# Patient Record
Sex: FEMALE | Race: WHITE | Employment: OTHER | URBAN - NONMETROPOLITAN AREA
[De-identification: names, ages, dates, MRNs, and addresses within clinical notes are randomized per-mention and may not be internally consistent; named-entity substitution may affect disease eponyms.]

---

## 2017-03-15 ENCOUNTER — OFFICE VISIT (OUTPATIENT)
Dept: ONCOLOGY | Age: 62
End: 2017-03-15

## 2017-03-15 VITALS
SYSTOLIC BLOOD PRESSURE: 149 MMHG | HEIGHT: 58 IN | DIASTOLIC BLOOD PRESSURE: 85 MMHG | HEART RATE: 83 BPM | WEIGHT: 116.8 LBS | BODY MASS INDEX: 24.52 KG/M2 | RESPIRATION RATE: 16 BRPM | TEMPERATURE: 97.9 F | OXYGEN SATURATION: 97 %

## 2017-03-15 DIAGNOSIS — M85.80 OSTEOPENIA: ICD-10-CM

## 2017-03-15 DIAGNOSIS — Z98.890 S/P LUMPECTOMY, LEFT BREAST: ICD-10-CM

## 2017-03-15 DIAGNOSIS — Z79.811 PROPHYLACTIC USE OF ANASTROZOLE (ARIMIDEX): ICD-10-CM

## 2017-03-15 DIAGNOSIS — C50.212 MALIGNANT NEOPLASM OF UPPER-INNER QUADRANT OF LEFT FEMALE BREAST (HCC): Primary | ICD-10-CM

## 2017-03-15 PROCEDURE — 99214 OFFICE O/P EST MOD 30 MIN: CPT | Performed by: INTERNAL MEDICINE

## 2017-03-15 RX ORDER — IBUPROFEN 200 MG
200 TABLET ORAL EVERY 6 HOURS PRN
Status: ON HOLD | COMMUNITY
End: 2018-06-13 | Stop reason: HOSPADM

## 2017-03-15 ASSESSMENT — ENCOUNTER SYMPTOMS
EYE DISCHARGE: 0
CHEST TIGHTNESS: 0
BLOOD IN STOOL: 0
CONSTIPATION: 0
FACIAL SWELLING: 0
COUGH: 0
ABDOMINAL DISTENTION: 0
VOMITING: 0
SORE THROAT: 0
TROUBLE SWALLOWING: 0
COLOR CHANGE: 0
DIARRHEA: 0
BACK PAIN: 0
ABDOMINAL PAIN: 0
WHEEZING: 0
SHORTNESS OF BREATH: 0
NAUSEA: 0
RECTAL PAIN: 0

## 2017-05-10 ENCOUNTER — OFFICE VISIT (OUTPATIENT)
Dept: FAMILY MEDICINE CLINIC | Age: 62
End: 2017-05-10

## 2017-05-10 VITALS
HEART RATE: 65 BPM | BODY MASS INDEX: 24.14 KG/M2 | TEMPERATURE: 98.6 F | RESPIRATION RATE: 14 BRPM | SYSTOLIC BLOOD PRESSURE: 126 MMHG | HEIGHT: 58 IN | DIASTOLIC BLOOD PRESSURE: 74 MMHG | WEIGHT: 115 LBS

## 2017-05-10 DIAGNOSIS — J40 BRONCHITIS: ICD-10-CM

## 2017-05-10 DIAGNOSIS — I10 ESSENTIAL HYPERTENSION: ICD-10-CM

## 2017-05-10 DIAGNOSIS — G56.03 BILATERAL CARPAL TUNNEL SYNDROME: Primary | ICD-10-CM

## 2017-05-10 DIAGNOSIS — F17.200 NICOTINE DEPENDENCE, UNCOMPLICATED, UNSPECIFIED NICOTINE PRODUCT TYPE: ICD-10-CM

## 2017-05-10 PROCEDURE — 99214 OFFICE O/P EST MOD 30 MIN: CPT | Performed by: FAMILY MEDICINE

## 2017-05-10 RX ORDER — ALBUTEROL SULFATE 90 UG/1
2 AEROSOL, METERED RESPIRATORY (INHALATION) EVERY 6 HOURS PRN
Qty: 1 INHALER | Refills: 3 | Status: SHIPPED | OUTPATIENT
Start: 2017-05-10 | End: 2018-05-31 | Stop reason: SDUPTHER

## 2017-07-19 ENCOUNTER — HOSPITAL ENCOUNTER (OUTPATIENT)
Dept: INFUSION THERAPY | Age: 62
Discharge: HOME | End: 2017-07-19
Payer: COMMERCIAL

## 2017-07-19 ENCOUNTER — OFFICE VISIT (OUTPATIENT)
Dept: ONCOLOGY | Age: 62
End: 2017-07-19
Payer: COMMERCIAL

## 2017-07-19 VITALS
RESPIRATION RATE: 18 BRPM | HEART RATE: 80 BPM | DIASTOLIC BLOOD PRESSURE: 73 MMHG | HEIGHT: 57 IN | BODY MASS INDEX: 24.47 KG/M2 | OXYGEN SATURATION: 96 % | SYSTOLIC BLOOD PRESSURE: 109 MMHG | TEMPERATURE: 97.6 F | WEIGHT: 113.4 LBS

## 2017-07-19 DIAGNOSIS — Z79.811 PROPHYLACTIC USE OF ANASTROZOLE (ARIMIDEX): ICD-10-CM

## 2017-07-19 DIAGNOSIS — M85.80 OSTEOPENIA: ICD-10-CM

## 2017-07-19 DIAGNOSIS — C50.212 MALIGNANT NEOPLASM OF UPPER-INNER QUADRANT OF LEFT FEMALE BREAST (HCC): Primary | ICD-10-CM

## 2017-07-19 DIAGNOSIS — Z98.890 S/P LUMPECTOMY, LEFT BREAST: ICD-10-CM

## 2017-07-19 PROCEDURE — 99214 OFFICE O/P EST MOD 30 MIN: CPT | Performed by: INTERNAL MEDICINE

## 2017-07-19 PROCEDURE — 99211 OFF/OP EST MAY X REQ PHY/QHP: CPT

## 2017-07-19 RX ORDER — ANASTROZOLE 1 MG/1
1 TABLET ORAL DAILY
Qty: 90 TABLET | Refills: 3 | Status: SHIPPED | OUTPATIENT
Start: 2017-07-19 | End: 2018-04-16 | Stop reason: SINTOL

## 2017-07-19 ASSESSMENT — ENCOUNTER SYMPTOMS
TROUBLE SWALLOWING: 0
FACIAL SWELLING: 0
DIARRHEA: 0
ABDOMINAL PAIN: 0
COUGH: 0
RECTAL PAIN: 0
CHEST TIGHTNESS: 0
SHORTNESS OF BREATH: 0
EYE DISCHARGE: 0
BACK PAIN: 0
ABDOMINAL DISTENTION: 0
NAUSEA: 0
CONSTIPATION: 0
WHEEZING: 0
SORE THROAT: 0
VOMITING: 0
COLOR CHANGE: 0
BLOOD IN STOOL: 0

## 2017-09-05 ENCOUNTER — OFFICE VISIT (OUTPATIENT)
Dept: FAMILY MEDICINE CLINIC | Age: 62
End: 2017-09-05
Payer: COMMERCIAL

## 2017-09-05 ENCOUNTER — HOSPITAL ENCOUNTER (OUTPATIENT)
Age: 62
Discharge: HOME | End: 2017-09-05
Payer: COMMERCIAL

## 2017-09-05 VITALS
DIASTOLIC BLOOD PRESSURE: 74 MMHG | HEART RATE: 58 BPM | TEMPERATURE: 98.3 F | BODY MASS INDEX: 24.72 KG/M2 | HEIGHT: 57 IN | SYSTOLIC BLOOD PRESSURE: 125 MMHG | RESPIRATION RATE: 12 BRPM | WEIGHT: 114.6 LBS

## 2017-09-05 DIAGNOSIS — R29.898 RIGHT LEG WEAKNESS: Primary | ICD-10-CM

## 2017-09-05 DIAGNOSIS — J01.90 ACUTE RHINOSINUSITIS: ICD-10-CM

## 2017-09-05 DIAGNOSIS — R29.898 RIGHT HAND WEAKNESS: ICD-10-CM

## 2017-09-05 DIAGNOSIS — R29.898 RIGHT LEG WEAKNESS: ICD-10-CM

## 2017-09-05 LAB
ALBUMIN SERPL-MCNC: 4.4 G/DL (ref 3.5–5.1)
ALP BLD-CCNC: 86 U/L (ref 38–126)
ALT SERPL-CCNC: 12 U/L (ref 11–66)
ANION GAP SERPL CALCULATED.3IONS-SCNC: 14 MEQ/L (ref 8–16)
AST SERPL-CCNC: 14 U/L (ref 5–40)
BASOPHILS # BLD: 1.2 %
BASOPHILS ABSOLUTE: 0.1 THOU/MM3 (ref 0–0.1)
BILIRUB SERPL-MCNC: 0.5 MG/DL (ref 0.3–1.2)
BUN BLDV-MCNC: 15 MG/DL (ref 7–22)
CALCIUM SERPL-MCNC: 10.8 MG/DL (ref 8.5–10.5)
CHLORIDE BLD-SCNC: 102 MEQ/L (ref 98–111)
CO2: 26 MEQ/L (ref 23–33)
CREAT SERPL-MCNC: 0.5 MG/DL (ref 0.4–1.2)
EOSINOPHIL # BLD: 1.2 %
EOSINOPHILS ABSOLUTE: 0.1 THOU/MM3 (ref 0–0.4)
GFR SERPL CREATININE-BSD FRML MDRD: > 90 ML/MIN/1.73M2
GLUCOSE BLD-MCNC: 89 MG/DL (ref 70–108)
HCT VFR BLD CALC: 44.4 % (ref 37–47)
HEMOGLOBIN: 15 GM/DL (ref 12–16)
LYMPHOCYTES # BLD: 18.5 %
LYMPHOCYTES ABSOLUTE: 2.1 THOU/MM3 (ref 1–4.8)
MCH RBC QN AUTO: 30.6 PG (ref 27–31)
MCHC RBC AUTO-ENTMCNC: 33.8 GM/DL (ref 33–37)
MCV RBC AUTO: 90.6 FL (ref 81–99)
MONOCYTES # BLD: 7 %
MONOCYTES ABSOLUTE: 0.8 THOU/MM3 (ref 0.4–1.3)
NUCLEATED RED BLOOD CELLS: 0 /100 WBC
PDW BLD-RTO: 13.2 % (ref 11.5–14.5)
PLATELET # BLD: 284 THOU/MM3 (ref 130–400)
PMV BLD AUTO: 8.8 MCM (ref 7.4–10.4)
POTASSIUM SERPL-SCNC: 4.2 MEQ/L (ref 3.5–5.2)
RBC # BLD: 4.9 MILL/MM3 (ref 4.2–5.4)
RBC # BLD: NORMAL 10*6/UL
SEG NEUTROPHILS: 72.1 %
SEGMENTED NEUTROPHILS ABSOLUTE COUNT: 8.3 THOU/MM3 (ref 1.8–7.7)
SODIUM BLD-SCNC: 142 MEQ/L (ref 135–145)
TOTAL PROTEIN: 7.4 G/DL (ref 6.1–8)
TSH SERPL DL<=0.05 MIU/L-ACNC: 1.51 UIU/ML (ref 0.4–4.2)
VITAMIN B-12: 377 PG/ML (ref 211–911)
WBC # BLD: 11.5 THOU/MM3 (ref 4.8–10.8)

## 2017-09-05 PROCEDURE — 36415 COLL VENOUS BLD VENIPUNCTURE: CPT

## 2017-09-05 PROCEDURE — 99214 OFFICE O/P EST MOD 30 MIN: CPT | Performed by: FAMILY MEDICINE

## 2017-09-05 PROCEDURE — 82607 VITAMIN B-12: CPT

## 2017-09-05 PROCEDURE — 80050 GENERAL HEALTH PANEL: CPT

## 2017-09-05 RX ORDER — DOXYCYCLINE HYCLATE 100 MG
100 TABLET ORAL 2 TIMES DAILY
Qty: 20 TABLET | Refills: 0 | Status: SHIPPED | OUTPATIENT
Start: 2017-09-05 | End: 2017-12-27 | Stop reason: SDUPTHER

## 2017-09-06 ENCOUNTER — HOSPITAL ENCOUNTER (OUTPATIENT)
Age: 62
Discharge: HOME | End: 2017-09-06
Payer: COMMERCIAL

## 2017-09-06 ENCOUNTER — TELEPHONE (OUTPATIENT)
Dept: FAMILY MEDICINE CLINIC | Age: 62
End: 2017-09-06

## 2017-09-06 DIAGNOSIS — E83.52 HYPERCALCEMIA: Primary | ICD-10-CM

## 2017-09-06 DIAGNOSIS — E83.52 HYPERCALCEMIA: ICD-10-CM

## 2017-09-06 LAB — PTH INTACT: 17.3 PG/ML (ref 15–65)

## 2017-09-06 PROCEDURE — 83970 ASSAY OF PARATHORMONE: CPT

## 2017-09-06 PROCEDURE — 36415 COLL VENOUS BLD VENIPUNCTURE: CPT

## 2017-09-26 ENCOUNTER — TELEPHONE (OUTPATIENT)
Dept: FAMILY MEDICINE CLINIC | Age: 62
End: 2017-09-26

## 2017-09-26 ENCOUNTER — PROCEDURE VISIT (OUTPATIENT)
Dept: NEUROLOGY | Age: 62
End: 2017-09-26
Payer: COMMERCIAL

## 2017-09-26 DIAGNOSIS — M54.16 RIGHT LUMBAR RADICULOPATHY: Primary | ICD-10-CM

## 2017-09-26 DIAGNOSIS — R29.898 RIGHT LEG WEAKNESS: ICD-10-CM

## 2017-09-26 PROCEDURE — 95908 NRV CNDJ TST 3-4 STUDIES: CPT | Performed by: PSYCHIATRY & NEUROLOGY

## 2017-09-26 PROCEDURE — 95886 MUSC TEST DONE W/N TEST COMP: CPT | Performed by: PSYCHIATRY & NEUROLOGY

## 2017-09-28 ENCOUNTER — HOSPITAL ENCOUNTER (OUTPATIENT)
Age: 62
Discharge: HOME | End: 2017-09-28
Payer: COMMERCIAL

## 2017-09-28 ENCOUNTER — TELEPHONE (OUTPATIENT)
Dept: FAMILY MEDICINE CLINIC | Age: 62
End: 2017-09-28

## 2017-09-28 DIAGNOSIS — E83.52 HYPERCALCEMIA: ICD-10-CM

## 2017-09-28 LAB — CALCIUM SERPL-MCNC: 9.1 MG/DL (ref 8.5–10.5)

## 2017-09-28 PROCEDURE — 82310 ASSAY OF CALCIUM: CPT

## 2017-09-28 PROCEDURE — 36415 COLL VENOUS BLD VENIPUNCTURE: CPT

## 2017-10-05 ENCOUNTER — OFFICE VISIT (OUTPATIENT)
Dept: FAMILY MEDICINE CLINIC | Age: 62
End: 2017-10-05
Payer: COMMERCIAL

## 2017-10-05 VITALS
HEIGHT: 57 IN | RESPIRATION RATE: 10 BRPM | DIASTOLIC BLOOD PRESSURE: 68 MMHG | TEMPERATURE: 98.2 F | HEART RATE: 80 BPM | BODY MASS INDEX: 25.03 KG/M2 | SYSTOLIC BLOOD PRESSURE: 124 MMHG | WEIGHT: 116 LBS

## 2017-10-05 DIAGNOSIS — M54.16 LUMBAR RADICULOPATHY, CHRONIC: ICD-10-CM

## 2017-10-05 DIAGNOSIS — M72.0 DUPUYTREN'S CONTRACTURE OF RIGHT HAND: Primary | ICD-10-CM

## 2017-10-05 PROCEDURE — 99213 OFFICE O/P EST LOW 20 MIN: CPT | Performed by: FAMILY MEDICINE

## 2017-10-05 NOTE — MR AVS SNAPSHOT
24.68 kg/m2 Current Every Day Smoker          Instructions    You may receive a survey about your visit with us today. The feedback from our patients helps us identify what is working well and where the service to all patients can be enhanced. Thank you! Medications and Orders      Your Current Medications Are              Calcium Carb-Cholecalciferol (CALCIUM 600+D) 600-800 MG-UNIT TABS Take 1 tablet by mouth 2 times daily    anastrozole (ARIMIDEX) 1 MG tablet Take 1 tablet by mouth daily    albuterol sulfate HFA (VENTOLIN HFA) 108 (90 BASE) MCG/ACT inhaler Inhale 2 puffs into the lungs every 6 hours as needed for Wheezing    ibuprofen (ADVIL;MOTRIN) 200 MG tablet Take 200 mg by mouth every 6 hours as needed for Pain    amLODIPine (NORVASC) 10 MG tablet Take 1 tablet by mouth daily      Allergies              Naproxen Anaphylaxis    Prednisone Swelling, Other (See Comments)    Angioedema    Ultram [Tramadol] Itching, Rash      We Ordered/Performed the following           External Referral To Orthopedic Surgery     Comments: The patient can be scheduled with any member of the group, including the provider with the first available appointments. Falguni Lowery MD     Scheduling Instructions:    [address], [address] MD  [address] [place] [address],[address] [facility]  [phone]    Comments: The patient can be scheduled with any member of the group, including the provider with the first available appointments.      ?Duputrne's contracture of the right hand         Additional Information        Basic Information     Date Of Birth Sex Race Ethnicity Preferred Language    1955 Female White Non-/Non  English      Problem List as of 10/5/2017  Date Reviewed: 10/5/2017                Lumbar radiculopathy, chronic    Essential hypertension    Bilateral carpal tunnel syndrome    S/P lumpectomy and sln bx, left breast Malignant neoplasm of upper-inner quadrant of left female breast ([facility] [address]    Nicotine dependence    S/P hemorrhoidectomy    Uterine prolapse    Blood in stool    Change in bowel habits    Abdominal pain, left lower quadrant    Hemorrhoids      Immunizations as of 10/5/2017     Name Date    Influenza Vaccine, unspecified formulation 10/13/2016      Preventive Care        Date Due    Hepatitis C screening is recommended for all adults regardless of risk factors born between [facility] at least once (lifetime) who have never been tested. 1955    HIV screening is recommended for all people regardless of risk factors  aged 15-65 years at least once (lifetime) who have never been HIV tested. 2/28/1968    Tetanus Combination Vaccine (1 - Tdap) 2/28/1972    Pneumococcal Vaccine - Pneumovax for adults aged 19-64 years with: chronic heart disease, chronic lung disease, diabetes mellitus, alcoholism, chronic liver disease, or cigarette smoking. (1 of 1 - PPSV23) 2/28/1972    Cholesterol Screening 2/28/1993    Zoster Vaccine 2/28/2013    Yearly Flu Vaccine (1) 9/1/2017    Mammograms are recommended every 2 years for low/average risk patients aged 48 - 69, and every year for high risk patients per updated national guidelines. However these guidelines can be individualized by your provider. 4/13/2018    Colonoscopy 10/27/2024            [employer] Signup           Our records indicate that you have an active [employer] account. You can view your After Visit Summary by going to [email or web address] and logging in with your [employer] username and password. If you don't have a [employer] username and password but a parent or guardian has access to your record, the parent or guardian should login with their own [employer] username and password and access your record to view the After Visit Summary.      Additional Information  If you have questions, please contact the physician practice where you receive care. Remember, [employer] is NOT to be used for urgent needs. For medical emergencies, dial 911. For questions regarding your [email or web address] account call [phone]. If you have a clinical question, please call your doctor's office.

## 2017-10-11 ENCOUNTER — TELEPHONE (OUTPATIENT)
Dept: FAMILY MEDICINE CLINIC | Age: 62
End: 2017-10-11

## 2017-10-11 ENCOUNTER — HOSPITAL ENCOUNTER (OUTPATIENT)
Dept: GENERAL RADIOLOGY | Age: 62
Discharge: HOME | End: 2017-10-11
Payer: COMMERCIAL

## 2017-10-11 ENCOUNTER — HOSPITAL ENCOUNTER (OUTPATIENT)
Age: 62
Discharge: HOME | End: 2017-10-11
Payer: COMMERCIAL

## 2017-10-11 DIAGNOSIS — M72.0 DUPUYTREN'S CONTRACTURE OF RIGHT HAND: ICD-10-CM

## 2017-10-11 PROCEDURE — 73130 X-RAY EXAM OF HAND: CPT

## 2017-10-11 NOTE — TELEPHONE ENCOUNTER
----- Message from Rocio Orellana DO sent at 10/11/2017  3:34 PM EDT -----  Kristina Ready with some arthritis changes, otherwise normal, recommend following up with ortho as discussed.

## 2017-10-17 ENCOUNTER — OFFICE VISIT (OUTPATIENT)
Dept: FAMILY MEDICINE CLINIC | Age: 62
End: 2017-10-17
Payer: COMMERCIAL

## 2017-10-17 VITALS
WEIGHT: 116 LBS | HEIGHT: 57 IN | HEART RATE: 60 BPM | DIASTOLIC BLOOD PRESSURE: 66 MMHG | SYSTOLIC BLOOD PRESSURE: 108 MMHG | BODY MASS INDEX: 25.03 KG/M2

## 2017-10-17 DIAGNOSIS — R29.898 HAND DYSFUNCTION: Primary | ICD-10-CM

## 2017-10-17 DIAGNOSIS — M79.641 PAIN OF RIGHT HAND: ICD-10-CM

## 2017-10-17 PROCEDURE — 99213 OFFICE O/P EST LOW 20 MIN: CPT | Performed by: FAMILY MEDICINE

## 2017-10-17 NOTE — PROGRESS NOTES
[facility] PROFESSIONAL SERVS  [facility]  [address] [address]  Dept: [phone]  Dept Fax: [phone]: [phone]  PROGRESS NOTE    Referring provider:  [facility], [address]    Visit Date: 10/17/2017    Sarah Beasley is a 57 y.o. female who presents today for:  Chief Complaint   Patient presents with    Hand Pain     right hand   per Dr Meryle Poet Duputrne's contracture of the right hand       Subjective:  HPI     Right hand: Had decreased ROM in 2nd and 4th MCPs but she is able to fully extend it. No injury. Reports nodules in her palm. Denies triggering of fingers. Seen by PCP. Has had carpal tunnel surgery on right hand. Having problems gripping objects and writing with a pen. Problems with using utensils. Denies neck pain.      is present    Review of Systems  Past Medical History:   Diagnosis Date    Chronic kidney disease     Diverticulosis     Essential hypertension 5/10/2017    GERD (gastroesophageal reflux disease)     IBS (irritable bowel syndrome)     Malignant neoplasm of upper-inner quadrant of left female breast ([address] 9/13/2016    Nausea & vomiting     PONV (postoperative nausea and vomiting)       Past Surgical History:   Procedure Laterality Date    BACK SURGERY  unsure    CARPAL TUNNEL RELEASE Right 06/2017    [facility]    COLONOSCOPY  unsure    Dr. Jewell Murphy COLONOSCOPY  10/10/14    Dr. Tha Saldana  80    [address]  12/16/2014    HYSTERECTOMY, VAGINAL  12/16/2014    KIDNEY STONE SURGERY      TUBAL LIGATION  80    WISDOM TOOTH EXTRACTION  1979    WRIST GANGLION EXCISION Left      Family History   Problem Relation Age of Onset    Cancer Mother      renal cell carcinoma    Kidney Cancer Mother 46    Cirrhosis Father     Diabetes Sister     Kidney Cancer Sister 54    Cancer Sister     Diabetes Brother     Breast Cancer Paternal Aunt 54    Ovarian Cancer Neg Hx      Social History   Substance Use Topics    Smoking status: Current Every Day Smoker     Packs/day: 1.50     Years: 20.00     Types: Cigarettes    Smokeless tobacco: Never Used    Alcohol use No      Current Outpatient Prescriptions   Medication Sig Dispense Refill    anastrozole (ARIMIDEX) 1 MG tablet Take 1 tablet by mouth daily 90 tablet 3    albuterol sulfate HFA (VENTOLIN HFA) 108 (90 BASE) MCG/ACT inhaler Inhale 2 puffs into the lungs every 6 hours as needed for Wheezing 1 Inhaler 3    ibuprofen (ADVIL;MOTRIN) 200 MG tablet Take 200 mg by mouth every 6 hours as needed for Pain      amLODIPine (NORVASC) 10 MG tablet Take 1 tablet by mouth daily 90 tablet 3     No current facility-administered medications for this visit. Allergies   Allergen Reactions    Naproxen Anaphylaxis    Prednisone Swelling and Other (See Comments)     Angioedema    Ultram [Tramadol] Itching and Rash     Health Maintenance   Topic Date Due    Hepatitis C screen  1955    HIV screen  02/28/1968    DTaP/Tdap/Td vaccine (1 - Tdap) 02/28/1972    Pneumococcal med risk (1 of 1 - PPSV23) 02/28/1972    Lipid screen  02/28/1993    Zostavax vaccine  02/28/2013    Breast cancer screen  04/13/2018    Colon cancer screen colonoscopy  10/27/2024    Flu vaccine  Completed       Objective:     /66 (Site: Left Arm, Position: Sitting, Cuff Size: Medium Adult)   Pulse 60   Ht 4' 9\" (1.448 m)   Wt 116 lb (52.6 kg)   BMI 25.10 kg/m²   Physical Exam   Constitutional: She is oriented to person, place, and time. She appears well-developed and well-nourished. No distress. Neurological: She is alert and oriented to person, place, and time. Psychiatric: She has a normal mood and affect. Her behavior is normal.   Vitals reviewed. Right hand:  No swelling. Several nodules in palm of hand. Full ROM of all fingers in flexion and extension. Good  strength.   Some extensor tendon slipping of fingers 2-5 when flexing fingers. Distal sensation and pulses are intact in Right hand. Impression/Plan:  1. Hand dysfunction  -right hand pain with decreased strength and decreased ROM. Possible Dupuytren's? Unlikely trigger finger. No loss of extension.  -referral to [facility] for further eval.  She declines OT eval.  - [facility] [place] [facility] Marrero MD    2. Pain of right hand  - [facility] [place] [facility] Marrero MD    They voiced understanding. All questions answered. They agreed with treatment plan. Return if symptoms worsen or fail to improve.        Electronically signed by Balbina Diaz MD on 10/17/2017 at 2:21 PM

## 2017-10-26 ENCOUNTER — HOSPITAL ENCOUNTER (OUTPATIENT)
Dept: RADIATION ONCOLOGY | Age: 62
Discharge: HOME | End: 2017-10-26
Payer: COMMERCIAL

## 2017-10-26 DIAGNOSIS — Z17.0 MALIGNANT NEOPLASM OF UPPER-INNER QUADRANT OF LEFT BREAST IN FEMALE, ESTROGEN RECEPTOR POSITIVE (HCC): Primary | ICD-10-CM

## 2017-10-26 DIAGNOSIS — C50.212 MALIGNANT NEOPLASM OF UPPER-INNER QUADRANT OF LEFT BREAST IN FEMALE, ESTROGEN RECEPTOR POSITIVE (HCC): Primary | ICD-10-CM

## 2017-10-26 PROCEDURE — 99211 OFF/OP EST MAY X REQ PHY/QHP: CPT

## 2017-10-26 NOTE — PROGRESS NOTES
[address] [place] [address]  [address], [address]  Phone: [phone]   Toll Free: [phone]   Fax: [phone]    RADIATION ONCOLOGY FOLLOW UP REPORT    PATIENT NAME:  Maria Del Carmen Tanner     : 1955  MEDICAL RECORD NO: [id number]    LOCATION: [facility] NO: [id number]      PROVIDER: Jossie Hill CNP        DATE OF SERVICE: 10/26/2017    FOLLOW UP PHYSICIANS: Dr. Raad Land, Dr. Mode Pandey, Dr. Mesha Gibson, Dr. Malena Ellsworth, Dr. Buck Vora    DIAGNOSIS: C 50.212 malignant neoplasm of upper inner quadrant of the left breast, stage IA, T1b N0 M0, ER positive, SC positive, HER-2/eleazar negative, with extensive intraductal component    DATE OF DIAGNOSIS: 2016    END OF TREATMENT DATE: 2016    ECOG PERFORMANCE STATUS: 0    PAIN: Occasional twinges to the left breast    CHAPERONE: Spouse      HPI: Laura Thomson underwent a screening mammogram in 2016 which returned concerning for an irregular density with indistinct margins in the upper left breast.  Ultrasound confirmed the presence of the suspicious density biopsy was recommended. On 2016 a biopsy was performed confirming the presence of an invasive ductal carcinoma. After discussing her options And elected to undergo breast conservation surgery. She underwent lumpectomy and sentinel lymph node biopsy 2016. Final pathology showing moderately differentiated invasive ductal carcinoma with high-grade DCIS. Surgical margins were free of invasion and sentinel lymph node was negative for malignancy. She was referred to radiation oncology to discuss the role of radiation therapy in the management of her breast cancer. She elected to undergo radiation therapy. She tolerated treatment well without any unexpected side effects.     INTERVAL HISTORY: Returns to [facility] 10/26/2017 First posttreatment checkup as part of her survivorship care after undergoing breast conservation surgery and Without any adenopathy. LYMPH: There is no supraclavicular or axillary adenopathy. LUNGS: Clear no adventitious sounds. HEART: Regular rate, rhythm without murmur. BREASTS: Right breast is soft, nontender. There are no suspicious masses lumps or architectural distortions. Nipple is everted without discharge. Left breast is soft, nontender to palpation. Surgical incision sites are well-healed without signs of infection. There are no suspicious masses or lumps with palpation. There is post surgical architectural distortion. Nipple is everted without discharge. There is mild soft tissue swelling. There is no erythema. Breast is warm to touch. ABDOMEN: Soft, flat nontender active bowel sounds ×4  EXTREMITIES: Without clubbing or cyanosis. She has full ROM to bilateral upper extremities. She does have decreased hand grasp to the left hand. Radial pulses are 2+. NEUROLOGIC EXAMINATION: cranial nerves grossly intact. ASSESSMENT AND PLAN: Ena Elizabeth continues to recuperate from her left breast radiation therapy. At this time she has no physical or mammographic evidence of recurrent disease. She continues to have intermittent left breast discomfort and fatigue. During her last visit attempted schedule her with lymphedema but she wanted to wait until she had carpal tunnel surgery completed. Unfortunately the surgery was not successful and she is scheduled to see Dr. Kristyn Tinajero at [facility] at the [facility] for further evaluation and management. Discussed physical therapy with her but she would like to wait until her evaluations at [facility] are completed first.     Plan:   1. Currently there is no physical or mammographic evidence of recurrent disease. 2. Will schedule bilateral mammogram to be competed now. 3. Fatigue/left breast tenderness/warmth: discussed physical therapy but would like to wait until evaluations are completed at [facility] the [facility]. 4. Arimidex: tolerating well  5.  DEXA scan: completed 12/2016 osteopenia was on calcium however ca level was elevated and calcium is on hold and being monitored. 6. Continue follow up with other physicians as scheduled. 7. Follow up here in 6 months, she is aware she can call prior to this appointment for any questions or concerns.        Electronically signed by Nabeel Smalls CNP on 10/26/17 at 2:37 PM

## 2017-10-30 ENCOUNTER — HOSPITAL ENCOUNTER (OUTPATIENT)
Dept: WOMENS IMAGING | Age: 62
Discharge: HOME | End: 2017-10-30
Payer: COMMERCIAL

## 2017-10-30 DIAGNOSIS — C50.212 MALIGNANT NEOPLASM OF UPPER-INNER QUADRANT OF LEFT BREAST IN FEMALE, ESTROGEN RECEPTOR POSITIVE (HCC): ICD-10-CM

## 2017-10-30 DIAGNOSIS — Z17.0 MALIGNANT NEOPLASM OF UPPER-INNER QUADRANT OF LEFT BREAST IN FEMALE, ESTROGEN RECEPTOR POSITIVE (HCC): ICD-10-CM

## 2017-10-30 PROCEDURE — G0204 DX MAMMO INCL CAD BI: HCPCS

## 2017-12-05 ENCOUNTER — TELEPHONE (OUTPATIENT)
Dept: FAMILY MEDICINE CLINIC | Age: 62
End: 2017-12-05

## 2017-12-05 NOTE — TELEPHONE ENCOUNTER
Pt informed and verbalized understanding. she going to wait a couple of days to see what happens and make an appointment if she doesn't get better.

## 2017-12-26 DIAGNOSIS — I10 ESSENTIAL HYPERTENSION: ICD-10-CM

## 2017-12-26 RX ORDER — AMLODIPINE BESYLATE 10 MG/1
10 TABLET ORAL DAILY
Qty: 90 TABLET | Refills: 3 | Status: ON HOLD | OUTPATIENT
Start: 2017-12-26 | End: 2018-10-29 | Stop reason: HOSPADM

## 2017-12-27 ENCOUNTER — OFFICE VISIT (OUTPATIENT)
Dept: FAMILY MEDICINE CLINIC | Age: 62
End: 2017-12-27
Payer: COMMERCIAL

## 2017-12-27 VITALS
BODY MASS INDEX: 24.47 KG/M2 | WEIGHT: 113.4 LBS | SYSTOLIC BLOOD PRESSURE: 118 MMHG | HEIGHT: 57 IN | OXYGEN SATURATION: 97 % | DIASTOLIC BLOOD PRESSURE: 82 MMHG | TEMPERATURE: 98.5 F | HEART RATE: 88 BPM | RESPIRATION RATE: 12 BRPM

## 2017-12-27 DIAGNOSIS — J01.90 ACUTE RHINOSINUSITIS: ICD-10-CM

## 2017-12-27 PROCEDURE — 99213 OFFICE O/P EST LOW 20 MIN: CPT | Performed by: FAMILY MEDICINE

## 2017-12-27 RX ORDER — BENZONATATE 200 MG/1
200 CAPSULE ORAL 3 TIMES DAILY PRN
Qty: 30 CAPSULE | Refills: 0 | Status: SHIPPED | OUTPATIENT
Start: 2017-12-27 | End: 2018-01-06

## 2017-12-27 RX ORDER — DOXYCYCLINE HYCLATE 100 MG
100 TABLET ORAL 2 TIMES DAILY
Qty: 20 TABLET | Refills: 0 | Status: SHIPPED | OUTPATIENT
Start: 2017-12-27 | End: 2018-01-06

## 2017-12-27 NOTE — PROGRESS NOTES
(1.448 m)   Wt 113 lb 6.4 oz (51.4 kg)   SpO2 97%   BMI 24.53 kg/m²   General appearance: alert, well appearing, and in no distress. ENT exam reveals - neck without nodes, pharynx erythematous without exudate and nasal mucosa congested. CVS exam: normal rate, regular rhythm, normal S1, S2, no murmurs, rubs, clicks or gallops. Chest:clear to auscultation, no wheezes, rales or rhonchi, symmetric air entry. Abdominal exam: soft, nontender, nondistended, no masses or organomegaly. Extremities:  No clubbing, cyanosis or edema  Skin exam - normal coloration and turgor, no rashes, no suspicious skin lesions noted. Psych -  Affect appropriate. Thought process is normal without evidence of depression or psychosis. Good insight and appropriae interaction. Cognition and memory appear to be intact. ASSESSMENT & PLAN  Laura Thomson was seen today for drainage, cough and headache. Diagnoses and all orders for this visit:    Acute rhinosinusitis  -     doxycycline hyclate (VIBRA-TABS) 100 MG tablet; Take 1 tablet by mouth 2 times daily for 10 days  -     benzonatate (TESSALON) 200 MG capsule; Take 1 capsule by mouth 3 times daily as needed for Cough        Return if symptoms worsen or fail to improve.     -Patient advised to call immediately or go to ER if any worsening of symptoms  -Patient counseled on conservative care including fluids, rest and OTC meds    Laura Thomson received counseling on the following healthy behaviors: medication adherence  Reviewed prior labs and health maintenance. Continue current medications, diet and exercise. Discussed use, benefit, and side effects of prescribed medications. Barriers to medication compliance addressed. Patient given educational materials - see patient instructions. All patient questions answered. Patient voiced understanding. I have reviewed this patient's history, habits, and medication list and have updated the chart where appropriate.

## 2018-02-27 DIAGNOSIS — R29.898 HAND DYSFUNCTION: Primary | ICD-10-CM

## 2018-03-14 ENCOUNTER — OFFICE VISIT (OUTPATIENT)
Dept: ONCOLOGY | Age: 63
End: 2018-03-14
Payer: MEDICARE

## 2018-03-14 ENCOUNTER — HOSPITAL ENCOUNTER (OUTPATIENT)
Dept: INFUSION THERAPY | Age: 63
Discharge: HOME | End: 2018-03-14
Payer: MEDICARE

## 2018-03-14 VITALS
SYSTOLIC BLOOD PRESSURE: 114 MMHG | WEIGHT: 112.8 LBS | HEIGHT: 57 IN | OXYGEN SATURATION: 95 % | DIASTOLIC BLOOD PRESSURE: 74 MMHG | RESPIRATION RATE: 16 BRPM | BODY MASS INDEX: 24.34 KG/M2 | HEART RATE: 82 BPM | TEMPERATURE: 98.1 F

## 2018-03-14 DIAGNOSIS — F17.219 NICOTINE DEPENDENCE, CIGARETTES, WITH UNSPECIFIED NICOTINE-INDUCED DISORDERS: Primary | ICD-10-CM

## 2018-03-14 DIAGNOSIS — Z98.890 S/P LUMPECTOMY, LEFT BREAST: ICD-10-CM

## 2018-03-14 DIAGNOSIS — Z79.811 PROPHYLACTIC USE OF ANASTROZOLE (ARIMIDEX): ICD-10-CM

## 2018-03-14 DIAGNOSIS — Z17.0 MALIGNANT NEOPLASM OF UPPER-INNER QUADRANT OF LEFT BREAST IN FEMALE, ESTROGEN RECEPTOR POSITIVE (HCC): ICD-10-CM

## 2018-03-14 DIAGNOSIS — C50.212 MALIGNANT NEOPLASM OF UPPER-INNER QUADRANT OF LEFT BREAST IN FEMALE, ESTROGEN RECEPTOR POSITIVE (HCC): ICD-10-CM

## 2018-03-14 PROCEDURE — G8400 PT W/DXA NO RESULTS DOC: HCPCS | Performed by: INTERNAL MEDICINE

## 2018-03-14 PROCEDURE — 99211 OFF/OP EST MAY X REQ PHY/QHP: CPT

## 2018-03-14 PROCEDURE — 3014F SCREEN MAMMO DOC REV: CPT | Performed by: INTERNAL MEDICINE

## 2018-03-14 PROCEDURE — G0296 VISIT TO DETERM LDCT ELIG: HCPCS | Performed by: INTERNAL MEDICINE

## 2018-03-14 PROCEDURE — 99214 OFFICE O/P EST MOD 30 MIN: CPT | Performed by: INTERNAL MEDICINE

## 2018-03-14 PROCEDURE — 4040F PNEUMOC VAC/ADMIN/RCVD: CPT | Performed by: INTERNAL MEDICINE

## 2018-03-14 PROCEDURE — 1090F PRES/ABSN URINE INCON ASSESS: CPT | Performed by: INTERNAL MEDICINE

## 2018-03-14 PROCEDURE — 3017F COLORECTAL CA SCREEN DOC REV: CPT | Performed by: INTERNAL MEDICINE

## 2018-03-14 PROCEDURE — G8482 FLU IMMUNIZE ORDER/ADMIN: HCPCS | Performed by: INTERNAL MEDICINE

## 2018-03-14 PROCEDURE — 1123F ACP DISCUSS/DSCN MKR DOCD: CPT | Performed by: INTERNAL MEDICINE

## 2018-03-14 PROCEDURE — 4004F PT TOBACCO SCREEN RCVD TLK: CPT | Performed by: INTERNAL MEDICINE

## 2018-03-14 PROCEDURE — G8427 DOCREV CUR MEDS BY ELIG CLIN: HCPCS | Performed by: INTERNAL MEDICINE

## 2018-03-14 PROCEDURE — G8420 CALC BMI NORM PARAMETERS: HCPCS | Performed by: INTERNAL MEDICINE

## 2018-03-14 ASSESSMENT — ENCOUNTER SYMPTOMS
BLOOD IN STOOL: 0
TROUBLE SWALLOWING: 0
BACK PAIN: 0
DIARRHEA: 0
NAUSEA: 0
SORE THROAT: 0
ABDOMINAL DISTENTION: 0
FACIAL SWELLING: 0
SHORTNESS OF BREATH: 0
VOMITING: 0
EYE DISCHARGE: 0
CONSTIPATION: 0
COLOR CHANGE: 0
COUGH: 0
ABDOMINAL PAIN: 0
WHEEZING: 0
CHEST TIGHTNESS: 0
RECTAL PAIN: 0

## 2018-03-14 NOTE — PROGRESS NOTES
[facility] PROFESSIONAL SERVS  [facility]  [address]  [address] [address],[address]  [address] [address]  Dept: [phone]  Dept Fax: [phone]: [phone]     Visit Date: 3/14/2018     Sejal Queen is a 70 y.o. female who presents today for:   Chief Complaint   Patient presents with   Margarito Colon        HPI   Mrs. Craol Davis is a 57-year-old female with stage I B left breast cancer September 2016. The patient had digital screening bilateral mammogram on August 31, 2016 that showed irregular density in the left breast that was indeterminate but confirmed on the compression and lateral medial views. On September 9, 2016 she underwent left breast upper inner quadrant core biopsy that showed invasive ductal carcinoma grade 2. IHC was positive for estrogen receptor expression in 95% of cells, and progesterone receptor expression in 80% of cells. Subsequently the patient met with the surgeon Dr. Mustapha Schulte and after discussing her treatment options, she decided to proceed with left breast lumpectomy and sentinel lymph node mapping and biopsy. Her surgery was performed on September 29, 2016. Final pathology report showed:  Specimen Laterality  Left  Tumor Site: Invasive Carcinoma  Upper inner quadrant  Tumor Size: Size of Largest Invasive Carcinoma  Greatest dimension of largest focus of invasion  > 1 mm:  0.9 cm  Histologic Type  Invasive ductal carcinoma, no special type.   Histologic Grade: Johnny Histologic Score  Glandular (Acinar)/Tubular Differentiation  Score 3:  < 10% of tumor area forming glandular/tubular structures  Overall Grade  Grade 2: scores of 6 or 7  Tumor Focality (required only if more than 1 focus of invasive  carcinoma is present)  Single focus of invasive carcinoma  Ductal Carcinoma In Situ (DCIS)  DCIS is present  Negative for extensive intraductal component (EIC)  Size (Extent) of DCIS  Estimated size (extent) of DCIS (greatest dimension using history on March 14, 2018: The patient reports severe fatigue. She believes that Arimidex and is responsible for her tiredness. Her hot flashes are stable. She denies having any arthralgia. She has Dupuytren contracture in her right hand for which she underwent recent surgery.     Past Medical History:   Diagnosis Date    Chronic kidney disease     Diverticulosis     Essential hypertension 5/10/2017    GERD (gastroesophageal reflux disease)     IBS (irritable bowel syndrome)     Malignant neoplasm of upper-inner quadrant of left female breast ([address] 9/13/2016    Nausea & vomiting     PONV (postoperative nausea and vomiting)       Past Surgical History:   Procedure Laterality Date    BACK SURGERY  unsure    CARPAL TUNNEL RELEASE Right 06/2017    [facility]    COLONOSCOPY  unsure    Dr. Huffman Batch COLONOSCOPY  10/10/14    Dr. Lopez Countess  80    [address]  12/16/2014    HYSTERECTOMY, VAGINAL  12/16/2014    KIDNEY STONE SURGERY      TUBAL LIGATION  80    WISDOM TOOTH EXTRACTION  1979    WRIST GANGLION EXCISION Left       Family History   Problem Relation Age of Onset    Cancer Mother      renal cell carcinoma    Kidney Cancer Mother 46    Cirrhosis Father     Diabetes Sister     Kidney Cancer Sister 54    Cancer Sister     Diabetes Brother     Breast Cancer Paternal Aunt 54    Ovarian Cancer Neg Hx       Social History   Substance Use Topics    Smoking status: Current Every Day Smoker     Packs/day: 1.50     Years: 20.00     Types: Cigarettes    Smokeless tobacco: Never Used    Alcohol use No      Current Outpatient Prescriptions   Medication Sig Dispense Refill    amLODIPine (NORVASC) 10 MG tablet Take 1 tablet by mouth daily 90 tablet 3    anastrozole (ARIMIDEX) 1 MG tablet Take 1 tablet by mouth daily 90 tablet 3    albuterol sulfate HFA (VENTOLIN HFA) 108 (90 BASE) MCG/ACT inhaler Inhale 2 puffs into the lungs every 6 hours as needed for Negative for dizziness, tremors, seizures, speech difficulty, weakness, light-headedness, numbness and headaches. Hematological: Negative for adenopathy. Does not bruise/bleed easily. Psychiatric/Behavioral: Negative for confusion and sleep disturbance. The patient is not nervous/anxious. Objective:   Physical Exam   Constitutional: She is oriented to person, place, and time. She appears well-developed and well-nourished. No distress. HENT:   Head: Normocephalic. Mouth/Throat: Oropharynx is clear and moist. No oropharyngeal exudate. Eyes: EOM are normal. Pupils are equal, round, and reactive to light. Right eye exhibits no discharge. Left eye exhibits no discharge. No scleral icterus. Neck: Normal range of motion. Neck supple. No JVD present. No tracheal deviation present. No thyromegaly present. Cardiovascular: Normal rate and normal heart sounds. Exam reveals no gallop and no friction rub. No murmur heard. Pulmonary/Chest: Effort normal and breath sounds normal. No stridor. No respiratory distress. She has no wheezes. She has no rales. She exhibits no tenderness. Abdominal: Soft. Bowel sounds are normal. She exhibits no distension and no mass. There is no tenderness. There is no rebound. Musculoskeletal: Normal range of motion. She exhibits no edema. Lymphadenopathy:     She has no cervical adenopathy. Neurological: She is alert and oriented to person, place, and time. She has normal reflexes. No cranial nerve deficit. She exhibits normal muscle tone. Skin: Skin is warm. No rash noted. No erythema. Psychiatric: She has a normal mood and affect. Her behavior is normal. Judgment and thought content normal.   Vitals reviewed.      /74 (Site: Right Arm, Position: Sitting, Cuff Size: Medium Adult)   Pulse 82   Temp 98.1 °F (36.7 °C) (Oral)   Resp 16   Ht 4' 9.01\" (1.448 m)   Wt 112 lb 12.8 oz (51.2 kg)   SpO2 95%   BMI 24.40 kg/m²      DEXA study in December 2016 showed:  IMPRESSION: OSTEOPENIA   Patient is at medium risk for fracture. Mammogram On October 30, 2017 showed: Benign findings, BIRADS category 2  Assessment:   1. Stage I B, ER positive, IN positive, HER-2/eleazar negative left breast cancer. The patient is status post a left lumpectomy with sentinel lymph node biopsy. Her breast cancer had multiple good prognostic features: 9 mm in size, strong estrogen receptor expression, strong progesterone receptor expression, HER-2/eleazar negativity. The only concerning feature of her breast cancer was high (40%) Ki-67 proliferation index. Therefore her breast cancer was assessed by Breast Oncotype Dx assay. It showed recurrent score of 20, translating into 13% risk of distant disease recurrence during the next 10 years. Decision was made  not to proceed with adjuvant chemotherapy. She completed adjuvant radiation treatment to the reminder of her left breast on December 4, 2016.    2. Adjuvant hormonal therapy. The patient started Adjuvant hormonal therapy with Arimidex in December 2016. She reports worsening fatigue. She blames Arimidex for her tiredness. Therefore she was instructed to stop taking Arimidex for 1 month. We will reevaluate her after this break. No evidence of distant disease recurrence on today's physical examination. Her annual breast mammogram in October 2017 was benign   3. Osteopenia. The patient was instructed to take calcium with vitamin D. She also received recommendation to exercise on a regular basis. Recommendation is to walk 3-4 times per week. 4.  The patient is a heavy smoker.   She agreed to have lung screening with low dose CT of the chest.    She will return to clinic in 1 month, all her questions were answered  Bri Molina MD    Low Dose CT (LDCT) Lung Screening criteria met   Age 50-69   Pack year smoking >30   Still smoking or less than 15 year since quit   No sign or symptoms of lung cancer   > 11 months since last LDCT

## 2018-03-14 NOTE — PATIENT INSTRUCTIONS
1.  Low dose computed tomography for lung cancer screening. 2.  RTC in one month. What is lung cancer screening? Lung cancer screening is a way in which doctors check the lungs for early signs of cancer in people who have no symptoms of lung cancer. A low-dose CT scan uses much less radiation than a normal CT scan and shows a more detailed image of the lungs than a standard X-ray. The goal of lung cancer screening is to find cancer early, before it has a chance to grow, spread, or cause problems. One large study found that smokers who were screened with low-dose CT scans were less likely to die of lung cancer than those who were screened with standard X-ray. Below is a summary of the things you need to know regarding screening for lung cancer with low-dose computed tomography (LDCT). This is a screening program that involves routine annual screening with LDCT studies of the lung. The LDCTs are done using low-dose radiation that is not thought to increase your cancer risk. If you have other serious medical conditions (other cancers, congestive heart failure) that limit your life expectancy to less than 10 years, you should not undergo lung cancer screening with LDCT. The chance is 20%-60% that the LDCT result will show abnormalities. This would require additional testing which could include repeat imaging or even invasive procedures. Most (about 95%) of \"abnormal\" LDCT results are false in the sense that no lung cancer is ultimately found. Additionally, some (about 10%) of the cancers found would not affect your life expectancy, even if undetected and untreated. If you are still smoking, the single most important thing that you can do to reduce your risk of dying of lung cancer is to quit. For this screening to be covered by Medicare and most other insurers, strict criteria must be met.   If you do not meet these criteria, but still wish to undergo LDCT testing, you will be required to sign a waiver indicating your willingness to pay for the scan. 1.

## 2018-04-05 ENCOUNTER — OFFICE VISIT (OUTPATIENT)
Dept: FAMILY MEDICINE CLINIC | Age: 63
End: 2018-04-05
Payer: MEDICARE

## 2018-04-05 ENCOUNTER — TELEPHONE (OUTPATIENT)
Dept: FAMILY MEDICINE CLINIC | Age: 63
End: 2018-04-05

## 2018-04-05 ENCOUNTER — HOSPITAL ENCOUNTER (OUTPATIENT)
Age: 63
Discharge: HOME | End: 2018-04-05
Payer: MEDICARE

## 2018-04-05 VITALS
HEIGHT: 58 IN | TEMPERATURE: 98.6 F | HEART RATE: 78 BPM | DIASTOLIC BLOOD PRESSURE: 76 MMHG | SYSTOLIC BLOOD PRESSURE: 128 MMHG | BODY MASS INDEX: 23.93 KG/M2 | WEIGHT: 114 LBS | RESPIRATION RATE: 14 BRPM

## 2018-04-05 DIAGNOSIS — R25.2 LEG CRAMPING: ICD-10-CM

## 2018-04-05 DIAGNOSIS — R60.9 DEPENDENT EDEMA: ICD-10-CM

## 2018-04-05 DIAGNOSIS — I10 ESSENTIAL HYPERTENSION: ICD-10-CM

## 2018-04-05 DIAGNOSIS — R60.9 DEPENDENT EDEMA: Primary | ICD-10-CM

## 2018-04-05 LAB
ANION GAP SERPL CALCULATED.3IONS-SCNC: 14 MEQ/L (ref 8–16)
BUN BLDV-MCNC: 18 MG/DL (ref 7–22)
CALCIUM SERPL-MCNC: 10.1 MG/DL (ref 8.5–10.5)
CHLORIDE BLD-SCNC: 106 MEQ/L (ref 98–111)
CO2: 26 MEQ/L (ref 23–33)
CREAT SERPL-MCNC: 0.4 MG/DL (ref 0.4–1.2)
GFR SERPL CREATININE-BSD FRML MDRD: > 90 ML/MIN/1.73M2
GLUCOSE BLD-MCNC: 109 MG/DL (ref 70–108)
POTASSIUM SERPL-SCNC: 3.6 MEQ/L (ref 3.5–5.2)
SODIUM BLD-SCNC: 146 MEQ/L (ref 135–145)
TOTAL CK: 50 U/L (ref 30–135)

## 2018-04-05 PROCEDURE — G8420 CALC BMI NORM PARAMETERS: HCPCS | Performed by: FAMILY MEDICINE

## 2018-04-05 PROCEDURE — G8400 PT W/DXA NO RESULTS DOC: HCPCS | Performed by: FAMILY MEDICINE

## 2018-04-05 PROCEDURE — 99213 OFFICE O/P EST LOW 20 MIN: CPT | Performed by: FAMILY MEDICINE

## 2018-04-05 PROCEDURE — 82550 ASSAY OF CK (CPK): CPT

## 2018-04-05 PROCEDURE — 3014F SCREEN MAMMO DOC REV: CPT | Performed by: FAMILY MEDICINE

## 2018-04-05 PROCEDURE — 36415 COLL VENOUS BLD VENIPUNCTURE: CPT

## 2018-04-05 PROCEDURE — 4004F PT TOBACCO SCREEN RCVD TLK: CPT | Performed by: FAMILY MEDICINE

## 2018-04-05 PROCEDURE — 80048 BASIC METABOLIC PNL TOTAL CA: CPT

## 2018-04-05 PROCEDURE — 4040F PNEUMOC VAC/ADMIN/RCVD: CPT | Performed by: FAMILY MEDICINE

## 2018-04-05 PROCEDURE — 3017F COLORECTAL CA SCREEN DOC REV: CPT | Performed by: FAMILY MEDICINE

## 2018-04-05 PROCEDURE — G8427 DOCREV CUR MEDS BY ELIG CLIN: HCPCS | Performed by: FAMILY MEDICINE

## 2018-04-05 PROCEDURE — 1090F PRES/ABSN URINE INCON ASSESS: CPT | Performed by: FAMILY MEDICINE

## 2018-04-05 PROCEDURE — 1123F ACP DISCUSS/DSCN MKR DOCD: CPT | Performed by: FAMILY MEDICINE

## 2018-04-05 ASSESSMENT — PATIENT HEALTH QUESTIONNAIRE - PHQ9
SUM OF ALL RESPONSES TO PHQ QUESTIONS 1-9: 0
1. LITTLE INTEREST OR PLEASURE IN DOING THINGS: 0
2. FEELING DOWN, DEPRESSED OR HOPELESS: 0
SUM OF ALL RESPONSES TO PHQ9 QUESTIONS 1 & 2: 0

## 2018-04-09 ENCOUNTER — HOSPITAL ENCOUNTER (OUTPATIENT)
Dept: CT IMAGING | Age: 63
Discharge: HOME | End: 2018-04-09
Payer: MEDICARE

## 2018-04-09 DIAGNOSIS — F17.219 NICOTINE DEPENDENCE, CIGARETTES, WITH UNSPECIFIED NICOTINE-INDUCED DISORDERS: ICD-10-CM

## 2018-04-09 PROCEDURE — G0297 LDCT FOR LUNG CA SCREEN: HCPCS

## 2018-04-15 ASSESSMENT — ENCOUNTER SYMPTOMS
TROUBLE SWALLOWING: 0
CONSTIPATION: 0
SORE THROAT: 0
DIARRHEA: 0
COLOR CHANGE: 0
NAUSEA: 0
VOMITING: 0
ABDOMINAL PAIN: 0
SHORTNESS OF BREATH: 0
FACIAL SWELLING: 0
COUGH: 0
WHEEZING: 0
CHEST TIGHTNESS: 0
BLOOD IN STOOL: 0
BACK PAIN: 0
EYE DISCHARGE: 0
ABDOMINAL DISTENTION: 0
RECTAL PAIN: 0

## 2018-04-16 ENCOUNTER — HOSPITAL ENCOUNTER (OUTPATIENT)
Dept: INFUSION THERAPY | Age: 63
Discharge: HOME | End: 2018-04-16
Payer: MEDICARE

## 2018-04-16 ENCOUNTER — OFFICE VISIT (OUTPATIENT)
Dept: ONCOLOGY | Age: 63
End: 2018-04-16
Payer: MEDICARE

## 2018-04-16 VITALS
WEIGHT: 114.4 LBS | DIASTOLIC BLOOD PRESSURE: 80 MMHG | SYSTOLIC BLOOD PRESSURE: 120 MMHG | OXYGEN SATURATION: 97 % | HEART RATE: 82 BPM | HEIGHT: 58 IN | TEMPERATURE: 98.3 F | RESPIRATION RATE: 16 BRPM | BODY MASS INDEX: 24.01 KG/M2

## 2018-04-16 DIAGNOSIS — Z17.0 MALIGNANT NEOPLASM OF UPPER-INNER QUADRANT OF LEFT BREAST IN FEMALE, ESTROGEN RECEPTOR POSITIVE (HCC): Primary | ICD-10-CM

## 2018-04-16 DIAGNOSIS — Z79.811 PROPHYLACTIC USE OF LETROZOLE (FEMARA): ICD-10-CM

## 2018-04-16 DIAGNOSIS — F17.219 NICOTINE DEPENDENCE, CIGARETTES, WITH UNSPECIFIED NICOTINE-INDUCED DISORDERS: ICD-10-CM

## 2018-04-16 DIAGNOSIS — Z98.890 S/P LUMPECTOMY, LEFT BREAST: ICD-10-CM

## 2018-04-16 DIAGNOSIS — C50.212 MALIGNANT NEOPLASM OF UPPER-INNER QUADRANT OF LEFT BREAST IN FEMALE, ESTROGEN RECEPTOR POSITIVE (HCC): Primary | ICD-10-CM

## 2018-04-16 PROCEDURE — 1090F PRES/ABSN URINE INCON ASSESS: CPT | Performed by: INTERNAL MEDICINE

## 2018-04-16 PROCEDURE — 3014F SCREEN MAMMO DOC REV: CPT | Performed by: INTERNAL MEDICINE

## 2018-04-16 PROCEDURE — 99211 OFF/OP EST MAY X REQ PHY/QHP: CPT

## 2018-04-16 PROCEDURE — G8427 DOCREV CUR MEDS BY ELIG CLIN: HCPCS | Performed by: INTERNAL MEDICINE

## 2018-04-16 PROCEDURE — 99214 OFFICE O/P EST MOD 30 MIN: CPT | Performed by: INTERNAL MEDICINE

## 2018-04-16 PROCEDURE — G8400 PT W/DXA NO RESULTS DOC: HCPCS | Performed by: INTERNAL MEDICINE

## 2018-04-16 PROCEDURE — G8420 CALC BMI NORM PARAMETERS: HCPCS | Performed by: INTERNAL MEDICINE

## 2018-04-16 PROCEDURE — 4040F PNEUMOC VAC/ADMIN/RCVD: CPT | Performed by: INTERNAL MEDICINE

## 2018-04-16 PROCEDURE — 4004F PT TOBACCO SCREEN RCVD TLK: CPT | Performed by: INTERNAL MEDICINE

## 2018-04-16 PROCEDURE — 1123F ACP DISCUSS/DSCN MKR DOCD: CPT | Performed by: INTERNAL MEDICINE

## 2018-04-16 PROCEDURE — 3017F COLORECTAL CA SCREEN DOC REV: CPT | Performed by: INTERNAL MEDICINE

## 2018-04-16 RX ORDER — LETROZOLE 2.5 MG/1
2.5 TABLET, FILM COATED ORAL DAILY
Qty: 30 TABLET | Refills: 3 | Status: SHIPPED | OUTPATIENT
Start: 2018-04-16 | End: 2018-07-16 | Stop reason: SDUPTHER

## 2018-05-03 ENCOUNTER — HOSPITAL ENCOUNTER (OUTPATIENT)
Dept: MRI IMAGING | Age: 63
Discharge: HOME | End: 2018-05-03
Payer: MEDICARE

## 2018-05-03 DIAGNOSIS — M48.07 SPINAL STENOSIS OF LUMBOSACRAL REGION: ICD-10-CM

## 2018-05-03 DIAGNOSIS — M50.322 OTHER CERVICAL DISC DEGENERATION AT C5-C6 LEVEL: ICD-10-CM

## 2018-05-03 PROCEDURE — 72141 MRI NECK SPINE W/O DYE: CPT

## 2018-05-03 PROCEDURE — 72148 MRI LUMBAR SPINE W/O DYE: CPT

## 2018-05-18 ENCOUNTER — HOSPITAL ENCOUNTER (OUTPATIENT)
Dept: PREADMISSION TESTING | Age: 63
Discharge: HOME | End: 2018-05-22
Payer: MEDICARE

## 2018-05-18 ENCOUNTER — HOSPITAL ENCOUNTER (OUTPATIENT)
Dept: GENERAL RADIOLOGY | Age: 63
Discharge: HOME | End: 2018-05-18
Payer: MEDICARE

## 2018-05-18 ENCOUNTER — HOSPITAL ENCOUNTER (OUTPATIENT)
Age: 63
Discharge: HOME | End: 2018-05-18
Payer: MEDICARE

## 2018-05-18 VITALS
RESPIRATION RATE: 16 BRPM | WEIGHT: 116.84 LBS | TEMPERATURE: 97.6 F | SYSTOLIC BLOOD PRESSURE: 138 MMHG | OXYGEN SATURATION: 97 % | HEART RATE: 72 BPM | HEIGHT: 58 IN | DIASTOLIC BLOOD PRESSURE: 81 MMHG | BODY MASS INDEX: 24.53 KG/M2

## 2018-05-18 DIAGNOSIS — Z01.818 PRE-OP TESTING: ICD-10-CM

## 2018-05-18 LAB
ANION GAP SERPL CALCULATED.3IONS-SCNC: 11 MEQ/L (ref 8–16)
APTT: 33.4 SECONDS (ref 22–38)
BUN BLDV-MCNC: 18 MG/DL (ref 7–22)
CALCIUM SERPL-MCNC: 9.3 MG/DL (ref 8.5–10.5)
CHLORIDE BLD-SCNC: 107 MEQ/L (ref 98–111)
CO2: 25 MEQ/L (ref 23–33)
CREAT SERPL-MCNC: 0.4 MG/DL (ref 0.4–1.2)
EKG ATRIAL RATE: 69 BPM
EKG P AXIS: 72 DEGREES
EKG P-R INTERVAL: 138 MS
EKG Q-T INTERVAL: 418 MS
EKG QRS DURATION: 100 MS
EKG QTC CALCULATION (BAZETT): 447 MS
EKG R AXIS: 101 DEGREES
EKG T AXIS: 67 DEGREES
EKG VENTRICULAR RATE: 69 BPM
GFR SERPL CREATININE-BSD FRML MDRD: > 90 ML/MIN/1.73M2
GLUCOSE BLD-MCNC: 93 MG/DL (ref 70–108)
HCT VFR BLD CALC: 41.1 % (ref 37–47)
HEMOGLOBIN: 14.1 GM/DL (ref 12–16)
INR BLD: 0.92 (ref 0.85–1.13)
MCH RBC QN AUTO: 30.9 PG (ref 27–31)
MCHC RBC AUTO-ENTMCNC: 34.2 GM/DL (ref 33–37)
MCV RBC AUTO: 90.2 FL (ref 81–99)
MRSA NASAL SCREEN RT-PCR: NEGATIVE
PDW BLD-RTO: 12.7 % (ref 11.5–14.5)
PLATELET # BLD: 274 THOU/MM3 (ref 130–400)
PMV BLD AUTO: 8.3 FL (ref 7.4–10.4)
POTASSIUM SERPL-SCNC: 3.7 MEQ/L (ref 3.5–5.2)
RBC # BLD: 4.56 MILL/MM3 (ref 4.2–5.4)
SODIUM BLD-SCNC: 143 MEQ/L (ref 135–145)
STAPH AUREUS SCREEN RT-PCR: NEGATIVE
WBC # BLD: 6.3 THOU/MM3 (ref 4.8–10.8)

## 2018-05-18 PROCEDURE — 71046 X-RAY EXAM CHEST 2 VIEWS: CPT

## 2018-05-18 PROCEDURE — 87641 MR-STAPH DNA AMP PROBE: CPT

## 2018-05-18 PROCEDURE — 93005 ELECTROCARDIOGRAM TRACING: CPT | Performed by: ORTHOPAEDIC SURGERY

## 2018-05-18 PROCEDURE — 80048 BASIC METABOLIC PNL TOTAL CA: CPT

## 2018-05-18 PROCEDURE — 87081 CULTURE SCREEN ONLY: CPT

## 2018-05-18 PROCEDURE — 85730 THROMBOPLASTIN TIME PARTIAL: CPT

## 2018-05-18 PROCEDURE — 93010 ELECTROCARDIOGRAM REPORT: CPT | Performed by: INTERNAL MEDICINE

## 2018-05-18 PROCEDURE — 85027 COMPLETE CBC AUTOMATED: CPT

## 2018-05-18 PROCEDURE — 36415 COLL VENOUS BLD VENIPUNCTURE: CPT

## 2018-05-18 PROCEDURE — 85610 PROTHROMBIN TIME: CPT

## 2018-05-18 ASSESSMENT — PAIN DESCRIPTION - PROGRESSION
CLINICAL_PROGRESSION: GRADUALLY WORSENING
CLINICAL_PROGRESSION_2: GRADUALLY WORSENING

## 2018-05-18 ASSESSMENT — PAIN SCALES - GENERAL: PAINLEVEL_OUTOF10: 2

## 2018-05-18 ASSESSMENT — PAIN DESCRIPTION - DESCRIPTORS
DESCRIPTORS_2: CONSTANT;SHARP;SORE
DESCRIPTORS: ACHING;SORE

## 2018-05-18 ASSESSMENT — PAIN DESCRIPTION - ORIENTATION
ORIENTATION_2: LOWER
ORIENTATION: RIGHT

## 2018-05-18 ASSESSMENT — PAIN DESCRIPTION - ONSET
ONSET: PROGRESSIVE
ONSET_2: ON-GOING

## 2018-05-18 ASSESSMENT — PAIN DESCRIPTION - LOCATION
LOCATION: HAND;NECK
LOCATION_2: BACK

## 2018-05-18 ASSESSMENT — PAIN DESCRIPTION - INTENSITY: RATING_2: 5

## 2018-05-18 ASSESSMENT — PAIN DESCRIPTION - PAIN TYPE: TYPE: CHRONIC PAIN

## 2018-05-18 ASSESSMENT — PAIN DESCRIPTION - FREQUENCY: FREQUENCY: CONTINUOUS

## 2018-05-18 ASSESSMENT — PAIN DESCRIPTION - DURATION: DURATION_2: CONTINUOUS

## 2018-05-20 LAB — MRSA SCREEN: NORMAL

## 2018-05-23 RX ORDER — BUPIVACAINE HYDROCHLORIDE 2.5 MG/ML
5 INJECTION, SOLUTION EPIDURAL; INFILTRATION; INTRACAUDAL ONCE
Status: CANCELLED | OUTPATIENT
Start: 2018-05-23 | End: 2018-05-23

## 2018-05-23 RX ORDER — METHYLPREDNISOLONE ACETATE 80 MG/ML
80 INJECTION, SUSPENSION INTRA-ARTICULAR; INTRALESIONAL; INTRAMUSCULAR; SOFT TISSUE ONCE
Status: CANCELLED | OUTPATIENT
Start: 2018-05-23 | End: 2018-05-23

## 2018-05-24 ENCOUNTER — HOSPITAL ENCOUNTER (OUTPATIENT)
Dept: INTERVENTIONAL RADIOLOGY/VASCULAR | Age: 63
Discharge: HOME | End: 2018-05-24
Payer: MEDICARE

## 2018-05-24 VITALS
RESPIRATION RATE: 18 BRPM | TEMPERATURE: 98.3 F | SYSTOLIC BLOOD PRESSURE: 139 MMHG | HEART RATE: 84 BPM | DIASTOLIC BLOOD PRESSURE: 82 MMHG | OXYGEN SATURATION: 97 %

## 2018-05-24 PROCEDURE — 2500000003 HC RX 250 WO HCPCS

## 2018-05-24 PROCEDURE — 6360000002 HC RX W HCPCS

## 2018-05-24 PROCEDURE — 62323 NJX INTERLAMINAR LMBR/SAC: CPT | Performed by: RADIOLOGY

## 2018-05-24 PROCEDURE — 6360000004 HC RX CONTRAST MEDICATION: Performed by: RADIOLOGY

## 2018-05-24 RX ORDER — METHYLPREDNISOLONE ACETATE 80 MG/ML
80 INJECTION, SUSPENSION INTRA-ARTICULAR; INTRALESIONAL; INTRAMUSCULAR; SOFT TISSUE ONCE
Status: COMPLETED | OUTPATIENT
Start: 2018-05-24 | End: 2018-05-24

## 2018-05-24 RX ORDER — BUPIVACAINE HYDROCHLORIDE 2.5 MG/ML
5 INJECTION, SOLUTION EPIDURAL; INFILTRATION; INTRACAUDAL ONCE
Status: COMPLETED | OUTPATIENT
Start: 2018-05-24 | End: 2018-05-24

## 2018-05-24 RX ADMIN — IOHEXOL 1 ML: 180 INJECTION INTRAVENOUS at 11:20

## 2018-05-24 RX ADMIN — BUPIVACAINE HYDROCHLORIDE 2 ML: 2.5 INJECTION, SOLUTION EPIDURAL; INFILTRATION; INTRACAUDAL at 11:20

## 2018-05-24 RX ADMIN — METHYLPREDNISOLONE ACETATE 80 MG: 80 INJECTION, SUSPENSION INTRA-ARTICULAR; INTRALESIONAL; INTRAMUSCULAR; SOFT TISSUE at 11:20

## 2018-05-24 ASSESSMENT — PAIN DESCRIPTION - LOCATION: LOCATION: NECK

## 2018-05-24 ASSESSMENT — PAIN SCALES - GENERAL
PAINLEVEL_OUTOF10: 0
PAINLEVEL_OUTOF10: 8

## 2018-05-24 ASSESSMENT — PAIN DESCRIPTION - PAIN TYPE: TYPE: CHRONIC PAIN

## 2018-05-31 ENCOUNTER — OFFICE VISIT (OUTPATIENT)
Dept: FAMILY MEDICINE CLINIC | Age: 63
End: 2018-05-31
Payer: MEDICARE

## 2018-05-31 VITALS
BODY MASS INDEX: 23.72 KG/M2 | TEMPERATURE: 98.6 F | DIASTOLIC BLOOD PRESSURE: 60 MMHG | SYSTOLIC BLOOD PRESSURE: 118 MMHG | WEIGHT: 113 LBS | HEIGHT: 58 IN | RESPIRATION RATE: 12 BRPM | HEART RATE: 84 BPM

## 2018-05-31 DIAGNOSIS — Z01.818 PRE-OP EVALUATION: Primary | ICD-10-CM

## 2018-05-31 DIAGNOSIS — J44.9 CHRONIC OBSTRUCTIVE PULMONARY DISEASE, UNSPECIFIED COPD TYPE (HCC): ICD-10-CM

## 2018-05-31 DIAGNOSIS — M48.02 SPINAL STENOSIS OF CERVICAL REGION: ICD-10-CM

## 2018-05-31 DIAGNOSIS — I10 ESSENTIAL HYPERTENSION: ICD-10-CM

## 2018-05-31 DIAGNOSIS — F17.200 NICOTINE DEPENDENCE, UNCOMPLICATED, UNSPECIFIED NICOTINE PRODUCT TYPE: ICD-10-CM

## 2018-05-31 PROCEDURE — 99213 OFFICE O/P EST LOW 20 MIN: CPT | Performed by: FAMILY MEDICINE

## 2018-05-31 PROCEDURE — 3023F SPIROM DOC REV: CPT | Performed by: FAMILY MEDICINE

## 2018-05-31 PROCEDURE — G8420 CALC BMI NORM PARAMETERS: HCPCS | Performed by: FAMILY MEDICINE

## 2018-05-31 PROCEDURE — G8926 SPIRO NO PERF OR DOC: HCPCS | Performed by: FAMILY MEDICINE

## 2018-05-31 PROCEDURE — G8427 DOCREV CUR MEDS BY ELIG CLIN: HCPCS | Performed by: FAMILY MEDICINE

## 2018-05-31 PROCEDURE — 3017F COLORECTAL CA SCREEN DOC REV: CPT | Performed by: FAMILY MEDICINE

## 2018-05-31 PROCEDURE — 1090F PRES/ABSN URINE INCON ASSESS: CPT | Performed by: FAMILY MEDICINE

## 2018-05-31 RX ORDER — ALBUTEROL SULFATE 90 UG/1
2 AEROSOL, METERED RESPIRATORY (INHALATION) EVERY 6 HOURS PRN
Qty: 1 INHALER | Refills: 3 | Status: SHIPPED | OUTPATIENT
Start: 2018-05-31 | End: 2018-10-08 | Stop reason: SDUPTHER

## 2018-06-12 ENCOUNTER — ANESTHESIA (OUTPATIENT)
Dept: OPERATING ROOM | Age: 63
End: 2018-06-12
Payer: MEDICARE

## 2018-06-12 ENCOUNTER — HOSPITAL ENCOUNTER (OUTPATIENT)
Age: 63
Discharge: HOME | End: 2018-06-13
Attending: ORTHOPAEDIC SURGERY | Admitting: ORTHOPAEDIC SURGERY
Payer: MEDICARE

## 2018-06-12 ENCOUNTER — ANESTHESIA EVENT (OUTPATIENT)
Dept: OPERATING ROOM | Age: 63
End: 2018-06-12
Payer: MEDICARE

## 2018-06-12 ENCOUNTER — APPOINTMENT (OUTPATIENT)
Dept: GENERAL RADIOLOGY | Age: 63
End: 2018-06-12
Attending: ORTHOPAEDIC SURGERY
Payer: MEDICARE

## 2018-06-12 VITALS
RESPIRATION RATE: 1 BRPM | OXYGEN SATURATION: 100 % | TEMPERATURE: 96.3 F | SYSTOLIC BLOOD PRESSURE: 105 MMHG | DIASTOLIC BLOOD PRESSURE: 57 MMHG

## 2018-06-12 PROBLEM — M48.02 CERVICAL SPINAL STENOSIS: Status: ACTIVE | Noted: 2018-06-12

## 2018-06-12 LAB
ABO: NORMAL
ANTIBODY SCREEN: NORMAL
RH FACTOR: NORMAL

## 2018-06-12 PROCEDURE — 6370000000 HC RX 637 (ALT 250 FOR IP): Performed by: ORTHOPAEDIC SURGERY

## 2018-06-12 PROCEDURE — C1713 ANCHOR/SCREW BN/BN,TIS/BN: HCPCS | Performed by: ORTHOPAEDIC SURGERY

## 2018-06-12 PROCEDURE — 6370000000 HC RX 637 (ALT 250 FOR IP): Performed by: NURSE ANESTHETIST, CERTIFIED REGISTERED

## 2018-06-12 PROCEDURE — 72020 X-RAY EXAM OF SPINE 1 VIEW: CPT

## 2018-06-12 PROCEDURE — 3700000001 HC ADD 15 MINUTES (ANESTHESIA): Performed by: ORTHOPAEDIC SURGERY

## 2018-06-12 PROCEDURE — 2500000003 HC RX 250 WO HCPCS: Performed by: ORTHOPAEDIC SURGERY

## 2018-06-12 PROCEDURE — 36415 COLL VENOUS BLD VENIPUNCTURE: CPT

## 2018-06-12 PROCEDURE — 6360000002 HC RX W HCPCS: Performed by: PHYSICIAN ASSISTANT

## 2018-06-12 PROCEDURE — 96361 HYDRATE IV INFUSION ADD-ON: CPT

## 2018-06-12 PROCEDURE — 2720000010 HC SURG SUPPLY STERILE: Performed by: ORTHOPAEDIC SURGERY

## 2018-06-12 PROCEDURE — 6370000000 HC RX 637 (ALT 250 FOR IP): Performed by: PHYSICIAN ASSISTANT

## 2018-06-12 PROCEDURE — 96375 TX/PRO/DX INJ NEW DRUG ADDON: CPT

## 2018-06-12 PROCEDURE — 3700000000 HC ANESTHESIA ATTENDED CARE: Performed by: ORTHOPAEDIC SURGERY

## 2018-06-12 PROCEDURE — 2500000003 HC RX 250 WO HCPCS: Performed by: NURSE ANESTHETIST, CERTIFIED REGISTERED

## 2018-06-12 PROCEDURE — 86900 BLOOD TYPING SEROLOGIC ABO: CPT

## 2018-06-12 PROCEDURE — 6360000002 HC RX W HCPCS

## 2018-06-12 PROCEDURE — 96374 THER/PROPH/DIAG INJ IV PUSH: CPT

## 2018-06-12 PROCEDURE — 3600000015 HC SURGERY LEVEL 5 ADDTL 15MIN: Performed by: ORTHOPAEDIC SURGERY

## 2018-06-12 PROCEDURE — 6360000002 HC RX W HCPCS: Performed by: ANESTHESIOLOGY

## 2018-06-12 PROCEDURE — 95940 IONM IN OPERATNG ROOM 15 MIN: CPT | Performed by: ORTHOPAEDIC SURGERY

## 2018-06-12 PROCEDURE — 96360 HYDRATION IV INFUSION INIT: CPT

## 2018-06-12 PROCEDURE — 6370000000 HC RX 637 (ALT 250 FOR IP)

## 2018-06-12 PROCEDURE — 7100000001 HC PACU RECOVERY - ADDTL 15 MIN: Performed by: ORTHOPAEDIC SURGERY

## 2018-06-12 PROCEDURE — 2580000003 HC RX 258: Performed by: ORTHOPAEDIC SURGERY

## 2018-06-12 PROCEDURE — 7100000000 HC PACU RECOVERY - FIRST 15 MIN: Performed by: ORTHOPAEDIC SURGERY

## 2018-06-12 PROCEDURE — 96365 THER/PROPH/DIAG IV INF INIT: CPT

## 2018-06-12 PROCEDURE — 6360000002 HC RX W HCPCS: Performed by: NURSE ANESTHETIST, CERTIFIED REGISTERED

## 2018-06-12 PROCEDURE — 3600000005 HC SURGERY LEVEL 5 BASE: Performed by: ORTHOPAEDIC SURGERY

## 2018-06-12 PROCEDURE — C9359 IMPLNT,BON VOID FILLER-PUTTY: HCPCS | Performed by: ORTHOPAEDIC SURGERY

## 2018-06-12 PROCEDURE — 2580000003 HC RX 258: Performed by: PHYSICIAN ASSISTANT

## 2018-06-12 PROCEDURE — 86850 RBC ANTIBODY SCREEN: CPT

## 2018-06-12 PROCEDURE — 86901 BLOOD TYPING SEROLOGIC RH(D): CPT

## 2018-06-12 DEVICE — SCREW 3120415 4.0 X 15 SELF DRILL FIX
Type: IMPLANTABLE DEVICE | Site: NECK | Status: FUNCTIONAL
Brand: ATLANTIS® ANTERIOR CERVICAL PLATE SYSTEM

## 2018-06-12 DEVICE — GRAFT BNE SUB W11XH8XL14MM CORT INTBDY FUS FRZ DRY BLK SPCR: Type: IMPLANTABLE DEVICE | Site: NECK | Status: FUNCTIONAL

## 2018-06-12 DEVICE — DBM 005001 PROGENIX DBM 1CC SRVC FEE
Type: IMPLANTABLE DEVICE | Site: NECK | Status: FUNCTIONAL
Brand: PROGENIX® PUTTY AND PROGENIX® PLUS

## 2018-06-12 RX ORDER — MORPHINE SULFATE 2 MG/ML
2 INJECTION, SOLUTION INTRAMUSCULAR; INTRAVENOUS
Status: DISCONTINUED | OUTPATIENT
Start: 2018-06-12 | End: 2018-06-13 | Stop reason: HOSPADM

## 2018-06-12 RX ORDER — DOCUSATE SODIUM 100 MG/1
100 CAPSULE, LIQUID FILLED ORAL 2 TIMES DAILY
Status: DISCONTINUED | OUTPATIENT
Start: 2018-06-12 | End: 2018-06-13 | Stop reason: HOSPADM

## 2018-06-12 RX ORDER — LETROZOLE 2.5 MG/1
2.5 TABLET, FILM COATED ORAL DAILY
Status: DISCONTINUED | OUTPATIENT
Start: 2018-06-13 | End: 2018-06-13 | Stop reason: HOSPADM

## 2018-06-12 RX ORDER — ROCURONIUM BROMIDE 10 MG/ML
INJECTION, SOLUTION INTRAVENOUS PRN
Status: DISCONTINUED | OUTPATIENT
Start: 2018-06-12 | End: 2018-06-12 | Stop reason: SDUPTHER

## 2018-06-12 RX ORDER — ALBUTEROL SULFATE 90 UG/1
AEROSOL, METERED RESPIRATORY (INHALATION) PRN
Status: DISCONTINUED | OUTPATIENT
Start: 2018-06-12 | End: 2018-06-12 | Stop reason: SDUPTHER

## 2018-06-12 RX ORDER — CYCLOBENZAPRINE HCL 10 MG
10 TABLET ORAL 3 TIMES DAILY PRN
Status: DISCONTINUED | OUTPATIENT
Start: 2018-06-12 | End: 2018-06-13 | Stop reason: HOSPADM

## 2018-06-12 RX ORDER — ACETAMINOPHEN 650 MG/1
650 SUPPOSITORY RECTAL EVERY 4 HOURS PRN
Status: DISCONTINUED | OUTPATIENT
Start: 2018-06-12 | End: 2018-06-13 | Stop reason: HOSPADM

## 2018-06-12 RX ORDER — SCOLOPAMINE TRANSDERMAL SYSTEM 1 MG/1
1 PATCH, EXTENDED RELEASE TRANSDERMAL ONCE
Status: DISCONTINUED | OUTPATIENT
Start: 2018-06-12 | End: 2018-06-13 | Stop reason: HOSPADM

## 2018-06-12 RX ORDER — ACETAMINOPHEN 325 MG/1
650 TABLET ORAL EVERY 4 HOURS PRN
Status: DISCONTINUED | OUTPATIENT
Start: 2018-06-12 | End: 2018-06-13 | Stop reason: HOSPADM

## 2018-06-12 RX ORDER — OXYCODONE HYDROCHLORIDE AND ACETAMINOPHEN 5; 325 MG/1; MG/1
1 TABLET ORAL EVERY 4 HOURS PRN
Status: DISCONTINUED | OUTPATIENT
Start: 2018-06-12 | End: 2018-06-13 | Stop reason: HOSPADM

## 2018-06-12 RX ORDER — ONDANSETRON 2 MG/ML
INJECTION INTRAMUSCULAR; INTRAVENOUS
Status: COMPLETED
Start: 2018-06-12 | End: 2018-06-12

## 2018-06-12 RX ORDER — SODIUM CHLORIDE 9 MG/ML
INJECTION, SOLUTION INTRAVENOUS CONTINUOUS
Status: DISCONTINUED | OUTPATIENT
Start: 2018-06-12 | End: 2018-06-12

## 2018-06-12 RX ORDER — OXYCODONE HYDROCHLORIDE AND ACETAMINOPHEN 5; 325 MG/1; MG/1
2 TABLET ORAL EVERY 4 HOURS PRN
Status: DISCONTINUED | OUTPATIENT
Start: 2018-06-12 | End: 2018-06-13 | Stop reason: HOSPADM

## 2018-06-12 RX ORDER — SUCCINYLCHOLINE CHLORIDE 20 MG/ML
INJECTION INTRAMUSCULAR; INTRAVENOUS PRN
Status: DISCONTINUED | OUTPATIENT
Start: 2018-06-12 | End: 2018-06-12 | Stop reason: SDUPTHER

## 2018-06-12 RX ORDER — MORPHINE SULFATE 2 MG/ML
INJECTION, SOLUTION INTRAMUSCULAR; INTRAVENOUS
Status: COMPLETED
Start: 2018-06-12 | End: 2018-06-12

## 2018-06-12 RX ORDER — SODIUM CHLORIDE 0.9 % (FLUSH) 0.9 %
10 SYRINGE (ML) INJECTION PRN
Status: DISCONTINUED | OUTPATIENT
Start: 2018-06-12 | End: 2018-06-12 | Stop reason: HOSPADM

## 2018-06-12 RX ORDER — PROPOFOL 10 MG/ML
INJECTION, EMULSION INTRAVENOUS PRN
Status: DISCONTINUED | OUTPATIENT
Start: 2018-06-12 | End: 2018-06-12 | Stop reason: SDUPTHER

## 2018-06-12 RX ORDER — FAMOTIDINE 20 MG/1
20 TABLET, FILM COATED ORAL 2 TIMES DAILY
Status: DISCONTINUED | OUTPATIENT
Start: 2018-06-12 | End: 2018-06-13 | Stop reason: HOSPADM

## 2018-06-12 RX ORDER — SODIUM CHLORIDE 9 MG/ML
INJECTION, SOLUTION INTRAVENOUS CONTINUOUS
Status: DISCONTINUED | OUTPATIENT
Start: 2018-06-12 | End: 2018-06-13 | Stop reason: HOSPADM

## 2018-06-12 RX ORDER — FENTANYL CITRATE 50 UG/ML
INJECTION, SOLUTION INTRAMUSCULAR; INTRAVENOUS PRN
Status: DISCONTINUED | OUTPATIENT
Start: 2018-06-12 | End: 2018-06-12 | Stop reason: SDUPTHER

## 2018-06-12 RX ORDER — DIPHENHYDRAMINE HYDROCHLORIDE 50 MG/ML
INJECTION INTRAMUSCULAR; INTRAVENOUS PRN
Status: DISCONTINUED | OUTPATIENT
Start: 2018-06-12 | End: 2018-06-12 | Stop reason: SDUPTHER

## 2018-06-12 RX ORDER — SODIUM CHLORIDE 0.9 % (FLUSH) 0.9 %
10 SYRINGE (ML) INJECTION EVERY 12 HOURS SCHEDULED
Status: DISCONTINUED | OUTPATIENT
Start: 2018-06-12 | End: 2018-06-13 | Stop reason: HOSPADM

## 2018-06-12 RX ORDER — SODIUM CHLORIDE 0.9 % (FLUSH) 0.9 %
10 SYRINGE (ML) INJECTION EVERY 12 HOURS SCHEDULED
Status: DISCONTINUED | OUTPATIENT
Start: 2018-06-12 | End: 2018-06-12 | Stop reason: HOSPADM

## 2018-06-12 RX ORDER — BISACODYL 10 MG
10 SUPPOSITORY, RECTAL RECTAL DAILY PRN
Status: DISCONTINUED | OUTPATIENT
Start: 2018-06-12 | End: 2018-06-13 | Stop reason: HOSPADM

## 2018-06-12 RX ORDER — FENTANYL CITRATE 50 UG/ML
50 INJECTION, SOLUTION INTRAMUSCULAR; INTRAVENOUS EVERY 5 MIN PRN
Status: DISCONTINUED | OUTPATIENT
Start: 2018-06-12 | End: 2018-06-12 | Stop reason: HOSPADM

## 2018-06-12 RX ORDER — LABETALOL HYDROCHLORIDE 5 MG/ML
10 INJECTION, SOLUTION INTRAVENOUS EVERY 10 MIN PRN
Status: DISCONTINUED | OUTPATIENT
Start: 2018-06-12 | End: 2018-06-12 | Stop reason: HOSPADM

## 2018-06-12 RX ORDER — PROMETHAZINE HYDROCHLORIDE 25 MG/ML
12.5 INJECTION, SOLUTION INTRAMUSCULAR; INTRAVENOUS
Status: DISCONTINUED | OUTPATIENT
Start: 2018-06-12 | End: 2018-06-12 | Stop reason: HOSPADM

## 2018-06-12 RX ORDER — ALBUTEROL SULFATE 90 UG/1
2 AEROSOL, METERED RESPIRATORY (INHALATION) EVERY 6 HOURS PRN
Status: DISCONTINUED | OUTPATIENT
Start: 2018-06-12 | End: 2018-06-13 | Stop reason: HOSPADM

## 2018-06-12 RX ORDER — ONDANSETRON 2 MG/ML
INJECTION INTRAMUSCULAR; INTRAVENOUS PRN
Status: DISCONTINUED | OUTPATIENT
Start: 2018-06-12 | End: 2018-06-12 | Stop reason: SDUPTHER

## 2018-06-12 RX ORDER — SCOLOPAMINE TRANSDERMAL SYSTEM 1 MG/1
PATCH, EXTENDED RELEASE TRANSDERMAL
Status: DISCONTINUED
Start: 2018-06-12 | End: 2018-06-13 | Stop reason: HOSPADM

## 2018-06-12 RX ORDER — LIDOCAINE HYDROCHLORIDE 10 MG/ML
INJECTION, SOLUTION INFILTRATION; PERINEURAL PRN
Status: DISCONTINUED | OUTPATIENT
Start: 2018-06-12 | End: 2018-06-12 | Stop reason: SDUPTHER

## 2018-06-12 RX ORDER — MORPHINE SULFATE 4 MG/ML
4 INJECTION, SOLUTION INTRAMUSCULAR; INTRAVENOUS
Status: DISCONTINUED | OUTPATIENT
Start: 2018-06-12 | End: 2018-06-13 | Stop reason: HOSPADM

## 2018-06-12 RX ORDER — ONDANSETRON 2 MG/ML
4 INJECTION INTRAMUSCULAR; INTRAVENOUS EVERY 6 HOURS PRN
Status: DISCONTINUED | OUTPATIENT
Start: 2018-06-12 | End: 2018-06-13 | Stop reason: HOSPADM

## 2018-06-12 RX ORDER — MIDAZOLAM HYDROCHLORIDE 1 MG/ML
INJECTION INTRAMUSCULAR; INTRAVENOUS PRN
Status: DISCONTINUED | OUTPATIENT
Start: 2018-06-12 | End: 2018-06-12 | Stop reason: SDUPTHER

## 2018-06-12 RX ORDER — AMLODIPINE BESYLATE 10 MG/1
10 TABLET ORAL DAILY
Status: DISCONTINUED | OUTPATIENT
Start: 2018-06-12 | End: 2018-06-13 | Stop reason: HOSPADM

## 2018-06-12 RX ORDER — GLYCOPYRROLATE 1 MG/5 ML
SYRINGE (ML) INTRAVENOUS PRN
Status: DISCONTINUED | OUTPATIENT
Start: 2018-06-12 | End: 2018-06-12 | Stop reason: SDUPTHER

## 2018-06-12 RX ORDER — SODIUM CHLORIDE 0.9 % (FLUSH) 0.9 %
10 SYRINGE (ML) INJECTION PRN
Status: DISCONTINUED | OUTPATIENT
Start: 2018-06-12 | End: 2018-06-13 | Stop reason: HOSPADM

## 2018-06-12 RX ADMIN — DOCUSATE SODIUM 100 MG: 100 CAPSULE, LIQUID FILLED ORAL at 20:34

## 2018-06-12 RX ADMIN — HYDROMORPHONE HYDROCHLORIDE 0.25 MG: 1 INJECTION, SOLUTION INTRAMUSCULAR; INTRAVENOUS; SUBCUTANEOUS at 10:15

## 2018-06-12 RX ADMIN — SODIUM CHLORIDE: 9 INJECTION, SOLUTION INTRAVENOUS at 19:22

## 2018-06-12 RX ADMIN — FENTANYL CITRATE 50 MCG: 50 INJECTION INTRAMUSCULAR; INTRAVENOUS at 09:47

## 2018-06-12 RX ADMIN — FENTANYL CITRATE 50 MCG: 50 INJECTION INTRAMUSCULAR; INTRAVENOUS at 08:50

## 2018-06-12 RX ADMIN — PHENYLEPHRINE HYDROCHLORIDE 100 MCG: 10 INJECTION INTRAVENOUS at 08:43

## 2018-06-12 RX ADMIN — Medication 60 MG: at 08:24

## 2018-06-12 RX ADMIN — CEFAZOLIN SODIUM 2 G: 10 INJECTION, POWDER, FOR SOLUTION INTRAVENOUS at 16:10

## 2018-06-12 RX ADMIN — LIDOCAINE HYDROCHLORIDE 40 MG: 10 INJECTION, SOLUTION INFILTRATION; PERINEURAL at 08:24

## 2018-06-12 RX ADMIN — HYDROMORPHONE HYDROCHLORIDE 0.25 MG: 1 INJECTION, SOLUTION INTRAMUSCULAR; INTRAVENOUS; SUBCUTANEOUS at 10:00

## 2018-06-12 RX ADMIN — FENTANYL CITRATE 50 MCG: 50 INJECTION, SOLUTION INTRAMUSCULAR; INTRAVENOUS at 11:00

## 2018-06-12 RX ADMIN — ROCURONIUM BROMIDE 45 MG: 10 INJECTION, SOLUTION INTRAVENOUS at 08:34

## 2018-06-12 RX ADMIN — PROPOFOL 100 MG: 10 INJECTION, EMULSION INTRAVENOUS at 08:24

## 2018-06-12 RX ADMIN — Medication 2 G: at 08:30

## 2018-06-12 RX ADMIN — ONDANSETRON HYDROCHLORIDE 4 MG: 4 INJECTION, SOLUTION INTRAMUSCULAR; INTRAVENOUS at 08:30

## 2018-06-12 RX ADMIN — OXYCODONE HYDROCHLORIDE AND ACETAMINOPHEN 2 TABLET: 5; 325 TABLET ORAL at 20:34

## 2018-06-12 RX ADMIN — OXYCODONE HYDROCHLORIDE AND ACETAMINOPHEN 2 TABLET: 5; 325 TABLET ORAL at 14:03

## 2018-06-12 RX ADMIN — PROPOFOL 40 MG: 10 INJECTION, EMULSION INTRAVENOUS at 08:50

## 2018-06-12 RX ADMIN — DOCUSATE SODIUM 100 MG: 100 CAPSULE, LIQUID FILLED ORAL at 15:07

## 2018-06-12 RX ADMIN — FENTANYL CITRATE 50 MCG: 50 INJECTION INTRAMUSCULAR; INTRAVENOUS at 08:53

## 2018-06-12 RX ADMIN — PHENYLEPHRINE HYDROCHLORIDE 50 MCG: 10 INJECTION INTRAVENOUS at 08:48

## 2018-06-12 RX ADMIN — FENTANYL CITRATE 50 MCG: 50 INJECTION INTRAMUSCULAR; INTRAVENOUS at 08:27

## 2018-06-12 RX ADMIN — Medication 0.2 MG: at 09:16

## 2018-06-12 RX ADMIN — MORPHINE SULFATE 2 MG: 2 INJECTION, SOLUTION INTRAMUSCULAR; INTRAVENOUS at 12:11

## 2018-06-12 RX ADMIN — MIDAZOLAM HYDROCHLORIDE 2 MG: 1 INJECTION, SOLUTION INTRAMUSCULAR; INTRAVENOUS at 08:23

## 2018-06-12 RX ADMIN — PHENYLEPHRINE HYDROCHLORIDE 50 MCG: 10 INJECTION INTRAVENOUS at 09:16

## 2018-06-12 RX ADMIN — CYCLOBENZAPRINE 10 MG: 10 TABLET, FILM COATED ORAL at 09:55

## 2018-06-12 RX ADMIN — SODIUM CHLORIDE: 9 INJECTION, SOLUTION INTRAVENOUS at 11:20

## 2018-06-12 RX ADMIN — HYDROMORPHONE HYDROCHLORIDE 0.25 MG: 1 INJECTION, SOLUTION INTRAMUSCULAR; INTRAVENOUS; SUBCUTANEOUS at 10:10

## 2018-06-12 RX ADMIN — PHENYLEPHRINE HYDROCHLORIDE 50 MCG: 10 INJECTION INTRAVENOUS at 09:05

## 2018-06-12 RX ADMIN — SODIUM CHLORIDE: 9 INJECTION, SOLUTION INTRAVENOUS at 07:36

## 2018-06-12 RX ADMIN — DIPHENHYDRAMINE HYDROCHLORIDE 12.5 MG: 50 INJECTION, SOLUTION INTRAMUSCULAR; INTRAVENOUS at 08:30

## 2018-06-12 RX ADMIN — ONDANSETRON 4 MG: 2 INJECTION INTRAMUSCULAR; INTRAVENOUS at 12:10

## 2018-06-12 RX ADMIN — PHENYLEPHRINE HYDROCHLORIDE 100 MCG: 10 INJECTION INTRAVENOUS at 09:20

## 2018-06-12 RX ADMIN — ROCURONIUM BROMIDE 5 MG: 10 INJECTION, SOLUTION INTRAVENOUS at 08:24

## 2018-06-12 RX ADMIN — FENTANYL CITRATE 50 MCG: 50 INJECTION, SOLUTION INTRAMUSCULAR; INTRAVENOUS at 10:35

## 2018-06-12 RX ADMIN — ONDANSETRON 4 MG: 2 INJECTION INTRAMUSCULAR; INTRAVENOUS at 20:05

## 2018-06-12 RX ADMIN — HYDROMORPHONE HYDROCHLORIDE 0.25 MG: 1 INJECTION, SOLUTION INTRAMUSCULAR; INTRAVENOUS; SUBCUTANEOUS at 10:05

## 2018-06-12 RX ADMIN — SUGAMMADEX 200 MG: 100 INJECTION, SOLUTION INTRAVENOUS at 09:11

## 2018-06-12 RX ADMIN — FAMOTIDINE 20 MG: 20 TABLET ORAL at 20:34

## 2018-06-12 RX ADMIN — ALBUTEROL SULFATE 4 PUFF: 90 AEROSOL, METERED RESPIRATORY (INHALATION) at 08:31

## 2018-06-12 RX ADMIN — PHENYLEPHRINE HYDROCHLORIDE 50 MCG: 10 INJECTION INTRAVENOUS at 09:03

## 2018-06-12 ASSESSMENT — PULMONARY FUNCTION TESTS
PIF_VALUE: 19
PIF_VALUE: 19
PIF_VALUE: 27
PIF_VALUE: 19
PIF_VALUE: 25
PIF_VALUE: 19
PIF_VALUE: 20
PIF_VALUE: 19
PIF_VALUE: 22
PIF_VALUE: 18
PIF_VALUE: 20
PIF_VALUE: 20
PIF_VALUE: 23
PIF_VALUE: 20
PIF_VALUE: 23
PIF_VALUE: 20
PIF_VALUE: 21
PIF_VALUE: 19
PIF_VALUE: 22
PIF_VALUE: 20
PIF_VALUE: 18
PIF_VALUE: 19
PIF_VALUE: 13
PIF_VALUE: 22
PIF_VALUE: 22
PIF_VALUE: 19
PIF_VALUE: 0
PIF_VALUE: 13
PIF_VALUE: 23
PIF_VALUE: 18
PIF_VALUE: 20
PIF_VALUE: 20
PIF_VALUE: 23
PIF_VALUE: 20
PIF_VALUE: 19
PIF_VALUE: 18
PIF_VALUE: 26
PIF_VALUE: 33
PIF_VALUE: 33
PIF_VALUE: 27
PIF_VALUE: 22
PIF_VALUE: 22
PIF_VALUE: 19
PIF_VALUE: 23
PIF_VALUE: 19
PIF_VALUE: 18
PIF_VALUE: 31
PIF_VALUE: 29
PIF_VALUE: 22
PIF_VALUE: 23
PIF_VALUE: 20
PIF_VALUE: 18
PIF_VALUE: 19
PIF_VALUE: 17
PIF_VALUE: 19
PIF_VALUE: 22
PIF_VALUE: 19
PIF_VALUE: 23
PIF_VALUE: 22
PIF_VALUE: 26
PIF_VALUE: 16
PIF_VALUE: 1
PIF_VALUE: 22
PIF_VALUE: 20
PIF_VALUE: 22
PIF_VALUE: 19
PIF_VALUE: 20
PIF_VALUE: 22
PIF_VALUE: 19
PIF_VALUE: 21

## 2018-06-12 ASSESSMENT — PAIN DESCRIPTION - DESCRIPTORS
DESCRIPTORS: ACHING
DESCRIPTORS: ACHING

## 2018-06-12 ASSESSMENT — PAIN SCALES - GENERAL
PAINLEVEL_OUTOF10: 7
PAINLEVEL_OUTOF10: 6
PAINLEVEL_OUTOF10: 10
PAINLEVEL_OUTOF10: 10
PAINLEVEL_OUTOF10: 5
PAINLEVEL_OUTOF10: 9
PAINLEVEL_OUTOF10: 6
PAINLEVEL_OUTOF10: 0
PAINLEVEL_OUTOF10: 6
PAINLEVEL_OUTOF10: 6
PAINLEVEL_OUTOF10: 9
PAINLEVEL_OUTOF10: 6
PAINLEVEL_OUTOF10: 10
PAINLEVEL_OUTOF10: 7
PAINLEVEL_OUTOF10: 4
PAINLEVEL_OUTOF10: 9
PAINLEVEL_OUTOF10: 2
PAINLEVEL_OUTOF10: 9

## 2018-06-12 ASSESSMENT — LIFESTYLE VARIABLES: SMOKING_STATUS: 1

## 2018-06-12 ASSESSMENT — PAIN DESCRIPTION - LOCATION
LOCATION: NECK

## 2018-06-12 ASSESSMENT — PAIN DESCRIPTION - FREQUENCY
FREQUENCY: CONTINUOUS
FREQUENCY: CONTINUOUS

## 2018-06-12 ASSESSMENT — PAIN DESCRIPTION - PAIN TYPE
TYPE: SURGICAL PAIN

## 2018-06-12 ASSESSMENT — PAIN DESCRIPTION - PROGRESSION
CLINICAL_PROGRESSION: NOT CHANGED

## 2018-06-12 ASSESSMENT — PAIN - FUNCTIONAL ASSESSMENT: PAIN_FUNCTIONAL_ASSESSMENT: 0-10

## 2018-06-12 ASSESSMENT — PAIN DESCRIPTION - ONSET
ONSET: ON-GOING
ONSET: ON-GOING

## 2018-06-12 ASSESSMENT — PAIN DESCRIPTION - ORIENTATION
ORIENTATION: LEFT;POSTERIOR
ORIENTATION: LEFT;ANTERIOR;POSTERIOR
ORIENTATION: LEFT;POSTERIOR
ORIENTATION: LEFT;ANTERIOR;POSTERIOR

## 2018-06-13 ENCOUNTER — TELEPHONE (OUTPATIENT)
Dept: FAMILY MEDICINE CLINIC | Age: 63
End: 2018-06-13

## 2018-06-13 VITALS
DIASTOLIC BLOOD PRESSURE: 66 MMHG | HEART RATE: 76 BPM | TEMPERATURE: 99.2 F | WEIGHT: 114 LBS | HEIGHT: 57 IN | OXYGEN SATURATION: 92 % | SYSTOLIC BLOOD PRESSURE: 125 MMHG | RESPIRATION RATE: 16 BRPM | BODY MASS INDEX: 24.59 KG/M2

## 2018-06-13 PROCEDURE — 6370000000 HC RX 637 (ALT 250 FOR IP): Performed by: PHYSICIAN ASSISTANT

## 2018-06-13 PROCEDURE — 2580000003 HC RX 258: Performed by: PHYSICIAN ASSISTANT

## 2018-06-13 PROCEDURE — 96376 TX/PRO/DX INJ SAME DRUG ADON: CPT

## 2018-06-13 PROCEDURE — 96361 HYDRATE IV INFUSION ADD-ON: CPT

## 2018-06-13 PROCEDURE — 96368 THER/DIAG CONCURRENT INF: CPT

## 2018-06-13 PROCEDURE — 6360000002 HC RX W HCPCS: Performed by: PHYSICIAN ASSISTANT

## 2018-06-13 RX ADMIN — SODIUM CHLORIDE: 9 INJECTION, SOLUTION INTRAVENOUS at 04:36

## 2018-06-13 RX ADMIN — AMLODIPINE BESYLATE 10 MG: 10 TABLET ORAL at 10:16

## 2018-06-13 RX ADMIN — ONDANSETRON 4 MG: 2 INJECTION INTRAMUSCULAR; INTRAVENOUS at 02:16

## 2018-06-13 RX ADMIN — OXYCODONE HYDROCHLORIDE AND ACETAMINOPHEN 2 TABLET: 5; 325 TABLET ORAL at 04:55

## 2018-06-13 RX ADMIN — LETROZOLE 2.5 MG: 2.5 TABLET, FILM COATED ORAL at 10:16

## 2018-06-13 RX ADMIN — FAMOTIDINE 20 MG: 20 TABLET ORAL at 10:16

## 2018-06-13 RX ADMIN — DOCUSATE SODIUM 100 MG: 100 CAPSULE, LIQUID FILLED ORAL at 10:16

## 2018-06-13 RX ADMIN — CEFAZOLIN SODIUM 2 G: 10 INJECTION, POWDER, FOR SOLUTION INTRAVENOUS at 00:02

## 2018-06-13 ASSESSMENT — PAIN SCALES - GENERAL
PAINLEVEL_OUTOF10: 8
PAINLEVEL_OUTOF10: 8
PAINLEVEL_OUTOF10: 4

## 2018-06-13 ASSESSMENT — PAIN DESCRIPTION - PROGRESSION
CLINICAL_PROGRESSION: NOT CHANGED

## 2018-06-13 ASSESSMENT — PAIN DESCRIPTION - LOCATION
LOCATION: NECK
LOCATION: NECK

## 2018-06-13 ASSESSMENT — PAIN DESCRIPTION - DESCRIPTORS: DESCRIPTORS: ACHING

## 2018-06-13 ASSESSMENT — PAIN DESCRIPTION - PAIN TYPE
TYPE: SURGICAL PAIN
TYPE: SURGICAL PAIN

## 2018-06-13 ASSESSMENT — PAIN DESCRIPTION - ORIENTATION: ORIENTATION: POSTERIOR;ANTERIOR

## 2018-06-13 ASSESSMENT — PAIN DESCRIPTION - ONSET: ONSET: ON-GOING

## 2018-06-13 ASSESSMENT — PAIN DESCRIPTION - FREQUENCY: FREQUENCY: CONTINUOUS

## 2018-06-18 ENCOUNTER — OFFICE VISIT (OUTPATIENT)
Dept: FAMILY MEDICINE CLINIC | Age: 63
End: 2018-06-18
Payer: MEDICARE

## 2018-06-18 VITALS
HEART RATE: 91 BPM | TEMPERATURE: 98.8 F | SYSTOLIC BLOOD PRESSURE: 132 MMHG | DIASTOLIC BLOOD PRESSURE: 84 MMHG | HEIGHT: 58 IN | RESPIRATION RATE: 16 BRPM | BODY MASS INDEX: 24.14 KG/M2 | WEIGHT: 115 LBS

## 2018-06-18 DIAGNOSIS — M48.02 CERVICAL SPINAL STENOSIS: Primary | ICD-10-CM

## 2018-06-18 PROCEDURE — 1111F DSCHRG MED/CURRENT MED MERGE: CPT | Performed by: FAMILY MEDICINE

## 2018-06-18 PROCEDURE — 99214 OFFICE O/P EST MOD 30 MIN: CPT | Performed by: FAMILY MEDICINE

## 2018-06-18 RX ORDER — HYDROCODONE BITARTRATE AND ACETAMINOPHEN 5; 325 MG/1; MG/1
1 TABLET ORAL EVERY 6 HOURS PRN
COMMUNITY
Start: 2018-06-11 | End: 2018-10-24 | Stop reason: ALTCHOICE

## 2018-06-18 RX ORDER — CYCLOBENZAPRINE HCL 10 MG
10 TABLET ORAL 3 TIMES DAILY PRN
COMMUNITY
End: 2018-10-24

## 2018-07-16 ENCOUNTER — HOSPITAL ENCOUNTER (OUTPATIENT)
Dept: INFUSION THERAPY | Age: 63
Discharge: HOME | End: 2018-07-16
Payer: MEDICARE

## 2018-07-16 ENCOUNTER — OFFICE VISIT (OUTPATIENT)
Dept: ONCOLOGY | Age: 63
End: 2018-07-16
Payer: MEDICARE

## 2018-07-16 VITALS
WEIGHT: 113.4 LBS | BODY MASS INDEX: 23.8 KG/M2 | SYSTOLIC BLOOD PRESSURE: 130 MMHG | TEMPERATURE: 98.6 F | RESPIRATION RATE: 16 BRPM | OXYGEN SATURATION: 96 % | HEIGHT: 58 IN | DIASTOLIC BLOOD PRESSURE: 85 MMHG | HEART RATE: 93 BPM

## 2018-07-16 DIAGNOSIS — C50.212 MALIGNANT NEOPLASM OF UPPER-INNER QUADRANT OF LEFT BREAST IN FEMALE, ESTROGEN RECEPTOR POSITIVE (HCC): Primary | ICD-10-CM

## 2018-07-16 DIAGNOSIS — Z17.0 MALIGNANT NEOPLASM OF UPPER-INNER QUADRANT OF LEFT BREAST IN FEMALE, ESTROGEN RECEPTOR POSITIVE (HCC): Primary | ICD-10-CM

## 2018-07-16 DIAGNOSIS — Z98.890 S/P LUMPECTOMY, LEFT BREAST: ICD-10-CM

## 2018-07-16 DIAGNOSIS — Z79.811 PROPHYLACTIC USE OF LETROZOLE (FEMARA): ICD-10-CM

## 2018-07-16 PROCEDURE — 3017F COLORECTAL CA SCREEN DOC REV: CPT | Performed by: INTERNAL MEDICINE

## 2018-07-16 PROCEDURE — G8420 CALC BMI NORM PARAMETERS: HCPCS | Performed by: INTERNAL MEDICINE

## 2018-07-16 PROCEDURE — 1090F PRES/ABSN URINE INCON ASSESS: CPT | Performed by: INTERNAL MEDICINE

## 2018-07-16 PROCEDURE — G8400 PT W/DXA NO RESULTS DOC: HCPCS | Performed by: INTERNAL MEDICINE

## 2018-07-16 PROCEDURE — 99211 OFF/OP EST MAY X REQ PHY/QHP: CPT

## 2018-07-16 PROCEDURE — 99214 OFFICE O/P EST MOD 30 MIN: CPT | Performed by: INTERNAL MEDICINE

## 2018-07-16 PROCEDURE — G8427 DOCREV CUR MEDS BY ELIG CLIN: HCPCS | Performed by: INTERNAL MEDICINE

## 2018-07-16 PROCEDURE — 1101F PT FALLS ASSESS-DOCD LE1/YR: CPT | Performed by: INTERNAL MEDICINE

## 2018-07-16 PROCEDURE — 1123F ACP DISCUSS/DSCN MKR DOCD: CPT | Performed by: INTERNAL MEDICINE

## 2018-07-16 PROCEDURE — 4004F PT TOBACCO SCREEN RCVD TLK: CPT | Performed by: INTERNAL MEDICINE

## 2018-07-16 PROCEDURE — 4040F PNEUMOC VAC/ADMIN/RCVD: CPT | Performed by: INTERNAL MEDICINE

## 2018-07-16 RX ORDER — LETROZOLE 2.5 MG/1
2.5 TABLET, FILM COATED ORAL DAILY
Qty: 90 TABLET | Refills: 3 | Status: SHIPPED | OUTPATIENT
Start: 2018-07-16 | End: 2019-06-20 | Stop reason: SDUPTHER

## 2018-07-16 ASSESSMENT — ENCOUNTER SYMPTOMS
CONSTIPATION: 0
NAUSEA: 0
CHEST TIGHTNESS: 0
RECTAL PAIN: 0
EYE DISCHARGE: 0
TROUBLE SWALLOWING: 0
ABDOMINAL DISTENTION: 0
COLOR CHANGE: 0
COUGH: 0
ABDOMINAL PAIN: 0
DIARRHEA: 0
SHORTNESS OF BREATH: 0
WHEEZING: 0
FACIAL SWELLING: 0
VOMITING: 0
BACK PAIN: 0
BLOOD IN STOOL: 0
SORE THROAT: 0

## 2018-07-16 NOTE — PROGRESS NOTES
using gross and  microscopic evaluation): at least 0.8 cm  Number of blocks with DCIS:  2  Number of blocks examined:  9  Nuclear Grade  Grade III (high)  Necrosis  Present, central (expansive \"comedo\" necrosis)  Margins  Invasive Carcinoma  Margins uninvolved by invasive carcinoma   Distance from closest margin:  0.9 cm   Specify margin:  skin    Lymph Nodes (required only if lymph nodes are present in the specimen)  Total number of lymph nodes examined (sentinel and nonsentinel):  1  Number of sentinel lymph nodes examined:  1    Lymph Node Involvement (required only if one or more lymph nodes have  tumor cells identified)   Number of lymph nodes with macrometastases ( > 2 mm):  0   Number of lymph nodes with micrometastases ( > 0.2 mm to 2 mm and/or  > 200 cells):  0   Number of lymph nodes with isolated tumor cells ( < or equal to 0.2 mm  and  < or equal to 200 cells):  0    Pathologic Staging (based on information available to the pathologist)  (pTNM)    Primary Tumor (Invasive Carcinoma) (pT)  pT1b: Tumor  > 5 mm but  < 10 mm in greatest dimension  pN0: No regional lymph node metastasis identified histologically    BREAST BIOMARKERS* ([id number], 9/13/16)  Estrogen Receptor:  Positive 95% of cells  Progesterone Receptor: Positive 80% of cells  Ki-67  Percentage of positive nuclei:  40%  HER-2 Dual RENEE   Nonamplified - HER2/CEP17 ratio  < 2.0 AND average HER2           copy number  < 4.0 signals/cell  Her breast cancer specimen was sent for Oncotype DX assay. It came back with a recurrent score of 20, translating into 13% risk of distant disease recurrence in the next 10 years. Therefore the patient did not receive recommendation to proceed with chemotherapy. She completed radiation treatment to her left breast on December 4, 2016. She received 5 CGy of radiation treatment. Overall she tolerated radiation treatment well. In December 2016 she started adjuvant hormonal therapy with Arimidex.     Interim shortness of breath and wheezing. Cardiovascular: Negative for chest pain, palpitations and leg swelling. Gastrointestinal: Negative for abdominal distention, abdominal pain, blood in stool, constipation, diarrhea, nausea, rectal pain and vomiting. Endocrine: Negative for cold intolerance, polydipsia and polyuria. Genitourinary: Negative for decreased urine volume, difficulty urinating, dysuria, flank pain, hematuria and urgency. Musculoskeletal: Negative for arthralgias, back pain, gait problem, joint swelling, myalgias and neck stiffness. Skin: Negative for color change, rash and wound. Neurological: Negative for dizziness, tremors, seizures, speech difficulty, weakness, light-headedness, numbness and headaches. Hematological: Negative for adenopathy. Does not bruise/bleed easily. Psychiatric/Behavioral: Negative for confusion and sleep disturbance. The patient is not nervous/anxious. Objective:   Physical Exam   Constitutional: She is oriented to person, place, and time. She appears well-developed and well-nourished. No distress. HENT:   Head: Normocephalic. Mouth/Throat: Oropharynx is clear and moist. No oropharyngeal exudate. Eyes: EOM are normal. Pupils are equal, round, and reactive to light. Right eye exhibits no discharge. Left eye exhibits no discharge. No scleral icterus. Neck: Normal range of motion. Neck supple. No JVD present. No tracheal deviation present. No thyromegaly present. Cardiovascular: Normal rate and normal heart sounds. Exam reveals no gallop and no friction rub. No murmur heard. Pulmonary/Chest: Effort normal and breath sounds normal. No stridor. No respiratory distress. She has no wheezes. She has no rales. She exhibits no tenderness. Abdominal: Soft. Bowel sounds are normal. She exhibits no distension and no mass. There is no tenderness. There is no rebound. Musculoskeletal: Normal range of motion. She exhibits no edema.    Lymphadenopathy: Therefore she was instructed to stop taking Arimidex for 1 month and afterwards she was placed on Femara. Today she reports that her fatigue is resolved. She reports minimal arthralgia, minimal hot flashes. No evidence of distant disease recurrence on today's physical examination. Her annual breast mammogram in October 2017 was benign   3. Osteopenia. The patient was instructed to take calcium with vitamin D. She also received recommendation to exercise on a regular basis. Recommendation is to walk 3-4 times per week. 4.  The patient is a heavy smoker. The lung screening low dose CT of the chest on a April 9, 2018 showed no suspicious pulmonary nodules. Lung rads category 2.       Dominga Ruiz MD    L

## 2018-07-30 ENCOUNTER — HOSPITAL ENCOUNTER (OUTPATIENT)
Dept: PHYSICAL THERAPY | Age: 63
Setting detail: THERAPIES SERIES
Discharge: HOME | End: 2018-07-30
Payer: MEDICARE

## 2018-07-30 PROCEDURE — 97161 PT EVAL LOW COMPLEX 20 MIN: CPT

## 2018-07-30 PROCEDURE — G8991 OTHER PT/OT GOAL STATUS: HCPCS

## 2018-07-30 PROCEDURE — 97110 THERAPEUTIC EXERCISES: CPT

## 2018-07-30 PROCEDURE — G8990 OTHER PT/OT CURRENT STATUS: HCPCS

## 2018-07-30 ASSESSMENT — PAIN DESCRIPTION - PAIN TYPE: TYPE: CHRONIC PAIN

## 2018-07-30 NOTE — PROGRESS NOTES
FADIR mod restriction with thigh soreness, hamstring min restriction with calf tightness  ROM LLE: SKTC, FRANCINE WFL, FADIR mod restriction with thigh soreness, hamstring min restriction with calf tightness  ROM RUE: AROM WFL  ROM LUE: AROM WFL    Strength RLE: WFL  Comment: SLR 3+/5, sidelying hip ABD 4/5, ER 5/5, hamstring 5/5, quad 4/5, ankle 4/5    Strength LLE: WFL  Comment: SLR 4-/5, sidelying hip ABD 4-/5, ER 5/5, hamstring 5/5, quad 4/5, ankle 4/5    Strength LUE: WFL  Comment: shoulder flexion, ABD 4/5, horizontal ABD and latts 3+/5, elbow flexion/ext 4+/5,  4+/5    Strength RUE: WFL  Comment: shoulder flexion, ABD 4/5, horizontal ABD and latts 3+/5, elbow flexion/ext 4+/5,  4+/5            Overall Sensation Status: WFL           Rolling to Left: Independent  Rolling to Right: Independent  Supine to Sit: Independent  Sit to Supine: Independent                Transfers  Sit to Stand: Modified independent  Stand to sit: Modified independent            Ambulation 1  Surface: carpet  Device: No Device  Assistance: Independent  Quality of Gait: decreased pace; no heel strike on right; decreased step length on right; antalgia pattern; no arm swing on right, minimal on left  Distance: 60ft              Exercises  Exercise 1: Log roll education with bed mobility. Postural education in chair with use of towel roll for lumbar support, pull shoulders back, and neck neutral, feet on stool for proper positioning. Exercise 2: Seated with towel roll: cervical AROM x10 each; shoulder shrugs, retro rolls and scap retraction x10 each -tactile cues for technique  Exercise 3: SKTC, DKTC 3x10 sec hold each with towel          Activity Tolerance:  Activity Tolerance: Patient Tolerated treatment well    Assessment:   Body structures, Functions, Activity limitations: Decreased functional mobility , Decreased ADL status, Decreased ROM, Decreased strength, Decreased high-level IADLs, Decreased coordination  Assessment: 65

## 2018-08-01 ENCOUNTER — HOSPITAL ENCOUNTER (OUTPATIENT)
Dept: PHYSICAL THERAPY | Age: 63
Setting detail: THERAPIES SERIES
Discharge: HOME | End: 2018-08-01
Payer: MEDICARE

## 2018-08-01 PROCEDURE — 97110 THERAPEUTIC EXERCISES: CPT

## 2018-08-01 NOTE — PROGRESS NOTES
[place]     Time In: 4609  Time Out: 3815  Minutes: 40  Timed Code Treatment Minutes: 40 Minutes     Date: 2018  Patient Name: Diane Garcia,  Gender:  female        CSN: [id number]   : 1955  (70 y.o.)       Referring Practitioner: José Miguel Lindsey MD      Diagnosis: B61.239 - Other cervical disc degeneration at C5-C6 level; M48.07 - Spinal stenosis, lumbosacral region   Additional Pertinent Hx: s/p C5-6 ACDF. Breast CA- left lumpectomy 2016 with mild lymphedema. General:  PT Visit Information  Onset Date: 18  Total # of Visits to Date: 2  Plan of Care/Certification Expiration Date: 10/22/18  Progress Note Counter: 2/10 for PN. Subjective:  Comments: Follow up with Dr. Everett Foster 18. Subjective: Patient reports no pain right now,  did do home ex --states neck and upper back little sore from ex     Pain:  Patient Currently in Pain: Denies         Objective    Exercises  Exercise 1: Log roll education with bed mobility. Postural education in chair with use of towel roll for lumbar support, pull shoulders back, and neck neutral, feet on stool for proper positioning. Exercise 2: Seated with towel roll: cervical AROM x10 each; shoulder shrugs, retro rolls and scap retraction x10 each -tactile cues for technique  Exercise 3: SKTC, DKTC 3x10 sec hold each,  LTR x3,  pelvic tilts x10 x 3 sec hold  Exercise 5: seated hamstring stretch, using stool, B 3 x 15 sec,  standing calf stretch R 3 x15 sec hold  Exercise 6: seated with towel roll lumbar, abdominal brace--UE flex , abd, rows, biceps x10--c/os fatigue but no pain  Exercise 9: nustep  seat 4, arms 8 work 1    5 min--c/os fatigue neck , upper back         Activity Tolerance:  Activity Tolerance: Patient Tolerated treatment well    Assessment:  Assessment: no c/os pain after rx,  posture fair in seated.   progressed with strengthing UEs, core       Patient Education:  Patient Education: handout supine stretches, hamstrings, calf                      Plan:  Times per week: 2x  Plan weeks: 12 weeks  Specific instructions for Next Treatment: no modalites d/t breast CA left breast; manual, cervical stretches, SLR hamstring stretch, cerv loyda, DLS  Current Treatment Recommendations: Strengthening, ROM, Gait Training, Manual Therapy - Soft Tissue Mobilization, Modalities, Aquatics, Home Exercise Program, Patient/Caregiver Education & Training, Functional Mobility Training, ADL/Self-care Training, IADL Training  Plan Comment: continue per POC    Goals:  Patient goals : Use right arm better and decrease pain    Short term goals  Time Frame for Short term goals: 6 weeks  Short term goal 1: Pt will report <5/10 neck pain and back pain at worst to tolerate bending down to do household chores . Short term goal 2: Pt will improve RUE strength to 4+/5 to demo appropriate use when reaching, grasping, and lifting. Short term goal 3: Pt will demo good postural awareness and core engagement with <2 verbal cues during therapy session to carry over to mobility and IADLs. Short term goal 4: Pt will improve AROM cervical rotation right from 36 to 46 degrees and sidebending left from 12 to 19 degrees for ease scanning envirnoment and less guarding. Long term goals  Time Frame for Long term goals : 12 weeks   Long term goal 1: Pt will improve postural strength (horizontal ABD and latts) to 4/5 for spinal stabilization when sitting and standing. Long term goal 2: Pt will demo good body mechanics with household chores with <2 cues for safe completion of tasks. Long term goal 3: Pt will ambulate with improved speed, fair heel strike on right and good arm swing B to normalize gait speed for community distances. Long term goal 4: Pt will be independent and compliant with HEP to achieve above goals.           Lucy Mireles PTA

## 2018-08-08 ENCOUNTER — HOSPITAL ENCOUNTER (OUTPATIENT)
Dept: PHYSICAL THERAPY | Age: 63
Setting detail: THERAPIES SERIES
Discharge: HOME | End: 2018-08-08
Payer: MEDICARE

## 2018-08-08 PROCEDURE — 97110 THERAPEUTIC EXERCISES: CPT

## 2018-08-08 NOTE — PROGRESS NOTES
[place]     Time In: 1081  Time Out: 1500  Minutes: 45  Timed Code Treatment Minutes: 45 Minutes     Date: 2018  Patient Name: Matti Faith,  Gender:  female        CSN: [id number]   : 1955  (70 y.o.)       Referring Practitioner: Arlette Bush MD      Diagnosis: G23.975 - Other cervical disc degeneration at C5-C6 level; M48.07 - Spinal stenosis, lumbosacral region   Additional Pertinent Hx: s/p C5-6 ACDF. Breast CA- left lumpectomy 2016 with mild lymphedema. General:  PT Visit Information  Onset Date: 18  PT Insurance Information: [employer] [place], $40 co-pay per visit, no visit limit, aquatics and modalities covered  Total # of Visits to Date: 3  Plan of Care/Certification Expiration Date: 10/22/18  Progress Note Counter: 3/10 for PN. Subjective:  Comments: Follow up with Dr. Laquita Nichols 18. Subjective: Patient states back was sore after last visit but didnt last long. Doing home ex with min difficulty     Pain:  Patient Currently in Pain: Denies         Objective    Exercises  Exercise 2: supine with bracing--marches, leg raises, heelslides x10  Exercise 3: SKTC, DKTC 3x10 sec hold each,  LTR x3,  pelvic tilts x10 x 3 sec hold  Exercise 4: seated toe taps, marches, kicks x10  Exercise 5: seated hamstring stretch, using stool, B 3 x 15 sec,  standing calf stretch R 3 x15 sec hold  Exercise 6: seated with towel roll lumbar, abdominal brace--UE flex , abd, rows, biceps x10--c/os fatigue but no pain  Exercise 7: seated with towel roll: cervical AROM x10, shoulder shrugs, rolls, scap retractions e08--[id number] tactile cues  Exercise 9: nustep  seat 4, arms 8 work 1    5 min--no c/os today         Activity Tolerance:  Activity Tolerance: Patient Tolerated treatment well    Assessment:  Assessment: posture fair, needing freq cues. Vidalia Skinner   good recall supine

## 2018-08-10 ENCOUNTER — HOSPITAL ENCOUNTER (OUTPATIENT)
Dept: PHYSICAL THERAPY | Age: 63
Setting detail: THERAPIES SERIES
Discharge: HOME | End: 2018-08-10
Payer: MEDICARE

## 2018-08-10 PROCEDURE — 97110 THERAPEUTIC EXERCISES: CPT

## 2018-08-10 ASSESSMENT — PAIN SCALES - GENERAL: PAINLEVEL_OUTOF10: 3

## 2018-08-10 ASSESSMENT — PAIN DESCRIPTION - LOCATION: LOCATION: NECK

## 2018-08-15 ENCOUNTER — APPOINTMENT (OUTPATIENT)
Dept: PHYSICAL THERAPY | Age: 63
End: 2018-08-15
Payer: MEDICARE

## 2018-08-17 ENCOUNTER — APPOINTMENT (OUTPATIENT)
Dept: PHYSICAL THERAPY | Age: 63
End: 2018-08-17
Payer: MEDICARE

## 2018-08-22 ENCOUNTER — APPOINTMENT (OUTPATIENT)
Dept: PHYSICAL THERAPY | Age: 63
End: 2018-08-22
Payer: MEDICARE

## 2018-08-24 ENCOUNTER — APPOINTMENT (OUTPATIENT)
Dept: PHYSICAL THERAPY | Age: 63
End: 2018-08-24
Payer: MEDICARE

## 2018-08-27 ENCOUNTER — APPOINTMENT (OUTPATIENT)
Dept: PHYSICAL THERAPY | Age: 63
End: 2018-08-27
Payer: MEDICARE

## 2018-08-29 ENCOUNTER — APPOINTMENT (OUTPATIENT)
Dept: PHYSICAL THERAPY | Age: 63
End: 2018-08-29
Payer: MEDICARE

## 2018-08-30 NOTE — DISCHARGE SUMMARY
[facility]    Patient Name: Skye Mendieta        CSN: [id number]   YOB: 1955  Gender: female  Referring Practitioner: Lisbet Dillard MD  Diagnosis: I43.004 - Other cervical disc degeneration at C5-C6 level; M48.07 - Spinal stenosis, lumbosacral region    Patient is discharged from Physical Therapy services at this time. See last note for details related to results of therapy and goal achievement. Reason for discharge:  Pt request. Physician ordered epidural. Pt discharged from PT 8/30/18 at 1019.       Kinza Ma DPT, #[id number]

## 2018-08-31 ENCOUNTER — HOSPITAL ENCOUNTER (OUTPATIENT)
Dept: PHYSICAL THERAPY | Age: 63
Setting detail: THERAPIES SERIES
Discharge: HOME | End: 2018-08-31
Payer: MEDICARE

## 2018-09-20 ENCOUNTER — HOSPITAL ENCOUNTER (OUTPATIENT)
Dept: INTERVENTIONAL RADIOLOGY/VASCULAR | Age: 63
Discharge: HOME | End: 2018-09-20
Payer: MEDICARE

## 2018-09-20 VITALS
WEIGHT: 115 LBS | SYSTOLIC BLOOD PRESSURE: 131 MMHG | OXYGEN SATURATION: 99 % | HEART RATE: 78 BPM | TEMPERATURE: 98.1 F | BODY MASS INDEX: 24.24 KG/M2 | DIASTOLIC BLOOD PRESSURE: 62 MMHG | RESPIRATION RATE: 16 BRPM

## 2018-09-20 PROCEDURE — 2709999900 HC NON-CHARGEABLE SUPPLY

## 2018-09-20 PROCEDURE — 6360000002 HC RX W HCPCS

## 2018-09-20 PROCEDURE — 6360000004 HC RX CONTRAST MEDICATION: Performed by: RADIOLOGY

## 2018-09-20 PROCEDURE — 2500000003 HC RX 250 WO HCPCS

## 2018-09-20 PROCEDURE — 62323 NJX INTERLAMINAR LMBR/SAC: CPT

## 2018-09-20 RX ORDER — BUPIVACAINE HYDROCHLORIDE 2.5 MG/ML
5 INJECTION, SOLUTION EPIDURAL; INFILTRATION; INTRACAUDAL ONCE
Status: COMPLETED | OUTPATIENT
Start: 2018-09-20 | End: 2018-09-20

## 2018-09-20 RX ORDER — METHYLPREDNISOLONE ACETATE 80 MG/ML
80 INJECTION, SUSPENSION INTRA-ARTICULAR; INTRALESIONAL; INTRAMUSCULAR; SOFT TISSUE ONCE
Status: COMPLETED | OUTPATIENT
Start: 2018-09-20 | End: 2018-09-20

## 2018-09-20 RX ADMIN — METHYLPREDNISOLONE ACETATE 80 MG: 80 INJECTION, SUSPENSION INTRA-ARTICULAR; INTRALESIONAL; INTRAMUSCULAR; SOFT TISSUE at 14:28

## 2018-09-20 RX ADMIN — IOHEXOL 1 ML: 180 INJECTION INTRAVENOUS at 14:28

## 2018-09-20 RX ADMIN — BUPIVACAINE HYDROCHLORIDE 2 ML: 2.5 INJECTION, SOLUTION EPIDURAL; INFILTRATION; INTRACAUDAL at 14:28

## 2018-09-20 ASSESSMENT — PAIN SCALES - GENERAL
PAINLEVEL_OUTOF10: 0
PAINLEVEL_OUTOF10: 0

## 2018-09-20 ASSESSMENT — PAIN - FUNCTIONAL ASSESSMENT: PAIN_FUNCTIONAL_ASSESSMENT: 0-10

## 2018-09-20 ASSESSMENT — PAIN DESCRIPTION - PAIN TYPE: TYPE: CHRONIC PAIN

## 2018-09-20 ASSESSMENT — PAIN DESCRIPTION - DESCRIPTORS: DESCRIPTORS: ACHING

## 2018-09-20 ASSESSMENT — PAIN DESCRIPTION - ORIENTATION: ORIENTATION: RIGHT;LEFT;LOWER

## 2018-09-20 ASSESSMENT — PAIN DESCRIPTION - LOCATION: LOCATION: BACK

## 2018-09-20 NOTE — PROGRESS NOTES
63 Pt in specials radiology for lumbar epidural. Explained procedure to pt and pt verbalizes understanding. Consent signed. 1617 Pt positioned prone on table. 1 Dr [employer] to start procedure. Made aware of allergies. 1428 Lumbar epidural complete. Pt tolerated well. 1431 Pt positioned on cart for comfort. 1432 Transferred to [facility] per cart. Stable condition.

## 2018-09-20 NOTE — PROGRESS NOTES
1344 PT ARRIVES TO [facility] FOR LUMBAR EPIDURAL. ALL QUESTIONS AND CONCERNS ADDRESSED. DENIES USE OF BLOOD THINNERS  1345 PATIENT RIGHTS AND RESPONSIBILITIES SHEET OFFERED TO PT TO READ. [address] PEDAL PUSH AND PULL WEAK ON RIGHT SIDE WITH FOOT DROP, PER PT STATEMENT   1410 TO SPECIALS VIA CART  1435 RETURNS TO [facility] POST PROCEDURE. MID LOWER BACK BANDAID CLEAN AND DRY. DENIES PAIN, DENIES NUMBNESS AND TINGLING. HAND GRASP PEDAL PUSH AND PULL EQUAL AND STRONG.    1440 TOLERATING LIQUIDS, REFRESHMENTS TO   1445 _M___ Safety:       (Environmental)   [place] to environment   Ensure ID band is correct and in place/ allergy band as needed   Assess for fall risk   Initiate fall precautions as applicable (fall band, side rails, etc.)   Call light within reach   Bed in low position/ wheels locked    _M___ Pain:        Assess pain level and characteristics   Administer analgesics as ordered   Assess effectiveness of pain management and report to MD as needed    M____ Knowledge Deficit:   Assess baseline knowledge   Provide teaching at level of understanding   Provide teaching via preferred learning method   Evaluate teaching effectiveness    _M__ Hemodynamic/Respiratory Status:       (Pre and Post Procedure Monitoring)   Assess/Monitor vital signs and LOC   Assess Baseline SpO2 prior to any sedation   Obtain weight/height   Assess vital signs/ LOC until patient meets discharge criteria   Monitor procedure site and notify MD of any issues             1455 WRITTEN DISCHARGE INSTRUCTIONS GIVEN TO PT-VERBALIZES UNDERSTANDING    1510 PT DISCHARGED AMBULATORY IN SATISFACTORY CONDITION

## 2018-09-20 NOTE — PROGRESS NOTES
Department of Radiology  Post Procedure Progress Note    Pre-Procedure Diagnosis:  Lumbar radiculopathy     Procedure Performed:  Epidural block/steroid injection      Anesthesia: local     Findings: successful    Immediate Complications:  None    Estimated Blood Loss: minimal    SEE DICTATED PROCEDURE NOTE FOR COMPLETE DETAILS.       Nathalie Aguilar MD   9/20/2018 2:30 PM

## 2018-09-20 NOTE — PROGRESS NOTES
Formulation and discussion of sedation / procedure plans, risks, benefits, side effects and alternatives with patient and/or responsible adult completed.     Electronically signed by Cas Womack MD on 9/20/2018 at 2:29 PM

## 2018-10-08 ENCOUNTER — TELEPHONE (OUTPATIENT)
Dept: FAMILY MEDICINE CLINIC | Age: 63
End: 2018-10-08

## 2018-10-08 ENCOUNTER — OFFICE VISIT (OUTPATIENT)
Dept: FAMILY MEDICINE CLINIC | Age: 63
End: 2018-10-08
Payer: MEDICARE

## 2018-10-08 VITALS
WEIGHT: 114 LBS | DIASTOLIC BLOOD PRESSURE: 62 MMHG | BODY MASS INDEX: 23.93 KG/M2 | SYSTOLIC BLOOD PRESSURE: 118 MMHG | HEART RATE: 80 BPM | TEMPERATURE: 98.5 F | RESPIRATION RATE: 16 BRPM | HEIGHT: 58 IN

## 2018-10-08 DIAGNOSIS — I10 ESSENTIAL HYPERTENSION: ICD-10-CM

## 2018-10-08 DIAGNOSIS — Z23 NEED FOR VACCINATION FOR PNEUMOCOCCUS: ICD-10-CM

## 2018-10-08 DIAGNOSIS — J44.9 CHRONIC OBSTRUCTIVE PULMONARY DISEASE, UNSPECIFIED COPD TYPE (HCC): ICD-10-CM

## 2018-10-08 DIAGNOSIS — Z23 NEED FOR IMMUNIZATION AGAINST INFLUENZA: ICD-10-CM

## 2018-10-08 DIAGNOSIS — J01.90 ACUTE RHINOSINUSITIS: Primary | ICD-10-CM

## 2018-10-08 PROCEDURE — 90686 IIV4 VACC NO PRSV 0.5 ML IM: CPT | Performed by: FAMILY MEDICINE

## 2018-10-08 PROCEDURE — G8400 PT W/DXA NO RESULTS DOC: HCPCS | Performed by: FAMILY MEDICINE

## 2018-10-08 PROCEDURE — 3017F COLORECTAL CA SCREEN DOC REV: CPT | Performed by: FAMILY MEDICINE

## 2018-10-08 PROCEDURE — G8926 SPIRO NO PERF OR DOC: HCPCS | Performed by: FAMILY MEDICINE

## 2018-10-08 PROCEDURE — 3023F SPIROM DOC REV: CPT | Performed by: FAMILY MEDICINE

## 2018-10-08 PROCEDURE — G0008 ADMIN INFLUENZA VIRUS VAC: HCPCS | Performed by: FAMILY MEDICINE

## 2018-10-08 PROCEDURE — 1123F ACP DISCUSS/DSCN MKR DOCD: CPT | Performed by: FAMILY MEDICINE

## 2018-10-08 PROCEDURE — 90670 PCV13 VACCINE IM: CPT | Performed by: FAMILY MEDICINE

## 2018-10-08 PROCEDURE — G0009 ADMIN PNEUMOCOCCAL VACCINE: HCPCS | Performed by: FAMILY MEDICINE

## 2018-10-08 PROCEDURE — 1101F PT FALLS ASSESS-DOCD LE1/YR: CPT | Performed by: FAMILY MEDICINE

## 2018-10-08 PROCEDURE — G8482 FLU IMMUNIZE ORDER/ADMIN: HCPCS | Performed by: FAMILY MEDICINE

## 2018-10-08 PROCEDURE — G8420 CALC BMI NORM PARAMETERS: HCPCS | Performed by: FAMILY MEDICINE

## 2018-10-08 PROCEDURE — 4004F PT TOBACCO SCREEN RCVD TLK: CPT | Performed by: FAMILY MEDICINE

## 2018-10-08 PROCEDURE — G8427 DOCREV CUR MEDS BY ELIG CLIN: HCPCS | Performed by: FAMILY MEDICINE

## 2018-10-08 PROCEDURE — 99214 OFFICE O/P EST MOD 30 MIN: CPT | Performed by: FAMILY MEDICINE

## 2018-10-08 PROCEDURE — 4040F PNEUMOC VAC/ADMIN/RCVD: CPT | Performed by: FAMILY MEDICINE

## 2018-10-08 PROCEDURE — 1090F PRES/ABSN URINE INCON ASSESS: CPT | Performed by: FAMILY MEDICINE

## 2018-10-08 RX ORDER — ALBUTEROL SULFATE 90 UG/1
2 AEROSOL, METERED RESPIRATORY (INHALATION) EVERY 6 HOURS PRN
Qty: 1 INHALER | Refills: 3 | Status: SHIPPED | OUTPATIENT
Start: 2018-10-08 | End: 2019-07-31 | Stop reason: SDUPTHER

## 2018-10-08 RX ORDER — DOXYCYCLINE HYCLATE 100 MG
100 TABLET ORAL 2 TIMES DAILY
Qty: 20 TABLET | Refills: 0 | Status: SHIPPED | OUTPATIENT
Start: 2018-10-08 | End: 2018-10-18

## 2018-10-08 RX ORDER — FLUTICASONE PROPIONATE 50 MCG
2 SPRAY, SUSPENSION (ML) NASAL DAILY
Qty: 1 BOTTLE | Refills: 0 | Status: SHIPPED | OUTPATIENT
Start: 2018-10-08 | End: 2019-01-02 | Stop reason: SDUPTHER

## 2018-10-08 NOTE — PROGRESS NOTES
Immunization(s) given during visit:    Immunizations     Name Date Dose Route    Influenza, Caspian Labrum, 3 yrs and older, IM, PF (Fluzone 3 yrs and older or Afluria 5 yrs and older) 10/8/2018 0.5 mL Intramuscular    Site: Deltoid- Left    Lot: [id number]    NDC: 42346-064-83    Pneumococcal 13-valent Conjugate ([id number]) 10/8/2018 0.5 mL Intramuscular    Site: Deltoid- Right    Lot: [id number]    NDC: 9543-9182-77          Most recent Vaccine Information Sheet dated 11/5/15, 8/7/15 given to pt

## 2018-10-17 ENCOUNTER — HOSPITAL ENCOUNTER (OUTPATIENT)
Dept: PULMONOLOGY | Age: 63
Discharge: HOME | End: 2018-10-17
Payer: MEDICARE

## 2018-10-17 DIAGNOSIS — J44.9 CHRONIC OBSTRUCTIVE PULMONARY DISEASE, UNSPECIFIED COPD TYPE (HCC): ICD-10-CM

## 2018-10-17 PROCEDURE — 94729 DIFFUSING CAPACITY: CPT

## 2018-10-17 PROCEDURE — 94726 PLETHYSMOGRAPHY LUNG VOLUMES: CPT

## 2018-10-17 PROCEDURE — 94060 EVALUATION OF WHEEZING: CPT

## 2018-10-17 PROCEDURE — 2709999900 HC NON-CHARGEABLE SUPPLY

## 2018-10-22 ENCOUNTER — TELEPHONE (OUTPATIENT)
Dept: FAMILY MEDICINE CLINIC | Age: 63
End: 2018-10-22

## 2018-10-24 ENCOUNTER — OFFICE VISIT (OUTPATIENT)
Dept: FAMILY MEDICINE CLINIC | Age: 63
End: 2018-10-24
Payer: MEDICARE

## 2018-10-24 VITALS
SYSTOLIC BLOOD PRESSURE: 130 MMHG | BODY MASS INDEX: 24.35 KG/M2 | TEMPERATURE: 98.2 F | HEIGHT: 58 IN | DIASTOLIC BLOOD PRESSURE: 82 MMHG | WEIGHT: 116 LBS | HEART RATE: 86 BPM | RESPIRATION RATE: 16 BRPM

## 2018-10-24 DIAGNOSIS — J43.9 PULMONARY EMPHYSEMA, UNSPECIFIED EMPHYSEMA TYPE (HCC): Primary | ICD-10-CM

## 2018-10-24 DIAGNOSIS — F17.218 CIGARETTE NICOTINE DEPENDENCE WITH OTHER NICOTINE-INDUCED DISORDER: ICD-10-CM

## 2018-10-24 PROCEDURE — 99213 OFFICE O/P EST LOW 20 MIN: CPT | Performed by: FAMILY MEDICINE

## 2018-10-24 PROCEDURE — G8400 PT W/DXA NO RESULTS DOC: HCPCS | Performed by: FAMILY MEDICINE

## 2018-10-24 PROCEDURE — 1101F PT FALLS ASSESS-DOCD LE1/YR: CPT | Performed by: FAMILY MEDICINE

## 2018-10-24 PROCEDURE — G8482 FLU IMMUNIZE ORDER/ADMIN: HCPCS | Performed by: FAMILY MEDICINE

## 2018-10-24 PROCEDURE — 1090F PRES/ABSN URINE INCON ASSESS: CPT | Performed by: FAMILY MEDICINE

## 2018-10-24 PROCEDURE — 4004F PT TOBACCO SCREEN RCVD TLK: CPT | Performed by: FAMILY MEDICINE

## 2018-10-24 PROCEDURE — G8427 DOCREV CUR MEDS BY ELIG CLIN: HCPCS | Performed by: FAMILY MEDICINE

## 2018-10-24 PROCEDURE — 3023F SPIROM DOC REV: CPT | Performed by: FAMILY MEDICINE

## 2018-10-24 PROCEDURE — 3017F COLORECTAL CA SCREEN DOC REV: CPT | Performed by: FAMILY MEDICINE

## 2018-10-24 PROCEDURE — 4040F PNEUMOC VAC/ADMIN/RCVD: CPT | Performed by: FAMILY MEDICINE

## 2018-10-24 PROCEDURE — G8926 SPIRO NO PERF OR DOC: HCPCS | Performed by: FAMILY MEDICINE

## 2018-10-24 PROCEDURE — 1123F ACP DISCUSS/DSCN MKR DOCD: CPT | Performed by: FAMILY MEDICINE

## 2018-10-24 PROCEDURE — G8420 CALC BMI NORM PARAMETERS: HCPCS | Performed by: FAMILY MEDICINE

## 2018-10-24 NOTE — PROGRESS NOTES
bilaterally- no wheezes, rales or rhonchi, normal air movement, no respiratory distress or retractions  Cardiovascular: normal rate, regular rhythm, normal S1 and S2, no murmurs, rubs, clicks, or gallops, distal pulses intact  Extremities: no cyanosis, clubbing or edema of the lower extremities  Psych:  Normal affect without evidence of depression oranxiety, insight and judgement are appropriate, memory appears intact  Skin: warm and dry, no rash or erythema      ASSESSMENT & PLAN  Cecy Owen was seen today for follow up after procedure. Diagnoses and all orders for this visit:    Pulmonary emphysema, unspecified emphysema type ([facility] [address]  -     tiotropium (SPIRIVA RESPIMAT) 2.5 MCG/ACT AERS inhaler; Inhale 2 puffs into the lungs daily    Cigarette nicotine dependence with other nicotine-induced disorder  -     tiotropium (SPIRIVA RESPIMAT) 2.5 MCG/ACT AERS inhaler; Inhale 2 puffs into the lungs daily    -COPD pathogenesis discussed extensively today  -Strongly encouraged on smoking cessation, she does not wish to try medications at this time  -Given samples of Spiriva today, will see if this is helping in 6 weeks, continue PRN albuterol  -Call if any issues    Return in about 6 weeks (around 12/5/2018), or if symptoms worsen or fail to improve. Controlled Substances Monitoring:                     Cecy Owen received counseling on the following healthy behaviors: medication adherence  Reviewed prior labs and health maintenance. Continue current medications, diet and exercise. Discussed use, benefit, and side effects of prescribed medications. Barriers to medication compliance addressed. Patient given educational materials - see patient instructions. All patient questions answered. Patient voiced understanding.

## 2018-10-27 ENCOUNTER — APPOINTMENT (OUTPATIENT)
Dept: GENERAL RADIOLOGY | Age: 63
DRG: 247 | End: 2018-10-27
Payer: MEDICARE

## 2018-10-27 ENCOUNTER — HOSPITAL ENCOUNTER (INPATIENT)
Age: 63
LOS: 2 days | Discharge: HOME | DRG: 247 | End: 2018-10-29
Attending: FAMILY MEDICINE | Admitting: INTERNAL MEDICINE
Payer: MEDICARE

## 2018-10-27 DIAGNOSIS — I21.11 STEMI INVOLVING RIGHT CORONARY ARTERY (HCC): Primary | ICD-10-CM

## 2018-10-27 PROBLEM — I21.3 STEMI (ST ELEVATION MYOCARDIAL INFARCTION) (HCC): Status: ACTIVE | Noted: 2018-10-27

## 2018-10-27 LAB
ACTIVATED CLOTTING TIME: 169 SECONDS (ref 1–150)
ALBUMIN SERPL-MCNC: 4.4 G/DL (ref 3.5–5.1)
ALP BLD-CCNC: 96 U/L (ref 38–126)
ALT SERPL-CCNC: 14 U/L (ref 11–66)
ANION GAP SERPL CALCULATED.3IONS-SCNC: 18 MEQ/L (ref 8–16)
APTT: 29.7 SECONDS (ref 22–38)
AST SERPL-CCNC: 14 U/L (ref 5–40)
BASOPHILS # BLD: 0.8 %
BASOPHILS ABSOLUTE: 0.1 THOU/MM3 (ref 0–0.1)
BILIRUB SERPL-MCNC: 0.3 MG/DL (ref 0.3–1.2)
BILIRUBIN DIRECT: < 0.2 MG/DL (ref 0–0.3)
BUN BLDV-MCNC: 14 MG/DL (ref 7–22)
CALCIUM SERPL-MCNC: 9.5 MG/DL (ref 8.5–10.5)
CHLORIDE BLD-SCNC: 103 MEQ/L (ref 98–111)
CO2: 20 MEQ/L (ref 23–33)
CREAT SERPL-MCNC: 0.6 MG/DL (ref 0.4–1.2)
EOSINOPHIL # BLD: 0.8 %
EOSINOPHILS ABSOLUTE: 0.1 THOU/MM3 (ref 0–0.4)
ERYTHROCYTE [DISTWIDTH] IN BLOOD BY AUTOMATED COUNT: 12.4 % (ref 11.5–14.5)
ERYTHROCYTE [DISTWIDTH] IN BLOOD BY AUTOMATED COUNT: 41 FL (ref 35–45)
GFR SERPL CREATININE-BSD FRML MDRD: > 90 ML/MIN/1.73M2
GLUCOSE BLD-MCNC: 152 MG/DL (ref 70–108)
HCT VFR BLD CALC: 43 % (ref 37–47)
HEMOGLOBIN: 14.6 GM/DL (ref 12–16)
IMMATURE GRANS (ABS): 0.02 THOU/MM3 (ref 0–0.07)
IMMATURE GRANULOCYTES: 0.2 %
LIPASE: 40.4 U/L (ref 5.6–51.3)
LYMPHOCYTES # BLD: 42.2 %
LYMPHOCYTES ABSOLUTE: 3.7 THOU/MM3 (ref 1–4.8)
MCH RBC QN AUTO: 30.6 PG (ref 26–33)
MCHC RBC AUTO-ENTMCNC: 34 GM/DL (ref 32.2–35.5)
MCV RBC AUTO: 90.1 FL (ref 81–99)
MONOCYTES # BLD: 10.2 %
MONOCYTES ABSOLUTE: 0.9 THOU/MM3 (ref 0.4–1.3)
MRSA SCREEN RT-PCR: NEGATIVE
NUCLEATED RED BLOOD CELLS: 0 /100 WBC
OSMOLALITY CALCULATION: 284.7 MOSMOL/KG (ref 275–300)
PLATELET # BLD: 333 THOU/MM3 (ref 130–400)
PMV BLD AUTO: 9.5 FL (ref 9.4–12.4)
POTASSIUM SERPL-SCNC: 3.5 MEQ/L (ref 3.5–5.2)
RBC # BLD: 4.77 MILL/MM3 (ref 4.2–5.4)
SEG NEUTROPHILS: 45.8 %
SEGMENTED NEUTROPHILS ABSOLUTE COUNT: 4 THOU/MM3 (ref 1.8–7.7)
SODIUM BLD-SCNC: 141 MEQ/L (ref 135–145)
TOTAL PROTEIN: 7.3 G/DL (ref 6.1–8)
TROPONIN T: < 0.01 NG/ML
WBC # BLD: 8.8 THOU/MM3 (ref 4.8–10.8)

## 2018-10-27 PROCEDURE — 93458 L HRT ARTERY/VENTRICLE ANGIO: CPT | Performed by: INTERNAL MEDICINE

## 2018-10-27 PROCEDURE — 93005 ELECTROCARDIOGRAM TRACING: CPT | Performed by: EMERGENCY MEDICINE

## 2018-10-27 PROCEDURE — 2700000000 HC OXYGEN THERAPY PER DAY

## 2018-10-27 PROCEDURE — C1894 INTRO/SHEATH, NON-LASER: HCPCS

## 2018-10-27 PROCEDURE — C1769 GUIDE WIRE: HCPCS

## 2018-10-27 PROCEDURE — 99221 1ST HOSP IP/OBS SF/LOW 40: CPT | Performed by: INTERNAL MEDICINE

## 2018-10-27 PROCEDURE — 92941 PRQ TRLML REVSC TOT OCCL AMI: CPT | Performed by: INTERNAL MEDICINE

## 2018-10-27 PROCEDURE — B2151ZZ FLUOROSCOPY OF LEFT HEART USING LOW OSMOLAR CONTRAST: ICD-10-PCS | Performed by: INTERNAL MEDICINE

## 2018-10-27 PROCEDURE — 87641 MR-STAPH DNA AMP PROBE: CPT

## 2018-10-27 PROCEDURE — 2500000003 HC RX 250 WO HCPCS

## 2018-10-27 PROCEDURE — 6360000004 HC RX CONTRAST MEDICATION: Performed by: FAMILY MEDICINE

## 2018-10-27 PROCEDURE — 6370000000 HC RX 637 (ALT 250 FOR IP): Performed by: EMERGENCY MEDICINE

## 2018-10-27 PROCEDURE — 80053 COMPREHEN METABOLIC PANEL: CPT

## 2018-10-27 PROCEDURE — 2709999900 HC NON-CHARGEABLE SUPPLY

## 2018-10-27 PROCEDURE — 6370000000 HC RX 637 (ALT 250 FOR IP): Performed by: NUCLEAR MEDICINE

## 2018-10-27 PROCEDURE — 2000000000 HC ICU R&B

## 2018-10-27 PROCEDURE — 6360000002 HC RX W HCPCS

## 2018-10-27 PROCEDURE — 84484 ASSAY OF TROPONIN QUANT: CPT

## 2018-10-27 PROCEDURE — C1760 CLOSURE DEV, VASC: HCPCS

## 2018-10-27 PROCEDURE — 4A023N7 MEASUREMENT OF CARDIAC SAMPLING AND PRESSURE, LEFT HEART, PERCUTANEOUS APPROACH: ICD-10-PCS | Performed by: INTERNAL MEDICINE

## 2018-10-27 PROCEDURE — 85347 COAGULATION TIME ACTIVATED: CPT

## 2018-10-27 PROCEDURE — 2580000003 HC RX 258: Performed by: FAMILY MEDICINE

## 2018-10-27 PROCEDURE — 6370000000 HC RX 637 (ALT 250 FOR IP)

## 2018-10-27 PROCEDURE — 82248 BILIRUBIN DIRECT: CPT

## 2018-10-27 PROCEDURE — 99285 EMERGENCY DEPT VISIT HI MDM: CPT

## 2018-10-27 PROCEDURE — 71045 X-RAY EXAM CHEST 1 VIEW: CPT

## 2018-10-27 PROCEDURE — 96375 TX/PRO/DX INJ NEW DRUG ADDON: CPT

## 2018-10-27 PROCEDURE — 96374 THER/PROPH/DIAG INJ IV PUSH: CPT

## 2018-10-27 PROCEDURE — 6360000002 HC RX W HCPCS: Performed by: EMERGENCY MEDICINE

## 2018-10-27 PROCEDURE — 85025 COMPLETE CBC W/AUTO DIFF WBC: CPT

## 2018-10-27 PROCEDURE — 85730 THROMBOPLASTIN TIME PARTIAL: CPT

## 2018-10-27 PROCEDURE — 6370000000 HC RX 637 (ALT 250 FOR IP): Performed by: INTERNAL MEDICINE

## 2018-10-27 PROCEDURE — 027034Z DILATION OF CORONARY ARTERY, ONE ARTERY WITH DRUG-ELUTING INTRALUMINAL DEVICE, PERCUTANEOUS APPROACH: ICD-10-PCS | Performed by: INTERNAL MEDICINE

## 2018-10-27 PROCEDURE — C1874 STENT, COATED/COV W/DEL SYS: HCPCS

## 2018-10-27 PROCEDURE — 87081 CULTURE SCREEN ONLY: CPT

## 2018-10-27 PROCEDURE — 83690 ASSAY OF LIPASE: CPT

## 2018-10-27 PROCEDURE — 93005 ELECTROCARDIOGRAM TRACING: CPT | Performed by: INTERNAL MEDICINE

## 2018-10-27 PROCEDURE — B2111ZZ FLUOROSCOPY OF MULTIPLE CORONARY ARTERIES USING LOW OSMOLAR CONTRAST: ICD-10-PCS | Performed by: INTERNAL MEDICINE

## 2018-10-27 PROCEDURE — C1887 CATHETER, GUIDING: HCPCS

## 2018-10-27 PROCEDURE — 2580000003 HC RX 258: Performed by: EMERGENCY MEDICINE

## 2018-10-27 PROCEDURE — 99291 CRITICAL CARE FIRST HOUR: CPT | Performed by: INTERNAL MEDICINE

## 2018-10-27 PROCEDURE — 2580000003 HC RX 258: Performed by: INTERNAL MEDICINE

## 2018-10-27 PROCEDURE — C1725 CATH, TRANSLUMIN NON-LASER: HCPCS

## 2018-10-27 PROCEDURE — 36415 COLL VENOUS BLD VENIPUNCTURE: CPT

## 2018-10-27 RX ORDER — ASPIRIN 81 MG/1
324 TABLET, CHEWABLE ORAL ONCE
Status: COMPLETED | OUTPATIENT
Start: 2018-10-27 | End: 2018-10-27

## 2018-10-27 RX ORDER — ONDANSETRON 2 MG/ML
4 INJECTION INTRAMUSCULAR; INTRAVENOUS EVERY 6 HOURS PRN
Status: DISCONTINUED | OUTPATIENT
Start: 2018-10-27 | End: 2018-10-29 | Stop reason: HOSPADM

## 2018-10-27 RX ORDER — 0.9 % SODIUM CHLORIDE 0.9 %
1000 INTRAVENOUS SOLUTION INTRAVENOUS ONCE
Status: COMPLETED | OUTPATIENT
Start: 2018-10-27 | End: 2018-10-27

## 2018-10-27 RX ORDER — HEPARIN SODIUM 1000 [USP'U]/ML
60 INJECTION, SOLUTION INTRAVENOUS; SUBCUTANEOUS ONCE
Status: COMPLETED | OUTPATIENT
Start: 2018-10-27 | End: 2018-10-27

## 2018-10-27 RX ORDER — IPRATROPIUM BROMIDE AND ALBUTEROL SULFATE 2.5; .5 MG/3ML; MG/3ML
1 SOLUTION RESPIRATORY (INHALATION) EVERY 4 HOURS PRN
Status: DISCONTINUED | OUTPATIENT
Start: 2018-10-27 | End: 2018-10-29 | Stop reason: HOSPADM

## 2018-10-27 RX ORDER — ATORVASTATIN CALCIUM 80 MG/1
80 TABLET, FILM COATED ORAL NIGHTLY
Status: DISCONTINUED | OUTPATIENT
Start: 2018-10-27 | End: 2018-10-29 | Stop reason: HOSPADM

## 2018-10-27 RX ORDER — SODIUM CHLORIDE 0.9 % (FLUSH) 0.9 %
10 SYRINGE (ML) INJECTION PRN
Status: DISCONTINUED | OUTPATIENT
Start: 2018-10-27 | End: 2018-10-29 | Stop reason: HOSPADM

## 2018-10-27 RX ORDER — SODIUM CHLORIDE 0.9 % (FLUSH) 0.9 %
10 SYRINGE (ML) INJECTION EVERY 12 HOURS SCHEDULED
Status: DISCONTINUED | OUTPATIENT
Start: 2018-10-27 | End: 2018-10-29 | Stop reason: HOSPADM

## 2018-10-27 RX ORDER — LISINOPRIL 2.5 MG/1
2.5 TABLET ORAL DAILY
Status: DISCONTINUED | OUTPATIENT
Start: 2018-10-27 | End: 2018-10-29

## 2018-10-27 RX ORDER — ONDANSETRON 2 MG/ML
4 INJECTION INTRAMUSCULAR; INTRAVENOUS ONCE
Status: COMPLETED | OUTPATIENT
Start: 2018-10-27 | End: 2018-10-27

## 2018-10-27 RX ORDER — ASPIRIN 81 MG/1
81 TABLET ORAL DAILY
Status: DISCONTINUED | OUTPATIENT
Start: 2018-10-28 | End: 2018-10-29 | Stop reason: HOSPADM

## 2018-10-27 RX ORDER — ATROPINE SULFATE 0.4 MG/ML
0.5 AMPUL (ML) INJECTION
Status: ACTIVE | OUTPATIENT
Start: 2018-10-27 | End: 2018-10-27

## 2018-10-27 RX ORDER — FLUTICASONE PROPIONATE 50 MCG
1 SPRAY, SUSPENSION (ML) NASAL DAILY
Status: DISCONTINUED | OUTPATIENT
Start: 2018-10-27 | End: 2018-10-29 | Stop reason: HOSPADM

## 2018-10-27 RX ORDER — ACETAMINOPHEN 325 MG/1
650 TABLET ORAL EVERY 4 HOURS PRN
Status: DISCONTINUED | OUTPATIENT
Start: 2018-10-27 | End: 2018-10-29 | Stop reason: HOSPADM

## 2018-10-27 RX ORDER — SODIUM CHLORIDE 9 MG/ML
INJECTION, SOLUTION INTRAVENOUS CONTINUOUS
Status: DISCONTINUED | OUTPATIENT
Start: 2018-10-27 | End: 2018-10-27 | Stop reason: SDUPTHER

## 2018-10-27 RX ORDER — HEPARIN SODIUM 1000 [USP'U]/ML
60 INJECTION, SOLUTION INTRAVENOUS; SUBCUTANEOUS PRN
Status: DISCONTINUED | OUTPATIENT
Start: 2018-10-27 | End: 2018-10-29 | Stop reason: HOSPADM

## 2018-10-27 RX ORDER — SODIUM CHLORIDE 9 MG/ML
75 INJECTION, SOLUTION INTRAVENOUS CONTINUOUS
Status: ACTIVE | OUTPATIENT
Start: 2018-10-27 | End: 2018-10-28

## 2018-10-27 RX ORDER — HEPARIN SODIUM 1000 [USP'U]/ML
30 INJECTION, SOLUTION INTRAVENOUS; SUBCUTANEOUS PRN
Status: DISCONTINUED | OUTPATIENT
Start: 2018-10-27 | End: 2018-10-29 | Stop reason: HOSPADM

## 2018-10-27 RX ORDER — CARVEDILOL 3.12 MG/1
3.12 TABLET ORAL 2 TIMES DAILY WITH MEALS
Status: DISCONTINUED | OUTPATIENT
Start: 2018-10-27 | End: 2018-10-29

## 2018-10-27 RX ORDER — HEPARIN SODIUM 10000 [USP'U]/100ML
12 INJECTION, SOLUTION INTRAVENOUS CONTINUOUS
Status: DISCONTINUED | OUTPATIENT
Start: 2018-10-27 | End: 2018-10-29 | Stop reason: HOSPADM

## 2018-10-27 RX ORDER — OXYCODONE HYDROCHLORIDE AND ACETAMINOPHEN 5; 325 MG/1; MG/1
1 TABLET ORAL EVERY 6 HOURS PRN
Status: DISCONTINUED | OUTPATIENT
Start: 2018-10-27 | End: 2018-10-29 | Stop reason: HOSPADM

## 2018-10-27 RX ORDER — MORPHINE SULFATE 2 MG/ML
2 INJECTION, SOLUTION INTRAMUSCULAR; INTRAVENOUS ONCE
Status: COMPLETED | OUTPATIENT
Start: 2018-10-27 | End: 2018-10-27

## 2018-10-27 RX ADMIN — ASPIRIN 81 MG 324 MG: 81 TABLET ORAL at 15:53

## 2018-10-27 RX ADMIN — TICAGRELOR 90 MG: 90 TABLET ORAL at 21:39

## 2018-10-27 RX ADMIN — ONDANSETRON 4 MG: 2 INJECTION INTRAMUSCULAR; INTRAVENOUS at 15:53

## 2018-10-27 RX ADMIN — SODIUM CHLORIDE: 9 INJECTION, SOLUTION INTRAVENOUS at 15:56

## 2018-10-27 RX ADMIN — CARVEDILOL 3.12 MG: 3.12 TABLET, FILM COATED ORAL at 20:10

## 2018-10-27 RX ADMIN — HEPARIN SODIUM 12 UNITS/KG/HR: 10000 INJECTION, SOLUTION INTRAVENOUS at 15:55

## 2018-10-27 RX ADMIN — OXYCODONE HYDROCHLORIDE AND ACETAMINOPHEN 1 TABLET: 5; 325 TABLET ORAL at 21:38

## 2018-10-27 RX ADMIN — ATORVASTATIN CALCIUM 80 MG: 80 TABLET, FILM COATED ORAL at 20:44

## 2018-10-27 RX ADMIN — ACETAMINOPHEN 650 MG: 325 TABLET ORAL at 20:44

## 2018-10-27 RX ADMIN — FLUTICASONE PROPIONATE 1 SPRAY: 50 SPRAY, METERED NASAL at 20:10

## 2018-10-27 RX ADMIN — SODIUM CHLORIDE 1000 ML: 9 INJECTION, SOLUTION INTRAVENOUS at 16:08

## 2018-10-27 RX ADMIN — LISINOPRIL 2.5 MG: 2.5 TABLET ORAL at 20:10

## 2018-10-27 RX ADMIN — MORPHINE SULFATE 2 MG: 2 INJECTION, SOLUTION INTRAMUSCULAR; INTRAVENOUS at 15:58

## 2018-10-27 RX ADMIN — SODIUM CHLORIDE: 9 INJECTION, SOLUTION INTRAVENOUS at 16:08

## 2018-10-27 RX ADMIN — HEPARIN SODIUM 3190 UNITS: 1000 INJECTION, SOLUTION INTRAVENOUS; SUBCUTANEOUS at 15:54

## 2018-10-27 RX ADMIN — SODIUM CHLORIDE 75 ML/HR: 9 INJECTION, SOLUTION INTRAVENOUS at 18:29

## 2018-10-27 RX ADMIN — IOPAMIDOL 110 ML: 755 INJECTION, SOLUTION INTRAVENOUS at 17:06

## 2018-10-27 ASSESSMENT — ENCOUNTER SYMPTOMS
COUGH: 0
BACK PAIN: 0
SORE THROAT: 0
EYE PAIN: 0
NAUSEA: 0
DIARRHEA: 1
SHORTNESS OF BREATH: 0
RHINORRHEA: 0
ABDOMINAL PAIN: 0
EYE DISCHARGE: 0
WHEEZING: 0
VOMITING: 1

## 2018-10-27 ASSESSMENT — PAIN SCALES - GENERAL
PAINLEVEL_OUTOF10: 0
PAINLEVEL_OUTOF10: 5
PAINLEVEL_OUTOF10: 7
PAINLEVEL_OUTOF10: 6

## 2018-10-27 NOTE — CONSULTS
Patient - Mirna Al   MRN -  [id number]   Acct # - [de-identified]   - 1955      Date of Admission -  10/27/2018  3:32 PM  Primary Care Physician - Izzy Zuniga DO  Date of evaluation -  10/27/2018  Room - [facility]   HD - 0  Problem List      Patient Active Problem List   Diagnosis    Hemorrhoids    Blood in stool    Change in bowel habits    Uterine prolapse    S/P hemorrhoidectomy    Nicotine dependence    Malignant neoplasm of upper-inner quadrant of left female breast ([address]    S/P lumpectomy and sln bx, left breast    Essential hypertension    Bilateral carpal tunnel syndrome    Lumbar radiculopathy, chronic    Cervical spinal stenosis     Assessment    · STEMI  · COPD  · Diarrhea  · Hypertension  · IBS  · History of breast cancer  · GERD    Plan    Cath per cardiology. Anti plt, b blockers, statin. ACE I,Echo, heparin per cardio  Duoneb today, then switch to albuterol PRN and Spiriva. Incentive spirometry. Stool for Cdiff. Influenza swab  Flonase  No COPD exacerbation. DVT prophylaxis    Chief Complaint/Reason for Admission   Chest pain    HPI   Mirna Al is a 70 y.o. female admitted for   She complained of substernal chest pain,mod to severe , central , comes and goes and the last 2 days, getting worse. associated with nausea and vomiting. She is smoker to around 30 pack-year  EKG in the ER showed ST elevation in 2-3 and AVF. STEMI alert was called and she went to the cath lab. The patient also had diarrhea and she was incontinent. Also the patient complains of runny nose and stuffiness. She has history of COPD  and had recent PFT on albuterol and spiriva. CXR did not show significant infiltrates.     PMHx         Diagnosis Date    Bronchitis     Cerebral artery occlusion with cerebral infarction [facility])     TIA    Chronic kidney disease     Diverticulosis     Essential hypertension 5/10/2017    Foot drop, right foot     GERD (gastroesophageal reflux disease) hematochezia, hematemesis or pyrosis, positive for diarrhea, vomiting.  Denies any frequency, urgency, hesitancy or incontinence  Heme Denies bruising or bleeding easily  Endocrine Denies any history of diabetes or thyroid disease  Neuro Denies any focal motor or sensory deficits    Vitals     height is 4' 10\" (1.473 m) and weight is 117 lb (53.1 kg). Her temperature is 97.7 °F (36.5 °C). Her blood pressure is 107/64 and her pulse is 61. Her respiration is 17 and oxygen saturation is 100%. O2 Flow Rate (L/min): 2 L/min  I/O    No intake or output data in the 24 hours ending 10/27/18 1736  [unfilled]  @[email or web address]@  Exam    General Appearance  Awake, alert, oriented, in no acute distress  HEENT - Normal, Head is normocephalic, atraumatic. EOMI, PERRLA    Neck - Supple, symmetrical, trachea midline and Soft, trachea midline and straight  Lungs - no wheezes, rales or rhonchi, aeration good  Cardiovascular - Regular rhythm normal rate without murmur, gallop or rub. Abdomen - Soft, nontender, nondistended, no masses or organomegaly  Neurologic - CN II-XII are grossly intact.  There are no focal motor or sensory deficits  Skin - No bruising or bleeding  Extremities - No cyanosis, clubbing or edema    Lab Data  - Old records and notes have been reviewed in [facility] ALEJANDR[facility]   CBC     Lab Results   Component Value Date    WBC 8.8 10/27/2018    RBC 4.77 10/27/2018    HGB 14.6 10/27/2018    HCT 43.0 10/27/2018     10/27/2018    MCV 90.1 10/27/2018    MCH 30.6 10/27/2018    MCHC 34.0 10/27/2018    RDW 12.7 05/18/2018    NRBC 0 10/27/2018    SEGSPCT 45.8 10/27/2018    MONOPCT 10.2 10/27/2018    MONOSABS 0.9 10/27/2018    LYMPHSABS 3.7 10/27/2018    EOSABS 0.1 10/27/2018    BASOSABS 0.1 10/27/2018     BMP   Lab Results   Component Value Date     10/27/2018    K 3.5 10/27/2018     10/27/2018    CO2 20 10/27/2018    BUN 14 10/27/2018    CREATININE 0.6 10/27/2018    GLUCOSE 152 10/27/2018    CALCIUM 9.5 10/27/2018 LFTS  Lab Results   Component Value Date    ALKPHOS 96 10/27/2018    ALT 14 10/27/2018    AST 14 10/27/2018    PROT 7.3 10/27/2018    BILITOT 0.3 10/27/2018    BILIDIR <0.2 10/27/2018    LABALBU 4.4 10/27/2018     ABG   No results found for: PH, PCO2, PO2, HCO3, O2SAT  No results found for: Cammy Payment, SETPEEP  INR  Lab Results   Component Value Date    INR 0.92 05/18/2018     PTT   Lab Results   Component Value Date    APTT 29.7 10/27/2018       Radiology      XR CHEST PORTABLE [[id number]] Resulted: 10/27/18 1609      Order Status: Completed Updated: 10/27/18 1611     Narrative:       PROCEDURE: XR CHEST PORTABLE    CLINICAL INFORMATION: chest pain, STEMI,     COMPARISON: No prior study. TECHNIQUE: A single mobile view of the chest was obtained.       Impression:       1. Normal heart size. Kyphotic positioning of the patient. Vascular clips project over left side of chest and left axillary region. 2. No acute findings. No infiltrates or effusions are seen.        (See actual reports for details)    Electronically signed by     Freddy Najera MD on 10/27/2018 at 5:36 PM

## 2018-10-27 NOTE — H&P
[facility]  Cardiology Consult      Patient:  Saira Moya  YOB: 1955    MRN: [id number]   Acct: [de-identified]     Primary Care Physician: Michelle Babcock DO        REASON FOR adm: STEMI        CHIEF COMPLAINT:        HISTORY OF PRESENT ILLNESS:          All labs, EKG's, diagnostic testing and images as well as cardiac cath, stress testing   were reviewed during this encounter    PREVIOUS CARDIAC TESTING    Ekg:     Echo:    Str. Test:    Cath:      Past Medical History:    Past Medical History:   Diagnosis Date    Bronchitis     Cerebral artery occlusion with cerebral infarction [address]     TIA    Chronic kidney disease     Diverticulosis     Essential hypertension 5/10/2017    Foot drop, right foot     GERD (gastroesophageal reflux disease)     IBS (irritable bowel syndrome)     Malignant neoplasm of upper-inner quadrant of left female breast ([facility] 9/13/2016    Nausea & vomiting     PONV (postoperative nausea and vomiting)        Past Surgical History:    Past Surgical History:   Procedure Laterality Date    BACK SURGERY  unsure    BREAST SURGERY Left 2016    exc. lymph nodes, lumpectomy    CARPAL TUNNEL RELEASE Right 06/2017    [facility]    COLONOSCOPY  unsure    Dr. Keyon Mosley    COLONOSCOPY  10/10/14    Dr. Lindsay Jamila  12/16/2014    HYSTERECTOMY, VAGINAL  12/16/2014    KIDNEY STONE SURGERY      LAMINECTOMY  06/12/2018    [place] OFFICE/OUTPT VISIT,PROCEDURE ONLY N/A 6/12/2018    ACDF C5-6 performed by Lilly Giles MD at [address] [place] [address] Left        Medications Prior to Admission:    Not in a hospital admission. Allergies:    Naproxen; Prednisone; and Ultram [tramadol]    Social History:    reports that she has been smoking Cigarettes. She has a 30.00 pack-year smoking history.  She has never used smokeless tobacco.

## 2018-10-27 NOTE — ED PROVIDER NOTES
[facility]  eMERGENCY dEPARTMENT eNCOUnter   Physician Attestation    Pt Name: Ismael Smith  MRN: [id number]  [place] 1955  Date of evaluation: 10/27/18        Physician Note:    Evaluation:  I have personally performed a face-to-face evaluation on this patient. I have reviewed the Midlevel's findings and agree. History and exam by me shows the following information. Patient has stuttering chest pain for about 3 days    Today she will be worse about 7/10 in central location associated nausea vomiting. She's had been a chronic smoker and had question will small stroke in the past    Cholesterol status unknown    Personal history of left breast cancer treated with lumpectomy and radiation 1-2 years ago. Exam showed her to be alert cooperative    Sinus rhythm on monitor with normal blood pressure    EKG showed sinus rhythm at 72. QRS complexes showed normal axis, normal conduction. The ST and T-wave showed ST elevation 2,  3 and aVF consistent with acute inferior wall STEMI. She has reciprocal depression across the precordium from V1 to V5. STEMI alert was initiated    She's given aspirin by mouth, heparin bolus and drip, morphine for pain    IV hydration    Nitrates are held with the inferior MI    Cath Lab and  have been notified    Chest x-ray, single view showed lungs were clear with heart and mediastinum normal    After morphine she did drop her blood pressure 72/52, pulse 58    This did improve with IV fluids    Admitting for heart cath and  intervention        Due to the immediate potential for life-threatening deterioration due to acute inferior ST elevation MI, I spent 30 minutes providing critical care. This time is excluding time spent performing procedures. 1. STEMI involving right coronary artery (HCC)          DISPOSITION/PLAN  PATIENT REFERRED TO:  No follow-up provider specified.   DISCHARGE MEDICATIONS:  New Prescriptions    No

## 2018-10-28 LAB
APTT: 30 SECONDS (ref 22–38)
APTT: 32.9 SECONDS (ref 22–38)
EKG ATRIAL RATE: 60 BPM
EKG ATRIAL RATE: 72 BPM
EKG ATRIAL RATE: 75 BPM
EKG P AXIS: 60 DEGREES
EKG P AXIS: 63 DEGREES
EKG P AXIS: 76 DEGREES
EKG P-R INTERVAL: 142 MS
EKG P-R INTERVAL: 158 MS
EKG P-R INTERVAL: 162 MS
EKG Q-T INTERVAL: 392 MS
EKG Q-T INTERVAL: 456 MS
EKG Q-T INTERVAL: 468 MS
EKG QRS DURATION: 74 MS
EKG QRS DURATION: 90 MS
EKG QRS DURATION: 94 MS
EKG QTC CALCULATION (BAZETT): 429 MS
EKG QTC CALCULATION (BAZETT): 468 MS
EKG QTC CALCULATION (BAZETT): 509 MS
EKG R AXIS: 102 DEGREES
EKG R AXIS: 68 DEGREES
EKG R AXIS: 74 DEGREES
EKG T AXIS: 100 DEGREES
EKG T AXIS: 43 DEGREES
EKG T AXIS: 74 DEGREES
EKG VENTRICULAR RATE: 60 BPM
EKG VENTRICULAR RATE: 72 BPM
EKG VENTRICULAR RATE: 75 BPM
FLU A ANTIGEN: NEGATIVE
FLU B ANTIGEN: NEGATIVE
MAGNESIUM: 1.9 MG/DL (ref 1.6–2.4)
POTASSIUM SERPL-SCNC: 3.4 MEQ/L (ref 3.5–5.2)

## 2018-10-28 PROCEDURE — 93005 ELECTROCARDIOGRAM TRACING: CPT | Performed by: INTERNAL MEDICINE

## 2018-10-28 PROCEDURE — 87804 INFLUENZA ASSAY W/OPTIC: CPT

## 2018-10-28 PROCEDURE — 93010 ELECTROCARDIOGRAM REPORT: CPT | Performed by: INTERNAL MEDICINE

## 2018-10-28 PROCEDURE — 6370000000 HC RX 637 (ALT 250 FOR IP): Performed by: INTERNAL MEDICINE

## 2018-10-28 PROCEDURE — 6370000000 HC RX 637 (ALT 250 FOR IP): Performed by: PHYSICIAN ASSISTANT

## 2018-10-28 PROCEDURE — 84132 ASSAY OF SERUM POTASSIUM: CPT

## 2018-10-28 PROCEDURE — 6360000002 HC RX W HCPCS: Performed by: INTERNAL MEDICINE

## 2018-10-28 PROCEDURE — 99232 SBSQ HOSP IP/OBS MODERATE 35: CPT | Performed by: PHYSICIAN ASSISTANT

## 2018-10-28 PROCEDURE — 2709999900 HC NON-CHARGEABLE SUPPLY

## 2018-10-28 PROCEDURE — 2580000003 HC RX 258: Performed by: INTERNAL MEDICINE

## 2018-10-28 PROCEDURE — 94640 AIRWAY INHALATION TREATMENT: CPT

## 2018-10-28 PROCEDURE — 6360000002 HC RX W HCPCS: Performed by: NUCLEAR MEDICINE

## 2018-10-28 PROCEDURE — 6370000000 HC RX 637 (ALT 250 FOR IP): Performed by: NUCLEAR MEDICINE

## 2018-10-28 PROCEDURE — 36415 COLL VENOUS BLD VENIPUNCTURE: CPT

## 2018-10-28 PROCEDURE — 85730 THROMBOPLASTIN TIME PARTIAL: CPT

## 2018-10-28 PROCEDURE — 83735 ASSAY OF MAGNESIUM: CPT

## 2018-10-28 PROCEDURE — 2140000000 HC CCU INTERMEDIATE R&B

## 2018-10-28 RX ORDER — NICOTINE 21 MG/24HR
1 PATCH, TRANSDERMAL 24 HOURS TRANSDERMAL DAILY
Status: DISCONTINUED | OUTPATIENT
Start: 2018-10-28 | End: 2018-10-29 | Stop reason: HOSPADM

## 2018-10-28 RX ORDER — POTASSIUM CHLORIDE 20 MEQ/1
40 TABLET, EXTENDED RELEASE ORAL ONCE
Status: COMPLETED | OUTPATIENT
Start: 2018-10-28 | End: 2018-10-28

## 2018-10-28 RX ORDER — LETROZOLE 2.5 MG/1
2.5 TABLET, FILM COATED ORAL DAILY
Status: DISCONTINUED | OUTPATIENT
Start: 2018-10-28 | End: 2018-10-29 | Stop reason: HOSPADM

## 2018-10-28 RX ORDER — POTASSIUM CHLORIDE 7.45 MG/ML
10 INJECTION INTRAVENOUS
Status: DISCONTINUED | OUTPATIENT
Start: 2018-10-28 | End: 2018-10-28

## 2018-10-28 RX ADMIN — TICAGRELOR 90 MG: 90 TABLET ORAL at 09:02

## 2018-10-28 RX ADMIN — CARVEDILOL 3.12 MG: 3.12 TABLET, FILM COATED ORAL at 17:25

## 2018-10-28 RX ADMIN — FLUTICASONE PROPIONATE 1 SPRAY: 50 SPRAY, METERED NASAL at 09:16

## 2018-10-28 RX ADMIN — ONDANSETRON 4 MG: 2 INJECTION INTRAMUSCULAR; INTRAVENOUS at 09:29

## 2018-10-28 RX ADMIN — ATORVASTATIN CALCIUM 80 MG: 80 TABLET, FILM COATED ORAL at 21:29

## 2018-10-28 RX ADMIN — LETROZOLE 2.5 MG: 2.5 TABLET, FILM COATED ORAL at 12:08

## 2018-10-28 RX ADMIN — POTASSIUM CHLORIDE 40 MEQ: 20 TABLET, EXTENDED RELEASE ORAL at 05:42

## 2018-10-28 RX ADMIN — TICAGRELOR 90 MG: 90 TABLET ORAL at 21:29

## 2018-10-28 RX ADMIN — ASPIRIN 81 MG: 81 TABLET, COATED ORAL at 09:03

## 2018-10-28 RX ADMIN — POTASSIUM CHLORIDE 10 MEQ: 7.46 INJECTION, SOLUTION INTRAVENOUS at 03:31

## 2018-10-28 RX ADMIN — ACETAMINOPHEN 650 MG: 325 TABLET ORAL at 09:31

## 2018-10-28 RX ADMIN — Medication 10 ML: at 21:29

## 2018-10-28 RX ADMIN — IPRATROPIUM BROMIDE AND ALBUTEROL SULFATE 1 AMPULE: .5; 3 SOLUTION RESPIRATORY (INHALATION) at 17:48

## 2018-10-28 ASSESSMENT — PAIN DESCRIPTION - LOCATION: LOCATION: HEAD

## 2018-10-28 ASSESSMENT — PAIN SCALES - GENERAL
PAINLEVEL_OUTOF10: 0
PAINLEVEL_OUTOF10: 4
PAINLEVEL_OUTOF10: 0
PAINLEVEL_OUTOF10: 0

## 2018-10-28 ASSESSMENT — PAIN DESCRIPTION - FREQUENCY: FREQUENCY: CONTINUOUS

## 2018-10-28 ASSESSMENT — PAIN DESCRIPTION - DESCRIPTORS: DESCRIPTORS: HEADACHE

## 2018-10-28 ASSESSMENT — PAIN DESCRIPTION - PAIN TYPE: TYPE: ACUTE PAIN

## 2018-10-28 NOTE — FLOWSHEET NOTE
10/28/18 0200   Provider Notification   Reason for Communication Evaluate   Provider Name Jm Jaramillo   Provider Notification Physician   Method of Communication Call   Spoke with Dr Jm Jaramillo regarding EKG changes, hypotension, and bradycardia. Says to hold lisinopril and coreg.

## 2018-10-29 ENCOUNTER — TELEPHONE (OUTPATIENT)
Dept: FAMILY MEDICINE CLINIC | Age: 63
End: 2018-10-29

## 2018-10-29 VITALS
HEART RATE: 75 BPM | TEMPERATURE: 97.8 F | BODY MASS INDEX: 26.24 KG/M2 | OXYGEN SATURATION: 93 % | HEIGHT: 58 IN | DIASTOLIC BLOOD PRESSURE: 67 MMHG | WEIGHT: 125 LBS | RESPIRATION RATE: 18 BRPM | SYSTOLIC BLOOD PRESSURE: 107 MMHG

## 2018-10-29 LAB
LV EF: 53 %
LVEF MODALITY: NORMAL
MRSA SCREEN: NORMAL
PLATELET # BLD: 243 THOU/MM3 (ref 130–400)
POTASSIUM SERPL-SCNC: 3.6 MEQ/L (ref 3.5–5.2)
VRE CULTURE: NORMAL

## 2018-10-29 PROCEDURE — 2580000003 HC RX 258: Performed by: INTERNAL MEDICINE

## 2018-10-29 PROCEDURE — 6370000000 HC RX 637 (ALT 250 FOR IP): Performed by: PHYSICIAN ASSISTANT

## 2018-10-29 PROCEDURE — 6360000002 HC RX W HCPCS: Performed by: INTERNAL MEDICINE

## 2018-10-29 PROCEDURE — 84132 ASSAY OF SERUM POTASSIUM: CPT

## 2018-10-29 PROCEDURE — 85049 AUTOMATED PLATELET COUNT: CPT

## 2018-10-29 PROCEDURE — 6370000000 HC RX 637 (ALT 250 FOR IP): Performed by: INTERNAL MEDICINE

## 2018-10-29 PROCEDURE — 99239 HOSP IP/OBS DSCHRG MGMT >30: CPT | Performed by: PHYSICIAN ASSISTANT

## 2018-10-29 PROCEDURE — 93306 TTE W/DOPPLER COMPLETE: CPT

## 2018-10-29 PROCEDURE — 36415 COLL VENOUS BLD VENIPUNCTURE: CPT

## 2018-10-29 RX ORDER — ASPIRIN 81 MG/1
81 TABLET ORAL DAILY
Qty: 30 TABLET | Refills: 3 | Status: SHIPPED | OUTPATIENT
Start: 2018-10-30

## 2018-10-29 RX ORDER — METOPROLOL SUCCINATE 25 MG/1
25 TABLET, EXTENDED RELEASE ORAL DAILY
Status: DISCONTINUED | OUTPATIENT
Start: 2018-10-30 | End: 2018-10-29 | Stop reason: HOSPADM

## 2018-10-29 RX ORDER — CLOPIDOGREL BISULFATE 75 MG/1
75 TABLET ORAL DAILY
Qty: 30 TABLET | Refills: 3 | Status: SHIPPED | OUTPATIENT
Start: 2018-10-29 | End: 2019-02-13 | Stop reason: SDUPTHER

## 2018-10-29 RX ORDER — POTASSIUM CHLORIDE 20 MEQ/1
20 TABLET, EXTENDED RELEASE ORAL ONCE
Status: COMPLETED | OUTPATIENT
Start: 2018-10-29 | End: 2018-10-29

## 2018-10-29 RX ORDER — NICOTINE 21 MG/24HR
1 PATCH, TRANSDERMAL 24 HOURS TRANSDERMAL DAILY
Qty: 30 PATCH | Refills: 0 | Status: SHIPPED | OUTPATIENT
Start: 2018-10-30 | End: 2018-11-21 | Stop reason: SDUPTHER

## 2018-10-29 RX ORDER — NITROGLYCERIN 0.4 MG/1
0.4 TABLET SUBLINGUAL EVERY 5 MIN PRN
Qty: 25 TABLET | Refills: 0 | Status: SHIPPED | OUTPATIENT
Start: 2018-10-29 | End: 2020-05-14 | Stop reason: SDUPTHER

## 2018-10-29 RX ORDER — METOPROLOL SUCCINATE 25 MG/1
25 TABLET, EXTENDED RELEASE ORAL DAILY
Qty: 30 TABLET | Refills: 3 | Status: SHIPPED | OUTPATIENT
Start: 2018-10-30 | End: 2019-02-13 | Stop reason: SDUPTHER

## 2018-10-29 RX ORDER — CLOPIDOGREL BISULFATE 75 MG/1
75 TABLET ORAL DAILY
Status: DISCONTINUED | OUTPATIENT
Start: 2018-10-29 | End: 2018-10-29 | Stop reason: HOSPADM

## 2018-10-29 RX ORDER — ATORVASTATIN CALCIUM 80 MG/1
80 TABLET, FILM COATED ORAL NIGHTLY
Qty: 30 TABLET | Refills: 3 | Status: SHIPPED | OUTPATIENT
Start: 2018-10-29 | End: 2019-02-13 | Stop reason: SDUPTHER

## 2018-10-29 RX ADMIN — CLOPIDOGREL BISULFATE 75 MG: 75 TABLET ORAL at 10:34

## 2018-10-29 RX ADMIN — ONDANSETRON 4 MG: 2 INJECTION INTRAMUSCULAR; INTRAVENOUS at 08:26

## 2018-10-29 RX ADMIN — ACETAMINOPHEN 650 MG: 325 TABLET ORAL at 10:36

## 2018-10-29 RX ADMIN — POTASSIUM CHLORIDE 20 MEQ: 20 TABLET, EXTENDED RELEASE ORAL at 08:47

## 2018-10-29 RX ADMIN — LETROZOLE 2.5 MG: 2.5 TABLET, FILM COATED ORAL at 08:49

## 2018-10-29 RX ADMIN — Medication 10 ML: at 08:48

## 2018-10-29 RX ADMIN — LISINOPRIL 2.5 MG: 2.5 TABLET ORAL at 08:49

## 2018-10-29 RX ADMIN — ACETAMINOPHEN 650 MG: 325 TABLET ORAL at 05:17

## 2018-10-29 RX ADMIN — ASPIRIN 81 MG: 81 TABLET, COATED ORAL at 08:47

## 2018-10-29 RX ADMIN — CARVEDILOL 3.12 MG: 3.12 TABLET, FILM COATED ORAL at 08:27

## 2018-10-29 ASSESSMENT — PAIN SCALES - GENERAL
PAINLEVEL_OUTOF10: 3
PAINLEVEL_OUTOF10: 5

## 2018-10-29 NOTE — PROGRESS NOTES
Inpatient Cardiac Rehabilitation Consult    Received consult for Phase II Cardiac Rehabilitation. Cardiac Rehab education completed with patient and family member in the room. Brochure given. Patient states she doesn't need the ICR program per she is a , she may agree to traditional cardiac rehab, she will see. We will follow up with patient at home.

## 2018-10-29 NOTE — PROCEDURES
[address] [id number]                           CARDIAC CATHETERIZATION    PATIENT NAME: Maty Weber                 :         1955  MED REC NO:   [id number]                           ROOM:       0034  ACCOUNT NO:   [de-identified]                           ADMIT DATE:  10/27/2018  PROVIDER:     Kavon Meza MD      DATE OF PROCEDURE:  10/27/2018    PROCEDURES PERFORMED:  1. Measurement of LVEF, LVEDP, and transaortic valve gradient. 2.  Selective coronary angiography. 3.  Drug-eluting stent to RCA. 4.  Right femoral arteriography. FINDINGS:  1. Left main vessel is normal.  2.  The anterior descending artery and its branches are normal.  3.  The circumflex vessel and its branches show mild irregularities  with no significant narrowing. 4.  Right coronary artery is of large caliber and is 100% occluded  proximally. PRE-PCI DIAGNOSES:  100% proximal RCA occlusion. POST-PCI DIAGNOSIS:  0% residual with OSITO grade 3 flow distally. COMPLICATIONS:  None. EQUIPMENT USE:  A 6-Cuban 10 cm femoral sheath. A 5-Cuban Multipack  diagnostic catheters. A 6-Cuban [place] JR4 guide, 0.014 Runthrough  wire, 3.5 x 15 mm TREK balloon, 3.5 x 30 mm Resolute Paul GABRIELLE,  heparin, AngioSeal.    PROCEDURE IN DETAIL:  The patient was brought to the cardiac  catheterization laboratory directly after being diagnosed with STEMI. The right femoral artery was prepped and draped in the usual fashion  and arterial access was readily obtained. A 6-Cuban 10 cm femoral  sheath was placed and the diagnostic study was performed using  5-Cuban Multipack diagnostic catheters. The findings outlined above. Following the diagnostic study, the culprit artery was the RCA. A 6-Cuban JR4 Campbell guide was used to intubate the right coronary  ostium, and a 0.014 Runthrough wire was advanced distal to the  stenosis.   The vessel was recanalized using the 3.5 x 15 TREK balloon. Following this a 3.5 x 30 mm Resolute [place] drug-eluting stent was  deployed. Angiography revealed 0% residual OSITO grade 3 flow  distally. Heparin was the anticoagulant used and following right femoral  arteriography, an AngioSeal device will be deployed for hemostasis. The LVEF was estimated at 50% and LVEDP estimated at 18 mmHg. There  was no gradient across the aortic valve on pullback. The patient tolerated the procedure well. The procedure details were explained to the patient and family and she  was transferred to the ICU for further monitoring and management.         Alix Valencia MD    D: 10/29/2018 9:34:36       T: 10/29/2018 10:06:50     [email or web address]  Job#: [id number]     Doc#: [id number]    CC:

## 2018-10-29 NOTE — DISCHARGE SUMMARY
normal bowel sounds, no masses Extremities: no cyanosis, clubbing or edema, pulse     Skin: warm and dry, no rash or erythema  Head: normocephalic and atraumatic  Eyes: pupils equal, round, and reactive to light  Neck: supple and non-tender without mass, no thyromegaly   Musculoskeletal: normal range of motion, no joint swelling, deformity or tenderness  Neurological: alert, oriented, normal speech, no focal findings or movement disorder noted    Medications:  Current Discharge Medication List      START taking these medications    Details   aspirin 81 MG EC tablet Take 1 tablet by mouth daily  Qty: 30 tablet, Refills: 3      atorvastatin (LIPITOR) 80 MG tablet Take 1 tablet by mouth nightly  Qty: 30 tablet, Refills: 3      metoprolol succinate (TOPROL XL) 25 MG extended release tablet Take 1 tablet by mouth daily  Qty: 30 tablet, Refills: 3      clopidogrel (PLAVIX) 75 MG tablet Take 1 tablet by mouth daily  Qty: 30 tablet, Refills: 3      nicotine (NICODERM CQ) 21 MG/24HR Place 1 patch onto the skin daily  Qty: 30 patch, Refills: 0         CONTINUE these medications which have NOT CHANGED    Details   tiotropium (SPIRIVA RESPIMAT) 2.5 MCG/ACT AERS inhaler Inhale 2 puffs into the lungs daily  Qty: 3 Inhaler, Refills: 0    Associated Diagnoses: Pulmonary emphysema, unspecified emphysema type ([address]  Cigarette nicotine dependence with other nicotine-induced disorder      albuterol sulfate HFA (VENTOLIN HFA) 108 (90 Base) MCG/ACT inhaler Inhale 2 puffs into the lungs every 6 hours as needed for Wheezing  Qty: 1 Inhaler, Refills: 3    Associated Diagnoses: Chronic obstructive pulmonary disease, unspecified COPD type ([facility])      fluticasone (FLONASE) 50 MCG/ACT nasal spray 2 sprays by Nasal route daily  Qty: 1 Bottle, Refills: 0    Associated Diagnoses: Acute rhinosinusitis      letrozole (FEMARA) 2.5 MG tablet Take 1 tablet by mouth daily  Qty: 90 tablet, Refills: 3         STOP taking these medications       amLODIPine (NORVASC) 10 MG tablet Comments:   Reason for Stopping:               Significant Diagnostics:   Radiology: Xr Chest Portable    Result Date: 10/27/2018  PROCEDURE: XR CHEST PORTABLE CLINICAL INFORMATION: chest pain, STEMI, COMPARISON: No prior study. TECHNIQUE: A single mobile view of the chest was obtained. 1. Normal heart size. Kyphotic positioning of the patient. Vascular clips project over left side of chest and left axillary region. 2. No acute findings. No infiltrates or effusions are seen. **This report has been created using voice recognition software. It may contain minor errors which are inherent in voice recognition technology. ** Final report electronically signed by Dr. Harry Alvarez on 10/27/2018 4:09 PM      Labs:   Recent Results (from the past 72 hour(s))   EKG Chest Pain    Collection Time: 10/27/18  3:40 PM   Result Value Ref Range    Ventricular Rate 72 BPM    Atrial Rate 72 BPM    P-R Interval 162 ms    QRS Duration 90 ms    Q-T Interval 392 ms    QTc Calculation (Bazett) 429 ms    P Axis 76 degrees    R Axis 102 degrees    T Axis 100 degrees   CBC auto differential    Collection Time: 10/27/18  3:45 PM   Result Value Ref Range    WBC 8.8 4.8 - 10.8 thou/mm3    RBC 4.77 4.20 - 5.40 mill/mm3    Hemoglobin 14.6 12.0 - 16.0 gm/dl    Hematocrit 43.0 37.0 - 47.0 %    MCV 90.1 81.0 - 99.0 fL    MCH 30.6 26.0 - 33.0 pg    MCHC 34.0 32.2 - 35.5 gm/dl    RDW-CV 12.4 11.5 - 14.5 %    RDW-SD 41.0 35.0 - 45.0 fL    Platelets 212 361 - 786 thou/mm3    MPV 9.5 9.4 - 12.4 fL    Seg Neutrophils 45.8 %    Lymphocytes 42.2 %    Monocytes 10.2 %    Eosinophils 0.8 %    Basophils 0.8 %    Immature Granulocytes 0.2 %    Segs Absolute 4.0 1.8 - 7.7 thou/mm3    Lymphocytes # 3.7 1.0 - 4.8 thou/mm3    Monocytes # 0.9 0.4 - 1.3 thou/mm3    Eosinophils # 0.1 0.0 - 0.4 thou/mm3    Basophils # 0.1 0.0 - 0.1 thou/mm3    Immature Grans (Abs) 0.02 0.00 - 0.07 thou/mm3    nRBC 0 /100 wbc   Basic Metabolic Panel lotions to the site for 2 weeks. ? Keep the site clean and dry to prevent infection. ?  Do not sit in a bathtub or a pool of water for 7 days. ? Inspect the site daily. Activity  ? You may resume normal activity in 2 days, including driving, letting pain be your     guide. ? Limit lifting over 5 pounds (half gallon of milk) to one week or until site heals. ? Limit vigorous activity (contact sports) to two weeks time. ? You will be able to return to work in 1-3 days. Call our office immediately if you experience any of the following  ? Significant bleeding does not stop after 10 minutes of applying firm pressure directly over incision. ?  Increased swelling of groin or leg.  ? Unusual pain at groin or down that leg.  ? Signs of infection: redness, warmth to touch, drainage, poorly healing incision, fever, or chills.     Diet: DIET CARDIAC;      Cardiac Rehab: Yes    Follow-up visits:   Dawn Palafox DO  [address] [place] [address] [address]   [phone]             Discharge condition: good  Disposition: Home  Time spent on discharge: >30 min  Family with several questions and all answered to their satisfaction      Discharge Medications for PCI/MI (performed or attempted):   · ASA: yes  · Statin: yes  · P2Y12 Inhibitor: yes  · Beta Blocker: yes  · Nitro SL: yes    F/up dr barclay 2 weeks  F/up PCP 1 week  Stopped ace due to lower bp    Electronically signed by Bhavesh Cuevas PA-C on 10/29/2018 at 10:21 AM

## 2018-10-29 NOTE — PROGRESS NOTES
CLINICAL PHARMACY: DISCHARGE MED RECONCILIATION/REVIEW    [facility]) Select Patient?: No  Total # of Interventions Recommended: 0     Total # Interventions Accepted: 0  Intervention Severity:   - Level 1 Intervention Present?: No   - Level 2 #: 0   - Level 3 #: 0   Time Spent (min): 20    Additional Documentation:    Discharge medication reconciliation reviewed and complete.     Macario Cali, PharmD, [facility]  10/29/2018  11:29 AM

## 2018-10-31 ENCOUNTER — TELEPHONE (OUTPATIENT)
Dept: FAMILY MEDICINE CLINIC | Age: 63
End: 2018-10-31

## 2018-10-31 DIAGNOSIS — F41.9 ANXIETY: Primary | ICD-10-CM

## 2018-10-31 RX ORDER — BUSPIRONE HYDROCHLORIDE 5 MG/1
5 TABLET ORAL 2 TIMES DAILY
Qty: 60 TABLET | Refills: 5 | Status: SHIPPED | OUTPATIENT
Start: 2018-10-31 | End: 2019-01-02 | Stop reason: SDUPTHER

## 2018-11-02 ENCOUNTER — HOSPITAL ENCOUNTER (OUTPATIENT)
Dept: WOMENS IMAGING | Age: 63
Discharge: HOME | End: 2018-11-02
Payer: MEDICARE

## 2018-11-02 DIAGNOSIS — Z17.0 MALIGNANT NEOPLASM OF UPPER-INNER QUADRANT OF LEFT BREAST IN FEMALE, ESTROGEN RECEPTOR POSITIVE (HCC): ICD-10-CM

## 2018-11-02 DIAGNOSIS — C50.212 MALIGNANT NEOPLASM OF UPPER-INNER QUADRANT OF LEFT BREAST IN FEMALE, ESTROGEN RECEPTOR POSITIVE (HCC): ICD-10-CM

## 2018-11-02 PROCEDURE — G0279 TOMOSYNTHESIS, MAMMO: HCPCS

## 2018-11-05 ENCOUNTER — OFFICE VISIT (OUTPATIENT)
Dept: FAMILY MEDICINE CLINIC | Age: 63
End: 2018-11-05
Payer: MEDICARE

## 2018-11-05 VITALS
RESPIRATION RATE: 12 BRPM | HEART RATE: 60 BPM | BODY MASS INDEX: 24.77 KG/M2 | HEIGHT: 58 IN | SYSTOLIC BLOOD PRESSURE: 136 MMHG | DIASTOLIC BLOOD PRESSURE: 82 MMHG | TEMPERATURE: 98.6 F | WEIGHT: 118 LBS

## 2018-11-05 DIAGNOSIS — Z87.891 FORMER CIGARETTE SMOKER: ICD-10-CM

## 2018-11-05 DIAGNOSIS — J44.9 CHRONIC OBSTRUCTIVE PULMONARY DISEASE, UNSPECIFIED COPD TYPE (HCC): ICD-10-CM

## 2018-11-05 DIAGNOSIS — I25.10 CORONARY ARTERY DISEASE INVOLVING NATIVE CORONARY ARTERY OF NATIVE HEART WITHOUT ANGINA PECTORIS: ICD-10-CM

## 2018-11-05 DIAGNOSIS — I21.19 ST ELEVATION MYOCARDIAL INFARCTION (STEMI) INVOLVING OTHER CORONARY ARTERY OF INFERIOR WALL (HCC): Primary | ICD-10-CM

## 2018-11-05 PROCEDURE — 99496 TRANSJ CARE MGMT HIGH F2F 7D: CPT | Performed by: FAMILY MEDICINE

## 2018-11-05 NOTE — PROGRESS NOTES
nicotine dependence. Diagnoses and all orders for this visit:    ST elevation myocardial infarction (STEMI) involving other coronary artery of inferior wall (HCC)    Coronary artery disease involving native coronary artery of native heart without angina pectoris    Former cigarette smoker    Chronic obstructive pulmonary disease, unspecified COPD type ([facility] [address]        Return in about 4 weeks (around 12/3/2018), or if symptoms worsen or fail to improve. Level of medical complexity:  high    -Overall, patient is improving  -Follow up with cardiology as scheduled. -Encouraged on smoking cessation, continue 21mg patches until she sees me next  -Continue current meds  -Advised to continue to closely monitor her symptoms and if any worsening to seek treatment    Gleason Dean received counseling on the following healthy behaviors: medication adherence  Reviewed prior labs and health maintenance. Continue current medications, diet and exercise. Discussed use, benefit, and side effects of prescribed medications. Barriers to medication compliance addressed. Patient given educational materials - see patient instructions. All patient questions answered. Patient voiced understanding.

## 2018-11-08 ENCOUNTER — OFFICE VISIT (OUTPATIENT)
Dept: CARDIOLOGY CLINIC | Age: 63
End: 2018-11-08
Payer: MEDICARE

## 2018-11-08 VITALS
HEIGHT: 57 IN | DIASTOLIC BLOOD PRESSURE: 85 MMHG | SYSTOLIC BLOOD PRESSURE: 132 MMHG | WEIGHT: 117 LBS | HEART RATE: 67 BPM | BODY MASS INDEX: 25.24 KG/M2

## 2018-11-08 DIAGNOSIS — I21.11 ST ELEVATION MYOCARDIAL INFARCTION INVOLVING RIGHT CORONARY ARTERY (HCC): ICD-10-CM

## 2018-11-08 DIAGNOSIS — Z95.820 S/P ANGIOPLASTY WITH STENT: ICD-10-CM

## 2018-11-08 DIAGNOSIS — E78.00 PURE HYPERCHOLESTEROLEMIA: Primary | ICD-10-CM

## 2018-11-08 DIAGNOSIS — I10 ESSENTIAL HYPERTENSION: ICD-10-CM

## 2018-11-08 PROCEDURE — 99213 OFFICE O/P EST LOW 20 MIN: CPT | Performed by: PHYSICIAN ASSISTANT

## 2018-11-08 PROCEDURE — 4040F PNEUMOC VAC/ADMIN/RCVD: CPT | Performed by: PHYSICIAN ASSISTANT

## 2018-11-08 PROCEDURE — 3017F COLORECTAL CA SCREEN DOC REV: CPT | Performed by: PHYSICIAN ASSISTANT

## 2018-11-08 PROCEDURE — 1101F PT FALLS ASSESS-DOCD LE1/YR: CPT | Performed by: PHYSICIAN ASSISTANT

## 2018-11-08 PROCEDURE — 1111F DSCHRG MED/CURRENT MED MERGE: CPT | Performed by: PHYSICIAN ASSISTANT

## 2018-11-08 PROCEDURE — G8482 FLU IMMUNIZE ORDER/ADMIN: HCPCS | Performed by: PHYSICIAN ASSISTANT

## 2018-11-08 PROCEDURE — G8419 CALC BMI OUT NRM PARAM NOF/U: HCPCS | Performed by: PHYSICIAN ASSISTANT

## 2018-11-08 PROCEDURE — 4004F PT TOBACCO SCREEN RCVD TLK: CPT | Performed by: PHYSICIAN ASSISTANT

## 2018-11-08 PROCEDURE — G8598 ASA/ANTIPLAT THER USED: HCPCS | Performed by: PHYSICIAN ASSISTANT

## 2018-11-08 PROCEDURE — G8427 DOCREV CUR MEDS BY ELIG CLIN: HCPCS | Performed by: PHYSICIAN ASSISTANT

## 2018-11-08 PROCEDURE — 1090F PRES/ABSN URINE INCON ASSESS: CPT | Performed by: PHYSICIAN ASSISTANT

## 2018-11-08 PROCEDURE — 1123F ACP DISCUSS/DSCN MKR DOCD: CPT | Performed by: PHYSICIAN ASSISTANT

## 2018-11-08 PROCEDURE — G8400 PT W/DXA NO RESULTS DOC: HCPCS | Performed by: PHYSICIAN ASSISTANT

## 2018-11-08 NOTE — PROGRESS NOTES
Pt C/O headaches, dizziness at times, swelling in legs, some fatigue       Pt denies CP, SOB,heart palpitations,                [facility]  [facility] [address] [address]   Dept: [phone]   Dept Fax: [phone]: [phone]      Chief Complaint   Patient presents with    Follow-Up from Hospital     stemi stent gs score 12%     Patient presents for a follow-up appointment after stenting and subsequent PCI. Patient states overall she's been doing well. She has remained tobacco free. Patient denies any chest pain, shortness of breath or palpitations.   Cardiologist:  Dr. Dwain Mendoza:   No fever, no chills, No fatigue or weight loss  Pulmonary:    No dyspnea, no wheezing  Cardiac:    Denies recent chest pain   GI:     No nausea or vomiting, no abdominal pain  Neuro:    No dizziness or light headedness  Musculoskeletal:  No recent active issues  Extremities:   No edema, good peripheral pulses      Past Medical History:   Diagnosis Date    Bronchitis     Cerebral artery occlusion with cerebral infarction ([facility] [address]     TIA    Chronic kidney disease     Diverticulosis     Essential hypertension 5/10/2017    Foot drop, right foot     GERD (gastroesophageal reflux disease)     IBS (irritable bowel syndrome)     Malignant neoplasm of upper-inner quadrant of left female breast (HCC) 9/13/2016    Nausea & vomiting     PONV (postoperative nausea and vomiting)        Allergies   Allergen Reactions    Naproxen Anaphylaxis    Prednisone Swelling and Other (See Comments)     Angioedema    Ultram [Tramadol] Itching and Rash       Current Outpatient Prescriptions   Medication Sig Dispense Refill    busPIRone (BUSPAR) 5 MG tablet Take 1 tablet by mouth 2 times daily 60 tablet 5    aspirin 81 MG EC tablet Take 1 tablet by mouth daily 30 tablet 3    atorvastatin (LIPITOR) 80 MG tablet Take 1 tablet by mouth nightly 30 tablet 3    Clear to auscultation  Heart:    Normal S1 S2, No murmur, rubs, or gallops  Abdomen:   Soft, non tender, no organomegalies, positive bowel sounds  Extremities:   No edema, no cyanosis, good peripheral pulses  Neurological:   Awake, alert, oriented. No obvious focal deficits  Musculoskeletal:  No obvious deformities       Cardiac catheterization 10/27/2018  PROCEDURES PERFORMED:  1. Measurement of LVEF, LVEDP, and transaortic valve gradient. 2.  Selective coronary angiography. 3.  Drug-eluting stent to RCA. 4.  Right femoral arteriography. Diagnosis Orders   1. Pure hypercholesterolemia  Lipid Panel    Hepatic Function Panel   2. ST elevation myocardial infarction involving right coronary artery ([address]     3. S/P angioplasty with stent     4. Essential hypertension         Orders Placed This Encounter   Procedures    Lipid Panel     Standing Status:   Future     Standing Expiration Date:   11/8/2019     Order Specific Question:   Is Patient Fasting?/# of Hours     Answer:   12 hours    Hepatic Function Panel     Standing Status:   Future     Standing Expiration Date:   11/8/2019     Continue Dr Marleny Sheriff current treatment plan    Check lipid and liver panel prior to appointment with Dr. Elvis Alfaro. Continue same current medications and with constant vigilance to changes in symptoms and also any potential side effects. Return for care if become worse or seek medical attention immediately. The patient is educated on symptoms of heart disease that include chest pain and passing out, dizziness, etc and to report them if there is any change or go to emergency room.        Follow up with Dr Elvis Alfaro as scheduled or sooner if needed  (Please note that portions of this note were completed with a voice recognition program.  Efforts were made to edit the dictation but occasionally words are mis-transcribed.)      Keiko Pool PA-C

## 2018-11-21 RX ORDER — NICOTINE 21 MG/24HR
1 PATCH, TRANSDERMAL 24 HOURS TRANSDERMAL DAILY
Qty: 30 PATCH | Refills: 0 | Status: SHIPPED | OUTPATIENT
Start: 2018-11-21 | End: 2018-12-05

## 2018-11-28 ENCOUNTER — OFFICE VISIT (OUTPATIENT)
Dept: FAMILY MEDICINE CLINIC | Age: 63
End: 2018-11-28
Payer: MEDICARE

## 2018-11-28 ENCOUNTER — TELEPHONE (OUTPATIENT)
Dept: FAMILY MEDICINE CLINIC | Age: 63
End: 2018-11-28

## 2018-11-28 VITALS
BODY MASS INDEX: 24.6 KG/M2 | DIASTOLIC BLOOD PRESSURE: 100 MMHG | SYSTOLIC BLOOD PRESSURE: 168 MMHG | TEMPERATURE: 98.4 F | HEIGHT: 59 IN | RESPIRATION RATE: 18 BRPM | HEART RATE: 84 BPM | WEIGHT: 122 LBS

## 2018-11-28 DIAGNOSIS — I10 ESSENTIAL HYPERTENSION: ICD-10-CM

## 2018-11-28 PROCEDURE — G8598 ASA/ANTIPLAT THER USED: HCPCS | Performed by: FAMILY MEDICINE

## 2018-11-28 PROCEDURE — 1101F PT FALLS ASSESS-DOCD LE1/YR: CPT | Performed by: FAMILY MEDICINE

## 2018-11-28 PROCEDURE — G8420 CALC BMI NORM PARAMETERS: HCPCS | Performed by: FAMILY MEDICINE

## 2018-11-28 PROCEDURE — 1090F PRES/ABSN URINE INCON ASSESS: CPT | Performed by: FAMILY MEDICINE

## 2018-11-28 PROCEDURE — G8427 DOCREV CUR MEDS BY ELIG CLIN: HCPCS | Performed by: FAMILY MEDICINE

## 2018-11-28 PROCEDURE — 4040F PNEUMOC VAC/ADMIN/RCVD: CPT | Performed by: FAMILY MEDICINE

## 2018-11-28 PROCEDURE — 99213 OFFICE O/P EST LOW 20 MIN: CPT | Performed by: FAMILY MEDICINE

## 2018-11-28 PROCEDURE — G8400 PT W/DXA NO RESULTS DOC: HCPCS | Performed by: FAMILY MEDICINE

## 2018-11-28 PROCEDURE — 1123F ACP DISCUSS/DSCN MKR DOCD: CPT | Performed by: FAMILY MEDICINE

## 2018-11-28 PROCEDURE — 3017F COLORECTAL CA SCREEN DOC REV: CPT | Performed by: FAMILY MEDICINE

## 2018-11-28 PROCEDURE — 4004F PT TOBACCO SCREEN RCVD TLK: CPT | Performed by: FAMILY MEDICINE

## 2018-11-28 PROCEDURE — 1111F DSCHRG MED/CURRENT MED MERGE: CPT | Performed by: FAMILY MEDICINE

## 2018-11-28 PROCEDURE — G8482 FLU IMMUNIZE ORDER/ADMIN: HCPCS | Performed by: FAMILY MEDICINE

## 2018-11-28 RX ORDER — AMLODIPINE BESYLATE 5 MG/1
5 TABLET ORAL DAILY
Qty: 30 TABLET | Refills: 5 | Status: SHIPPED | OUTPATIENT
Start: 2018-11-28 | End: 2019-02-12 | Stop reason: SDUPTHER

## 2018-12-05 ENCOUNTER — OFFICE VISIT (OUTPATIENT)
Dept: FAMILY MEDICINE CLINIC | Age: 63
End: 2018-12-05
Payer: MEDICARE

## 2018-12-05 VITALS
WEIGHT: 121 LBS | HEART RATE: 64 BPM | BODY MASS INDEX: 24.39 KG/M2 | DIASTOLIC BLOOD PRESSURE: 64 MMHG | RESPIRATION RATE: 16 BRPM | TEMPERATURE: 98.7 F | HEIGHT: 59 IN | SYSTOLIC BLOOD PRESSURE: 122 MMHG

## 2018-12-05 DIAGNOSIS — J43.9 PULMONARY EMPHYSEMA, UNSPECIFIED EMPHYSEMA TYPE (HCC): ICD-10-CM

## 2018-12-05 DIAGNOSIS — R51.9 LEFT-SIDED HEADACHE: Primary | ICD-10-CM

## 2018-12-05 DIAGNOSIS — I10 ESSENTIAL HYPERTENSION: ICD-10-CM

## 2018-12-05 DIAGNOSIS — F17.218 CIGARETTE NICOTINE DEPENDENCE WITH OTHER NICOTINE-INDUCED DISORDER: ICD-10-CM

## 2018-12-05 PROCEDURE — 99214 OFFICE O/P EST MOD 30 MIN: CPT | Performed by: FAMILY MEDICINE

## 2018-12-05 PROCEDURE — G8427 DOCREV CUR MEDS BY ELIG CLIN: HCPCS | Performed by: FAMILY MEDICINE

## 2018-12-05 PROCEDURE — 1036F TOBACCO NON-USER: CPT | Performed by: FAMILY MEDICINE

## 2018-12-05 PROCEDURE — G8598 ASA/ANTIPLAT THER USED: HCPCS | Performed by: FAMILY MEDICINE

## 2018-12-05 PROCEDURE — G8400 PT W/DXA NO RESULTS DOC: HCPCS | Performed by: FAMILY MEDICINE

## 2018-12-05 PROCEDURE — 1090F PRES/ABSN URINE INCON ASSESS: CPT | Performed by: FAMILY MEDICINE

## 2018-12-05 PROCEDURE — 1101F PT FALLS ASSESS-DOCD LE1/YR: CPT | Performed by: FAMILY MEDICINE

## 2018-12-05 PROCEDURE — G8926 SPIRO NO PERF OR DOC: HCPCS | Performed by: FAMILY MEDICINE

## 2018-12-05 PROCEDURE — 3023F SPIROM DOC REV: CPT | Performed by: FAMILY MEDICINE

## 2018-12-05 PROCEDURE — 3017F COLORECTAL CA SCREEN DOC REV: CPT | Performed by: FAMILY MEDICINE

## 2018-12-05 PROCEDURE — G8482 FLU IMMUNIZE ORDER/ADMIN: HCPCS | Performed by: FAMILY MEDICINE

## 2018-12-05 PROCEDURE — 4040F PNEUMOC VAC/ADMIN/RCVD: CPT | Performed by: FAMILY MEDICINE

## 2018-12-05 PROCEDURE — 1123F ACP DISCUSS/DSCN MKR DOCD: CPT | Performed by: FAMILY MEDICINE

## 2018-12-05 PROCEDURE — G8420 CALC BMI NORM PARAMETERS: HCPCS | Performed by: FAMILY MEDICINE

## 2018-12-05 RX ORDER — BUTALBITAL, ACETAMINOPHEN AND CAFFEINE 50; 325; 40 MG/1; MG/1; MG/1
1 TABLET ORAL EVERY 8 HOURS PRN
Qty: 30 TABLET | Refills: 0 | Status: SHIPPED | OUTPATIENT
Start: 2018-12-05 | End: 2019-04-02

## 2018-12-05 RX ORDER — DOXYCYCLINE HYCLATE 100 MG
100 TABLET ORAL 2 TIMES DAILY
Qty: 20 TABLET | Refills: 0 | Status: SHIPPED | OUTPATIENT
Start: 2018-12-05 | End: 2018-12-15

## 2018-12-05 RX ORDER — NICOTINE 21 MG/24HR
1 PATCH, TRANSDERMAL 24 HOURS TRANSDERMAL DAILY
Qty: 30 PATCH | Refills: 1 | Status: SHIPPED | OUTPATIENT
Start: 2018-12-05 | End: 2019-01-02 | Stop reason: SDUPTHER

## 2018-12-05 NOTE — PROGRESS NOTES
Tatyana Tabor is a 70 y.o. female that presents for     Chief Complaint   Patient presents with    Follow-up    Hypertension     home readings running 166/94   164/97   worse when up and walking     Headache     headaches on left side of face for past 3 weeks            HPI:    HTN    Does patient check BP regularly at home? - Yes - her readings have been much better since last visit  Current Medication regimen - Norvasc, metoprolol  Tolerating medications well? - yes    Shortness of breath or chest pain? No  Headache or visual complaints? Yes - Has been having daily frontal headahce headaches for the past 3 weeks. She wakes up with them. She gets a deep ache/pressure. +nausea, no vomiting. No phono or photophobia, changes in vision. She describes the headaches as severe. Can last all day and she has difficulty functioning when she has one. She has been having a lot of nasal congestion. Neurologic changes like confusion? No  Extremity edema?  No    BP Readings from Last 3 Encounters:   12/05/18 122/64   11/28/18 (!) 168/100   11/08/18 132/85         Health Maintenance   Topic Date Due    Hepatitis C screen  1955    HIV screen  02/28/1968    DTaP/Tdap/Td vaccine (1 - Tdap) 02/28/1972    Lipid screen  02/28/1993    Shingles Vaccine (1 of 2 - 2 Dose Series) 02/28/2003    Low dose CT lung screening  04/09/2019    Pneumococcal low/med risk (2 of 2 - PPSV23) 10/08/2019    Creatinine monitoring  10/27/2019    Potassium monitoring  10/29/2019    Breast cancer screen  11/02/2019    Colon cancer screen colonoscopy  10/27/2024    DEXA (modify frequency per FRAX score)  Completed    Flu vaccine  Completed       Past Medical History:   Diagnosis Date    Bronchitis     Cerebral artery occlusion with cerebral infarction ([facility] [address]     TIA    Chronic kidney disease     Diverticulosis     Essential hypertension 5/10/2017    Foot drop, right foot     GERD (gastroesophageal reflux disease)     IBS

## 2018-12-06 ENCOUNTER — TELEPHONE (OUTPATIENT)
Dept: FAMILY MEDICINE CLINIC | Age: 63
End: 2018-12-06

## 2018-12-13 ENCOUNTER — TELEPHONE (OUTPATIENT)
Dept: FAMILY MEDICINE CLINIC | Age: 63
End: 2018-12-13

## 2018-12-19 ENCOUNTER — HOSPITAL ENCOUNTER (OUTPATIENT)
Dept: MRI IMAGING | Age: 63
Discharge: HOME | End: 2018-12-19
Payer: MEDICARE

## 2018-12-19 DIAGNOSIS — R51.9 LEFT-SIDED HEADACHE: ICD-10-CM

## 2018-12-19 PROCEDURE — 70551 MRI BRAIN STEM W/O DYE: CPT

## 2018-12-20 ENCOUNTER — TELEPHONE (OUTPATIENT)
Dept: FAMILY MEDICINE CLINIC | Age: 63
End: 2018-12-20

## 2018-12-20 ENCOUNTER — OFFICE VISIT (OUTPATIENT)
Dept: ONCOLOGY | Age: 63
End: 2018-12-20
Payer: MEDICARE

## 2018-12-20 ENCOUNTER — HOSPITAL ENCOUNTER (OUTPATIENT)
Dept: INFUSION THERAPY | Age: 63
Discharge: HOME | End: 2018-12-20
Payer: MEDICARE

## 2018-12-20 VITALS
RESPIRATION RATE: 16 BRPM | TEMPERATURE: 98.2 F | HEART RATE: 69 BPM | BODY MASS INDEX: 26.2 KG/M2 | HEIGHT: 58 IN | SYSTOLIC BLOOD PRESSURE: 124 MMHG | DIASTOLIC BLOOD PRESSURE: 77 MMHG | OXYGEN SATURATION: 98 % | WEIGHT: 124.8 LBS

## 2018-12-20 DIAGNOSIS — R93.0 ABNORMAL MRI OF HEAD: Primary | ICD-10-CM

## 2018-12-20 DIAGNOSIS — C50.212 MALIGNANT NEOPLASM OF UPPER-INNER QUADRANT OF LEFT BREAST IN FEMALE, ESTROGEN RECEPTOR POSITIVE (HCC): Primary | ICD-10-CM

## 2018-12-20 DIAGNOSIS — Z17.0 MALIGNANT NEOPLASM OF UPPER-INNER QUADRANT OF LEFT BREAST IN FEMALE, ESTROGEN RECEPTOR POSITIVE (HCC): Primary | ICD-10-CM

## 2018-12-20 DIAGNOSIS — Z98.890 S/P LUMPECTOMY, LEFT BREAST: ICD-10-CM

## 2018-12-20 DIAGNOSIS — Z79.811 PROPHYLACTIC USE OF LETROZOLE (FEMARA): ICD-10-CM

## 2018-12-20 PROCEDURE — G8419 CALC BMI OUT NRM PARAM NOF/U: HCPCS | Performed by: INTERNAL MEDICINE

## 2018-12-20 PROCEDURE — 99211 OFF/OP EST MAY X REQ PHY/QHP: CPT

## 2018-12-20 PROCEDURE — 99214 OFFICE O/P EST MOD 30 MIN: CPT | Performed by: INTERNAL MEDICINE

## 2018-12-20 PROCEDURE — 4040F PNEUMOC VAC/ADMIN/RCVD: CPT | Performed by: INTERNAL MEDICINE

## 2018-12-20 PROCEDURE — 3017F COLORECTAL CA SCREEN DOC REV: CPT | Performed by: INTERNAL MEDICINE

## 2018-12-20 PROCEDURE — G8427 DOCREV CUR MEDS BY ELIG CLIN: HCPCS | Performed by: INTERNAL MEDICINE

## 2018-12-20 PROCEDURE — G8400 PT W/DXA NO RESULTS DOC: HCPCS | Performed by: INTERNAL MEDICINE

## 2018-12-20 PROCEDURE — G8598 ASA/ANTIPLAT THER USED: HCPCS | Performed by: INTERNAL MEDICINE

## 2018-12-20 PROCEDURE — 1101F PT FALLS ASSESS-DOCD LE1/YR: CPT | Performed by: INTERNAL MEDICINE

## 2018-12-20 PROCEDURE — G8482 FLU IMMUNIZE ORDER/ADMIN: HCPCS | Performed by: INTERNAL MEDICINE

## 2018-12-20 PROCEDURE — 1123F ACP DISCUSS/DSCN MKR DOCD: CPT | Performed by: INTERNAL MEDICINE

## 2018-12-20 PROCEDURE — 1036F TOBACCO NON-USER: CPT | Performed by: INTERNAL MEDICINE

## 2018-12-20 PROCEDURE — 1090F PRES/ABSN URINE INCON ASSESS: CPT | Performed by: INTERNAL MEDICINE

## 2018-12-20 ASSESSMENT — ENCOUNTER SYMPTOMS
DIARRHEA: 0
CONSTIPATION: 0
COUGH: 0
SHORTNESS OF BREATH: 0
RECTAL PAIN: 0
ABDOMINAL DISTENTION: 0
VOMITING: 0
CHEST TIGHTNESS: 0
BACK PAIN: 0
WHEEZING: 0
SORE THROAT: 0
EYE DISCHARGE: 0
FACIAL SWELLING: 0
COLOR CHANGE: 0
ABDOMINAL PAIN: 0
BLOOD IN STOOL: 0
TROUBLE SWALLOWING: 0
NAUSEA: 0

## 2018-12-20 NOTE — PROGRESS NOTES
Arimidex. Interim history on December 20, 2018: The patient was hospitalized in October 2018 for STEMI, she  Is S/P PCI of the RCA. She reports that she tolerates Femara well. Denies fatigue, her hot flashes are stable. She denies having any arthralgia.        Past Medical History:   Diagnosis Date    Bronchitis     Cerebral artery occlusion with cerebral infarction ([facility] [address]     TIA    Chronic kidney disease     Diverticulosis     Essential hypertension 5/10/2017    Foot drop, right foot     GERD (gastroesophageal reflux disease)     IBS (irritable bowel syndrome)     Malignant neoplasm of upper-inner quadrant of left female breast ([facility] [address] 9/13/2016    Nausea & vomiting     PONV (postoperative nausea and vomiting)       Past Surgical History:   Procedure Laterality Date    BACK SURGERY  unsure    BREAST SURGERY Left 2016    exc. lymph nodes, lumpectomy    CARPAL TUNNEL RELEASE Right 06/2017    OIO    COLONOSCOPY  unsure    Dr. Laura Gandhi    COLONOSCOPY  10/10/14    Dr. Devin Reid  12/16/2014    HYSTERECTOMY, VAGINAL  12/16/2014    KIDNEY STONE SURGERY      LAMINECTOMY  06/12/2018    AL OFFICE/OUTPT VISIT,PROCEDURE ONLY N/A 6/12/2018    ACDF C5-6 performed by Catherine Johansen MD at [facility]  89    WISDOM TOOTH EXTRACTION  1979    [address] [place]       Family History   Problem Relation Age of Onset    Cancer Mother         renal cell carcinoma    Kidney Cancer Mother 46    Cirrhosis Father     Diabetes Sister     Kidney Cancer Sister 54    Cancer Sister     Diabetes Brother     Breast Cancer Paternal Aunt 54    Ovarian Cancer Neg Hx       Social History   Substance Use Topics    Smoking status: Former Smoker     Packs/day: 1.50     Years: 20.00     Types: Cigarettes     Quit date: 10/27/2018    Smokeless tobacco: Never Used    Alcohol use No      Current Outpatient Prescriptions assay. It showed recurrent score of 20, translating into 13% risk of distant disease recurrence during the next 10 years. Decision was made  not to proceed with adjuvant chemotherapy. She completed adjuvant radiation treatment to the reminder of her left breast on December 4, 2016.    2. Adjuvant hormonal therapy. The patient started Adjuvant hormonal therapy with Arimidex in December 2016. She developed fatigue on Arimidex. Therefore she was placed on Femara which she tolerates well. She reports minimal arthralgia, minimal hot flashes. No evidence of distant disease recurrence on today's physical examination. Her annual breast mammogram in November 2018 was benign   3. Osteopenia. The patient was instructed to take calcium with vitamin D. She also received recommendation to exercise on a regular basis. Recommendation is to walk 3-4 times per week. 4.  The patient is a heavy smoker. The lung screening low dose CT of the chest on a April 9, 2018 showed no suspicious pulmonary nodules. Lung rads category 2. She quit smoking after recent STEMI.        Dmitry Martinez MD

## 2019-01-02 ENCOUNTER — OFFICE VISIT (OUTPATIENT)
Dept: FAMILY MEDICINE CLINIC | Age: 64
End: 2019-01-02
Payer: MEDICARE

## 2019-01-02 VITALS
HEART RATE: 68 BPM | HEIGHT: 58 IN | BODY MASS INDEX: 26.49 KG/M2 | SYSTOLIC BLOOD PRESSURE: 128 MMHG | TEMPERATURE: 97.7 F | WEIGHT: 126.2 LBS | DIASTOLIC BLOOD PRESSURE: 84 MMHG | RESPIRATION RATE: 18 BRPM

## 2019-01-02 DIAGNOSIS — R51.9 GENERALIZED HEADACHES: Primary | ICD-10-CM

## 2019-01-02 DIAGNOSIS — J01.90 ACUTE RHINOSINUSITIS: ICD-10-CM

## 2019-01-02 DIAGNOSIS — F17.218 CIGARETTE NICOTINE DEPENDENCE WITH OTHER NICOTINE-INDUCED DISORDER: ICD-10-CM

## 2019-01-02 DIAGNOSIS — F41.9 ANXIETY: ICD-10-CM

## 2019-01-02 PROCEDURE — G8482 FLU IMMUNIZE ORDER/ADMIN: HCPCS | Performed by: FAMILY MEDICINE

## 2019-01-02 PROCEDURE — G8400 PT W/DXA NO RESULTS DOC: HCPCS | Performed by: FAMILY MEDICINE

## 2019-01-02 PROCEDURE — G8419 CALC BMI OUT NRM PARAM NOF/U: HCPCS | Performed by: FAMILY MEDICINE

## 2019-01-02 PROCEDURE — 1123F ACP DISCUSS/DSCN MKR DOCD: CPT | Performed by: FAMILY MEDICINE

## 2019-01-02 PROCEDURE — 1090F PRES/ABSN URINE INCON ASSESS: CPT | Performed by: FAMILY MEDICINE

## 2019-01-02 PROCEDURE — 3017F COLORECTAL CA SCREEN DOC REV: CPT | Performed by: FAMILY MEDICINE

## 2019-01-02 PROCEDURE — G8427 DOCREV CUR MEDS BY ELIG CLIN: HCPCS | Performed by: FAMILY MEDICINE

## 2019-01-02 PROCEDURE — 1036F TOBACCO NON-USER: CPT | Performed by: FAMILY MEDICINE

## 2019-01-02 PROCEDURE — 1101F PT FALLS ASSESS-DOCD LE1/YR: CPT | Performed by: FAMILY MEDICINE

## 2019-01-02 PROCEDURE — 4040F PNEUMOC VAC/ADMIN/RCVD: CPT | Performed by: FAMILY MEDICINE

## 2019-01-02 PROCEDURE — G8598 ASA/ANTIPLAT THER USED: HCPCS | Performed by: FAMILY MEDICINE

## 2019-01-02 PROCEDURE — 99214 OFFICE O/P EST MOD 30 MIN: CPT | Performed by: FAMILY MEDICINE

## 2019-01-02 RX ORDER — NICOTINE 21 MG/24HR
1 PATCH, TRANSDERMAL 24 HOURS TRANSDERMAL DAILY
Qty: 30 PATCH | Refills: 1 | Status: SHIPPED | OUTPATIENT
Start: 2019-01-02 | End: 2019-04-02

## 2019-01-02 RX ORDER — BUSPIRONE HYDROCHLORIDE 10 MG/1
10 TABLET ORAL 3 TIMES DAILY
Qty: 90 TABLET | Refills: 3 | Status: SHIPPED | OUTPATIENT
Start: 2019-01-02 | End: 2019-02-13 | Stop reason: ALTCHOICE

## 2019-01-02 RX ORDER — LEVOFLOXACIN 500 MG/1
500 TABLET, FILM COATED ORAL DAILY
Qty: 10 TABLET | Refills: 0 | Status: SHIPPED | OUTPATIENT
Start: 2019-01-02 | End: 2019-01-12

## 2019-01-02 RX ORDER — FLUTICASONE PROPIONATE 50 MCG
1 SPRAY, SUSPENSION (ML) NASAL DAILY
Qty: 1 BOTTLE | Refills: 1 | Status: SHIPPED | OUTPATIENT
Start: 2019-01-02 | End: 2019-11-19

## 2019-01-07 ENCOUNTER — TELEPHONE (OUTPATIENT)
Dept: FAMILY MEDICINE CLINIC | Age: 64
End: 2019-01-07

## 2019-01-07 ENCOUNTER — HOSPITAL ENCOUNTER (OUTPATIENT)
Dept: MRI IMAGING | Age: 64
Discharge: HOME | End: 2019-01-07
Payer: MEDICARE

## 2019-01-07 DIAGNOSIS — R93.0 ABNORMAL MRI OF HEAD: ICD-10-CM

## 2019-01-07 LAB — POC CREATININE WHOLE BLOOD: 0.7 MG/DL (ref 0.5–1.2)

## 2019-01-07 PROCEDURE — 70552 MRI BRAIN STEM W/DYE: CPT

## 2019-01-07 PROCEDURE — A9579 GAD-BASE MR CONTRAST NOS,1ML: HCPCS | Performed by: FAMILY MEDICINE

## 2019-01-07 PROCEDURE — 6360000004 HC RX CONTRAST MEDICATION: Performed by: FAMILY MEDICINE

## 2019-01-07 PROCEDURE — 82565 ASSAY OF CREATININE: CPT

## 2019-01-07 RX ADMIN — GADOTERIDOL 15 ML: 279.3 INJECTION, SOLUTION INTRAVENOUS at 16:23

## 2019-01-14 ENCOUNTER — TELEPHONE (OUTPATIENT)
Dept: FAMILY MEDICINE CLINIC | Age: 64
End: 2019-01-14

## 2019-02-06 ENCOUNTER — HOSPITAL ENCOUNTER (OUTPATIENT)
Age: 64
Discharge: HOME | End: 2019-02-06
Payer: MEDICARE

## 2019-02-06 DIAGNOSIS — E78.00 PURE HYPERCHOLESTEROLEMIA: ICD-10-CM

## 2019-02-06 LAB
ALBUMIN SERPL-MCNC: 4.2 G/DL (ref 3.5–5.1)
ALP BLD-CCNC: 92 U/L (ref 38–126)
ALT SERPL-CCNC: 15 U/L (ref 11–66)
AST SERPL-CCNC: 15 U/L (ref 5–40)
BILIRUB SERPL-MCNC: 0.4 MG/DL (ref 0.3–1.2)
BILIRUBIN DIRECT: < 0.2 MG/DL (ref 0–0.3)
CHOLESTEROL, TOTAL: 133 MG/DL (ref 100–199)
HDLC SERPL-MCNC: 80 MG/DL
LDL CHOLESTEROL CALCULATED: 37 MG/DL
TOTAL PROTEIN: 6.6 G/DL (ref 6.1–8)
TRIGL SERPL-MCNC: 79 MG/DL (ref 0–199)

## 2019-02-06 PROCEDURE — 36415 COLL VENOUS BLD VENIPUNCTURE: CPT

## 2019-02-06 PROCEDURE — 80076 HEPATIC FUNCTION PANEL: CPT

## 2019-02-06 PROCEDURE — 80061 LIPID PANEL: CPT

## 2019-02-12 DIAGNOSIS — I10 ESSENTIAL HYPERTENSION: ICD-10-CM

## 2019-02-12 RX ORDER — AMLODIPINE BESYLATE 5 MG/1
5 TABLET ORAL DAILY
Qty: 30 TABLET | Refills: 5 | Status: SHIPPED | OUTPATIENT
Start: 2019-02-12 | End: 2019-02-14 | Stop reason: DRUGHIGH

## 2019-02-13 ENCOUNTER — OFFICE VISIT (OUTPATIENT)
Dept: CARDIOLOGY CLINIC | Age: 64
End: 2019-02-13
Payer: MEDICARE

## 2019-02-13 VITALS
DIASTOLIC BLOOD PRESSURE: 72 MMHG | HEART RATE: 66 BPM | HEIGHT: 58 IN | WEIGHT: 131.2 LBS | BODY MASS INDEX: 27.54 KG/M2 | SYSTOLIC BLOOD PRESSURE: 124 MMHG

## 2019-02-13 DIAGNOSIS — Z95.820 S/P ANGIOPLASTY WITH STENT: ICD-10-CM

## 2019-02-13 DIAGNOSIS — I21.11 ST ELEVATION MYOCARDIAL INFARCTION INVOLVING RIGHT CORONARY ARTERY (HCC): Primary | ICD-10-CM

## 2019-02-13 PROCEDURE — 1090F PRES/ABSN URINE INCON ASSESS: CPT | Performed by: INTERNAL MEDICINE

## 2019-02-13 PROCEDURE — G8427 DOCREV CUR MEDS BY ELIG CLIN: HCPCS | Performed by: INTERNAL MEDICINE

## 2019-02-13 PROCEDURE — 1101F PT FALLS ASSESS-DOCD LE1/YR: CPT | Performed by: INTERNAL MEDICINE

## 2019-02-13 PROCEDURE — 3017F COLORECTAL CA SCREEN DOC REV: CPT | Performed by: INTERNAL MEDICINE

## 2019-02-13 PROCEDURE — G8419 CALC BMI OUT NRM PARAM NOF/U: HCPCS | Performed by: INTERNAL MEDICINE

## 2019-02-13 PROCEDURE — 99213 OFFICE O/P EST LOW 20 MIN: CPT | Performed by: INTERNAL MEDICINE

## 2019-02-13 PROCEDURE — 1123F ACP DISCUSS/DSCN MKR DOCD: CPT | Performed by: INTERNAL MEDICINE

## 2019-02-13 PROCEDURE — G8598 ASA/ANTIPLAT THER USED: HCPCS | Performed by: INTERNAL MEDICINE

## 2019-02-13 PROCEDURE — G8482 FLU IMMUNIZE ORDER/ADMIN: HCPCS | Performed by: INTERNAL MEDICINE

## 2019-02-13 PROCEDURE — 1036F TOBACCO NON-USER: CPT | Performed by: INTERNAL MEDICINE

## 2019-02-13 PROCEDURE — 4040F PNEUMOC VAC/ADMIN/RCVD: CPT | Performed by: INTERNAL MEDICINE

## 2019-02-13 PROCEDURE — G8400 PT W/DXA NO RESULTS DOC: HCPCS | Performed by: INTERNAL MEDICINE

## 2019-02-13 RX ORDER — METOPROLOL SUCCINATE 25 MG/1
25 TABLET, EXTENDED RELEASE ORAL DAILY
Qty: 90 TABLET | Refills: 3 | Status: SHIPPED | OUTPATIENT
Start: 2019-02-13 | End: 2020-01-27 | Stop reason: SDUPTHER

## 2019-02-13 RX ORDER — ATORVASTATIN CALCIUM 80 MG/1
80 TABLET, FILM COATED ORAL NIGHTLY
Qty: 90 TABLET | Refills: 3 | Status: SHIPPED | OUTPATIENT
Start: 2019-02-13 | End: 2020-01-27 | Stop reason: SDUPTHER

## 2019-02-13 RX ORDER — CLOPIDOGREL BISULFATE 75 MG/1
75 TABLET ORAL DAILY
Qty: 90 TABLET | Refills: 3 | Status: SHIPPED | OUTPATIENT
Start: 2019-02-13 | End: 2020-01-27 | Stop reason: SDUPTHER

## 2019-02-14 RX ORDER — AMLODIPINE BESYLATE 10 MG/1
10 TABLET ORAL DAILY
COMMUNITY
End: 2019-02-14 | Stop reason: SDUPTHER

## 2019-02-14 RX ORDER — AMLODIPINE BESYLATE 10 MG/1
10 TABLET ORAL DAILY
Qty: 90 TABLET | Refills: 3 | Status: SHIPPED | OUTPATIENT
Start: 2019-02-14 | End: 2020-01-27 | Stop reason: SDUPTHER

## 2019-02-19 ENCOUNTER — TELEPHONE (OUTPATIENT)
Dept: FAMILY MEDICINE CLINIC | Age: 64
End: 2019-02-19

## 2019-04-02 ENCOUNTER — OFFICE VISIT (OUTPATIENT)
Dept: FAMILY MEDICINE CLINIC | Age: 64
End: 2019-04-02
Payer: MEDICARE

## 2019-04-02 ENCOUNTER — NURSE ONLY (OUTPATIENT)
Dept: LAB | Age: 64
End: 2019-04-02

## 2019-04-02 VITALS
WEIGHT: 136.4 LBS | BODY MASS INDEX: 29.43 KG/M2 | RESPIRATION RATE: 12 BRPM | HEIGHT: 57 IN | DIASTOLIC BLOOD PRESSURE: 90 MMHG | TEMPERATURE: 97.9 F | SYSTOLIC BLOOD PRESSURE: 138 MMHG | HEART RATE: 84 BPM

## 2019-04-02 DIAGNOSIS — R35.89 POLYURIA: ICD-10-CM

## 2019-04-02 DIAGNOSIS — R63.5 WEIGHT GAIN: ICD-10-CM

## 2019-04-02 DIAGNOSIS — F51.01 PRIMARY INSOMNIA: ICD-10-CM

## 2019-04-02 DIAGNOSIS — R35.0 FREQUENCY OF URINATION: ICD-10-CM

## 2019-04-02 DIAGNOSIS — J01.90 ACUTE RHINOSINUSITIS: ICD-10-CM

## 2019-04-02 DIAGNOSIS — R35.89 POLYURIA: Primary | ICD-10-CM

## 2019-04-02 LAB
ANION GAP SERPL CALCULATED.3IONS-SCNC: 12 MEQ/L (ref 8–16)
BILIRUBIN, POC: NORMAL
BLOOD URINE, POC: NORMAL
BUN BLDV-MCNC: 21 MG/DL (ref 7–22)
CALCIUM SERPL-MCNC: 9.6 MG/DL (ref 8.5–10.5)
CHLORIDE BLD-SCNC: 104 MEQ/L (ref 98–111)
CLARITY, POC: CLEAR
CO2: 25 MEQ/L (ref 23–33)
COLOR, POC: YELLOW
CREAT SERPL-MCNC: 0.5 MG/DL (ref 0.4–1.2)
GFR SERPL CREATININE-BSD FRML MDRD: > 90 ML/MIN/1.73M2
GLUCOSE BLD-MCNC: 97 MG/DL (ref 70–108)
GLUCOSE URINE, POC: NORMAL
KETONES, POC: NORMAL
LEUKOCYTE EST, POC: NORMAL
NITRITE, POC: NORMAL
PH, POC: 5.5
POTASSIUM SERPL-SCNC: 4.6 MEQ/L (ref 3.5–5.2)
PROTEIN, POC: NORMAL
SODIUM BLD-SCNC: 141 MEQ/L (ref 135–145)
SPECIFIC GRAVITY, POC: >=1.03
TSH SERPL DL<=0.05 MIU/L-ACNC: 1.26 UIU/ML (ref 0.4–4.2)
UROBILINOGEN, POC: 0.2

## 2019-04-02 PROCEDURE — 99214 OFFICE O/P EST MOD 30 MIN: CPT | Performed by: FAMILY MEDICINE

## 2019-04-02 PROCEDURE — G8419 CALC BMI OUT NRM PARAM NOF/U: HCPCS | Performed by: FAMILY MEDICINE

## 2019-04-02 PROCEDURE — 81002 URINALYSIS NONAUTO W/O SCOPE: CPT | Performed by: FAMILY MEDICINE

## 2019-04-02 PROCEDURE — 3017F COLORECTAL CA SCREEN DOC REV: CPT | Performed by: FAMILY MEDICINE

## 2019-04-02 PROCEDURE — G8400 PT W/DXA NO RESULTS DOC: HCPCS | Performed by: FAMILY MEDICINE

## 2019-04-02 PROCEDURE — G8427 DOCREV CUR MEDS BY ELIG CLIN: HCPCS | Performed by: FAMILY MEDICINE

## 2019-04-02 PROCEDURE — G8598 ASA/ANTIPLAT THER USED: HCPCS | Performed by: FAMILY MEDICINE

## 2019-04-02 PROCEDURE — 1036F TOBACCO NON-USER: CPT | Performed by: FAMILY MEDICINE

## 2019-04-02 PROCEDURE — 1123F ACP DISCUSS/DSCN MKR DOCD: CPT | Performed by: FAMILY MEDICINE

## 2019-04-02 PROCEDURE — 1090F PRES/ABSN URINE INCON ASSESS: CPT | Performed by: FAMILY MEDICINE

## 2019-04-02 PROCEDURE — 4040F PNEUMOC VAC/ADMIN/RCVD: CPT | Performed by: FAMILY MEDICINE

## 2019-04-02 RX ORDER — AMITRIPTYLINE HYDROCHLORIDE 25 MG/1
25 TABLET, FILM COATED ORAL NIGHTLY
Qty: 30 TABLET | Refills: 5 | Status: SHIPPED | OUTPATIENT
Start: 2019-04-02 | End: 2019-07-31 | Stop reason: ALTCHOICE

## 2019-04-02 RX ORDER — DOXYCYCLINE HYCLATE 100 MG
100 TABLET ORAL 2 TIMES DAILY
Qty: 20 TABLET | Refills: 0 | Status: SHIPPED | OUTPATIENT
Start: 2019-04-02 | End: 2019-04-12

## 2019-04-02 ASSESSMENT — PATIENT HEALTH QUESTIONNAIRE - PHQ9
2. FEELING DOWN, DEPRESSED OR HOPELESS: 0
SUM OF ALL RESPONSES TO PHQ QUESTIONS 1-9: 0
1. LITTLE INTEREST OR PLEASURE IN DOING THINGS: 0
SUM OF ALL RESPONSES TO PHQ9 QUESTIONS 1 & 2: 0
SUM OF ALL RESPONSES TO PHQ QUESTIONS 1-9: 0

## 2019-04-02 NOTE — PROGRESS NOTES
Tamiko Pierre is a 75 y.o. female that presents for     Chief Complaint   Patient presents with    Check-Up     3 month f/u for chronic conditions    Nicotine Dependence     pt had quite in November and is asking if she is needing the nicotine patches    Discuss Medications     Melatonin, pt states she feels fatigued, not sleeping well and wanting to know if she is able to take melatonin    Urinary Frequency     pt is urinating up to five times a night, urgency. No burning or pain, just pressure       BP (!) 138/90   Pulse 84   Temp 97.9 °F (36.6 °C) (Oral)   Resp 12   Ht 4' 9\" (1.448 m)   Wt 136 lb 6.4 oz (61.9 kg)   BMI 29.52 kg/m²       HPI:    Patient has quit smoking and still on the patches. Requesting to stop these which I agree with. Patient notes that she has been having more fatigue recently. Has also been noting urinary frequency worse at night, states that she also notes pubic pressure. No incontinence. Notes difficulty falling and staying asleep. Sleeping about 4 hours per night. Has been noting weight gain frequently. Has been noting facial 'fullness' for the past several weeks. Notes maxillary pressure and a lot of nasal congestion. No fever.         Health Maintenance   Topic Date Due    Hepatitis C screen  1955    DTaP/Tdap/Td vaccine (1 - Tdap) 02/28/1972    Diabetes screen  02/28/1993    Shingles Vaccine (1 of 2) 02/28/2003    Low dose CT lung screening  04/09/2019    Pneumococcal 65+ years Vaccine (2 of 2 - PPSV23) 10/08/2019    Creatinine monitoring  10/27/2019    Potassium monitoring  10/29/2019    Breast cancer screen  11/02/2019    Lipid screen  02/06/2024    Colon cancer screen colonoscopy  10/27/2024    DEXA (modify frequency per FRAX score)  Completed    Flu vaccine  Completed       Past Medical History:   Diagnosis Date    Bronchitis     Cerebral artery occlusion with cerebral infarction ([facility] [address]     TIA    Chronic kidney disease     Diverticulosis     Essential hypertension 5/10/2017    Foot drop, right foot     GERD (gastroesophageal reflux disease)     IBS (irritable bowel syndrome)     Malignant neoplasm of upper-inner quadrant of left female breast ([address] 2016    Nausea & vomiting     PONV (postoperative nausea and vomiting)        Past Surgical History:   Procedure Laterality Date    BACK SURGERY  unsure    BREAST SURGERY Left 2016    exc. lymph nodes, lumpectomy    CARPAL TUNNEL RELEASE Right 2017    [facility]    COLONOSCOPY  unsure    Dr. Megan Oliva COLONOSCOPY  10/10/14    Dr. Wright Stager  80    HEMORRHOID SURGERY  2014    HYSTERECTOMY, VAGINAL  2014    KIDNEY STONE SURGERY      LAMINECTOMY  2018    MD OFFICE/OUTPT VISIT,PROCEDURE ONLY N/A 2018    ACDF C5-6 performed by London Rodriguez MD at [address] [place] [address]  80    WISDOM TOOTH EXTRACTION  1979    WRIST GANGLION EXCISION Left        Social History     Tobacco Use    Smoking status: Former Smoker     Packs/day: 1.50     Years: 20.00     Pack years: 30.00     Types: Cigarettes     Last attempt to quit: 10/27/2018     Years since quittin.4    Smokeless tobacco: Never Used   Substance Use Topics    Alcohol use: No    Drug use: No       Family History   Problem Relation Age of Onset    Cancer Mother         renal cell carcinoma    Kidney Cancer Mother 46    Cirrhosis Father     Diabetes Sister     Kidney Cancer Sister 54    Cancer Sister     Diabetes Brother     Breast Cancer Paternal Aunt 54    Ovarian Cancer Neg Hx          I have reviewed the patient's past medical history, past surgical history, allergies, medications, social and family history and I have made updates where appropriate.     Review of Systems        PHYSICAL EXAM:  BP (!) 138/90   Pulse 84   Temp 97.9 °F (36.6 °C) (Oral)   Resp 12   Ht 4' 9\" (1.448 m)   Wt 136 lb 6.4 oz (61.9 kg)   BMI 29.52 kg/m²     General Appearance: well developed and well- nourished, in no acute distress  Head: normocephalic and atraumatic  ENT: external ear and ear canal normal bilaterally, nose without deformity, nasal mucosa and turbinates normal without polyps, oropharynx normal, dentition is normal for age, no lipor gum lesions noted  Neck: supple and non-tender without mass, no thyromegaly or thyroid nodules, no cervical lymphadenopathy  Pulmonary/Chest: clear to auscultation bilaterally- no wheezes, rales or rhonchi, normal air movement, no respiratory distress or retractions  Cardiovascular: normal rate, regular rhythm, normal S1 and S2, no murmurs, rubs, clicks, or gallops, distal pulses intact  Extremities: no cyanosis, clubbing or edema of the lower extremities  Psych:  Normal affect without evidence of depression oranxiety, insight and judgement are appropriate, memory appears intact  Skin: warm and dry, no rash or erythema      ASSESSMENT & PLAN  Rachel Lane was seen today for check-up, nicotine dependence, discuss medications and urinary frequency. Diagnoses and all orders for this visit:    Polyuria  -     TSH with Reflex; Future  -     Basic Metabolic Panel; Future    Primary insomnia  -     amitriptyline (ELAVIL) 25 MG tablet; Take 1 tablet by mouth nightly    Weight gain  -     TSH with Reflex; Future  -     Basic Metabolic Panel; Future    Acute rhinosinusitis  -     doxycycline hyclate (VIBRA-TABS) 100 MG tablet; Take 1 tablet by mouth 2 times daily for 10 days    Frequency of urination  -     POCT Urinalysis no Micro    -Will start Elvail for sleep and anti cholinergic properties given her urinary frequency  -US not consistent with infection, check BMP as well  -Patient advised to call immediately or go to ER if any worsening of symptoms      Return in about 4 months (around 8/2/2019), or if symptoms worsen or fail to improve.     Controlled Substances Monitoring:                     Rachel Lane received counseling on the following

## 2019-04-02 NOTE — PROGRESS NOTES
Visit Information    Have you changed or started any medications since your last visit including any over-the-counter medicines, vitamins, or herbal medicines? no   Are you having any side effects from any of your medications? -  no  Have you stopped taking any of your medications? Is so, why? -  no    Have you seen any other physician or provider since your last visit? Yes - Records Obtained  Have you had any other diagnostic tests since your last visit? No  Have you been seen in the emergency room and/or had an admission to a hospital since we last saw you? No  Have you had your routine dental cleaning in the past 6 months? no    Have you activated your [employer] account? If not, what are your barriers?  Yes     Patient Care Team:  Suni Boucher DO as PCP - General  Kristy Escalante RN as Nurse Navigator        Health Maintenance   Topic Date Due    Hepatitis C screen  1955    DTaP/Tdap/Td vaccine (1 - Tdap) 02/28/1972    Diabetes screen  02/28/1993    Shingles Vaccine (1 of 2) 02/28/2003    Low dose CT lung screening  04/09/2019    Pneumococcal 65+ years Vaccine (2 of 2 - PPSV23) 10/08/2019    Creatinine monitoring  10/27/2019    Potassium monitoring  10/29/2019    Breast cancer screen  11/02/2019    Lipid screen  02/06/2024    Colon cancer screen colonoscopy  10/27/2024    DEXA (modify frequency per FRAX score)  Completed    Flu vaccine  Completed

## 2019-04-04 ENCOUNTER — TELEPHONE (OUTPATIENT)
Dept: FAMILY MEDICINE CLINIC | Age: 64
End: 2019-04-04

## 2019-06-04 ENCOUNTER — TELEPHONE (OUTPATIENT)
Dept: FAMILY MEDICINE CLINIC | Age: 64
End: 2019-06-04

## 2019-06-04 ENCOUNTER — NURSE ONLY (OUTPATIENT)
Dept: LAB | Age: 64
End: 2019-06-04

## 2019-06-04 ENCOUNTER — OFFICE VISIT (OUTPATIENT)
Dept: FAMILY MEDICINE CLINIC | Age: 64
End: 2019-06-04
Payer: MEDICARE

## 2019-06-04 VITALS
WEIGHT: 142 LBS | TEMPERATURE: 98.8 F | RESPIRATION RATE: 20 BRPM | HEART RATE: 84 BPM | SYSTOLIC BLOOD PRESSURE: 116 MMHG | BODY MASS INDEX: 30.63 KG/M2 | HEIGHT: 57 IN | DIASTOLIC BLOOD PRESSURE: 76 MMHG

## 2019-06-04 DIAGNOSIS — K64.4 EXTERNAL HEMORRHOID, BLEEDING: ICD-10-CM

## 2019-06-04 DIAGNOSIS — K62.5 BLOOD PER RECTUM: ICD-10-CM

## 2019-06-04 DIAGNOSIS — K62.5 RECTAL BLEEDING: ICD-10-CM

## 2019-06-04 DIAGNOSIS — K62.5 RECTAL BLEEDING: Primary | ICD-10-CM

## 2019-06-04 LAB
HCT VFR BLD CALC: 37.3 % (ref 37–47)
HEMOGLOBIN: 12.2 GM/DL (ref 12–16)

## 2019-06-04 PROCEDURE — 1090F PRES/ABSN URINE INCON ASSESS: CPT | Performed by: FAMILY MEDICINE

## 2019-06-04 PROCEDURE — 99213 OFFICE O/P EST LOW 20 MIN: CPT | Performed by: FAMILY MEDICINE

## 2019-06-04 PROCEDURE — 3017F COLORECTAL CA SCREEN DOC REV: CPT | Performed by: FAMILY MEDICINE

## 2019-06-04 PROCEDURE — 1123F ACP DISCUSS/DSCN MKR DOCD: CPT | Performed by: FAMILY MEDICINE

## 2019-06-04 PROCEDURE — G8400 PT W/DXA NO RESULTS DOC: HCPCS | Performed by: FAMILY MEDICINE

## 2019-06-04 PROCEDURE — 1036F TOBACCO NON-USER: CPT | Performed by: FAMILY MEDICINE

## 2019-06-04 PROCEDURE — 4040F PNEUMOC VAC/ADMIN/RCVD: CPT | Performed by: FAMILY MEDICINE

## 2019-06-04 PROCEDURE — G8598 ASA/ANTIPLAT THER USED: HCPCS | Performed by: FAMILY MEDICINE

## 2019-06-04 PROCEDURE — G8427 DOCREV CUR MEDS BY ELIG CLIN: HCPCS | Performed by: FAMILY MEDICINE

## 2019-06-04 PROCEDURE — G8417 CALC BMI ABV UP PARAM F/U: HCPCS | Performed by: FAMILY MEDICINE

## 2019-06-04 NOTE — TELEPHONE ENCOUNTER
Future Appointments   Date Time Provider Melissa Jin   6/4/2019  2:15 PM Terence Kamara DO [facility] [place]   6/20/2019 12:00 PM Lori Oliveros MD Oncology [facility] [place]   8/5/2019  2:30 PM Terence Kamara DO [facility] [place]   8/28/2019  2:15 PM Nicho Wilson MD [address] [facility] [place]

## 2019-06-04 NOTE — TELEPHONE ENCOUNTER
Pt called requesting an noemi for rectal bleeding. She said that yesterday she went to have a BM and just had bright red blood and abd cramping. She said that she hasn't had the cramping since but she has had some rectal bleeding since. I advised her to go to the ED and she agreed. She did not know which ED she was going to go to though. Please advise.

## 2019-06-04 NOTE — PROGRESS NOTES
Rosemary Enciso is a 75 y.o. female that presents for     Chief Complaint   Patient presents with    Rectal Bleeding     started last night bight red and  some  dark red x 3 since last night , formed  stool ,  denies  nasusea or vomiting     Abdominal Cramping     started  2 days     Lower Back Pain     has been going on for  18 months        /76   Pulse 84   Temp 98.8 °F (37.1 °C) (Oral)   Resp 20   Ht 4' 9\" (1.448 m)   Wt 142 lb (64.4 kg)   BMI 30.73 kg/m²       HPI:      Has been noting bright red blood in stool for the past 24 hours. States that she has noted a darker red in her stool as well. Has had blood with every BM (x3) since last night. Has been noting some abdominal bloating and cramping for the past week. No recent trauma. Denies N/V, states that she is tolerating PO well. Denies dyschezia, rectal masses. Had hemorrhoid surgery about 5 years ago.       Health Maintenance   Topic Date Due    Hepatitis C screen  1955    DTaP/Tdap/Td vaccine (1 - Tdap) 02/28/1972    Shingles Vaccine (1 of 2) 02/28/2003    Low dose CT lung screening  04/09/2019    Pneumococcal 65+ years Vaccine (2 of 2 - PPSV23) 10/08/2019    Breast cancer screen  11/02/2019    Potassium monitoring  04/02/2020    Creatinine monitoring  04/02/2020    Lipid screen  02/06/2024    Colon cancer screen colonoscopy  10/27/2024    DEXA (modify frequency per FRAX score)  Completed    Flu vaccine  Completed       Past Medical History:   Diagnosis Date    Bronchitis     Cerebral artery occlusion with cerebral infarction ([address]     TIA    Chronic kidney disease     Diverticulosis     Essential hypertension 5/10/2017    Foot drop, right foot     GERD (gastroesophageal reflux disease)     IBS (irritable bowel syndrome)     Malignant neoplasm of upper-inner quadrant of left female breast ([address] 9/13/2016    Nausea & vomiting     PONV (postoperative nausea and vomiting)        Past Surgical History: Procedure Laterality Date    BACK SURGERY  unsure    BREAST SURGERY Left     exc. lymph nodes, lumpectomy    CARPAL TUNNEL RELEASE Right 2017    [facility]    COLONOSCOPY  unsure    Dr. Gena Ro COLONOSCOPY  10/10/14    Dr. Nichol Gonzalez  80    HEMORRHOID SURGERY  2014    HYSTERECTOMY, VAGINAL  2014    KIDNEY STONE SURGERY      LAMINECTOMY  2018    [place] OFFICE/OUTPT VISIT,PROCEDURE ONLY N/A 2018    ACDF C5-6 performed by Monse Pérez MD at [address]  [address]    WISDOM TOOTH EXTRACTION  1979    WRIST GANGLION EXCISION Left        Social History     Tobacco Use    Smoking status: Former Smoker     Packs/day: 1.50     Years: 20.00     Pack years: 30.00     Types: Cigarettes     Last attempt to quit: 10/27/2018     Years since quittin.6    Smokeless tobacco: Never Used   Substance Use Topics    Alcohol use: No    Drug use: No       Family History   Problem Relation Age of Onset    Cancer Mother         renal cell carcinoma    Kidney Cancer Mother 46    Cirrhosis Father     Diabetes Sister     Kidney Cancer Sister 54    Cancer Sister     Diabetes Brother     Breast Cancer Paternal Aunt 54    Ovarian Cancer Neg Hx          I have reviewed the patient's past medical history, past surgical history, allergies, medications, social and family history and I have made updates where appropriate.     Review of Systems        PHYSICAL EXAM:  /76   Pulse 84   Temp 98.8 °F (37.1 °C) (Oral)   Resp 20   Ht 4' 9\" (1.448 m)   Wt 142 lb (64.4 kg)   BMI 30.73 kg/m²     General Appearance: well developed and well- nourished, in no acute distress  Head: normocephalic and atraumatic  ENT: external ear and ear canal normal bilaterally, nose without deformity, nasal mucosa and turbinates normal without polyps, oropharynx normal, dentition is normal for age, no lipor gum lesions noted  Neck: supple and non-tender without mass, no

## 2019-06-05 ENCOUNTER — OFFICE VISIT (OUTPATIENT)
Dept: INTERNAL MEDICINE CLINIC | Age: 64
End: 2019-06-05
Payer: MEDICARE

## 2019-06-05 ENCOUNTER — TELEPHONE (OUTPATIENT)
Dept: FAMILY MEDICINE CLINIC | Age: 64
End: 2019-06-05

## 2019-06-05 VITALS
DIASTOLIC BLOOD PRESSURE: 72 MMHG | BODY MASS INDEX: 30.31 KG/M2 | WEIGHT: 140.5 LBS | HEART RATE: 74 BPM | HEIGHT: 57 IN | SYSTOLIC BLOOD PRESSURE: 116 MMHG | RESPIRATION RATE: 16 BRPM

## 2019-06-05 DIAGNOSIS — Z86.010 HX OF ADENOMATOUS COLONIC POLYPS: Primary | ICD-10-CM

## 2019-06-05 DIAGNOSIS — K62.5 RECTAL BLEED: ICD-10-CM

## 2019-06-05 PROCEDURE — 3017F COLORECTAL CA SCREEN DOC REV: CPT | Performed by: INTERNAL MEDICINE

## 2019-06-05 PROCEDURE — 1036F TOBACCO NON-USER: CPT | Performed by: INTERNAL MEDICINE

## 2019-06-05 PROCEDURE — G8598 ASA/ANTIPLAT THER USED: HCPCS | Performed by: INTERNAL MEDICINE

## 2019-06-05 PROCEDURE — G8417 CALC BMI ABV UP PARAM F/U: HCPCS | Performed by: INTERNAL MEDICINE

## 2019-06-05 PROCEDURE — G8427 DOCREV CUR MEDS BY ELIG CLIN: HCPCS | Performed by: INTERNAL MEDICINE

## 2019-06-05 PROCEDURE — 99203 OFFICE O/P NEW LOW 30 MIN: CPT | Performed by: INTERNAL MEDICINE

## 2019-06-05 PROCEDURE — 4040F PNEUMOC VAC/ADMIN/RCVD: CPT | Performed by: INTERNAL MEDICINE

## 2019-06-05 PROCEDURE — G8400 PT W/DXA NO RESULTS DOC: HCPCS | Performed by: INTERNAL MEDICINE

## 2019-06-05 PROCEDURE — 1090F PRES/ABSN URINE INCON ASSESS: CPT | Performed by: INTERNAL MEDICINE

## 2019-06-05 PROCEDURE — 1123F ACP DISCUSS/DSCN MKR DOCD: CPT | Performed by: INTERNAL MEDICINE

## 2019-06-05 RX ORDER — POLYETHYLENE GLYCOL 3350 17 G/17G
250 POWDER, FOR SOLUTION ORAL DAILY
Qty: 250 BOTTLE | Refills: 0 | Status: SHIPPED | OUTPATIENT
Start: 2019-06-05 | End: 2019-06-06

## 2019-06-05 NOTE — PROGRESS NOTES
Foot drop, right foot, GERD (gastroesophageal reflux disease), IBS (irritable bowel syndrome), Malignant neoplasm of upper-inner quadrant of left female breast ([address] Nausea & vomiting, and PONV (postoperative nausea and vomiting). Past Surgical History:      has a past surgical history that includes back surgery (unsure); Dilation and curettage of uterus (89); Tubal ligation (89); Colonoscopy (unsure); Wrist ganglion excision (Left); [place] tooth extraction (1979); Colonoscopy (10/10/14); Hysterectomy, vaginal (12/16/2014); Hemorrhoid surgery (12/16/2014); Kidney stone surgery; Carpal tunnel release (Right, 06/2017); Breast surgery (Left, 2016); pr office/outpt visit,procedure only (N/A, 6/12/2018); and laminectomy (06/12/2018). Current Outpatient Medications   Medication Sig Dispense Refill    polyethylene glycol (GLYCOLAX) powder Take 250 g by mouth daily for 1 dose Mix in 2 quarts water Drink 8 oz  x 4 starting at 4pm day before and again  at 5 am day of exam . 250 Bottle 0    bisacodyl (DULCOLAX) 5 MG EC tablet Take 2 tablets by mouth daily Take day before colonoscopy in the afternoon 2 tablet 0    hydrocortisone (ANUSOL-HC) 2.5 % rectal cream Place rectally 2 times daily.  1 Tube 2    amitriptyline (ELAVIL) 25 MG tablet Take 1 tablet by mouth nightly 30 tablet 5    amLODIPine (NORVASC) 10 MG tablet Take 1 tablet by mouth daily 90 tablet 3    atorvastatin (LIPITOR) 80 MG tablet Take 1 tablet by mouth nightly 90 tablet 3    clopidogrel (PLAVIX) 75 MG tablet Take 1 tablet by mouth daily 90 tablet 3    metoprolol succinate (TOPROL XL) 25 MG extended release tablet Take 1 tablet by mouth daily 90 tablet 3    fluticasone (FLONASE) 50 MCG/ACT nasal spray 1 spray by Nasal route daily 1 Bottle 1    tiotropium (SPIRIVA RESPIMAT) 2.5 MCG/ACT AERS inhaler Inhale 2 puffs into the lungs daily 3 Inhaler 3    aspirin 81 MG EC tablet Take 1 tablet by mouth daily 30 tablet 3    nitroGLYCERIN (NITROSTAT) 0.4 MG SL tablet Place 1 tablet under the tongue every 5 minutes as needed for Chest pain up to max of 3 total doses. If no relief after 1 dose, call 911. 25 tablet 0    albuterol sulfate HFA (VENTOLIN HFA) 108 (90 Base) MCG/ACT inhaler Inhale 2 puffs into the lungs every 6 hours as needed for Wheezing 1 Inhaler 3    letrozole (FEMARA) 2.5 MG tablet Take 1 tablet by mouth daily 90 tablet 3    nicotine (NICODERM CQ) 7 MG/24HR Place 1 patch onto the skin daily 30 patch 1     No current facility-administered medications for this visit. Allergies:  Naproxen; Prednisone; and Ultram [tramadol]     History of allergic reaction to anesthesia:  No     Social History:    TOBACCO:   reports that she quit smoking about 7 months ago. Her smoking use included cigarettes. She has a 30.00 pack-year smoking history. She has never used smokeless tobacco.  ETOH:   reports that she does not drink alcohol.    Family History:          Problem Relation Age of Onset   [facility] Cancer Mother         renal cell carcinoma    Kidney Cancer Mother 46    Cirrhosis Father     Diabetes Sister     Kidney Cancer Sister 54    Cancer Sister     Diabetes Brother     Breast Cancer Paternal Aunt 54    Ovarian Cancer Neg Hx         Review of Systems - General ROS: positive for  - weight gain   Ophthalmic ROS: negative   ENT ROS: negative   Hematological and Lymphatic ROS: negative   Endocrine ROS: negative   Respiratory ROS: no cough, shortness of breath, or wheezing   Cardiovascular ROS: no chest pain or dyspnea on exertion   Genito-Urinary ROS: no dysuria, trouble voiding, or hematuria   Musculoskeletal ROS: negative   Neurological ROS: no TIA or stroke symptoms     Laboratory     Lab Results   Component Value Date    WBC 8.8 10/27/2018    RBC 4.77 10/27/2018    HGB 12.2 06/04/2019    MCV 90.1 10/27/2018               Lab Results   Component Value Date    AST 15 02/06/2019    ALT 15 02/06/2019    GLUCOSE 97 04/02/2019    BUN 21 04/02/2019 CREATININE 0.5 04/02/2019    CO2 25 04/02/2019    CALCIUM 9.6 04/02/2019     Lab Results   Component Value Date    TSH 1.260 04/02/2019        Lab Results   Component Value Date    LIPASE 40.4 10/27/2018        PHYSICAL EXAM:       /72   Pulse 74   Resp 16   Ht 4' 9.01\" (1.448 m)   Wt 140 lb 8 oz (63.7 kg)   BMI 30.40 kg/m²  I       General:  alert   HEENT: PERRLA,    Neck supple, Thyroid not enlarged, No Virchow's node present   Heart:  Normal apical impulse, regular rate and rhythm, normal S1 and S2, no S3 or S4, and no murmur noted     Lungs:  No increased work of breathing, good air exchange, clear to auscultation bilaterally, no crackles or wheezing     Abdomen:  No scars, normal bowel sounds, soft, non-distended, non-tender, no masses palpated, no hepatosplenomegally   EXT: No evidence of cyanosis, clubbing or edema   Neurological: No localizing neurologic signs. Skin: normal,no rash, no spider angiomata nor petechiae, no  tattoos    Rectal: deferred external skin tags seen       ASA Grade:  ASA 2 - Patient with mild systemic disease with no functional limitations       ASSESSMENT:     Assessment  Patient is a 75 y.o. female here for colonoscopy with anesthesia  colonoscopy with anesthesia 6/17/19 at 1300h hx of adenomatous polyps 2014 and rectal bleeding. CAD post stent 10/ 18       PLAN:     1 Plan Procedure options, risks and benefits reviewed with patient who  expresses understanding.       2  Hold ASA and Plavix x 4 days before procedure

## 2019-06-05 NOTE — TELEPHONE ENCOUNTER
----- Message from Mayda Palomo DO sent at 6/5/2019  7:58 AM EDT -----  Blood count has dropped a little since last check, but still just within the normal range. We can see what Dr Laymond Dancer recommends going forward.

## 2019-06-17 ENCOUNTER — HOSPITAL ENCOUNTER (OUTPATIENT)
Age: 64
Setting detail: OUTPATIENT SURGERY
Discharge: HOME | End: 2019-06-17
Attending: INTERNAL MEDICINE | Admitting: INTERNAL MEDICINE
Payer: MEDICARE

## 2019-06-17 ENCOUNTER — ANESTHESIA EVENT (OUTPATIENT)
Dept: ENDOSCOPY | Age: 64
End: 2019-06-17
Payer: MEDICARE

## 2019-06-17 ENCOUNTER — ANESTHESIA (OUTPATIENT)
Dept: ENDOSCOPY | Age: 64
End: 2019-06-17
Payer: MEDICARE

## 2019-06-17 VITALS
RESPIRATION RATE: 16 BRPM | SYSTOLIC BLOOD PRESSURE: 155 MMHG | DIASTOLIC BLOOD PRESSURE: 70 MMHG | OXYGEN SATURATION: 100 %

## 2019-06-17 VITALS
BODY MASS INDEX: 30.2 KG/M2 | RESPIRATION RATE: 18 BRPM | HEIGHT: 57 IN | HEART RATE: 68 BPM | OXYGEN SATURATION: 98 % | WEIGHT: 140 LBS | DIASTOLIC BLOOD PRESSURE: 83 MMHG | SYSTOLIC BLOOD PRESSURE: 183 MMHG | TEMPERATURE: 98 F

## 2019-06-17 PROCEDURE — 3700000000 HC ANESTHESIA ATTENDED CARE: Performed by: INTERNAL MEDICINE

## 2019-06-17 PROCEDURE — 88305 TISSUE EXAM BY PATHOLOGIST: CPT

## 2019-06-17 PROCEDURE — 2500000003 HC RX 250 WO HCPCS: Performed by: SPECIALIST

## 2019-06-17 PROCEDURE — 45384 COLONOSCOPY W/LESION REMOVAL: CPT | Performed by: INTERNAL MEDICINE

## 2019-06-17 PROCEDURE — 3700000001 HC ADD 15 MINUTES (ANESTHESIA): Performed by: INTERNAL MEDICINE

## 2019-06-17 PROCEDURE — 7100000000 HC PACU RECOVERY - FIRST 15 MIN: Performed by: INTERNAL MEDICINE

## 2019-06-17 PROCEDURE — 3609010400 HC COLONOSCOPY POLYPECTOMY HOT BIOPSY: Performed by: INTERNAL MEDICINE

## 2019-06-17 PROCEDURE — 7100000001 HC PACU RECOVERY - ADDTL 15 MIN: Performed by: INTERNAL MEDICINE

## 2019-06-17 PROCEDURE — 2580000003 HC RX 258: Performed by: INTERNAL MEDICINE

## 2019-06-17 PROCEDURE — 2709999900 HC NON-CHARGEABLE SUPPLY: Performed by: INTERNAL MEDICINE

## 2019-06-17 PROCEDURE — 6360000002 HC RX W HCPCS: Performed by: SPECIALIST

## 2019-06-17 RX ORDER — SODIUM CHLORIDE 450 MG/100ML
INJECTION, SOLUTION INTRAVENOUS CONTINUOUS
Status: DISCONTINUED | OUTPATIENT
Start: 2019-06-17 | End: 2019-06-17 | Stop reason: HOSPADM

## 2019-06-17 RX ORDER — LIDOCAINE HYDROCHLORIDE 10 MG/ML
INJECTION, SOLUTION INFILTRATION; PERINEURAL PRN
Status: DISCONTINUED | OUTPATIENT
Start: 2019-06-17 | End: 2019-06-17 | Stop reason: SDUPTHER

## 2019-06-17 RX ORDER — ONDANSETRON 2 MG/ML
INJECTION INTRAMUSCULAR; INTRAVENOUS PRN
Status: DISCONTINUED | OUTPATIENT
Start: 2019-06-17 | End: 2019-06-17 | Stop reason: SDUPTHER

## 2019-06-17 RX ORDER — GLYCOPYRROLATE 1 MG/5 ML
SYRINGE (ML) INTRAVENOUS PRN
Status: DISCONTINUED | OUTPATIENT
Start: 2019-06-17 | End: 2019-06-17 | Stop reason: SDUPTHER

## 2019-06-17 RX ORDER — PROPOFOL 10 MG/ML
INJECTION, EMULSION INTRAVENOUS PRN
Status: DISCONTINUED | OUTPATIENT
Start: 2019-06-17 | End: 2019-06-17 | Stop reason: SDUPTHER

## 2019-06-17 RX ADMIN — PROPOFOL 20 MG: 10 INJECTION, EMULSION INTRAVENOUS at 13:11

## 2019-06-17 RX ADMIN — ONDANSETRON HYDROCHLORIDE 4 MG: 4 INJECTION, SOLUTION INTRAMUSCULAR; INTRAVENOUS at 13:21

## 2019-06-17 RX ADMIN — PROPOFOL 10 MG: 10 INJECTION, EMULSION INTRAVENOUS at 13:17

## 2019-06-17 RX ADMIN — LIDOCAINE HYDROCHLORIDE 100 MG: 10 INJECTION, SOLUTION INFILTRATION; PERINEURAL at 13:00

## 2019-06-17 RX ADMIN — PROPOFOL 20 MG: 10 INJECTION, EMULSION INTRAVENOUS at 13:03

## 2019-06-17 RX ADMIN — PROPOFOL 50 MG: 10 INJECTION, EMULSION INTRAVENOUS at 13:00

## 2019-06-17 RX ADMIN — PROPOFOL 30 MG: 10 INJECTION, EMULSION INTRAVENOUS at 13:09

## 2019-06-17 RX ADMIN — PROPOFOL 20 MG: 10 INJECTION, EMULSION INTRAVENOUS at 13:06

## 2019-06-17 RX ADMIN — PROPOFOL 20 MG: 10 INJECTION, EMULSION INTRAVENOUS at 13:14

## 2019-06-17 RX ADMIN — Medication 0.1 MG: at 12:59

## 2019-06-17 RX ADMIN — SODIUM CHLORIDE: 4.5 INJECTION, SOLUTION INTRAVENOUS at 11:56

## 2019-06-17 ASSESSMENT — ENCOUNTER SYMPTOMS
SHORTNESS OF BREATH: 1
DYSPNEA ACTIVITY LEVEL: AFTER AMBULATING 1 FLIGHT OF STAIRS

## 2019-06-17 ASSESSMENT — PAIN - FUNCTIONAL ASSESSMENT: PAIN_FUNCTIONAL_ASSESSMENT: 0-10

## 2019-06-17 ASSESSMENT — COPD QUESTIONNAIRES: CAT_SEVERITY: MODERATE

## 2019-06-17 ASSESSMENT — PAIN SCALES - GENERAL: PAINLEVEL_OUTOF10: 0

## 2019-06-17 NOTE — PROGRESS NOTES
1325 Pt to ENDO PACU easily arouseable. Spouse at bedside. Pt passing a lot of gas at this time. Denies pain  1328 pt awake and alert. 56 Dr Lakisha Hector at bedside speaking with pt and spouse  [phone] IV removed. Discharge instructions given to patient and spouse. 1345 pt dressing, tolerating well.   1350 Pt meets PACU discharge criteria .    1351 to car via wheelchair

## 2019-06-17 NOTE — OP NOTE
diverticulosis and DESCENDING COLON:  2- 3mm polyp removed with the hot bx forceps    Procedure: Colonoscopy    Anesthesia: IV conscious sedation    Surgeons/Assistants:Jose Christopher M.D.,DOMITILA     Estimated Blood Loss:less than 10ml    Specimens: were obtained    (The following sections must be completed)  Post-Sedation Vital Signs: Vital signs were reviewed and were stable after the procedure (see flow sheet for vitals)            Post-Sedation Exam: Lungs: clear           Complications: none       Jose Christopher M.D.,FIDEL   6/17/2019, 1:20 PM

## 2019-06-17 NOTE — ANESTHESIA PRE PROCEDURE
Department of Anesthesiology  Preprocedure Note       Name:  Monica Packer   Age:  75 y.o.  :  1955                                          MRN:  [id number]         Date:  2019      Surgeon: Melinda Colbert):  Clint Thompson MD    Procedure: COLONOSCOPY (Left Anus)    Medications prior to admission:   Prior to Admission medications    Medication Sig Start Date End Date Taking? Authorizing Provider   bisacodyl (DULCOLAX) 5 MG EC tablet Take 2 tablets by mouth daily Take day before colonoscopy in the afternoon 19  Yes Clint Thompson MD   hydrocortisone (ANUSOL-HC) 2.5 % rectal cream Place rectally 2 times daily. 19  Yes Tamara Machado DO   amitriptyline (ELAVIL) 25 MG tablet Take 1 tablet by mouth nightly 19  Yes Tamara Machado DO   amLODIPine (NORVASC) 10 MG tablet Take 1 tablet by mouth daily 19  Yes Tamara Machado DO   atorvastatin (LIPITOR) 80 MG tablet Take 1 tablet by mouth nightly 19  Yes Hay Candelaria MD   clopidogrel (PLAVIX) 75 MG tablet Take 1 tablet by mouth daily 19  Yes Hay Candelaria MD   metoprolol succinate (TOPROL XL) 25 MG extended release tablet Take 1 tablet by mouth daily 19  Yes Hay Candelaria MD   aspirin 81 MG EC tablet Take 1 tablet by mouth daily 10/30/18  Yes Marylee Munda, PA-C   albuterol sulfate HFA (VENTOLIN HFA) 108 (90 Base) MCG/ACT inhaler Inhale 2 puffs into the lungs every 6 hours as needed for Wheezing 10/8/18  Yes Tamara Machado DO   letrozole [facility]) 2.5 MG tablet Take 1 tablet by mouth daily 18  Yes Polly Funes MD   nicotine (NICODERM CQ) 7 MG/24HR Place 1 patch onto the skin daily 19  Tamara Machado DO   fluticasone (FLONASE) 50 MCG/ACT nasal spray 1 spray by Nasal route daily 19   Tamara Machado DO   nitroGLYCERIN (NITROSTAT) 0.4 MG SL tablet Place 1 tablet under the tongue every 5 minutes as needed for Chest pain up to max of 3 total doses.  If no relief after 1 dose, call 911. 10/29/18   Rani Canales Wilfred Richards PA-C       Current medications:    Current Facility-Administered Medications   Medication Dose Route Frequency Provider Last Rate Last Dose    0.45 % sodium chloride infusion   Intravenous Continuous Tanesha Mckeon MD 75 mL/hr at 06/17/19 1156         Allergies:     Allergies   Allergen Reactions    Naproxen Anaphylaxis    Prednisone Swelling and Other (See Comments)     Angioedema    Ultram [Tramadol] Itching and Rash       Problem List:    Patient Active Problem List   Diagnosis Code    Hemorrhoids K64.9    Blood in stool K92.1    Change in bowel habits R19.4    Uterine prolapse N81.4    S/P hemorrhoidectomy Z98.890, Z87.19    Nicotine dependence F17.200    Malignant neoplasm of upper-inner quadrant of left female breast ([facility] [address] C50.212    S/P lumpectomy and sln bx, left breast Z98.890    Essential hypertension I10    Bilateral carpal tunnel syndrome G56.03    Lumbar radiculopathy, chronic M54.16    Cervical spinal stenosis M48.02    STEMI (ST elevation myocardial infarction) ([facility]) I21.3    Simple chronic bronchitis ([facility]) J41.0    Diarrhea R19.7    Gastroesophageal reflux disease without esophagitis K21.9       Past Medical History:        Diagnosis Date    Bronchitis     Cerebral artery occlusion with cerebral infarction ([facility] [address]     TIA    Chronic kidney disease     Diverticulosis     Essential hypertension 5/10/2017    Foot drop, right foot     GERD (gastroesophageal reflux disease)     IBS (irritable bowel syndrome)     Malignant neoplasm of upper-inner quadrant of left female breast ([facility]) 9/13/2016    Nausea & vomiting     PONV (postoperative nausea and vomiting)        Past Surgical History:        Procedure Laterality Date    BACK SURGERY  unsure    BREAST SURGERY Left 2016    exc. lymph nodes, lumpectomy    CARPAL TUNNEL RELEASE Right 06/2017    OIO    COLONOSCOPY  unsure    Dr. Karthikeyan Wolff    COLONOSCOPY  10/10/14    Dr. Wauneta Severin UTERUS  89    HEMORRHOID SURGERY  2014    HYSTERECTOMY, VAGINAL  2014    KIDNEY STONE SURGERY      LAMINECTOMY  2018    SD OFFICE/OUTPT VISIT,PROCEDURE ONLY N/A 2018    ACDF C5-6 performed by Mandi [facility]orga MD at [facility]    WRIST GANGLION EXCISION Left        Social History:    Social History     Tobacco Use    Smoking status: Former Smoker     Packs/day: 1.50     Years: 20.00     Pack years: 30.00     Types: Cigarettes     Last attempt to quit: 10/27/2018     Years since quittin.6    Smokeless tobacco: Never Used   Substance Use Topics    Alcohol use: No                                Counseling given: Not Answered      Vital Signs (Current):   Vitals:    19 1144   BP: (!) 157/73   Pulse: 79   Resp: 16   Temp: 36.8 °C (98.2 °F)   TempSrc: Temporal   SpO2: 97%   Weight: 140 lb (63.5 kg)   Height: 4' 9\" (1.448 m)                                              BP Readings from Last 3 Encounters:   19 (!) 157/73   19 116/72   19 116/76       NPO Status: Time of last liquid consumption: 0500(Prep)                        Time of last solid consumption:                         Date of last liquid consumption: 19                        Date of last solid food consumption: 06/15/19    BMI:   Wt Readings from Last 3 Encounters:   19 140 lb (63.5 kg)   19 140 lb 8 oz (63.7 kg)   19 142 lb (64.4 kg)     Body mass index is 30.3 kg/m².     CBC:   Lab Results   Component Value Date    WBC 8.8 10/27/2018    RBC 4.77 10/27/2018    HGB 12.2 2019    HCT 37.3 2019    MCV 90.1 10/27/2018    RDW 12.7 2018     10/29/2018       CMP:   Lab Results   Component Value Date     2019    K 4.6 2019     2019    CO2 25 2019    BUN 21 2019    CREATININE 0.5 2019    LABGLOM >90 2019    GLUCOSE 97 2019    PROT 6.6 02/06/2019    CALCIUM 9.6 04/02/2019    BILITOT 0.4 02/06/2019    ALKPHOS 92 02/06/2019    AST 15 02/06/2019    ALT 15 02/06/2019       POC Tests: No results for input(s): POCGLU, POCNA, POCK, POCCL, POCBUN, POCHEMO, POCHCT in the last 72 hours. Coags:   Lab Results   Component Value Date    INR 0.92 05/18/2018    APTT 30.0 10/28/2018       HCG (If Applicable): No results found for: PREGTESTUR, PREGSERUM, HCG, HCGQUANT     ABGs: No results found for: PHART, PO2ART, XIL9GKP, YCG5QCA, BEART, O8FSKQOE     Type & Screen (If Applicable):  Lab Results   Component Value Date    [address] 06/12/2018       Anesthesia Evaluation  Patient summary reviewed and Nursing notes reviewed   history of anesthetic complications: PONV. Airway: Mallampati: II  TM distance: <3 FB   Neck ROM: limited  Mouth opening: > = 3 FB Dental: normal exam         Pulmonary:   (+) COPD: moderate,  shortness of breath: no interval change,  decreased breath sounds,                             Cardiovascular:  Exercise tolerance: good (>4 METS),   (+) hypertension:, past MI: no interval change, CABG/stent:, HORAN: after ambulating 1 flight of stairs, hyperlipidemia      ECG reviewed  Rhythm: regular  Rate: normal  Echocardiogram reviewed         Beta Blocker:  Dose within 24 Hrs         Neuro/Psych:   (+) CVA: no interval change, depression/anxiety             GI/Hepatic/Renal:   (+) GERD: well controlled, bowel prep,           Endo/Other:    (+) malignancy/cancer. Pt had no PAT visit        ROS comment: Hx breast ca left Abdominal:           Vascular: negative vascular ROS. Anesthesia Plan      MAC     ASA 3       Induction: intravenous. Anesthetic plan and risks discussed with patient and spouse. Plan discussed with attending.                   GREGORIO Castillo - CRNA   6/17/2019

## 2019-06-17 NOTE — ANESTHESIA POSTPROCEDURE EVALUATION
Department of Anesthesiology  Postprocedure Note    Patient: Jennifer May  MRN: [id number]  YOB: 1955  Date of evaluation: 6/17/2019  Time:  1:23 PM     Procedure Summary     Date:  06/17/19 Room / Location:  [address] ENDO 15 / [address] Endoscopy    Anesthesia Start:  1599 Anesthesia Stop:  1322    Procedure:  COLONOSCOPY POLYPECTOMY HOT BIOPSY (Left Anus) Diagnosis:  (RECTAL BLEED)    Surgeon:  Austen Glover MD Responsible Provider:  lEise Bustos MD    Anesthesia Type:  MAC ASA Status:  3          Anesthesia Type: MAC    Arsh Phase I: Arsh Score: 10    Arsh Phase II:      Last vitals: Reviewed and per EMR flowsheets.        Anesthesia Post Evaluation    Patient location during evaluation: bedside  Patient participation: complete - patient participated  Level of consciousness: awake  Pain score: 0  Airway patency: patent  Nausea & Vomiting: no vomiting and no nausea  Complications: no  Cardiovascular status: hemodynamically stable  Respiratory status: spontaneous ventilation, room air and acceptable  Hydration status: stable

## 2019-06-17 NOTE — H&P
[facility]  Sedation/Analgesia History & Physical    Patient: Carolina Henriquez : 1955  Med Rec#: [id number] Acc#: [id number]   Provider Performing Procedure: Mary Fernandez  Primary Care Physician: Dhaval Reinoso DO    PRE-PROCEDURE   Full CODE [x]Yes  DNR-CCA/DNR-CC []Yes   Brief History/Pre-Procedure Diagnosis:colon polyps          MEDICAL HISTORY  []CAD/Valve  []Liver Disease  []Lung Disease []Diabetes  []Hypertension []Renal Disease  []Additional information:       has a past medical history of Bronchitis, Cerebral artery occlusion with cerebral infarction [facility]), Chronic kidney disease, Diverticulosis, Essential hypertension, Foot drop, right foot, GERD (gastroesophageal reflux disease), IBS (irritable bowel syndrome), Malignant neoplasm of upper-inner quadrant of left female breast ([facility] [address] Nausea & vomiting, and PONV (postoperative nausea and vomiting). SURGICAL HISTORY   has a past surgical history that includes back surgery (unsure); Dilation and curettage of uterus (89); Tubal ligation (89); Colonoscopy (unsure); Wrist ganglion excision (Left); [place] tooth extraction (); Colonoscopy (10/10/14); Hysterectomy, vaginal (2014); Hemorrhoid surgery (2014); Kidney stone surgery; Carpal tunnel release (Right, 2017); Breast surgery (Left, ); pr office/outpt visit,procedure only (N/A, 2018); and laminectomy (2018).   Additional information:       ALLERGIES   Allergies as of 2019 - Review Complete 2019   Allergen Reaction Noted    Naproxen Anaphylaxis 09/10/2014    Prednisone Swelling and Other (See Comments) 09/10/2014    Ultram [tramadol] Itching and Rash 09/10/2014     Additional information:       MEDICATIONS   Coumadin Use Last 7 Days [x]No []Yes  Antiplatelet drug therapy use last 7 days  [x]No []Yes  Other anticoagulant use last 7 days  [x]No []Yes    Current Facility-Administered Medications:     0.45 % sodium chloride infusion, , Intravenous, Continuous, Julissa Sebastian MD, Last Rate: 75 mL/hr at 06/17/19 1156  Prior to Admission medications    Medication Sig Start Date End Date Taking? Authorizing Provider   bisacodyl (DULCOLAX) 5 MG EC tablet Take 2 tablets by mouth daily Take day before colonoscopy in the afternoon 6/5/19  Yes Julissa Sebastian MD   hydrocortisone (ANUSOL-HC) 2.5 % rectal cream Place rectally 2 times daily. 6/4/19  Yes Arturo Sterling DO   amitriptyline (ELAVIL) 25 MG tablet Take 1 tablet by mouth nightly 4/2/19  Yes Arturo Sterling DO   amLODIPine (NORVASC) 10 MG tablet Take 1 tablet by mouth daily 2/14/19  Yes Arturo Sterling DO   atorvastatin (LIPITOR) 80 MG tablet Take 1 tablet by mouth nightly 2/13/19  Yes Elda Morrow MD   clopidogrel (PLAVIX) 75 MG tablet Take 1 tablet by mouth daily 2/13/19  Yes Elda Morrow MD   metoprolol succinate (TOPROL XL) 25 MG extended release tablet Take 1 tablet by mouth daily 2/13/19  Yes Elda Morrow MD   aspirin 81 MG EC tablet Take 1 tablet by mouth daily 10/30/18  Yes Genia Koch PA-C   albuterol sulfate HFA (VENTOLIN HFA) 108 (90 Base) MCG/ACT inhaler Inhale 2 puffs into the lungs every 6 hours as needed for Wheezing 10/8/18  Yes Arturo Sterling DO   letrozole [facility]) 2.5 MG tablet Take 1 tablet by mouth daily 7/16/18  Yes Diane Ledezma MD   nicotine (NICODERM CQ) 7 MG/24HR Place 1 patch onto the skin daily 2/19/19 4/20/19  Arturo Sterling DO   fluticasone (FLONASE) 50 MCG/ACT nasal spray 1 spray by Nasal route daily 1/2/19   Arturo Sterling DO   nitroGLYCERIN (NITROSTAT) 0.4 MG SL tablet Place 1 tablet under the tongue every 5 minutes as needed for Chest pain up to max of 3 total doses.  If no relief after 1 dose, call 911. 10/29/18   Genia Koch PA-C     Additional information:       PHYSICAL:   Heart:  [x]Regular rate and rhythm  []Other:    Lungs:  [x]Clear    []Other:    Abdomen: [x]Soft    []Other:    Mental Status: [x]Alert & Oriented  []Other:      VITAL SIGNS   Patient Vitals for the past 24 hrs:   BP Temp Temp src Pulse Resp SpO2 Height Weight   06/17/19 1144 (!) 157/73 98.2 °F (36.8 °C) Temporal 79 16 97 % 4' 9\" (1.448 m) 140 lb (63.5 kg)       PLANNED PROCEDURE   []EGD  [x]Colonoscopy []Flex Sigmoid  []ERCP []EUS   []Cystoscopy  [] CATH [] BRONCH   Consent: I have discussed with the patient and/or the patient representative the indication, alternatives, and the possible risks and/or complications of the planned procedure and the anesthesia methods. The patient and/or patient representative appear to understand and agree to proceed. SEDATION  Planned agent:[x]Midazolam []Meperidine [x]Sublimaze []Morphine  []Diazepam  []Other:     ASA Classification: Class 2 - A normal healthy patient with mild systemic disease    Airway Assessment: Mallampati Class II - (soft palate, fauces & uvula are visible)    Monitoring and Safety: The patient will be placed on a cardiac monitor and vital signs, pulse oximetry and level of consciousness will be continuously evaluated throughout the procedure. The patient will be closely monitored until recovery from the medications is complete and the patient has returned to baseline status. Respiratory therapy will be on standby during the procedure. [x]Pre-procedure diagnostic studies complete and results available. Comment:    [x]Previous sedation/anesthesia experiences assessed. Comment:    [x]The patient is an appropriate candidate to undergo the planned procedure sedation and anesthesia. (Refer to nursing sedation/analgesia documentation record)  [x]Formulation and discussion of sedation/procedure plan, risks, and expectations with patient and/or responsible adult completed. [x]Patient examined immediately prior to the procedure.  (Refer to nursing sedation/analgesia documentation record)    Austen Glover MD   Electronically signed 6/17/2019 at 12:53 PM

## 2019-06-19 ENCOUNTER — TELEPHONE (OUTPATIENT)
Dept: INTERNAL MEDICINE CLINIC | Age: 64
End: 2019-06-19

## 2019-06-19 NOTE — RESULT ENCOUNTER NOTE
Tell patient colon polyp was precancerous and totally removed and should have repeat colonoscopy in 5 years.

## 2019-06-19 NOTE — TELEPHONE ENCOUNTER
----- Message from Renard Coley MD sent at 6/19/2019 10:37 AM EDT -----  Tell patient colon polyp was precancerous and totally removed and should have repeat colonoscopy in 5 years.

## 2019-06-20 ENCOUNTER — OFFICE VISIT (OUTPATIENT)
Dept: ONCOLOGY | Age: 64
End: 2019-06-20
Payer: MEDICARE

## 2019-06-20 ENCOUNTER — HOSPITAL ENCOUNTER (OUTPATIENT)
Dept: INFUSION THERAPY | Age: 64
Discharge: HOME | End: 2019-06-20
Payer: MEDICARE

## 2019-06-20 VITALS
HEART RATE: 90 BPM | RESPIRATION RATE: 18 BRPM | BODY MASS INDEX: 30.76 KG/M2 | DIASTOLIC BLOOD PRESSURE: 68 MMHG | TEMPERATURE: 98.8 F | OXYGEN SATURATION: 97 % | WEIGHT: 142.6 LBS | SYSTOLIC BLOOD PRESSURE: 119 MMHG | HEIGHT: 57 IN

## 2019-06-20 DIAGNOSIS — Z85.3 ENCOUNTER FOR FOLLOW-UP SURVEILLANCE OF BREAST CANCER: ICD-10-CM

## 2019-06-20 DIAGNOSIS — C50.212 MALIGNANT NEOPLASM OF UPPER-INNER QUADRANT OF LEFT BREAST IN FEMALE, ESTROGEN RECEPTOR POSITIVE (HCC): Primary | ICD-10-CM

## 2019-06-20 DIAGNOSIS — Z98.890 S/P LUMPECTOMY, LEFT BREAST: ICD-10-CM

## 2019-06-20 DIAGNOSIS — Z08 ENCOUNTER FOR FOLLOW-UP SURVEILLANCE OF BREAST CANCER: ICD-10-CM

## 2019-06-20 DIAGNOSIS — Z79.811 PROPHYLACTIC USE OF LETROZOLE (FEMARA): ICD-10-CM

## 2019-06-20 DIAGNOSIS — Z17.0 MALIGNANT NEOPLASM OF UPPER-INNER QUADRANT OF LEFT BREAST IN FEMALE, ESTROGEN RECEPTOR POSITIVE (HCC): Primary | ICD-10-CM

## 2019-06-20 PROCEDURE — G8400 PT W/DXA NO RESULTS DOC: HCPCS | Performed by: INTERNAL MEDICINE

## 2019-06-20 PROCEDURE — 1036F TOBACCO NON-USER: CPT | Performed by: INTERNAL MEDICINE

## 2019-06-20 PROCEDURE — 4040F PNEUMOC VAC/ADMIN/RCVD: CPT | Performed by: INTERNAL MEDICINE

## 2019-06-20 PROCEDURE — 1123F ACP DISCUSS/DSCN MKR DOCD: CPT | Performed by: INTERNAL MEDICINE

## 2019-06-20 PROCEDURE — G8417 CALC BMI ABV UP PARAM F/U: HCPCS | Performed by: INTERNAL MEDICINE

## 2019-06-20 PROCEDURE — 3017F COLORECTAL CA SCREEN DOC REV: CPT | Performed by: INTERNAL MEDICINE

## 2019-06-20 PROCEDURE — G8598 ASA/ANTIPLAT THER USED: HCPCS | Performed by: INTERNAL MEDICINE

## 2019-06-20 PROCEDURE — G8427 DOCREV CUR MEDS BY ELIG CLIN: HCPCS | Performed by: INTERNAL MEDICINE

## 2019-06-20 PROCEDURE — 99214 OFFICE O/P EST MOD 30 MIN: CPT | Performed by: INTERNAL MEDICINE

## 2019-06-20 PROCEDURE — 1090F PRES/ABSN URINE INCON ASSESS: CPT | Performed by: INTERNAL MEDICINE

## 2019-06-20 PROCEDURE — 99211 OFF/OP EST MAY X REQ PHY/QHP: CPT

## 2019-06-20 RX ORDER — LETROZOLE 2.5 MG/1
2.5 TABLET, FILM COATED ORAL DAILY
Qty: 90 TABLET | Refills: 3 | Status: SHIPPED | OUTPATIENT
Start: 2019-06-20 | End: 2020-06-26 | Stop reason: SDUPTHER

## 2019-06-20 ASSESSMENT — ENCOUNTER SYMPTOMS
ABDOMINAL PAIN: 0
CONSTIPATION: 0
TROUBLE SWALLOWING: 0
RECTAL PAIN: 0
WHEEZING: 0
COLOR CHANGE: 0
FACIAL SWELLING: 0
VOMITING: 0
COUGH: 0
BLOOD IN STOOL: 0
BACK PAIN: 0
NAUSEA: 0
CHEST TIGHTNESS: 0
ABDOMINAL DISTENTION: 0
DIARRHEA: 0
SHORTNESS OF BREATH: 0
EYE DISCHARGE: 0
SORE THROAT: 0

## 2019-06-20 NOTE — PROGRESS NOTES
[facility] PROFESSIONAL SERVS  [facility]  [address] [facility] [address]  [address] [facility] [address]  [address] [address]  Dept: [phone]  Dept Fax: [phone]: [phone]     Visit Date: 6/20/2019     Stella Jaime is a 75 y.o. female who presents today for:   Chief Complaint   Patient presents with   Martine JACOME   Mrs. Darrin Benitez is a 43-year-old female with stage I B left breast cancer diagnosed in September 2016. The patient had digital screening bilateral mammogram on August 31, 2016 that showed irregular density in the left breast that was indeterminate but confirmed on the compression and lateral medial views. On September 9, 2016 she underwent left breast upper inner quadrant core biopsy that showed invasive ductal carcinoma grade 2. IHC was positive for estrogen receptor expression in 95% of cells, and progesterone receptor expression in 80% of cells. Subsequently the patient met with the surgeon Dr. Kenyatta De La Cruz and after discussing her treatment options, she decided to proceed with left breast lumpectomy and sentinel lymph node mapping and biopsy. Her surgery was performed on September 29, 2016. Final pathology report showed:  Specimen Laterality  Left  Tumor Site: Invasive Carcinoma  Upper inner quadrant  Tumor Size: Size of Largest Invasive Carcinoma  Greatest dimension of largest focus of invasion  > 1 mm:  0.9 cm  Histologic Type  Invasive ductal carcinoma, no special type.   Histologic Grade: Johnny Histologic Score  Glandular (Acinar)/Tubular Differentiation  Score 3:  < 10% of tumor area forming glandular/tubular structures  Overall Grade  Grade 2: scores of 6 or 7  Tumor Focality (required only if more than 1 focus of invasive  carcinoma is present)  Single focus of invasive carcinoma  Ductal Carcinoma In Situ (DCIS)  DCIS is present  Negative for extensive intraductal component (EIC)  Size (Extent) of DCIS  Estimated size (extent) of DCIS (greatest dimension patient was hospitalized in October 2018 for STEMI, she  Is S/P PCI of the RCA. Interim history on June 20, 2019:  She reports that she tolerates Femara well. Denies fatigue, her hot flashes are stable. She denies having any arthralgia. No complaints related to er breasts.       Past Medical History:   Diagnosis Date    Bronchitis     Cerebral artery occlusion with cerebral infarction ([facility] [address]     TIA    Chronic kidney disease     Diverticulosis     Essential hypertension 5/10/2017    Foot drop, right foot     GERD (gastroesophageal reflux disease)     IBS (irritable bowel syndrome)     Malignant neoplasm of upper-inner quadrant of left female breast ([address] 9/13/2016    Nausea & vomiting     PONV (postoperative nausea and vomiting)       Past Surgical History:   Procedure Laterality Date    BACK SURGERY  unsure    BREAST SURGERY Left 2016    exc. lymph nodes, lumpectomy    CARPAL TUNNEL RELEASE Right 06/2017    OIO    COLONOSCOPY  unsure    Dr. Hallie Baer  10/10/14    Dr. Benson Lopez COLONOSCOPY Left 6/17/2019    COLONOSCOPY POLYPECTOMY HOT BIOPSY performed by Delbert Minaya MD at [address] [place] [address] OF UTERUS  80    HEMORRHOID SURGERY  12/16/2014    HYSTERECTOMY, VAGINAL  12/16/2014    KIDNEY STONE SURGERY      LAMINECTOMY  06/12/2018    [place] OFFICE/OUTPT VISIT,PROCEDURE ONLY N/A 6/12/2018    ACDF C5-6 performed by Ann Marie Bowling MD at [address] [facility]  89    WISDOM TOOTH EXTRACTION  1979    WRIST [address] [place] [address] [address]       Family History   Problem Relation Age of Onset    Cancer Mother         renal cell carcinoma    Kidney Cancer Mother 46    Cirrhosis Father     Diabetes Sister     Kidney Cancer Sister 54    Cancer Sister     Diabetes Brother     Breast Cancer Paternal Aunt 54    Ovarian Cancer Neg Hx       Social History     Tobacco Use    Smoking status: Former Smoker     Packs/day: 1.50     Years: 20.00     Pack years: 04/09/2019    Pneumococcal 65+ years Vaccine (2 of 2 - PPSV23) 10/08/2019    Breast cancer screen  11/02/2019    Potassium monitoring  04/02/2020    Creatinine monitoring  04/02/2020    Lipid screen  02/06/2024    Colon cancer screen colonoscopy  06/17/2029    DEXA (modify frequency per FRAX score)  Completed    Flu vaccine  Completed        Subjective:   Review of Systems   Constitutional: Negative for activity change, appetite change, fatigue and fever. HENT: Negative for congestion, dental problem, facial swelling, hearing loss, mouth sores, nosebleeds, sore throat, tinnitus and trouble swallowing. Eyes: Negative for discharge and visual disturbance. Respiratory: Negative for cough, chest tightness, shortness of breath and wheezing. Cardiovascular: Negative for chest pain, palpitations and leg swelling. Gastrointestinal: Negative for abdominal distention, abdominal pain, blood in stool, constipation, diarrhea, nausea, rectal pain and vomiting. Endocrine: Negative for cold intolerance, polydipsia and polyuria. Genitourinary: Negative for decreased urine volume, difficulty urinating, dysuria, flank pain, hematuria and urgency. Musculoskeletal: Negative for arthralgias, back pain, gait problem, joint swelling, myalgias and neck stiffness. Skin: Negative for color change, rash and wound. Neurological: Negative for dizziness, tremors, seizures, speech difficulty, weakness, light-headedness, numbness and headaches. Hematological: Negative for adenopathy. Does not bruise/bleed easily. Psychiatric/Behavioral: Negative for confusion and sleep disturbance. The patient is not nervous/anxious. Objective:   Physical Exam   Constitutional: She is oriented to person, place, and time. She appears well-developed and well-nourished. No distress. HENT:   Head: Normocephalic. Mouth/Throat: Oropharynx is clear and moist. No oropharyngeal exudate.    Eyes: Pupils are equal, round, and reactive to light. EOM are normal. Right eye exhibits no discharge. Left eye exhibits no discharge. No scleral icterus. Neck: Normal range of motion. Neck supple. No JVD present. No tracheal deviation present. No thyromegaly present. Cardiovascular: Normal rate and normal heart sounds. Exam reveals no gallop and no friction rub. No murmur heard. Pulmonary/Chest: Effort normal and breath sounds normal. No stridor. No respiratory distress. She has no wheezes. She has no rales. She exhibits no tenderness. Abdominal: Soft. Bowel sounds are normal. She exhibits no distension and no mass. There is no tenderness. There is no rebound. Musculoskeletal: Normal range of motion. She exhibits no edema. Lymphadenopathy:     She has no cervical adenopathy. Neurological: She is alert and oriented to person, place, and time. She has normal reflexes. No cranial nerve deficit. She exhibits normal muscle tone. Skin: Skin is warm. No rash noted. No erythema. Psychiatric: She has a normal mood and affect. Her behavior is normal. Judgment and thought content normal.   Vitals reviewed. /68 (Site: Right Upper Arm, Position: Sitting, Cuff Size: Medium Adult)   Pulse 90   Temp 98.8 °F (37.1 °C) (Oral)   Resp 18   Ht 4' 9.01\" (1.448 m)   Wt 142 lb 9.6 oz (64.7 kg)   SpO2 97%   BMI 30.85 kg/m²      DEXA study in December 2016 showed:  IMPRESSION: OSTEOPENIA   Patient is at medium risk for fracture. Mammogram On November 2, 2018 showed: Benign findings, BIRADS category       Assessment:   1. Stage I B, ER positive, TX positive, HER-2/eleazar negative left breast cancer. The patient is status post a left lumpectomy with sentinel lymph node biopsy. Her breast cancer had multiple good prognostic features: 9 mm in size, strong estrogen receptor expression, strong progesterone receptor expression, HER-2/eleazar negativity. The only concerning feature of her breast cancer was high (40%) Ki-67 proliferation index. Therefore her breast cancer was assessed by Breast Oncotype Dx assay. It showed recurrent score of 20, translating into 13% risk of distant disease recurrence during the next 10 years. Decision was made  not to proceed with adjuvant chemotherapy. She completed adjuvant radiation treatment to the reminder of her left breast on December 4, 2016.    2. Adjuvant hormonal therapy. The patient started Adjuvant hormonal therapy with Arimidex in December 2016. She developed fatigue on Arimidex. Therefore she was placed on Femara which she tolerates well. She reports minimal arthralgia, minimal hot flashes. No evidence of distant disease recurrence on today's physical examination. Her annual breast mammogram in November 2018 was benign   3. Osteopenia. The patient was instructed to take calcium with vitamin D. She also received recommendation to exercise on a regular basis. Recommendation is to walk 3-4 times per week. 4.  The patient is a heavy smoker. The lung screening low dose CT of the chest on a April 9, 2018 showed no suspicious pulmonary nodules. Lung rads category 2. She quit smoking after recent STEMI.        Eliseo Wilcox MD

## 2019-07-03 ENCOUNTER — OFFICE VISIT (OUTPATIENT)
Dept: INTERNAL MEDICINE CLINIC | Age: 64
End: 2019-07-03
Payer: MEDICARE

## 2019-07-03 VITALS
DIASTOLIC BLOOD PRESSURE: 67 MMHG | WEIGHT: 145 LBS | SYSTOLIC BLOOD PRESSURE: 108 MMHG | BODY MASS INDEX: 31.28 KG/M2 | HEIGHT: 57 IN | HEART RATE: 92 BPM

## 2019-07-03 DIAGNOSIS — K92.89 GAS BLOAT SYNDROME: Primary | ICD-10-CM

## 2019-07-03 DIAGNOSIS — D12.6 ADENOMATOUS POLYP OF COLON, UNSPECIFIED PART OF COLON: ICD-10-CM

## 2019-07-03 PROCEDURE — 1090F PRES/ABSN URINE INCON ASSESS: CPT | Performed by: INTERNAL MEDICINE

## 2019-07-03 PROCEDURE — G8417 CALC BMI ABV UP PARAM F/U: HCPCS | Performed by: INTERNAL MEDICINE

## 2019-07-03 PROCEDURE — 1036F TOBACCO NON-USER: CPT | Performed by: INTERNAL MEDICINE

## 2019-07-03 PROCEDURE — G8598 ASA/ANTIPLAT THER USED: HCPCS | Performed by: INTERNAL MEDICINE

## 2019-07-03 PROCEDURE — 3017F COLORECTAL CA SCREEN DOC REV: CPT | Performed by: INTERNAL MEDICINE

## 2019-07-03 PROCEDURE — 1123F ACP DISCUSS/DSCN MKR DOCD: CPT | Performed by: INTERNAL MEDICINE

## 2019-07-03 PROCEDURE — 99212 OFFICE O/P EST SF 10 MIN: CPT | Performed by: INTERNAL MEDICINE

## 2019-07-03 PROCEDURE — G8400 PT W/DXA NO RESULTS DOC: HCPCS | Performed by: INTERNAL MEDICINE

## 2019-07-03 PROCEDURE — 4040F PNEUMOC VAC/ADMIN/RCVD: CPT | Performed by: INTERNAL MEDICINE

## 2019-07-03 PROCEDURE — G8427 DOCREV CUR MEDS BY ELIG CLIN: HCPCS | Performed by: INTERNAL MEDICINE

## 2019-07-03 RX ORDER — SIMETHICONE 80 MG
80 TABLET,CHEWABLE ORAL 4 TIMES DAILY PRN
Qty: 180 TABLET | Refills: 3 | COMMUNITY
Start: 2019-07-03 | End: 2019-11-19

## 2019-07-03 RX ORDER — ALPHA-D-GALACTOSIDASE
2 TABLET ORAL
Refills: 0 | COMMUNITY
Start: 2019-07-03 | End: 2019-11-19

## 2019-07-03 NOTE — PROGRESS NOTES
[facility] PROFESSIONAL [facility]  PHYSICIANS [facility] Jose Kirkland MD, [facility] [place]  Gastroenterology  [address]Noreen ARMSTRONG  Dept: [phone]  Dept Fax: [phone]    Patient: René Boateng : 1955  Med Rec#: [id number] Acc#: ,[id number]   Primary Care Provider Edward Aggarwla DO   HISTORY OF PRESENT ILLNESS:    The patient is a 75 y. o.  female with significant past medical history of tubular adenoma by colonoscopy recently. No more bleeding but some increased gastritis. Has anla cryptitis recommended anal wipes post BM and Mylicon and Beano for increased gastritis RTC prn and repeat colonoscopy 5 years. Call for problems. Wt Readings from Last 3 Encounters:   19 145 lb (65.8 kg)   19 142 lb 9.6 oz (64.7 kg)   19 140 lb (63.5 kg)    She denies melena, hematochezia, hematemesis, and coffee ground emesis. Medical therapy in the past has included none. The patient's usual bowel pattern is a bowel movement every  day. Bowel movements have not significantly changed . The patient currently denies significant abdominal pain or discomfort. Wt Readings from Last 3 Encounters:   19 145 lb (65.8 kg)   19 142 lb 9.6 oz (64.7 kg)   19 140 lb (63.5 kg)    The patient has noted no bleeding associated with bowel movements. Stool test FIT or Hemoccult,  ,The patient does not have a family history of colon polyps. The patient does not have a family history of colon cancer. Past Medical History:      has a past medical history of Bronchitis, Cerebral artery occlusion with cerebral infarction ([address] Chronic kidney disease, Diverticulosis, Essential hypertension, Foot drop, right foot, GERD (gastroesophageal reflux disease), IBS (irritable bowel syndrome), Malignant neoplasm of upper-inner quadrant of left female breast ([address] Nausea & vomiting, and PONV (postoperative nausea and vomiting).    Past Surgical History:      has a past surgical history that includes back surgery (unsure); Dilation and curettage of uterus (89); Tubal ligation (89); Colonoscopy (unsure); Wrist ganglion excision (Left); Crete tooth extraction (1979); Colonoscopy (10/10/14); Hysterectomy, vaginal (12/16/2014); Hemorrhoid surgery (12/16/2014); Kidney stone surgery; Carpal tunnel release (Right, 06/2017); Breast surgery (Left, 2016); pr office/outpt visit,procedure only (N/A, 6/12/2018); laminectomy (06/12/2018); and Colonoscopy (Left, 6/17/2019). Current Outpatient Medications   Medication Sig Dispense Refill    alpha-galactosidase (BEANO) TABS chewable tablet Take 2 tablets by mouth 3 times daily (with meals)  0    simethicone (MYLICON) 80 MG chewable tablet Take 1 tablet by mouth 4 times daily as needed for Flatulence 180 tablet 3    letrozole (FEMARA) 2.5 MG tablet Take 1 tablet by mouth daily 90 tablet 3    bisacodyl (DULCOLAX) 5 MG EC tablet Take 2 tablets by mouth daily Take day before colonoscopy in the afternoon 2 tablet 0    hydrocortisone (ANUSOL-HC) 2.5 % rectal cream Place rectally 2 times daily. 1 Tube 2    amitriptyline (ELAVIL) 25 MG tablet Take 1 tablet by mouth nightly 30 tablet 5    amLODIPine (NORVASC) 10 MG tablet Take 1 tablet by mouth daily 90 tablet 3    atorvastatin (LIPITOR) 80 MG tablet Take 1 tablet by mouth nightly 90 tablet 3    clopidogrel (PLAVIX) 75 MG tablet Take 1 tablet by mouth daily 90 tablet 3    metoprolol succinate (TOPROL XL) 25 MG extended release tablet Take 1 tablet by mouth daily 90 tablet 3    fluticasone (FLONASE) 50 MCG/ACT nasal spray 1 spray by Nasal route daily 1 Bottle 1    aspirin 81 MG EC tablet Take 1 tablet by mouth daily 30 tablet 3    nitroGLYCERIN (NITROSTAT) 0.4 MG SL tablet Place 1 tablet under the tongue every 5 minutes as needed for Chest pain up to max of 3 total doses.  If no relief after 1 dose, call 911. 25 tablet 0    albuterol sulfate HFA (VENTOLIN HFA) 108 (90 Base) MCG/ACT inhaler Inhale 2 puffs into

## 2019-07-24 ENCOUNTER — TELEPHONE (OUTPATIENT)
Dept: CARDIOLOGY CLINIC | Age: 64
End: 2019-07-24

## 2019-07-24 NOTE — TELEPHONE ENCOUNTER
Pre op Risk Assessment    Procedure Lumbar Surgery  Physician Dr. Kerry Cunningham  Date of surgery/procedure 8-23-19    Last OV 2-13-19  Last Stress None in Epic  Last Echo 10-27-18  Last Cath 10-27-18  Last Stent 10-27-18  Is patient on blood thinners ASA and Plavix  Hold Meds/how many days ?

## 2019-07-25 NOTE — PROGRESS NOTES
PAT appointment reminder call  Arrival time and location given  Please fast for 6 hours you may have water or black coffee  Bring drivers license and insurance  Bring list of medications with dosage and frequency  If possible bring caregiver for appointment  Appointment may last 2 hours

## 2019-07-31 ENCOUNTER — OFFICE VISIT (OUTPATIENT)
Dept: FAMILY MEDICINE CLINIC | Age: 64
End: 2019-07-31
Payer: MEDICARE

## 2019-07-31 VITALS
WEIGHT: 144.2 LBS | DIASTOLIC BLOOD PRESSURE: 70 MMHG | TEMPERATURE: 98.2 F | HEIGHT: 58 IN | SYSTOLIC BLOOD PRESSURE: 118 MMHG | RESPIRATION RATE: 16 BRPM | BODY MASS INDEX: 30.27 KG/M2 | HEART RATE: 88 BPM

## 2019-07-31 DIAGNOSIS — J44.9 CHRONIC OBSTRUCTIVE PULMONARY DISEASE, UNSPECIFIED COPD TYPE (HCC): ICD-10-CM

## 2019-07-31 DIAGNOSIS — F17.218 CIGARETTE NICOTINE DEPENDENCE WITH OTHER NICOTINE-INDUCED DISORDER: ICD-10-CM

## 2019-07-31 DIAGNOSIS — M48.061 SPINAL STENOSIS OF LUMBAR REGION WITHOUT NEUROGENIC CLAUDICATION: Primary | ICD-10-CM

## 2019-07-31 DIAGNOSIS — R05.9 COUGH: ICD-10-CM

## 2019-07-31 DIAGNOSIS — J43.9 PULMONARY EMPHYSEMA, UNSPECIFIED EMPHYSEMA TYPE (HCC): ICD-10-CM

## 2019-07-31 PROCEDURE — 3017F COLORECTAL CA SCREEN DOC REV: CPT | Performed by: FAMILY MEDICINE

## 2019-07-31 PROCEDURE — G8400 PT W/DXA NO RESULTS DOC: HCPCS | Performed by: FAMILY MEDICINE

## 2019-07-31 PROCEDURE — G8926 SPIRO NO PERF OR DOC: HCPCS | Performed by: FAMILY MEDICINE

## 2019-07-31 PROCEDURE — 1036F TOBACCO NON-USER: CPT | Performed by: FAMILY MEDICINE

## 2019-07-31 PROCEDURE — 99213 OFFICE O/P EST LOW 20 MIN: CPT | Performed by: FAMILY MEDICINE

## 2019-07-31 PROCEDURE — G8598 ASA/ANTIPLAT THER USED: HCPCS | Performed by: FAMILY MEDICINE

## 2019-07-31 PROCEDURE — 4040F PNEUMOC VAC/ADMIN/RCVD: CPT | Performed by: FAMILY MEDICINE

## 2019-07-31 PROCEDURE — G8427 DOCREV CUR MEDS BY ELIG CLIN: HCPCS | Performed by: FAMILY MEDICINE

## 2019-07-31 PROCEDURE — 3023F SPIROM DOC REV: CPT | Performed by: FAMILY MEDICINE

## 2019-07-31 PROCEDURE — 1123F ACP DISCUSS/DSCN MKR DOCD: CPT | Performed by: FAMILY MEDICINE

## 2019-07-31 PROCEDURE — 1090F PRES/ABSN URINE INCON ASSESS: CPT | Performed by: FAMILY MEDICINE

## 2019-07-31 PROCEDURE — G8417 CALC BMI ABV UP PARAM F/U: HCPCS | Performed by: FAMILY MEDICINE

## 2019-07-31 RX ORDER — ACETAMINOPHEN AND CODEINE PHOSPHATE 300; 30 MG/1; MG/1
1 TABLET ORAL EVERY 6 HOURS PRN
COMMUNITY
End: 2019-08-01 | Stop reason: SDUPTHER

## 2019-07-31 RX ORDER — AZITHROMYCIN 250 MG/1
TABLET, FILM COATED ORAL
Qty: 1 PACKET | Refills: 0 | Status: SHIPPED | OUTPATIENT
Start: 2019-07-31 | End: 2019-11-19 | Stop reason: ALTCHOICE

## 2019-07-31 RX ORDER — ALBUTEROL SULFATE 90 UG/1
2 AEROSOL, METERED RESPIRATORY (INHALATION) EVERY 6 HOURS PRN
Qty: 1 INHALER | Refills: 3 | Status: SHIPPED | OUTPATIENT
Start: 2019-07-31 | End: 2019-12-27

## 2019-08-01 ENCOUNTER — HOSPITAL ENCOUNTER (OUTPATIENT)
Age: 64
Discharge: HOME | End: 2019-08-01
Payer: MEDICARE

## 2019-08-01 ENCOUNTER — HOSPITAL ENCOUNTER (OUTPATIENT)
Dept: GENERAL RADIOLOGY | Age: 64
Discharge: HOME | End: 2019-08-01
Payer: MEDICARE

## 2019-08-01 DIAGNOSIS — R05.9 COUGH: ICD-10-CM

## 2019-08-01 DIAGNOSIS — M48.061 SPINAL STENOSIS OF LUMBAR REGION WITHOUT NEUROGENIC CLAUDICATION: Primary | ICD-10-CM

## 2019-08-01 PROCEDURE — 71046 X-RAY EXAM CHEST 2 VIEWS: CPT

## 2019-08-01 RX ORDER — ACETAMINOPHEN AND CODEINE PHOSPHATE 300; 30 MG/1; MG/1
1 TABLET ORAL EVERY 6 HOURS PRN
Qty: 120 TABLET | Refills: 0 | Status: SHIPPED | OUTPATIENT
Start: 2019-08-01 | End: 2019-09-03 | Stop reason: SDUPTHER

## 2019-08-02 ENCOUNTER — HOSPITAL ENCOUNTER (OUTPATIENT)
Dept: PREADMISSION TESTING | Age: 64
Discharge: HOME | End: 2019-08-02
Payer: MEDICARE

## 2019-08-05 ENCOUNTER — TELEPHONE (OUTPATIENT)
Dept: FAMILY MEDICINE CLINIC | Age: 64
End: 2019-08-05

## 2019-08-05 DIAGNOSIS — J44.9 CHRONIC OBSTRUCTIVE PULMONARY DISEASE, UNSPECIFIED COPD TYPE (HCC): Primary | ICD-10-CM

## 2019-08-05 RX ORDER — DOXYCYCLINE HYCLATE 100 MG
100 TABLET ORAL 2 TIMES DAILY
Qty: 20 TABLET | Refills: 0 | Status: SHIPPED | OUTPATIENT
Start: 2019-08-05 | End: 2019-08-15

## 2019-08-05 NOTE — TELEPHONE ENCOUNTER
Pt called c/o continued isues with a cough. She finished the Z-caty and is using the Spiriva and albuterol, but reports no change in sxs. Please advise.

## 2019-08-28 ENCOUNTER — OFFICE VISIT (OUTPATIENT)
Dept: CARDIOLOGY CLINIC | Age: 64
End: 2019-08-28
Payer: MEDICARE

## 2019-08-28 VITALS
BODY MASS INDEX: 28.92 KG/M2 | SYSTOLIC BLOOD PRESSURE: 118 MMHG | WEIGHT: 137.8 LBS | HEART RATE: 89 BPM | DIASTOLIC BLOOD PRESSURE: 78 MMHG | HEIGHT: 58 IN

## 2019-08-28 DIAGNOSIS — R06.09 DOE (DYSPNEA ON EXERTION): Primary | ICD-10-CM

## 2019-08-28 DIAGNOSIS — Z95.820 S/P ANGIOPLASTY WITH STENT: ICD-10-CM

## 2019-08-28 PROCEDURE — G8427 DOCREV CUR MEDS BY ELIG CLIN: HCPCS | Performed by: INTERNAL MEDICINE

## 2019-08-28 PROCEDURE — G8598 ASA/ANTIPLAT THER USED: HCPCS | Performed by: INTERNAL MEDICINE

## 2019-08-28 PROCEDURE — G8417 CALC BMI ABV UP PARAM F/U: HCPCS | Performed by: INTERNAL MEDICINE

## 2019-08-28 PROCEDURE — 4040F PNEUMOC VAC/ADMIN/RCVD: CPT | Performed by: INTERNAL MEDICINE

## 2019-08-28 PROCEDURE — 1036F TOBACCO NON-USER: CPT | Performed by: INTERNAL MEDICINE

## 2019-08-28 PROCEDURE — 3017F COLORECTAL CA SCREEN DOC REV: CPT | Performed by: INTERNAL MEDICINE

## 2019-08-28 PROCEDURE — 1123F ACP DISCUSS/DSCN MKR DOCD: CPT | Performed by: INTERNAL MEDICINE

## 2019-08-28 PROCEDURE — G8400 PT W/DXA NO RESULTS DOC: HCPCS | Performed by: INTERNAL MEDICINE

## 2019-08-28 PROCEDURE — 1090F PRES/ABSN URINE INCON ASSESS: CPT | Performed by: INTERNAL MEDICINE

## 2019-08-28 PROCEDURE — 99213 OFFICE O/P EST LOW 20 MIN: CPT | Performed by: INTERNAL MEDICINE

## 2019-09-03 DIAGNOSIS — M48.061 SPINAL STENOSIS OF LUMBAR REGION WITHOUT NEUROGENIC CLAUDICATION: ICD-10-CM

## 2019-09-03 RX ORDER — ACETAMINOPHEN AND CODEINE PHOSPHATE 300; 30 MG/1; MG/1
1 TABLET ORAL EVERY 6 HOURS PRN
Qty: 120 TABLET | Refills: 0 | Status: SHIPPED | OUTPATIENT
Start: 2019-09-03 | End: 2019-09-30 | Stop reason: SDUPTHER

## 2019-09-11 ENCOUNTER — HOSPITAL ENCOUNTER (OUTPATIENT)
Dept: NON INVASIVE DIAGNOSTICS | Age: 64
Discharge: HOME | End: 2019-09-11
Payer: MEDICARE

## 2019-09-11 DIAGNOSIS — Z95.820 S/P ANGIOPLASTY WITH STENT: ICD-10-CM

## 2019-09-11 DIAGNOSIS — R06.09 DOE (DYSPNEA ON EXERTION): ICD-10-CM

## 2019-09-11 LAB
LV EF: 58 %
LVEF MODALITY: NORMAL

## 2019-09-11 PROCEDURE — 96374 THER/PROPH/DIAG INJ IV PUSH: CPT | Performed by: INTERNAL MEDICINE

## 2019-09-11 PROCEDURE — 93017 CV STRESS TEST TRACING ONLY: CPT | Performed by: INTERNAL MEDICINE

## 2019-09-11 PROCEDURE — 93306 TTE W/DOPPLER COMPLETE: CPT

## 2019-09-11 PROCEDURE — 2709999900 HC NON-CHARGEABLE SUPPLY

## 2019-09-11 PROCEDURE — A9500 TC99M SESTAMIBI: HCPCS | Performed by: INTERNAL MEDICINE

## 2019-09-11 PROCEDURE — 78452 HT MUSCLE IMAGE SPECT MULT: CPT

## 2019-09-11 PROCEDURE — 3430000000 HC RX DIAGNOSTIC RADIOPHARMACEUTICAL: Performed by: INTERNAL MEDICINE

## 2019-09-11 PROCEDURE — 6360000002 HC RX W HCPCS

## 2019-09-11 RX ADMIN — Medication 9.2 MILLICURIE: at 10:36

## 2019-09-11 RX ADMIN — Medication 30.4 MILLICURIE: at 11:32

## 2019-09-12 ENCOUNTER — TELEPHONE (OUTPATIENT)
Dept: CARDIOLOGY CLINIC | Age: 64
End: 2019-09-12

## 2019-09-12 DIAGNOSIS — R06.09 DYSPNEA ON EXERTION: Primary | ICD-10-CM

## 2019-09-19 ENCOUNTER — TELEPHONE (OUTPATIENT)
Dept: FAMILY MEDICINE CLINIC | Age: 64
End: 2019-09-19

## 2019-09-30 DIAGNOSIS — M48.061 SPINAL STENOSIS OF LUMBAR REGION WITHOUT NEUROGENIC CLAUDICATION: ICD-10-CM

## 2019-09-30 RX ORDER — ACETAMINOPHEN AND CODEINE PHOSPHATE 300; 30 MG/1; MG/1
1 TABLET ORAL EVERY 6 HOURS PRN
Qty: 120 TABLET | Refills: 0 | Status: SHIPPED | OUTPATIENT
Start: 2019-09-30 | End: 2019-10-28 | Stop reason: SDUPTHER

## 2019-10-28 DIAGNOSIS — M48.061 SPINAL STENOSIS OF LUMBAR REGION WITHOUT NEUROGENIC CLAUDICATION: ICD-10-CM

## 2019-10-28 RX ORDER — ACETAMINOPHEN AND CODEINE PHOSPHATE 300; 30 MG/1; MG/1
1 TABLET ORAL EVERY 6 HOURS PRN
Qty: 120 TABLET | Refills: 0 | Status: SHIPPED | OUTPATIENT
Start: 2019-10-28 | End: 2019-11-25 | Stop reason: SDUPTHER

## 2019-10-30 ENCOUNTER — OFFICE VISIT (OUTPATIENT)
Dept: PULMONOLOGY | Age: 64
End: 2019-10-30
Payer: MEDICARE

## 2019-10-30 VITALS
HEIGHT: 58 IN | TEMPERATURE: 97.8 F | SYSTOLIC BLOOD PRESSURE: 114 MMHG | HEART RATE: 80 BPM | OXYGEN SATURATION: 97 % | WEIGHT: 134 LBS | BODY MASS INDEX: 28.13 KG/M2 | DIASTOLIC BLOOD PRESSURE: 82 MMHG

## 2019-10-30 DIAGNOSIS — J34.89 NASAL OBSTRUCTION: ICD-10-CM

## 2019-10-30 DIAGNOSIS — Z87.891 PERSONAL HISTORY OF TOBACCO USE: ICD-10-CM

## 2019-10-30 DIAGNOSIS — R06.02 SOB (SHORTNESS OF BREATH): ICD-10-CM

## 2019-10-30 DIAGNOSIS — J44.9 STAGE 2 MODERATE COPD BY GOLD CLASSIFICATION (HCC): Primary | ICD-10-CM

## 2019-10-30 PROCEDURE — 1090F PRES/ABSN URINE INCON ASSESS: CPT | Performed by: INTERNAL MEDICINE

## 2019-10-30 PROCEDURE — 4040F PNEUMOC VAC/ADMIN/RCVD: CPT | Performed by: INTERNAL MEDICINE

## 2019-10-30 PROCEDURE — G8926 SPIRO NO PERF OR DOC: HCPCS | Performed by: INTERNAL MEDICINE

## 2019-10-30 PROCEDURE — G8484 FLU IMMUNIZE NO ADMIN: HCPCS | Performed by: INTERNAL MEDICINE

## 2019-10-30 PROCEDURE — 3017F COLORECTAL CA SCREEN DOC REV: CPT | Performed by: INTERNAL MEDICINE

## 2019-10-30 PROCEDURE — G8400 PT W/DXA NO RESULTS DOC: HCPCS | Performed by: INTERNAL MEDICINE

## 2019-10-30 PROCEDURE — G8417 CALC BMI ABV UP PARAM F/U: HCPCS | Performed by: INTERNAL MEDICINE

## 2019-10-30 PROCEDURE — G8598 ASA/ANTIPLAT THER USED: HCPCS | Performed by: INTERNAL MEDICINE

## 2019-10-30 PROCEDURE — G0296 VISIT TO DETERM LDCT ELIG: HCPCS | Performed by: INTERNAL MEDICINE

## 2019-10-30 PROCEDURE — G8427 DOCREV CUR MEDS BY ELIG CLIN: HCPCS | Performed by: INTERNAL MEDICINE

## 2019-10-30 PROCEDURE — 1123F ACP DISCUSS/DSCN MKR DOCD: CPT | Performed by: INTERNAL MEDICINE

## 2019-10-30 PROCEDURE — 3023F SPIROM DOC REV: CPT | Performed by: INTERNAL MEDICINE

## 2019-10-30 PROCEDURE — 1036F TOBACCO NON-USER: CPT | Performed by: INTERNAL MEDICINE

## 2019-10-30 PROCEDURE — 99204 OFFICE O/P NEW MOD 45 MIN: CPT | Performed by: INTERNAL MEDICINE

## 2019-10-30 ASSESSMENT — ENCOUNTER SYMPTOMS
COUGH: 1
BACK PAIN: 1
EYE DISCHARGE: 0
APNEA: 0
ABDOMINAL DISTENTION: 0
CHOKING: 0
TROUBLE SWALLOWING: 0
STRIDOR: 0
SHORTNESS OF BREATH: 1
EYE REDNESS: 0
VOICE CHANGE: 0
CHEST TIGHTNESS: 0
ABDOMINAL PAIN: 0
WHEEZING: 0

## 2019-11-07 DIAGNOSIS — J44.9 STAGE 2 MODERATE COPD BY GOLD CLASSIFICATION (HCC): ICD-10-CM

## 2019-11-18 ENCOUNTER — HOSPITAL ENCOUNTER (OUTPATIENT)
Dept: WOMENS IMAGING | Age: 64
Discharge: HOME | End: 2019-11-18
Payer: MEDICARE

## 2019-11-18 DIAGNOSIS — Z98.890 S/P LUMPECTOMY, LEFT BREAST: ICD-10-CM

## 2019-11-18 DIAGNOSIS — C50.212 MALIGNANT NEOPLASM OF UPPER-INNER QUADRANT OF LEFT BREAST IN FEMALE, ESTROGEN RECEPTOR POSITIVE (HCC): ICD-10-CM

## 2019-11-18 DIAGNOSIS — Z17.0 MALIGNANT NEOPLASM OF UPPER-INNER QUADRANT OF LEFT BREAST IN FEMALE, ESTROGEN RECEPTOR POSITIVE (HCC): ICD-10-CM

## 2019-11-18 PROCEDURE — G0279 TOMOSYNTHESIS, MAMMO: HCPCS

## 2019-11-19 ENCOUNTER — OFFICE VISIT (OUTPATIENT)
Dept: FAMILY MEDICINE CLINIC | Age: 64
End: 2019-11-19
Payer: MEDICARE

## 2019-11-19 VITALS
HEART RATE: 66 BPM | RESPIRATION RATE: 10 BRPM | BODY MASS INDEX: 28.76 KG/M2 | DIASTOLIC BLOOD PRESSURE: 62 MMHG | HEIGHT: 58 IN | WEIGHT: 137 LBS | SYSTOLIC BLOOD PRESSURE: 98 MMHG | TEMPERATURE: 98.1 F

## 2019-11-19 DIAGNOSIS — Z00.00 ROUTINE GENERAL MEDICAL EXAMINATION AT A HEALTH CARE FACILITY: Primary | ICD-10-CM

## 2019-11-19 DIAGNOSIS — M48.061 SPINAL STENOSIS OF LUMBAR REGION WITHOUT NEUROGENIC CLAUDICATION: ICD-10-CM

## 2019-11-19 DIAGNOSIS — N32.81 OAB (OVERACTIVE BLADDER): ICD-10-CM

## 2019-11-19 PROCEDURE — 1090F PRES/ABSN URINE INCON ASSESS: CPT | Performed by: FAMILY MEDICINE

## 2019-11-19 PROCEDURE — 1123F ACP DISCUSS/DSCN MKR DOCD: CPT | Performed by: FAMILY MEDICINE

## 2019-11-19 PROCEDURE — 1036F TOBACCO NON-USER: CPT | Performed by: FAMILY MEDICINE

## 2019-11-19 PROCEDURE — G8484 FLU IMMUNIZE NO ADMIN: HCPCS | Performed by: FAMILY MEDICINE

## 2019-11-19 PROCEDURE — G8598 ASA/ANTIPLAT THER USED: HCPCS | Performed by: FAMILY MEDICINE

## 2019-11-19 PROCEDURE — G8427 DOCREV CUR MEDS BY ELIG CLIN: HCPCS | Performed by: FAMILY MEDICINE

## 2019-11-19 PROCEDURE — 99213 OFFICE O/P EST LOW 20 MIN: CPT | Performed by: FAMILY MEDICINE

## 2019-11-19 PROCEDURE — 4040F PNEUMOC VAC/ADMIN/RCVD: CPT | Performed by: FAMILY MEDICINE

## 2019-11-19 PROCEDURE — G8400 PT W/DXA NO RESULTS DOC: HCPCS | Performed by: FAMILY MEDICINE

## 2019-11-19 PROCEDURE — 3017F COLORECTAL CA SCREEN DOC REV: CPT | Performed by: FAMILY MEDICINE

## 2019-11-19 PROCEDURE — G0438 PPPS, INITIAL VISIT: HCPCS | Performed by: FAMILY MEDICINE

## 2019-11-19 PROCEDURE — G8417 CALC BMI ABV UP PARAM F/U: HCPCS | Performed by: FAMILY MEDICINE

## 2019-11-19 RX ORDER — OXYBUTYNIN CHLORIDE 5 MG/1
5 TABLET, EXTENDED RELEASE ORAL DAILY
Qty: 30 TABLET | Refills: 5 | Status: SHIPPED | OUTPATIENT
Start: 2019-11-19 | End: 2019-12-20

## 2019-11-19 ASSESSMENT — PATIENT HEALTH QUESTIONNAIRE - PHQ9
SUM OF ALL RESPONSES TO PHQ QUESTIONS 1-9: 0
SUM OF ALL RESPONSES TO PHQ QUESTIONS 1-9: 0

## 2019-11-19 ASSESSMENT — LIFESTYLE VARIABLES: HOW OFTEN DO YOU HAVE A DRINK CONTAINING ALCOHOL: 0

## 2019-11-25 DIAGNOSIS — M48.061 SPINAL STENOSIS OF LUMBAR REGION WITHOUT NEUROGENIC CLAUDICATION: ICD-10-CM

## 2019-11-25 RX ORDER — ACETAMINOPHEN AND CODEINE PHOSPHATE 300; 30 MG/1; MG/1
1 TABLET ORAL EVERY 6 HOURS PRN
Qty: 120 TABLET | Refills: 0 | Status: SHIPPED | OUTPATIENT
Start: 2019-11-25 | End: 2019-12-27 | Stop reason: SDUPTHER

## 2019-12-06 ENCOUNTER — OFFICE VISIT (OUTPATIENT)
Dept: ENT CLINIC | Age: 64
End: 2019-12-06
Payer: MEDICARE

## 2019-12-06 VITALS
HEART RATE: 64 BPM | DIASTOLIC BLOOD PRESSURE: 72 MMHG | SYSTOLIC BLOOD PRESSURE: 120 MMHG | HEIGHT: 58 IN | WEIGHT: 136 LBS | BODY MASS INDEX: 28.55 KG/M2 | RESPIRATION RATE: 12 BRPM

## 2019-12-06 DIAGNOSIS — Z79.01 ANTICOAGULATED: ICD-10-CM

## 2019-12-06 DIAGNOSIS — J31.0 RHINITIS MEDICAMENTOSA: ICD-10-CM

## 2019-12-06 DIAGNOSIS — Z95.5 HISTORY OF HEART ARTERY STENT: ICD-10-CM

## 2019-12-06 DIAGNOSIS — R06.09 DYSPNEA ON EXERTION: ICD-10-CM

## 2019-12-06 DIAGNOSIS — T48.5X5A RHINITIS MEDICAMENTOSA: ICD-10-CM

## 2019-12-06 DIAGNOSIS — J34.89 NASAL OBSTRUCTION: Primary | ICD-10-CM

## 2019-12-06 DIAGNOSIS — J32.2 CHRONIC ETHMOIDAL SINUSITIS: ICD-10-CM

## 2019-12-06 PROCEDURE — 1090F PRES/ABSN URINE INCON ASSESS: CPT | Performed by: OTOLARYNGOLOGY

## 2019-12-06 PROCEDURE — G8427 DOCREV CUR MEDS BY ELIG CLIN: HCPCS | Performed by: OTOLARYNGOLOGY

## 2019-12-06 PROCEDURE — G8484 FLU IMMUNIZE NO ADMIN: HCPCS | Performed by: OTOLARYNGOLOGY

## 2019-12-06 PROCEDURE — G8400 PT W/DXA NO RESULTS DOC: HCPCS | Performed by: OTOLARYNGOLOGY

## 2019-12-06 PROCEDURE — 3017F COLORECTAL CA SCREEN DOC REV: CPT | Performed by: OTOLARYNGOLOGY

## 2019-12-06 PROCEDURE — G8598 ASA/ANTIPLAT THER USED: HCPCS | Performed by: OTOLARYNGOLOGY

## 2019-12-06 PROCEDURE — G8417 CALC BMI ABV UP PARAM F/U: HCPCS | Performed by: OTOLARYNGOLOGY

## 2019-12-06 PROCEDURE — 4040F PNEUMOC VAC/ADMIN/RCVD: CPT | Performed by: OTOLARYNGOLOGY

## 2019-12-06 PROCEDURE — 1123F ACP DISCUSS/DSCN MKR DOCD: CPT | Performed by: OTOLARYNGOLOGY

## 2019-12-06 PROCEDURE — 99203 OFFICE O/P NEW LOW 30 MIN: CPT | Performed by: OTOLARYNGOLOGY

## 2019-12-06 PROCEDURE — 1036F TOBACCO NON-USER: CPT | Performed by: OTOLARYNGOLOGY

## 2019-12-06 RX ORDER — AMOXICILLIN AND CLAVULANATE POTASSIUM 875; 125 MG/1; MG/1
1 TABLET, FILM COATED ORAL 2 TIMES DAILY
Qty: 28 TABLET | Refills: 0 | Status: SHIPPED | OUTPATIENT
Start: 2019-12-06 | End: 2019-12-20

## 2019-12-06 RX ORDER — FLUTICASONE PROPIONATE 50 MCG
1 SPRAY, SUSPENSION (ML) NASAL DAILY
Qty: 1 BOTTLE | Refills: 3 | Status: SHIPPED | OUTPATIENT
Start: 2019-12-06

## 2019-12-06 ASSESSMENT — ENCOUNTER SYMPTOMS
NAUSEA: 0
STRIDOR: 0
ABDOMINAL PAIN: 0
CHOKING: 0
APNEA: 0
CHEST TIGHTNESS: 0
TROUBLE SWALLOWING: 0
WHEEZING: 0
FACIAL SWELLING: 0
SINUS PRESSURE: 0
SORE THROAT: 0
VOICE CHANGE: 0
DIARRHEA: 0
COUGH: 0
SHORTNESS OF BREATH: 0
RHINORRHEA: 0
COLOR CHANGE: 0
VOMITING: 0

## 2019-12-09 ENCOUNTER — TELEPHONE (OUTPATIENT)
Dept: ENT CLINIC | Age: 64
End: 2019-12-09

## 2019-12-16 ENCOUNTER — NURSE ONLY (OUTPATIENT)
Dept: LAB | Age: 64
End: 2019-12-16

## 2019-12-16 DIAGNOSIS — N32.81 OAB (OVERACTIVE BLADDER): ICD-10-CM

## 2019-12-16 LAB
BACTERIA: NORMAL
BILIRUBIN URINE: NEGATIVE
BLOOD, URINE: NEGATIVE
CASTS: NORMAL /LPF
CASTS: NORMAL /LPF
CHARACTER, URINE: CLEAR
COLOR: YELLOW
CRYSTALS: NORMAL
EPITHELIAL CELLS, UA: NORMAL /HPF
GLUCOSE, URINE: NEGATIVE MG/DL
KETONES, URINE: NEGATIVE
LEUKOCYTE ESTERASE, URINE: NEGATIVE
MISCELLANEOUS LAB TEST RESULT: NORMAL
NITRITE, URINE: NEGATIVE
PH UA: 5 (ref 5–9)
PROTEIN UA: NEGATIVE MG/DL
RBC URINE: NORMAL /HPF
RENAL EPITHELIAL, UA: NORMAL
SPECIFIC GRAVITY UA: 1.02 (ref 1–1.03)
UROBILINOGEN, URINE: 0.2 EU/DL (ref 0–1)
WBC UA: NORMAL /HPF
YEAST: NORMAL

## 2019-12-18 ENCOUNTER — TELEPHONE (OUTPATIENT)
Dept: FAMILY MEDICINE CLINIC | Age: 64
End: 2019-12-18

## 2019-12-20 ENCOUNTER — OFFICE VISIT (OUTPATIENT)
Dept: ONCOLOGY | Age: 64
End: 2019-12-20
Payer: MEDICARE

## 2019-12-20 ENCOUNTER — HOSPITAL ENCOUNTER (OUTPATIENT)
Dept: INFUSION THERAPY | Age: 64
Discharge: HOME | End: 2019-12-20
Payer: MEDICARE

## 2019-12-20 VITALS
HEIGHT: 58 IN | WEIGHT: 137 LBS | RESPIRATION RATE: 18 BRPM | DIASTOLIC BLOOD PRESSURE: 70 MMHG | SYSTOLIC BLOOD PRESSURE: 111 MMHG | HEART RATE: 71 BPM | BODY MASS INDEX: 28.76 KG/M2 | TEMPERATURE: 98.4 F | OXYGEN SATURATION: 96 %

## 2019-12-20 DIAGNOSIS — Z98.890 S/P LUMPECTOMY, LEFT BREAST: ICD-10-CM

## 2019-12-20 DIAGNOSIS — Z08 ENCOUNTER FOR FOLLOW-UP SURVEILLANCE OF BREAST CANCER: ICD-10-CM

## 2019-12-20 DIAGNOSIS — Z17.0 MALIGNANT NEOPLASM OF UPPER-INNER QUADRANT OF LEFT BREAST IN FEMALE, ESTROGEN RECEPTOR POSITIVE (HCC): Primary | ICD-10-CM

## 2019-12-20 DIAGNOSIS — Z79.811 PROPHYLACTIC USE OF LETROZOLE (FEMARA): ICD-10-CM

## 2019-12-20 DIAGNOSIS — C50.212 MALIGNANT NEOPLASM OF UPPER-INNER QUADRANT OF LEFT BREAST IN FEMALE, ESTROGEN RECEPTOR POSITIVE (HCC): Primary | ICD-10-CM

## 2019-12-20 DIAGNOSIS — Z85.3 ENCOUNTER FOR FOLLOW-UP SURVEILLANCE OF BREAST CANCER: ICD-10-CM

## 2019-12-20 PROCEDURE — 4040F PNEUMOC VAC/ADMIN/RCVD: CPT | Performed by: INTERNAL MEDICINE

## 2019-12-20 PROCEDURE — G8417 CALC BMI ABV UP PARAM F/U: HCPCS | Performed by: INTERNAL MEDICINE

## 2019-12-20 PROCEDURE — G8427 DOCREV CUR MEDS BY ELIG CLIN: HCPCS | Performed by: INTERNAL MEDICINE

## 2019-12-20 PROCEDURE — G8484 FLU IMMUNIZE NO ADMIN: HCPCS | Performed by: INTERNAL MEDICINE

## 2019-12-20 PROCEDURE — G8598 ASA/ANTIPLAT THER USED: HCPCS | Performed by: INTERNAL MEDICINE

## 2019-12-20 PROCEDURE — 99214 OFFICE O/P EST MOD 30 MIN: CPT | Performed by: INTERNAL MEDICINE

## 2019-12-20 PROCEDURE — 1123F ACP DISCUSS/DSCN MKR DOCD: CPT | Performed by: INTERNAL MEDICINE

## 2019-12-20 PROCEDURE — 1090F PRES/ABSN URINE INCON ASSESS: CPT | Performed by: INTERNAL MEDICINE

## 2019-12-20 PROCEDURE — 1036F TOBACCO NON-USER: CPT | Performed by: INTERNAL MEDICINE

## 2019-12-20 PROCEDURE — 3017F COLORECTAL CA SCREEN DOC REV: CPT | Performed by: INTERNAL MEDICINE

## 2019-12-20 PROCEDURE — G8400 PT W/DXA NO RESULTS DOC: HCPCS | Performed by: INTERNAL MEDICINE

## 2019-12-20 PROCEDURE — 99211 OFF/OP EST MAY X REQ PHY/QHP: CPT

## 2019-12-20 ASSESSMENT — ENCOUNTER SYMPTOMS
BLOOD IN STOOL: 0
TROUBLE SWALLOWING: 0
WHEEZING: 0
COUGH: 0
ABDOMINAL DISTENTION: 0
SHORTNESS OF BREATH: 0
CONSTIPATION: 0
CHEST TIGHTNESS: 0
COLOR CHANGE: 0
ABDOMINAL PAIN: 0
FACIAL SWELLING: 0
BACK PAIN: 0
VOMITING: 0
EYE DISCHARGE: 0
SORE THROAT: 0
NAUSEA: 0
RECTAL PAIN: 0
DIARRHEA: 0

## 2019-12-27 ENCOUNTER — HOSPITAL ENCOUNTER (OUTPATIENT)
Dept: CT IMAGING | Age: 64
Discharge: HOME | End: 2019-12-27
Payer: MEDICARE

## 2019-12-27 DIAGNOSIS — Z87.891 PERSONAL HISTORY OF TOBACCO USE: ICD-10-CM

## 2019-12-27 DIAGNOSIS — M48.061 SPINAL STENOSIS OF LUMBAR REGION WITHOUT NEUROGENIC CLAUDICATION: ICD-10-CM

## 2019-12-27 PROCEDURE — G0297 LDCT FOR LUNG CA SCREEN: HCPCS

## 2019-12-27 RX ORDER — ACETAMINOPHEN AND CODEINE PHOSPHATE 300; 30 MG/1; MG/1
1 TABLET ORAL EVERY 6 HOURS PRN
Qty: 120 TABLET | Refills: 0 | Status: SHIPPED | OUTPATIENT
Start: 2019-12-27 | End: 2020-01-30 | Stop reason: SDUPTHER

## 2020-01-06 ENCOUNTER — TELEPHONE (OUTPATIENT)
Dept: PULMONOLOGY | Age: 65
End: 2020-01-06

## 2020-01-06 NOTE — TELEPHONE ENCOUNTER
Pt called asking for her Ct Lung Screen results, pt has a follow up 1-30-20 I asked pt if she wanted a sooner appointment pt stated no she will keep her 1-30-20 appointment but wants a call with those results of her Lung Screen. Please advise.  Thank you

## 2020-01-23 ENCOUNTER — TELEPHONE (OUTPATIENT)
Dept: CARDIOLOGY CLINIC | Age: 65
End: 2020-01-23

## 2020-01-23 NOTE — TELEPHONE ENCOUNTER
Request for pre op clearance:  Requested by: Dr Callahan File  Date of surgery 02/19/20 @ [facility]  Procedure Lumbar Laminectomy and Fusion L3-S1  Office phone # [phone] ext [phone]  Fax #  [phone]    Is the patient on anti-coag medication?    If yes, how many days does the surgeon want anti-coag medication held:     Date of last visit with cardiologist: 08/28/19

## 2020-01-27 ENCOUNTER — TELEPHONE (OUTPATIENT)
Dept: CARDIOLOGY CLINIC | Age: 65
End: 2020-01-27

## 2020-01-27 RX ORDER — AMLODIPINE BESYLATE 10 MG/1
10 TABLET ORAL DAILY
Qty: 90 TABLET | Refills: 3 | Status: SHIPPED | OUTPATIENT
Start: 2020-01-27 | End: 2020-10-26

## 2020-01-27 RX ORDER — METOPROLOL SUCCINATE 25 MG/1
25 TABLET, EXTENDED RELEASE ORAL DAILY
Qty: 90 TABLET | Refills: 1 | Status: SHIPPED | OUTPATIENT
Start: 2020-01-27 | End: 2020-05-14 | Stop reason: SDUPTHER

## 2020-01-27 RX ORDER — ATORVASTATIN CALCIUM 80 MG/1
80 TABLET, FILM COATED ORAL NIGHTLY
Qty: 90 TABLET | Refills: 1 | Status: SHIPPED | OUTPATIENT
Start: 2020-01-27 | End: 2020-02-17 | Stop reason: SDUPTHER

## 2020-01-27 RX ORDER — CLOPIDOGREL BISULFATE 75 MG/1
75 TABLET ORAL DAILY
Qty: 90 TABLET | Refills: 1 | Status: SHIPPED | OUTPATIENT
Start: 2020-01-27 | End: 2020-05-14 | Stop reason: SDUPTHER

## 2020-01-27 NOTE — TELEPHONE ENCOUNTER
Priya Kidney called requesting a refill on the following medications:  Requested Prescriptions     Pending Prescriptions Disp Refills    atorvastatin (LIPITOR) 80 MG tablet 90 tablet 3     Sig: Take 1 tablet by mouth nightly    clopidogrel (PLAVIX) 75 MG tablet 90 tablet 3     Sig: Take 1 tablet by mouth daily    metoprolol succinate (TOPROL XL) 25 MG extended release tablet 90 tablet 3     Sig: Take 1 tablet by mouth daily     Pharmacy verified:  [id number] REQUESTING A 90 DAY SUPPLY      Date of last visit: 8/28/19  Date of next visit (if applicable): 3/67/1250

## 2020-01-27 NOTE — TELEPHONE ENCOUNTER
Ursula Islas called requesting a refill on the following medications:  Requested Prescriptions     Pending Prescriptions Disp Refills    amLODIPine (NORVASC) 10 MG tablet 90 tablet 3     Sig: Take 1 tablet by mouth daily     Pharmacy verified:  [facility]  90 day supply    Date of last visit: 11/19/19  Date of next visit (if applicable): 4/54/9425

## 2020-01-30 ENCOUNTER — OFFICE VISIT (OUTPATIENT)
Dept: PULMONOLOGY | Age: 65
End: 2020-01-30
Payer: MEDICARE

## 2020-01-30 VITALS
SYSTOLIC BLOOD PRESSURE: 102 MMHG | TEMPERATURE: 100.9 F | HEIGHT: 58 IN | WEIGHT: 137.4 LBS | BODY MASS INDEX: 28.84 KG/M2 | OXYGEN SATURATION: 97 % | HEART RATE: 64 BPM | DIASTOLIC BLOOD PRESSURE: 64 MMHG

## 2020-01-30 PROCEDURE — 3023F SPIROM DOC REV: CPT | Performed by: INTERNAL MEDICINE

## 2020-01-30 PROCEDURE — 1036F TOBACCO NON-USER: CPT | Performed by: INTERNAL MEDICINE

## 2020-01-30 PROCEDURE — G8417 CALC BMI ABV UP PARAM F/U: HCPCS | Performed by: INTERNAL MEDICINE

## 2020-01-30 PROCEDURE — 4040F PNEUMOC VAC/ADMIN/RCVD: CPT | Performed by: INTERNAL MEDICINE

## 2020-01-30 PROCEDURE — G8484 FLU IMMUNIZE NO ADMIN: HCPCS | Performed by: INTERNAL MEDICINE

## 2020-01-30 PROCEDURE — G8427 DOCREV CUR MEDS BY ELIG CLIN: HCPCS | Performed by: INTERNAL MEDICINE

## 2020-01-30 PROCEDURE — 1123F ACP DISCUSS/DSCN MKR DOCD: CPT | Performed by: INTERNAL MEDICINE

## 2020-01-30 PROCEDURE — 3017F COLORECTAL CA SCREEN DOC REV: CPT | Performed by: INTERNAL MEDICINE

## 2020-01-30 PROCEDURE — 99214 OFFICE O/P EST MOD 30 MIN: CPT | Performed by: INTERNAL MEDICINE

## 2020-01-30 PROCEDURE — G8400 PT W/DXA NO RESULTS DOC: HCPCS | Performed by: INTERNAL MEDICINE

## 2020-01-30 PROCEDURE — 1090F PRES/ABSN URINE INCON ASSESS: CPT | Performed by: INTERNAL MEDICINE

## 2020-01-30 PROCEDURE — G8926 SPIRO NO PERF OR DOC: HCPCS | Performed by: INTERNAL MEDICINE

## 2020-01-30 RX ORDER — ACETAMINOPHEN AND CODEINE PHOSPHATE 300; 30 MG/1; MG/1
1 TABLET ORAL EVERY 6 HOURS PRN
Qty: 120 TABLET | Refills: 0 | Status: ON HOLD
Start: 2020-01-30 | End: 2020-02-23 | Stop reason: HOSPADM

## 2020-01-30 ASSESSMENT — ENCOUNTER SYMPTOMS
ABDOMINAL DISTENTION: 0
APNEA: 0
WHEEZING: 0
STRIDOR: 0
COUGH: 1
EYE REDNESS: 0
VOICE CHANGE: 0
SHORTNESS OF BREATH: 1
EYE DISCHARGE: 0
BACK PAIN: 1
CHOKING: 0
TROUBLE SWALLOWING: 0
CHEST TIGHTNESS: 0
ABDOMINAL PAIN: 0

## 2020-01-30 NOTE — TELEPHONE ENCOUNTER
Ursula Islas called requesting a refill on the following medications:  Requested Prescriptions     Pending Prescriptions Disp Refills    acetaminophen-codeine (TYLENOL #3) 300-30 MG per tablet 120 tablet 0     Sig: Take 1 tablet by mouth every 6 hours as needed for Pain for up to 30 days. Pharmacy verified: [facility]      Date of last visit: 11/19/19  Date of next visit (if applicable): 3/17/6004

## 2020-01-30 NOTE — PROGRESS NOTES
Neck Circumference -   13  Mallampati - 4    Lung Nodule Screening     [x] Qualifies    [] Does not qualify   [] Declined    [] Completed

## 2020-01-30 NOTE — PROGRESS NOTES
 Essential hypertension 5/10/2017    Foot drop, right foot     GERD (gastroesophageal reflux disease)     IBS (irritable bowel syndrome)     Malignant neoplasm of upper-inner quadrant of left female breast ([address] 2016    Nausea & vomiting     PONV (postoperative nausea and vomiting)      Past Surgical History:   Procedure Laterality Date    BACK SURGERY  unsure    BREAST SURGERY Left     exc. lymph nodes, lumpectomy    CARPAL TUNNEL RELEASE Right 2017    [facility]    COLONOSCOPY  unsure    Dr. Rosie Tripathi COLONOSCOPY  10/10/14    Dr. Job Manzano COLONOSCOPY Left 2019    COLONOSCOPY POLYPECTOMY HOT BIOPSY performed by Mauri Olivia MD at [address] [place] [address] UTERUS  80    HEMORRHOID SURGERY  2014    HYSTERECTOMY, VAGINAL  2014    KIDNEY STONE SURGERY      LAMINECTOMY  2018    AL OFFICE/OUTPT VISIT,PROCEDURE ONLY N/A 2018    ACDF C5-6 performed [facility] at [facility]    WRIST GANGLION EXCISION Left      Social History     Tobacco Use    Smoking status: Former Smoker     Packs/day: 1.50     Years: 20.00     Pack years: 30.00     Types: Cigarettes     Last attempt to quit: 10/27/2018     Years since quittin.2    Smokeless tobacco: Never Used   Substance Use Topics    Alcohol use: No    Drug use: No      Allergies   Allergen Reactions    Naproxen Anaphylaxis    Prednisone Swelling and Other (See Comments)     Angioedema    Ultram [Tramadol] Itching and Rash      Family History   Problem Relation Age of Onset    Cancer Mother         renal cell carcinoma    Kidney Cancer Mother 46    Cirrhosis Father     Diabetes Sister     Kidney Cancer Sister 54    Cancer Sister     Diabetes Brother     Breast Cancer Paternal Aunt 54    Ovarian Cancer Neg Hx      Current Outpatient Medications   Medication Sig Dispense Refill    acetaminophen-codeine (TYLENOL #3) 300-30 MG per tablet Take 1 tablet by mouth every 6 hours as needed for Pain for up to 30 days. 120 tablet 0    amLODIPine (NORVASC) 10 MG tablet Take 1 tablet by mouth daily 90 tablet 3    atorvastatin (LIPITOR) 80 MG tablet Take 1 tablet by mouth nightly 90 tablet 1    clopidogrel (PLAVIX) 75 MG tablet Take 1 tablet by mouth daily 90 tablet 1    metoprolol succinate (TOPROL XL) 25 MG extended release tablet Take 1 tablet by mouth daily 90 tablet 1    albuterol sulfate  (90 Base) MCG/ACT inhaler INHALE 2 PUFFS BY MOUTH EVERY 6 HOURS AS NEEDED FOR WHEEZING 18 g 5    fluticasone (FLONASE) 50 MCG/ACT nasal spray 1 spray by Nasal route daily 1 Bottle 3    Tiotropium Bromide-Olodaterol (STIOLTO RESPIMAT) 2.5-2.5 MCG/ACT AERS Inhale 2 puffs into the lungs daily 4 g 5    letrozole (FEMARA) 2.5 MG tablet Take 1 tablet by mouth daily 90 tablet 3    aspirin 81 MG EC tablet Take 1 tablet by mouth daily 30 tablet 3    nitroGLYCERIN (NITROSTAT) 0.4 MG SL tablet Place 1 tablet under the tongue every 5 minutes as needed for Chest pain up to max of 3 total doses. If no relief after 1 dose, call 911. 25 tablet 0    bisacodyl (DULCOLAX) 5 MG EC tablet Take 2 tablets by mouth daily Take day before colonoscopy in the afternoon (Patient not taking: Reported on 1/30/2020) 2 tablet 0    hydrocortisone (ANUSOL-HC) 2.5 % rectal cream Place rectally 2 times daily. (Patient not taking: Reported on 1/30/2020) 1 Tube 2     No current facility-administered medications for this visit.       LABS - none   /64 (Site: Right Upper Arm, Position: Sitting, Cuff Size: Medium Adult)   Pulse 64   Temp 100.9 °F (38.3 °C)   Ht 4' 10\" (1.473 m)   Wt 137 lb 6.4 oz (62.3 kg)   SpO2 97% Comment: on room air at rest  BMI 28.72 kg/m²    Wt Readings from Last 3 Encounters:   01/30/20 137 lb 6.4 oz (62.3 kg)   12/20/19 137 lb (62.1 kg)   12/06/19 136 lb (61.7 kg)     Neck Circumference - 13 in; Mallampati Score -2      Objective: Physical Exam  Vitals signs and nursing note reviewed. Constitutional:       General: She is not in acute distress. Appearance: She is well-developed. She is not diaphoretic. HENT:      Head: Normocephalic and atraumatic. Mouth/Throat:      Pharynx: No oropharyngeal exudate. Eyes:      General: No scleral icterus. Right eye: No discharge. Left eye: No discharge. Pupils: Pupils are equal, round, and reactive to light. Neck:      Musculoskeletal: Normal range of motion and neck supple. Vascular: No JVD. Trachea: No tracheal deviation. Cardiovascular:      Rate and Rhythm: Normal rate and regular rhythm. Heart sounds: No murmur. No friction rub. No gallop. Pulmonary:      Effort: Pulmonary effort is normal. No respiratory distress. Breath sounds: Normal breath sounds. No stridor. No wheezing or rales. Chest:      Chest wall: No tenderness. Abdominal:      General: Bowel sounds are normal. There is no distension. Palpations: Abdomen is soft. There is no mass. Tenderness: There is no abdominal tenderness. There is no guarding or rebound. Musculoskeletal: Normal range of motion. General: No tenderness. Lymphadenopathy:      Cervical: No cervical adenopathy. Skin:     General: Skin is warm. Coloration: Skin is not pale. Findings: No erythema or rash. Neurological:      Mental Status: She is alert and oriented to person, place, and time. Cranial Nerves: No cranial nerve deficit. Psychiatric:         Behavior: Behavior normal.         Thought Content: Thought content normal.         Judgment: Judgment normal.                   CT screening:        FINDINGS:   LUNGS NODULES:   1. There is a 5 mm nodule in the right lateral costophrenic angle not seen on the prior exam.   Stable less than 3 mm pleural-based nodule posterior medial left lung base image 301. Calcified granuloma right lower lobes.  Atelectasis in the

## 2020-02-06 ENCOUNTER — HOSPITAL ENCOUNTER (OUTPATIENT)
Dept: PREADMISSION TESTING | Age: 65
Discharge: HOME | End: 2020-02-06
Payer: MEDICARE

## 2020-02-06 ENCOUNTER — HOSPITAL ENCOUNTER (OUTPATIENT)
Dept: PREADMISSION TESTING | Age: 65
Discharge: HOME | End: 2020-02-10
Payer: MEDICARE

## 2020-02-06 VITALS
HEIGHT: 58 IN | TEMPERATURE: 96.9 F | OXYGEN SATURATION: 96 % | WEIGHT: 138.89 LBS | DIASTOLIC BLOOD PRESSURE: 72 MMHG | BODY MASS INDEX: 29.15 KG/M2 | HEART RATE: 66 BPM | SYSTOLIC BLOOD PRESSURE: 108 MMHG

## 2020-02-06 LAB
ALBUMIN SERPL-MCNC: 4.3 G/DL (ref 3.5–5.1)
ANION GAP SERPL CALCULATED.3IONS-SCNC: 12 MEQ/L (ref 8–16)
BASOPHILS # BLD: 0.9 %
BASOPHILS ABSOLUTE: 0.1 THOU/MM3 (ref 0–0.1)
BUN BLDV-MCNC: 22 MG/DL (ref 7–22)
CALCIUM SERPL-MCNC: 9.4 MG/DL (ref 8.5–10.5)
CHLORIDE BLD-SCNC: 108 MEQ/L (ref 98–111)
CO2: 24 MEQ/L (ref 23–33)
CREAT SERPL-MCNC: 0.7 MG/DL (ref 0.4–1.2)
EKG ATRIAL RATE: 73 BPM
EKG P AXIS: 69 DEGREES
EKG P-R INTERVAL: 144 MS
EKG Q-T INTERVAL: 418 MS
EKG QRS DURATION: 92 MS
EKG QTC CALCULATION (BAZETT): 460 MS
EKG R AXIS: -35 DEGREES
EKG T AXIS: 74 DEGREES
EKG VENTRICULAR RATE: 73 BPM
EOSINOPHIL # BLD: 5.5 %
EOSINOPHILS ABSOLUTE: 0.4 THOU/MM3 (ref 0–0.4)
ERYTHROCYTE [DISTWIDTH] IN BLOOD BY AUTOMATED COUNT: 12.2 % (ref 11.5–14.5)
ERYTHROCYTE [DISTWIDTH] IN BLOOD BY AUTOMATED COUNT: 42.5 FL (ref 35–45)
GFR SERPL CREATININE-BSD FRML MDRD: 83 ML/MIN/1.73M2
GLUCOSE BLD-MCNC: 95 MG/DL (ref 70–108)
HCT VFR BLD CALC: 40.4 % (ref 37–47)
HEMOGLOBIN: 12.8 GM/DL (ref 12–16)
IMMATURE GRANS (ABS): 0.01 THOU/MM3 (ref 0–0.07)
IMMATURE GRANULOCYTES: 0.1 %
LYMPHOCYTES # BLD: 26.6 %
LYMPHOCYTES ABSOLUTE: 1.8 THOU/MM3 (ref 1–4.8)
MCH RBC QN AUTO: 29.8 PG (ref 26–33)
MCHC RBC AUTO-ENTMCNC: 31.7 GM/DL (ref 32.2–35.5)
MCV RBC AUTO: 94.2 FL (ref 81–99)
MONOCYTES # BLD: 12.4 %
MONOCYTES ABSOLUTE: 0.8 THOU/MM3 (ref 0.4–1.3)
MRSA NASAL SCREEN RT-PCR: NEGATIVE
NUCLEATED RED BLOOD CELLS: 0 /100 WBC
PLATELET # BLD: 281 THOU/MM3 (ref 130–400)
PMV BLD AUTO: 9.6 FL (ref 9.4–12.4)
POTASSIUM SERPL-SCNC: 4.2 MEQ/L (ref 3.5–5.2)
PREALBUMIN: 25.8 MG/DL (ref 20–40)
RBC # BLD: 4.29 MILL/MM3 (ref 4.2–5.4)
SEG NEUTROPHILS: 54.5 %
SEGMENTED NEUTROPHILS ABSOLUTE COUNT: 3.7 THOU/MM3 (ref 1.8–7.7)
SODIUM BLD-SCNC: 144 MEQ/L (ref 135–145)
STAPH AUREUS SCREEN RT-PCR: NEGATIVE
WBC # BLD: 6.8 THOU/MM3 (ref 4.8–10.8)

## 2020-02-06 PROCEDURE — 93005 ELECTROCARDIOGRAM TRACING: CPT | Performed by: PHYSICIAN ASSISTANT

## 2020-02-06 PROCEDURE — 85025 COMPLETE CBC W/AUTO DIFF WBC: CPT

## 2020-02-06 PROCEDURE — 36415 COLL VENOUS BLD VENIPUNCTURE: CPT

## 2020-02-06 PROCEDURE — 87641 MR-STAPH DNA AMP PROBE: CPT

## 2020-02-06 PROCEDURE — 87640 STAPH A DNA AMP PROBE: CPT

## 2020-02-06 PROCEDURE — 82040 ASSAY OF SERUM ALBUMIN: CPT

## 2020-02-06 PROCEDURE — 84134 ASSAY OF PREALBUMIN: CPT

## 2020-02-06 PROCEDURE — 80048 BASIC METABOLIC PNL TOTAL CA: CPT

## 2020-02-06 PROCEDURE — 87081 CULTURE SCREEN ONLY: CPT

## 2020-02-06 NOTE — PROGRESS NOTES
Keeping You Safe for Surgery    Staphylococcus aureus or Guicho Potts is a germ that lives on the skin and in the nose of some healthy people. Your skin protects you from those germs. However, when you have surgery, we will be cutting your skin. Sometimes germs can get into those cuts and cause infection. How do we screen for Staph? We will swab your nose to see if you are a carrier of Staph. The results will be available about 2 hours after we obtain the nasal specimen. A positive test does not mean you have an infection; it just means you are a carrier of Staph. Your surgery most likely will not be canceled or delayed. If my test is positive, what happens? If your test is positive, a nurse will call you and tell you to get Mupirocin Ointment (Bactroban) at your pharmacy. The medicine may come in one large tube or may come in many small packets. When the medication is administered into your nose, it works by killing some of the germs that could cause you to get a surgical site infection.  If you get a big tube of Mupirocin, place enough medicine to cover the tip of a cotton swab (Q-tip). Place the cotton swab inside one nostril and rub the medicine onto the inside of the nose. Then repeat this same procedure in your other nostril.  If you get small individual tubes, put half the tube on a cotton swab and put the medicine in one nostril. Then the other half in the other nostril. After the medication has been inserted into each nostril, gently press your nose together and release for about a minute to get the medicine all over the inside of your nose. Do this once in the morning and once at night for 5 days prior to your scheduled surgery date. If I have Staph, will I be treated differently in the hospital?  If you have a certain type of Staph called MRSA (Methicillin-Resistant Staph aureus), you will be in a single room on Contact Precautions.  This means your doctors and nurses will wear gloves and gowns when taking care of you. We do this (along with good hand hygiene) to make sure we do not spread MRSA to any another patients in our care.

## 2020-02-06 NOTE — PROGRESS NOTES
SURGERY PREPARATION CHECKLIST     NAME: ________________________________________     DATE OF SURGERY: ____________________________    Enter Dates, Check (?) circles to indicate task is completed. Date MUPIROCIN NASAL OINTMENT BODY CLEANSING     DAY 1 _______2/14_______________   Morning    Evening          Day 2 _________2/15_____________   Morning     Evening        Day 3 __________2/16____________   Morning  Evening        Day 4 _________2/17_____________   Morning    Evening        Day 5 _______2/18_______________   Morning  Evening        Day 6 __________2/19____________  (Day of Surgery)               PLEASE COMPLETE and BRING THIS CHECKLIST WITH YOU TO [address] to give to your nurse on the day of surgery. You will be notified if you need to use Mupirocin Nasal Ointment.

## 2020-02-06 NOTE — PROGRESS NOTES
Preliminary Discharge Planning Questionnaire  Date of Surgery 2/19/20   Surgeon Gianni Azrola    · Having the proper help and care after surgery is very important to your recovery. Who will be able to help you at home when you are discharged from the hospital? ([facility] - 24/7 for a minimum of 2 weeks)    spouse    Name(s) Taylor Murphy    · How many steps to enter your home? 4    · Bathroom on first floor? No    · Bedroom on the first floor? No    · Do you have an elevated toilet seat to use at home? No    · Do you have a walker to use at home? · Total Joints - with wheels N/A   · Spine - with wheels  Yes     Have you been doing home exercises? no    *You will go home with some outpatient physical therapy, where do you prefer to go? [facility]  *If needed, what home health agency would you like to use?   [facility]    *Cardiac Rehab plans na

## 2020-02-08 LAB — MRSA SCREEN: NORMAL

## 2020-02-10 RX ORDER — AZITHROMYCIN 250 MG/1
TABLET, FILM COATED ORAL
Qty: 1 PACKET | Refills: 0 | Status: ON HOLD | OUTPATIENT
Start: 2020-02-10 | End: 2020-02-19 | Stop reason: ALTCHOICE

## 2020-02-11 ENCOUNTER — OFFICE VISIT (OUTPATIENT)
Dept: FAMILY MEDICINE CLINIC | Age: 65
End: 2020-02-11
Payer: MEDICARE

## 2020-02-11 VITALS
HEIGHT: 57 IN | WEIGHT: 137 LBS | RESPIRATION RATE: 16 BRPM | DIASTOLIC BLOOD PRESSURE: 80 MMHG | TEMPERATURE: 98.4 F | SYSTOLIC BLOOD PRESSURE: 132 MMHG | HEART RATE: 80 BPM | BODY MASS INDEX: 29.56 KG/M2

## 2020-02-11 PROCEDURE — 1090F PRES/ABSN URINE INCON ASSESS: CPT | Performed by: FAMILY MEDICINE

## 2020-02-11 PROCEDURE — G8926 SPIRO NO PERF OR DOC: HCPCS | Performed by: FAMILY MEDICINE

## 2020-02-11 PROCEDURE — 99212 OFFICE O/P EST SF 10 MIN: CPT | Performed by: FAMILY MEDICINE

## 2020-02-11 PROCEDURE — 3023F SPIROM DOC REV: CPT | Performed by: FAMILY MEDICINE

## 2020-02-11 PROCEDURE — G8428 CUR MEDS NOT DOCUMENT: HCPCS | Performed by: FAMILY MEDICINE

## 2020-02-11 PROCEDURE — G8484 FLU IMMUNIZE NO ADMIN: HCPCS | Performed by: FAMILY MEDICINE

## 2020-02-11 PROCEDURE — G8417 CALC BMI ABV UP PARAM F/U: HCPCS | Performed by: FAMILY MEDICINE

## 2020-02-11 ASSESSMENT — PATIENT HEALTH QUESTIONNAIRE - PHQ9
SUM OF ALL RESPONSES TO PHQ9 QUESTIONS 1 & 2: 0
SUM OF ALL RESPONSES TO PHQ QUESTIONS 1-9: 0
2. FEELING DOWN, DEPRESSED OR HOPELESS: 0
1. LITTLE INTEREST OR PLEASURE IN DOING THINGS: 0
SUM OF ALL RESPONSES TO PHQ QUESTIONS 1-9: 0

## 2020-02-18 ENCOUNTER — ANESTHESIA EVENT (OUTPATIENT)
Dept: OPERATING ROOM | Age: 65
DRG: 460 | End: 2020-02-18
Payer: MEDICARE

## 2020-02-18 RX ORDER — ATORVASTATIN CALCIUM 80 MG/1
80 TABLET, FILM COATED ORAL NIGHTLY
Qty: 90 TABLET | Refills: 3 | Status: ON HOLD | OUTPATIENT
Start: 2020-02-18 | End: 2020-02-20 | Stop reason: SDUPTHER

## 2020-02-19 ENCOUNTER — APPOINTMENT (OUTPATIENT)
Dept: GENERAL RADIOLOGY | Age: 65
DRG: 460 | End: 2020-02-19
Attending: ORTHOPAEDIC SURGERY
Payer: MEDICARE

## 2020-02-19 ENCOUNTER — ANESTHESIA (OUTPATIENT)
Dept: OPERATING ROOM | Age: 65
DRG: 460 | End: 2020-02-19
Payer: MEDICARE

## 2020-02-19 ENCOUNTER — HOSPITAL ENCOUNTER (INPATIENT)
Age: 65
LOS: 4 days | Discharge: HOME | DRG: 460 | End: 2020-02-23
Attending: ORTHOPAEDIC SURGERY | Admitting: ORTHOPAEDIC SURGERY
Payer: MEDICARE

## 2020-02-19 VITALS
OXYGEN SATURATION: 100 % | SYSTOLIC BLOOD PRESSURE: 154 MMHG | TEMPERATURE: 96.4 F | DIASTOLIC BLOOD PRESSURE: 89 MMHG | RESPIRATION RATE: 17 BRPM

## 2020-02-19 PROBLEM — M48.061 SPINAL STENOSIS, LUMBAR REGION WITHOUT NEUROGENIC CLAUDICATION: Status: ACTIVE | Noted: 2020-02-19

## 2020-02-19 LAB
ABO: NORMAL
ANTIBODY SCREEN: NORMAL
RH FACTOR: NORMAL

## 2020-02-19 PROCEDURE — 86923 COMPATIBILITY TEST ELECTRIC: CPT

## 2020-02-19 PROCEDURE — 6370000000 HC RX 637 (ALT 250 FOR IP): Performed by: PHYSICIAN ASSISTANT

## 2020-02-19 PROCEDURE — 2500000003 HC RX 250 WO HCPCS: Performed by: ORTHOPAEDIC SURGERY

## 2020-02-19 PROCEDURE — 7100000001 HC PACU RECOVERY - ADDTL 15 MIN: Performed by: ORTHOPAEDIC SURGERY

## 2020-02-19 PROCEDURE — 6360000002 HC RX W HCPCS: Performed by: ANESTHESIOLOGY

## 2020-02-19 PROCEDURE — C1713 ANCHOR/SCREW BN/BN,TIS/BN: HCPCS | Performed by: ORTHOPAEDIC SURGERY

## 2020-02-19 PROCEDURE — 94640 AIRWAY INHALATION TREATMENT: CPT

## 2020-02-19 PROCEDURE — 36415 COLL VENOUS BLD VENIPUNCTURE: CPT

## 2020-02-19 PROCEDURE — 3600000015 HC SURGERY LEVEL 5 ADDTL 15MIN: Performed by: ORTHOPAEDIC SURGERY

## 2020-02-19 PROCEDURE — 86900 BLOOD TYPING SEROLOGIC ABO: CPT

## 2020-02-19 PROCEDURE — 94760 N-INVAS EAR/PLS OXIMETRY 1: CPT

## 2020-02-19 PROCEDURE — 2780000010 HC IMPLANT OTHER: Performed by: ORTHOPAEDIC SURGERY

## 2020-02-19 PROCEDURE — 6360000002 HC RX W HCPCS: Performed by: NURSE ANESTHETIST, CERTIFIED REGISTERED

## 2020-02-19 PROCEDURE — 2720000010 HC SURG SUPPLY STERILE: Performed by: ORTHOPAEDIC SURGERY

## 2020-02-19 PROCEDURE — 2580000003 HC RX 258: Performed by: PHYSICIAN ASSISTANT

## 2020-02-19 PROCEDURE — 2580000003 HC RX 258: Performed by: ORTHOPAEDIC SURGERY

## 2020-02-19 PROCEDURE — 72100 X-RAY EXAM L-S SPINE 2/3 VWS: CPT

## 2020-02-19 PROCEDURE — 6360000002 HC RX W HCPCS: Performed by: PHYSICIAN ASSISTANT

## 2020-02-19 PROCEDURE — 7100000000 HC PACU RECOVERY - FIRST 15 MIN: Performed by: ORTHOPAEDIC SURGERY

## 2020-02-19 PROCEDURE — 2580000003 HC RX 258: Performed by: NURSE ANESTHETIST, CERTIFIED REGISTERED

## 2020-02-19 PROCEDURE — 72020 X-RAY EXAM OF SPINE 1 VIEW: CPT

## 2020-02-19 PROCEDURE — 01NR0ZZ RELEASE SACRAL NERVE, OPEN APPROACH: ICD-10-PCS | Performed by: ORTHOPAEDIC SURGERY

## 2020-02-19 PROCEDURE — 2709999900 HC NON-CHARGEABLE SUPPLY: Performed by: ORTHOPAEDIC SURGERY

## 2020-02-19 PROCEDURE — 01NB0ZZ RELEASE LUMBAR NERVE, OPEN APPROACH: ICD-10-PCS | Performed by: ORTHOPAEDIC SURGERY

## 2020-02-19 PROCEDURE — 2500000003 HC RX 250 WO HCPCS: Performed by: NURSE ANESTHETIST, CERTIFIED REGISTERED

## 2020-02-19 PROCEDURE — 1200000000 HC SEMI PRIVATE

## 2020-02-19 PROCEDURE — 3600000005 HC SURGERY LEVEL 5 BASE: Performed by: ORTHOPAEDIC SURGERY

## 2020-02-19 PROCEDURE — 86850 RBC ANTIBODY SCREEN: CPT

## 2020-02-19 PROCEDURE — 3700000000 HC ANESTHESIA ATTENDED CARE: Performed by: ORTHOPAEDIC SURGERY

## 2020-02-19 PROCEDURE — 86901 BLOOD TYPING SEROLOGIC RH(D): CPT

## 2020-02-19 PROCEDURE — 3700000001 HC ADD 15 MINUTES (ANESTHESIA): Performed by: ORTHOPAEDIC SURGERY

## 2020-02-19 PROCEDURE — 0SG1071 FUSION OF 2 OR MORE LUMBAR VERTEBRAL JOINTS WITH AUTOLOGOUS TISSUE SUBSTITUTE, POSTERIOR APPROACH, POSTERIOR COLUMN, OPEN APPROACH: ICD-10-PCS | Performed by: ORTHOPAEDIC SURGERY

## 2020-02-19 DEVICE — POLYAXIAL SCREW, 6.5 X 45
Type: IMPLANTABLE DEVICE | Site: SPINE LUMBAR | Status: FUNCTIONAL
Brand: POLYAXIAL SCREW, 6.5 X 45

## 2020-02-19 DEVICE — DBM T42200 2.5CMX10CM 2 EACH GRAFTON MAT
Type: IMPLANTABLE DEVICE | Site: SPINE LUMBAR | Status: FUNCTIONAL
Brand: GRAFTON®AND GRAFTON PLUS®DEMINERALIZED BONE MATRIX (DBM)

## 2020-02-19 DEVICE — CURVED ROD, 5.5 X 65
Type: IMPLANTABLE DEVICE | Site: SPINE LUMBAR | Status: FUNCTIONAL
Brand: CURVED ROD, 5.5 X 65

## 2020-02-19 DEVICE — IMPLANTABLE DEVICE
Type: IMPLANTABLE DEVICE | Site: SPINE LUMBAR | Status: FUNCTIONAL
Brand: SET SCREW

## 2020-02-19 DEVICE — AGENT HEMOSTATIC SURGIFLOW MATRIX KIT W/THROMBIN: Type: IMPLANTABLE DEVICE | Site: SPINE LUMBAR | Status: FUNCTIONAL

## 2020-02-19 RX ORDER — POLYETHYLENE GLYCOL 3350 17 G/17G
17 POWDER, FOR SOLUTION ORAL DAILY
Status: DISCONTINUED | OUTPATIENT
Start: 2020-02-19 | End: 2020-02-23 | Stop reason: HOSPADM

## 2020-02-19 RX ORDER — SODIUM CHLORIDE 0.9 % (FLUSH) 0.9 %
10 SYRINGE (ML) INJECTION PRN
Status: DISCONTINUED | OUTPATIENT
Start: 2020-02-19 | End: 2020-02-23 | Stop reason: HOSPADM

## 2020-02-19 RX ORDER — FENTANYL CITRATE 50 UG/ML
50 INJECTION, SOLUTION INTRAMUSCULAR; INTRAVENOUS EVERY 5 MIN PRN
Status: DISCONTINUED | OUTPATIENT
Start: 2020-02-19 | End: 2020-02-19 | Stop reason: HOSPADM

## 2020-02-19 RX ORDER — NEOSTIGMINE METHYLSULFATE 5 MG/5 ML
SYRINGE (ML) INTRAVENOUS PRN
Status: DISCONTINUED | OUTPATIENT
Start: 2020-02-19 | End: 2020-02-19 | Stop reason: SDUPTHER

## 2020-02-19 RX ORDER — OXYCODONE HYDROCHLORIDE AND ACETAMINOPHEN 5; 325 MG/1; MG/1
2 TABLET ORAL EVERY 4 HOURS PRN
Status: DISCONTINUED | OUTPATIENT
Start: 2020-02-19 | End: 2020-02-23 | Stop reason: HOSPADM

## 2020-02-19 RX ORDER — SODIUM CHLORIDE 0.9 % (FLUSH) 0.9 %
10 SYRINGE (ML) INJECTION EVERY 12 HOURS SCHEDULED
Status: DISCONTINUED | OUTPATIENT
Start: 2020-02-19 | End: 2020-02-19 | Stop reason: HOSPADM

## 2020-02-19 RX ORDER — PROMETHAZINE HYDROCHLORIDE 25 MG/ML
6.25 INJECTION, SOLUTION INTRAMUSCULAR; INTRAVENOUS
Status: DISCONTINUED | OUTPATIENT
Start: 2020-02-19 | End: 2020-02-19 | Stop reason: HOSPADM

## 2020-02-19 RX ORDER — ONDANSETRON 2 MG/ML
INJECTION INTRAMUSCULAR; INTRAVENOUS PRN
Status: DISCONTINUED | OUTPATIENT
Start: 2020-02-19 | End: 2020-02-19 | Stop reason: SDUPTHER

## 2020-02-19 RX ORDER — SODIUM CHLORIDE, SODIUM LACTATE, POTASSIUM CHLORIDE, CALCIUM CHLORIDE 600; 310; 30; 20 MG/100ML; MG/100ML; MG/100ML; MG/100ML
INJECTION, SOLUTION INTRAVENOUS CONTINUOUS PRN
Status: DISCONTINUED | OUTPATIENT
Start: 2020-02-19 | End: 2020-02-19 | Stop reason: SDUPTHER

## 2020-02-19 RX ORDER — FLUTICASONE PROPIONATE 50 MCG
1 SPRAY, SUSPENSION (ML) NASAL DAILY
Status: DISCONTINUED | OUTPATIENT
Start: 2020-02-19 | End: 2020-02-23 | Stop reason: HOSPADM

## 2020-02-19 RX ORDER — AMLODIPINE BESYLATE 10 MG/1
10 TABLET ORAL DAILY
Status: DISCONTINUED | OUTPATIENT
Start: 2020-02-20 | End: 2020-02-23 | Stop reason: HOSPADM

## 2020-02-19 RX ORDER — SODIUM CHLORIDE 0.9 % (FLUSH) 0.9 %
10 SYRINGE (ML) INJECTION EVERY 12 HOURS SCHEDULED
Status: DISCONTINUED | OUTPATIENT
Start: 2020-02-19 | End: 2020-02-23 | Stop reason: HOSPADM

## 2020-02-19 RX ORDER — ONDANSETRON 2 MG/ML
4 INJECTION INTRAMUSCULAR; INTRAVENOUS
Status: DISCONTINUED | OUTPATIENT
Start: 2020-02-19 | End: 2020-02-19 | Stop reason: HOSPADM

## 2020-02-19 RX ORDER — FENTANYL CITRATE 50 UG/ML
25 INJECTION, SOLUTION INTRAMUSCULAR; INTRAVENOUS EVERY 5 MIN PRN
Status: DISCONTINUED | OUTPATIENT
Start: 2020-02-19 | End: 2020-02-19 | Stop reason: HOSPADM

## 2020-02-19 RX ORDER — SODIUM CHLORIDE 9 MG/ML
INJECTION, SOLUTION INTRAVENOUS CONTINUOUS
Status: DISCONTINUED | OUTPATIENT
Start: 2020-02-19 | End: 2020-02-23 | Stop reason: HOSPADM

## 2020-02-19 RX ORDER — NITROGLYCERIN 0.4 MG/1
0.4 TABLET SUBLINGUAL EVERY 5 MIN PRN
Status: DISCONTINUED | OUTPATIENT
Start: 2020-02-19 | End: 2020-02-23 | Stop reason: HOSPADM

## 2020-02-19 RX ORDER — SODIUM CHLORIDE 9 MG/ML
INJECTION, SOLUTION INTRAVENOUS CONTINUOUS
Status: DISCONTINUED | OUTPATIENT
Start: 2020-02-19 | End: 2020-02-19 | Stop reason: SDUPTHER

## 2020-02-19 RX ORDER — ACETAMINOPHEN 325 MG/1
650 TABLET ORAL EVERY 4 HOURS PRN
Status: DISCONTINUED | OUTPATIENT
Start: 2020-02-19 | End: 2020-02-23 | Stop reason: HOSPADM

## 2020-02-19 RX ORDER — LABETALOL 20 MG/4 ML (5 MG/ML) INTRAVENOUS SYRINGE
5 EVERY 10 MIN PRN
Status: DISCONTINUED | OUTPATIENT
Start: 2020-02-19 | End: 2020-02-19 | Stop reason: HOSPADM

## 2020-02-19 RX ORDER — LIDOCAINE HCL/PF 100 MG/5ML
SYRINGE (ML) INJECTION PRN
Status: DISCONTINUED | OUTPATIENT
Start: 2020-02-19 | End: 2020-02-19 | Stop reason: SDUPTHER

## 2020-02-19 RX ORDER — BISACODYL 10 MG
10 SUPPOSITORY, RECTAL RECTAL DAILY PRN
Status: DISCONTINUED | OUTPATIENT
Start: 2020-02-19 | End: 2020-02-23 | Stop reason: HOSPADM

## 2020-02-19 RX ORDER — SODIUM PHOSPHATE, DIBASIC AND SODIUM PHOSPHATE, MONOBASIC 7; 19 G/133ML; G/133ML
1 ENEMA RECTAL DAILY PRN
Status: DISCONTINUED | OUTPATIENT
Start: 2020-02-19 | End: 2020-02-23 | Stop reason: HOSPADM

## 2020-02-19 RX ORDER — ONDANSETRON 2 MG/ML
4 INJECTION INTRAMUSCULAR; INTRAVENOUS EVERY 6 HOURS PRN
Status: DISCONTINUED | OUTPATIENT
Start: 2020-02-19 | End: 2020-02-23 | Stop reason: HOSPADM

## 2020-02-19 RX ORDER — GLYCOPYRROLATE 1 MG/5 ML
SYRINGE (ML) INTRAVENOUS PRN
Status: DISCONTINUED | OUTPATIENT
Start: 2020-02-19 | End: 2020-02-19 | Stop reason: SDUPTHER

## 2020-02-19 RX ORDER — LETROZOLE 2.5 MG/1
2.5 TABLET, FILM COATED ORAL DAILY
Status: DISCONTINUED | OUTPATIENT
Start: 2020-02-19 | End: 2020-02-23 | Stop reason: HOSPADM

## 2020-02-19 RX ORDER — PROMETHAZINE HYDROCHLORIDE 25 MG/1
12.5 TABLET ORAL EVERY 6 HOURS PRN
Status: DISCONTINUED | OUTPATIENT
Start: 2020-02-19 | End: 2020-02-23 | Stop reason: HOSPADM

## 2020-02-19 RX ORDER — METOPROLOL SUCCINATE 25 MG/1
25 TABLET, EXTENDED RELEASE ORAL DAILY
Status: DISCONTINUED | OUTPATIENT
Start: 2020-02-20 | End: 2020-02-23 | Stop reason: HOSPADM

## 2020-02-19 RX ORDER — ATORVASTATIN CALCIUM 80 MG/1
80 TABLET, FILM COATED ORAL NIGHTLY
Status: DISCONTINUED | OUTPATIENT
Start: 2020-02-19 | End: 2020-02-23 | Stop reason: HOSPADM

## 2020-02-19 RX ORDER — SODIUM CHLORIDE 0.9 % (FLUSH) 0.9 %
10 SYRINGE (ML) INJECTION PRN
Status: DISCONTINUED | OUTPATIENT
Start: 2020-02-19 | End: 2020-02-19 | Stop reason: HOSPADM

## 2020-02-19 RX ORDER — HYDROMORPHONE HCL 110MG/55ML
PATIENT CONTROLLED ANALGESIA SYRINGE INTRAVENOUS PRN
Status: DISCONTINUED | OUTPATIENT
Start: 2020-02-19 | End: 2020-02-19 | Stop reason: SDUPTHER

## 2020-02-19 RX ORDER — FENTANYL CITRATE 50 UG/ML
INJECTION, SOLUTION INTRAMUSCULAR; INTRAVENOUS PRN
Status: DISCONTINUED | OUTPATIENT
Start: 2020-02-19 | End: 2020-02-19 | Stop reason: SDUPTHER

## 2020-02-19 RX ORDER — OXYCODONE HYDROCHLORIDE AND ACETAMINOPHEN 5; 325 MG/1; MG/1
1 TABLET ORAL EVERY 4 HOURS PRN
Status: DISCONTINUED | OUTPATIENT
Start: 2020-02-19 | End: 2020-02-23 | Stop reason: HOSPADM

## 2020-02-19 RX ORDER — ROCURONIUM BROMIDE 10 MG/ML
INJECTION, SOLUTION INTRAVENOUS PRN
Status: DISCONTINUED | OUTPATIENT
Start: 2020-02-19 | End: 2020-02-19 | Stop reason: SDUPTHER

## 2020-02-19 RX ORDER — ALBUTEROL SULFATE 90 UG/1
1 AEROSOL, METERED RESPIRATORY (INHALATION) EVERY 4 HOURS PRN
Status: DISCONTINUED | OUTPATIENT
Start: 2020-02-19 | End: 2020-02-23 | Stop reason: HOSPADM

## 2020-02-19 RX ORDER — CYCLOBENZAPRINE HCL 10 MG
10 TABLET ORAL 3 TIMES DAILY PRN
Status: DISCONTINUED | OUTPATIENT
Start: 2020-02-19 | End: 2020-02-23 | Stop reason: HOSPADM

## 2020-02-19 RX ORDER — PROPOFOL 10 MG/ML
INJECTION, EMULSION INTRAVENOUS PRN
Status: DISCONTINUED | OUTPATIENT
Start: 2020-02-19 | End: 2020-02-19 | Stop reason: SDUPTHER

## 2020-02-19 RX ORDER — EPHEDRINE SULFATE/0.9% NACL/PF 50 MG/5 ML
SYRINGE (ML) INTRAVENOUS PRN
Status: DISCONTINUED | OUTPATIENT
Start: 2020-02-19 | End: 2020-02-19 | Stop reason: SDUPTHER

## 2020-02-19 RX ORDER — LIDOCAINE HYDROCHLORIDE AND EPINEPHRINE 10; 10 MG/ML; UG/ML
INJECTION, SOLUTION INFILTRATION; PERINEURAL PRN
Status: DISCONTINUED | OUTPATIENT
Start: 2020-02-19 | End: 2020-02-19 | Stop reason: ALTCHOICE

## 2020-02-19 RX ADMIN — FLUTICASONE PROPIONATE 1 SPRAY: 50 SPRAY, METERED NASAL at 19:58

## 2020-02-19 RX ADMIN — Medication 0.6 MG: at 12:13

## 2020-02-19 RX ADMIN — ROCURONIUM BROMIDE 40 MG: 10 INJECTION INTRAVENOUS at 09:54

## 2020-02-19 RX ADMIN — SODIUM CHLORIDE: 9 INJECTION, SOLUTION INTRAVENOUS at 08:13

## 2020-02-19 RX ADMIN — DEXTROSE MONOHYDRATE 2 G: 50 INJECTION, SOLUTION INTRAVENOUS at 18:44

## 2020-02-19 RX ADMIN — SODIUM CHLORIDE, POTASSIUM CHLORIDE, SODIUM LACTATE AND CALCIUM CHLORIDE: 600; 310; 30; 20 INJECTION, SOLUTION INTRAVENOUS at 11:40

## 2020-02-19 RX ADMIN — Medication 5 MG: at 12:01

## 2020-02-19 RX ADMIN — SODIUM CHLORIDE, POTASSIUM CHLORIDE, SODIUM LACTATE AND CALCIUM CHLORIDE: 600; 310; 30; 20 INJECTION, SOLUTION INTRAVENOUS at 10:58

## 2020-02-19 RX ADMIN — HYDROMORPHONE HYDROCHLORIDE 0.25 MG: 2 INJECTION INTRAMUSCULAR; INTRAVENOUS; SUBCUTANEOUS at 12:33

## 2020-02-19 RX ADMIN — HYDROMORPHONE HYDROCHLORIDE 0.5 MG: 2 INJECTION INTRAMUSCULAR; INTRAVENOUS; SUBCUTANEOUS at 11:34

## 2020-02-19 RX ADMIN — Medication 10 MG: at 10:20

## 2020-02-19 RX ADMIN — Medication 1 PUFF: at 20:44

## 2020-02-19 RX ADMIN — OXYCODONE HYDROCHLORIDE AND ACETAMINOPHEN 2 TABLET: 5; 325 TABLET ORAL at 20:06

## 2020-02-19 RX ADMIN — Medication 10 MG: at 10:11

## 2020-02-19 RX ADMIN — FENTANYL CITRATE 50 MCG: 50 INJECTION INTRAMUSCULAR; INTRAVENOUS at 11:36

## 2020-02-19 RX ADMIN — ONDANSETRON 4 MG: 2 INJECTION INTRAMUSCULAR; INTRAVENOUS at 16:10

## 2020-02-19 RX ADMIN — OXYCODONE HYDROCHLORIDE AND ACETAMINOPHEN 2 TABLET: 5; 325 TABLET ORAL at 23:57

## 2020-02-19 RX ADMIN — SODIUM CHLORIDE, POTASSIUM CHLORIDE, SODIUM LACTATE AND CALCIUM CHLORIDE: 600; 310; 30; 20 INJECTION, SOLUTION INTRAVENOUS at 10:30

## 2020-02-19 RX ADMIN — ONDANSETRON HYDROCHLORIDE 4 MG: 4 INJECTION, SOLUTION INTRAMUSCULAR; INTRAVENOUS at 09:52

## 2020-02-19 RX ADMIN — PROPOFOL 150 MG: 10 INJECTION, EMULSION INTRAVENOUS at 09:54

## 2020-02-19 RX ADMIN — FENTANYL CITRATE 100 MCG: 50 INJECTION INTRAMUSCULAR; INTRAVENOUS at 09:54

## 2020-02-19 RX ADMIN — FENTANYL CITRATE 50 MCG: 50 INJECTION, SOLUTION INTRAMUSCULAR; INTRAVENOUS at 13:00

## 2020-02-19 RX ADMIN — Medication 80 MG: at 09:54

## 2020-02-19 RX ADMIN — SODIUM CHLORIDE: 9 INJECTION, SOLUTION INTRAVENOUS at 18:48

## 2020-02-19 RX ADMIN — Medication 5 MG: at 11:50

## 2020-02-19 RX ADMIN — BISACODYL 5 MG: 5 TABLET, COATED ORAL at 19:58

## 2020-02-19 RX ADMIN — HYDROMORPHONE HYDROCHLORIDE 0.5 MG: 1 INJECTION, SOLUTION INTRAMUSCULAR; INTRAVENOUS; SUBCUTANEOUS at 16:10

## 2020-02-19 RX ADMIN — CEFAZOLIN 2 G: 10 INJECTION, POWDER, FOR SOLUTION INTRAVENOUS at 10:10

## 2020-02-19 RX ADMIN — FENTANYL CITRATE 50 MCG: 50 INJECTION INTRAMUSCULAR; INTRAVENOUS at 10:49

## 2020-02-19 RX ADMIN — Medication 4 MG: at 12:13

## 2020-02-19 RX ADMIN — ATORVASTATIN CALCIUM 80 MG: 80 TABLET, FILM COATED ORAL at 19:58

## 2020-02-19 RX ADMIN — FENTANYL CITRATE 50 MCG: 50 INJECTION INTRAMUSCULAR; INTRAVENOUS at 10:29

## 2020-02-19 RX ADMIN — Medication 5 MG: at 11:06

## 2020-02-19 ASSESSMENT — PULMONARY FUNCTION TESTS
PIF_VALUE: 16
PIF_VALUE: 18
PIF_VALUE: 16
PIF_VALUE: 18
PIF_VALUE: 19
PIF_VALUE: 18
PIF_VALUE: 18
PIF_VALUE: 19
PIF_VALUE: 18
PIF_VALUE: 17
PIF_VALUE: 19
PIF_VALUE: 2
PIF_VALUE: 17
PIF_VALUE: 1
PIF_VALUE: 16
PIF_VALUE: 12
PIF_VALUE: 16
PIF_VALUE: 18
PIF_VALUE: 2
PIF_VALUE: 18
PIF_VALUE: 16
PIF_VALUE: 0
PIF_VALUE: 19
PIF_VALUE: 1
PIF_VALUE: 15
PIF_VALUE: 18
PIF_VALUE: 19
PIF_VALUE: 15
PIF_VALUE: 16
PIF_VALUE: 18
PIF_VALUE: 19
PIF_VALUE: 14
PIF_VALUE: 18
PIF_VALUE: 16
PIF_VALUE: 18
PIF_VALUE: 3
PIF_VALUE: 13
PIF_VALUE: 2
PIF_VALUE: 1
PIF_VALUE: 19
PIF_VALUE: 17
PIF_VALUE: 18
PIF_VALUE: 19
PIF_VALUE: 19
PIF_VALUE: 1
PIF_VALUE: 19
PIF_VALUE: 18
PIF_VALUE: 13
PIF_VALUE: 18
PIF_VALUE: 19
PIF_VALUE: 14
PIF_VALUE: 16
PIF_VALUE: 17
PIF_VALUE: 17
PIF_VALUE: 18
PIF_VALUE: 19
PIF_VALUE: 2
PIF_VALUE: 2
PIF_VALUE: 18
PIF_VALUE: 2
PIF_VALUE: 19
PIF_VALUE: 19
PIF_VALUE: 16
PIF_VALUE: 16
PIF_VALUE: 19
PIF_VALUE: 18
PIF_VALUE: 10
PIF_VALUE: 19
PIF_VALUE: 18
PIF_VALUE: 18
PIF_VALUE: 0
PIF_VALUE: 18
PIF_VALUE: 19
PIF_VALUE: 18
PIF_VALUE: 19
PIF_VALUE: 15
PIF_VALUE: 19
PIF_VALUE: 13
PIF_VALUE: 17
PIF_VALUE: 15
PIF_VALUE: 18
PIF_VALUE: 29
PIF_VALUE: 2
PIF_VALUE: 16
PIF_VALUE: 1
PIF_VALUE: 17
PIF_VALUE: 17
PIF_VALUE: 1
PIF_VALUE: 18
PIF_VALUE: 3
PIF_VALUE: 18
PIF_VALUE: 19
PIF_VALUE: 16
PIF_VALUE: 2
PIF_VALUE: 18
PIF_VALUE: 2
PIF_VALUE: 2
PIF_VALUE: 7
PIF_VALUE: 1
PIF_VALUE: 24
PIF_VALUE: 2
PIF_VALUE: 3
PIF_VALUE: 19
PIF_VALUE: 16
PIF_VALUE: 18
PIF_VALUE: 18
PIF_VALUE: 1
PIF_VALUE: 18
PIF_VALUE: 2
PIF_VALUE: 18
PIF_VALUE: 18
PIF_VALUE: 15
PIF_VALUE: 18
PIF_VALUE: 19
PIF_VALUE: 18
PIF_VALUE: 16
PIF_VALUE: 18
PIF_VALUE: 18
PIF_VALUE: 1
PIF_VALUE: 2
PIF_VALUE: 18
PIF_VALUE: 19
PIF_VALUE: 16
PIF_VALUE: 18
PIF_VALUE: 18
PIF_VALUE: 16
PIF_VALUE: 16
PIF_VALUE: 11
PIF_VALUE: 2
PIF_VALUE: 18
PIF_VALUE: 2
PIF_VALUE: 15
PIF_VALUE: 18
PIF_VALUE: 19
PIF_VALUE: 2
PIF_VALUE: 18
PIF_VALUE: 18
PIF_VALUE: 19
PIF_VALUE: 2
PIF_VALUE: 16
PIF_VALUE: 17
PIF_VALUE: 4
PIF_VALUE: 19
PIF_VALUE: 18
PIF_VALUE: 16
PIF_VALUE: 19
PIF_VALUE: 19
PIF_VALUE: 15
PIF_VALUE: 18
PIF_VALUE: 18
PIF_VALUE: 2
PIF_VALUE: 1
PIF_VALUE: 18
PIF_VALUE: 19
PIF_VALUE: 18
PIF_VALUE: 18
PIF_VALUE: 2
PIF_VALUE: 2
PIF_VALUE: 20
PIF_VALUE: 18
PIF_VALUE: 1
PIF_VALUE: 0
PIF_VALUE: 18
PIF_VALUE: 18
PIF_VALUE: 2
PIF_VALUE: 18
PIF_VALUE: 2
PIF_VALUE: 1
PIF_VALUE: 0

## 2020-02-19 ASSESSMENT — PAIN DESCRIPTION - PAIN TYPE
TYPE: SURGICAL PAIN

## 2020-02-19 ASSESSMENT — PAIN DESCRIPTION - FREQUENCY
FREQUENCY: INTERMITTENT

## 2020-02-19 ASSESSMENT — PAIN DESCRIPTION - ONSET
ONSET: AWAKENED FROM SLEEP
ONSET: ON-GOING
ONSET: ON-GOING

## 2020-02-19 ASSESSMENT — PAIN - FUNCTIONAL ASSESSMENT
PAIN_FUNCTIONAL_ASSESSMENT: PREVENTS OR INTERFERES SOME ACTIVE ACTIVITIES AND ADLS
PAIN_FUNCTIONAL_ASSESSMENT: PREVENTS OR INTERFERES SOME ACTIVE ACTIVITIES AND ADLS
PAIN_FUNCTIONAL_ASSESSMENT: 0-10
PAIN_FUNCTIONAL_ASSESSMENT: PREVENTS OR INTERFERES SOME ACTIVE ACTIVITIES AND ADLS

## 2020-02-19 ASSESSMENT — PAIN DESCRIPTION - DESCRIPTORS
DESCRIPTORS: ACHING;SHARP
DESCRIPTORS: ACHING
DESCRIPTORS: ACHING;BURNING
DESCRIPTORS: CONSTANT

## 2020-02-19 ASSESSMENT — PAIN DESCRIPTION - LOCATION
LOCATION: BACK

## 2020-02-19 ASSESSMENT — PAIN SCALES - GENERAL
PAINLEVEL_OUTOF10: 7
PAINLEVEL_OUTOF10: 8
PAINLEVEL_OUTOF10: 10
PAINLEVEL_OUTOF10: 0
PAINLEVEL_OUTOF10: 9
PAINLEVEL_OUTOF10: 8
PAINLEVEL_OUTOF10: 8

## 2020-02-19 ASSESSMENT — PAIN DESCRIPTION - PROGRESSION: CLINICAL_PROGRESSION: NOT CHANGED

## 2020-02-19 ASSESSMENT — PAIN DESCRIPTION - ORIENTATION
ORIENTATION: RIGHT

## 2020-02-19 NOTE — BRIEF OP NOTE
Brief Postoperative Note  ______________________________________________________________    Patient: Daniele Ramirez  YOB: 1955  MRN: [id number]  Date of Procedure: 2/19/2020    Pre-Op Diagnosis: SPINAL STENOSIS, LUMBAR REGION WITHOUT NEUROGENIC CLAUDICATION, SPINAL STENOSIS, LUMBOSACRAL REGION, ACUTE BILATERAL LOW BACK PAIN WITHOUT SCIATICA, FOOT DROP    Post-Op Diagnosis: Same       Procedure(s):  LUMBAR LAMINECTOMY PSF L3-L5  WITH CAPTIVA    Anesthesia: General    Surgeon(s):  Delbert Gowers, MD    Assistant: Julius Stacy    Estimated Blood Loss (mL): 500 ml, 125 ml cell saver    Complications: None    Specimens:   * No specimens in log *    Implants:  Implant Name Type Inv.  Item Serial No.  Lot No. LRB No. Used   [employer] MATRX DEMINRL 2.5X10CM - [id number] Spine [employer] MATRX DEMINRL 2.5X10CM [employer] 5  N/A 1         Drains: * No LDAs found *    Findings: same    Delbert Gowers, MD  Date: 2/19/2020  Time: 12:09 PM

## 2020-02-19 NOTE — PROGRESS NOTES
ADMITTED TO hospitals AND ORIENTED TO UNIT. SCDS ON. FALL AND ALLERGY BANDS ON. PT VERBALIZED APPROVAL FOR FIRST NAME, LAST INITIAL AND PHYSICIAN NAME ON UNIT WHITEBOARD. Patient spouse, Eber Thompson with the patient.

## 2020-02-19 NOTE — PROGRESS NOTES
[phone]- Patient to PACU. Hooked up to monitor. Report from Sherren Record, CRNA and VINH Perkins. Patient arouses to name. 1300- Patient states pain 8/10, medicated with 50 mcg Fentanyl. 1309- Patient asleep, no distress observed. Vitals stable. 1317- Continues to sleep. Easy to arouse then falls back to sleep. Vitals remain stable. 1325- Arterial line removed from right wrist, no complications, dressing applied. 1333- Continues to sleep, no distress observed. Vitals remain stable. 1340- Asleep, at ease. Vitals stable. 1351- Patient continues to sleep, easy to arouse then falls back to sleep. No distress observed and states pain improved. Denies nausea. Vitals stable. 1402- Called report to Davida Mills. 1411- Asleep, easy to arouse then falls back to sleep. No distress observed. Vitals stable. Awaiting bed to be cleaned. 1423- Patient easy to arouse. States pain tolerable and denies nausea. Turned onto right side with pillow support for comfort. Vitals stable. Meets PACU discharge criteria. Transported [address] [address] by transport team in stable condition. Family notified of bed assignment.

## 2020-02-19 NOTE — ANESTHESIA PRE PROCEDURE
Department of Anesthesiology  Preprocedure Note       Name:  Joshua Cobian   Age:  75 y.o.  :  1955                                          MRN:  [id number]         Date:  2020      Surgeon: Felice Coffman):  Claire Ross MD    Procedure: LUMBAR LAMINECTOMY PSF L3-S1, PLIF L5-S1 WITH CAPTIVA (N/A Spine Lumbar)    Medications prior to admission:   Prior to Admission medications    Medication Sig Start Date End Date Taking? Authorizing Provider   atorvastatin (LIPITOR) 80 MG tablet Take 1 tablet by mouth nightly 20  Yes Tamir Meraz PA-C   Tiotropium Bromide-Olodaterol (STIOLTO RESPIMAT) 2.5-2.5 MCG/ACT AERS Inhale 1 puff into the lungs daily   Yes Historical Provider, MD   acetaminophen-codeine (TYLENOL #3) 300-30 MG per tablet Take 1 tablet by mouth every 6 hours as needed for Pain for up to 30 days. 20 Yes Sander Geller, DO   amLODIPine (NORVASC) 10 MG tablet Take 1 tablet by mouth daily 20  Yes Sander Geller, DO   metoprolol succinate (TOPROL XL) 25 MG extended release tablet Take 1 tablet by mouth daily 20  Yes Tamir Meraz PA-C   albuterol sulfate  (90 Base) MCG/ACT inhaler INHALE 2 PUFFS BY MOUTH EVERY 6 HOURS AS NEEDED FOR WHEEZING 19  Yes Sander Geller, DO   fluticasone [facility]) 50 MCG/ACT nasal spray 1 spray by Nasal route daily 19  Yes Anya Don MD   letrozole [facility]) 2.5 MG tablet Take 1 tablet by mouth daily 19  Yes Gilberto Garcia MD   aspirin 81 MG EC tablet Take 1 tablet by mouth daily 10/30/18  Yes Tamir Meraz PA-C   hydrocortisone (ANUSOL-HC) 2.5 % rectal cream Place rectally 2 times daily as needed for Hemorrhoids Place rectally 2 times daily.     Historical Provider, MD   umeclidinium-vilanterol [facility] ELLIPTA) 62.5-25 MCG/INH AEPB inhaler Inhale 1 puff into the lungs daily 20   Dary Felder MD   clopidogrel (PLAVIX) 75 MG tablet Take 1 tablet by mouth daily 20   Tamir Meraz PA-C disease) ([facility]     Diverticulosis     Essential hypertension 5/10/2017    Foot drop, right foot     GERD (gastroesophageal reflux disease)     IBS (irritable bowel syndrome)     Kidney stone     [facility] of upper-inner quadrant of [facility] 2016    breast    PONV (postoperative nausea and vomiting)        Past Surgical History:        Procedure Laterality Date    BACK SURGERY  1998    Maicol Learn BREAST SURGERY Left 2016    exc. lymph nodes, lumpectomy    CARPAL TUNNEL RELEASE Right 2017    OIO    CERVICAL FUSION      COLONOSCOPY  unsure    Dr. Madisyn Xavier COLONOSCOPY  10/10/14    Dr. Nusrat Jackson COLONOSCOPY Left 2019    COLONOSCOPY POLYPECTOMY HOT BIOPSY performed by Radha Rodas MD at [address] [place] [address]  10/27/2018    [facility]    DILATION AND CURETTAGE OF UTERUS  80    HEMORRHOID SURGERY  2014    HYSTERECTOMY, VAGINAL  2014    KIDNEY STONE SURGERY      LAMINECTOMY  2018    [place] OFFICE/OUTPT VISIT,PROCEDURE ONLY N/A 2018    ACDF C5-6 performed by Arina Zimmerman MD at [address] [place] [address] Left        Social History:    Social History     Tobacco Use    Smoking status: Former Smoker     Packs/day: 1.50     Years: 20.00     Pack years: 30.00     Types: Cigarettes     Last attempt to quit: 10/27/2018     Years since quittin.3    Smokeless tobacco: Never Used   Substance Use Topics    Alcohol use:  No                                Counseling given: Not Answered      Vital Signs (Current):   Vitals:    20 0619   BP: 127/73   Pulse: 72   Resp: 12   Temp: 98.7 °F (37.1 °C)   TempSrc: Temporal   SpO2: 97%   Weight: 138 lb 12.8 oz (63 kg)   Height: 4' 10\" (1.473 m)                                              BP Readings from Last 3 Encounters:   20 127/73   20 132/80   20 108/72       NPO Status: Time of last liquid consumption: 0330                        Time of last solid consumption: 2315                        Date of last liquid consumption: 02/19/20                        Date of last solid food consumption: 02/18/20    BMI:   Wt Readings from Last 3 Encounters:   02/19/20 138 lb 12.8 oz (63 kg)   02/11/20 137 lb (62.1 kg)   02/06/20 138 lb 14.2 oz (63 kg)     Body mass index is 29.01 kg/m². CBC:   Lab Results   Component Value Date    WBC 6.8 02/06/2020    RBC 4.29 02/06/2020    HGB 12.8 02/06/2020    HCT 40.4 02/06/2020    MCV 94.2 02/06/2020    RDW 12.7 05/18/2018     02/06/2020       CMP:   Lab Results   Component Value Date     02/06/2020    K 4.2 02/06/2020     02/06/2020    CO2 24 02/06/2020    BUN 22 02/06/2020    CREATININE 0.7 02/06/2020    LABGLOM 83 02/06/2020    GLUCOSE 95 02/06/2020    PROT 6.6 02/06/2019    CALCIUM 9.4 02/06/2020    BILITOT 0.4 02/06/2019    ALKPHOS 92 02/06/2019    AST 15 02/06/2019    ALT 15 02/06/2019       POC Tests: No results for input(s): POCGLU, POCNA, POCK, POCCL, POCBUN, POCHEMO, POCHCT in the last 72 hours. Coags:   Lab Results   Component Value Date    INR 0.92 05/18/2018    APTT 30.0 10/28/2018       HCG (If Applicable): No results found for: PREGTESTUR, PREGSERUM, HCG, HCGQUANT     ABGs: No results found for: PHART, PO2ART, URH8JMB, EFU3NUR, BEART, J8BTURUO     Type & Screen (If Applicable):  Lab Results   Component Value Date    [facility] 02/19/2020       Anesthesia Evaluation     history of anesthetic complications: PONV. Airway: Mallampati: II  TM distance: >3 FB   Neck ROM: full  Mouth opening: > = 3 FB Dental:          Pulmonary:normal exam    (+) COPD:            Patient did not smoke on day of surgery.                  Cardiovascular:  Exercise tolerance: good (>4 METS),   (+) hypertension:, past MI:, CAD:, CABG/stent:,                   Neuro/Psych:   (+) CVA:, neuromuscular disease:,             GI/Hepatic/Renal:

## 2020-02-20 LAB
BASOPHILS # BLD: 0.3 %
BASOPHILS ABSOLUTE: 0 THOU/MM3 (ref 0–0.1)
EOSINOPHIL # BLD: 0.5 %
EOSINOPHILS ABSOLUTE: 0.1 THOU/MM3 (ref 0–0.4)
ERYTHROCYTE [DISTWIDTH] IN BLOOD BY AUTOMATED COUNT: 12.8 % (ref 11.5–14.5)
ERYTHROCYTE [DISTWIDTH] IN BLOOD BY AUTOMATED COUNT: 44 FL (ref 35–45)
HCT VFR BLD CALC: 26.3 % (ref 37–47)
HEMOGLOBIN: 8.2 GM/DL (ref 12–16)
IMMATURE GRANS (ABS): 0.05 THOU/MM3 (ref 0–0.07)
IMMATURE GRANULOCYTES: 0.4 %
LYMPHOCYTES # BLD: 12.7 %
LYMPHOCYTES ABSOLUTE: 1.7 THOU/MM3 (ref 1–4.8)
MCH RBC QN AUTO: 29.8 PG (ref 26–33)
MCHC RBC AUTO-ENTMCNC: 31.2 GM/DL (ref 32.2–35.5)
MCV RBC AUTO: 95.6 FL (ref 81–99)
MONOCYTES # BLD: 9.5 %
MONOCYTES ABSOLUTE: 1.3 THOU/MM3 (ref 0.4–1.3)
NUCLEATED RED BLOOD CELLS: 0 /100 WBC
PLATELET # BLD: 209 THOU/MM3 (ref 130–400)
PMV BLD AUTO: 10.2 FL (ref 9.4–12.4)
RBC # BLD: 2.75 MILL/MM3 (ref 4.2–5.4)
SEG NEUTROPHILS: 76.6 %
SEGMENTED NEUTROPHILS ABSOLUTE COUNT: 10.1 THOU/MM3 (ref 1.8–7.7)
WBC # BLD: 13.2 THOU/MM3 (ref 4.8–10.8)

## 2020-02-20 PROCEDURE — 97162 PT EVAL MOD COMPLEX 30 MIN: CPT

## 2020-02-20 PROCEDURE — 6370000000 HC RX 637 (ALT 250 FOR IP): Performed by: PHYSICIAN ASSISTANT

## 2020-02-20 PROCEDURE — 1200000000 HC SEMI PRIVATE

## 2020-02-20 PROCEDURE — 85025 COMPLETE CBC W/AUTO DIFF WBC: CPT

## 2020-02-20 PROCEDURE — 94640 AIRWAY INHALATION TREATMENT: CPT

## 2020-02-20 PROCEDURE — 97535 SELF CARE MNGMENT TRAINING: CPT

## 2020-02-20 PROCEDURE — 97166 OT EVAL MOD COMPLEX 45 MIN: CPT

## 2020-02-20 PROCEDURE — 6360000002 HC RX W HCPCS: Performed by: PHYSICIAN ASSISTANT

## 2020-02-20 PROCEDURE — 2580000003 HC RX 258: Performed by: PHYSICIAN ASSISTANT

## 2020-02-20 PROCEDURE — 94760 N-INVAS EAR/PLS OXIMETRY 1: CPT

## 2020-02-20 PROCEDURE — 36415 COLL VENOUS BLD VENIPUNCTURE: CPT

## 2020-02-20 PROCEDURE — 97530 THERAPEUTIC ACTIVITIES: CPT

## 2020-02-20 RX ORDER — ATORVASTATIN CALCIUM 80 MG/1
80 TABLET, FILM COATED ORAL NIGHTLY
Qty: 90 TABLET | Refills: 3 | Status: SHIPPED | OUTPATIENT
Start: 2020-02-20 | End: 2020-08-21

## 2020-02-20 RX ADMIN — OXYCODONE HYDROCHLORIDE AND ACETAMINOPHEN 2 TABLET: 5; 325 TABLET ORAL at 23:52

## 2020-02-20 RX ADMIN — GLYCOPYRROLATE AND FORMOTEROL FUMARATE 2 PUFF: 9; 4.8 AEROSOL, METERED RESPIRATORY (INHALATION) at 17:19

## 2020-02-20 RX ADMIN — POLYETHYLENE GLYCOL 3350 17 G: 17 POWDER, FOR SOLUTION ORAL at 08:10

## 2020-02-20 RX ADMIN — Medication 10 ML: at 21:01

## 2020-02-20 RX ADMIN — GLYCOPYRROLATE AND FORMOTEROL FUMARATE 2 PUFF: 9; 4.8 AEROSOL, METERED RESPIRATORY (INHALATION) at 08:00

## 2020-02-20 RX ADMIN — LETROZOLE 2.5 MG: 2.5 TABLET ORAL at 08:10

## 2020-02-20 RX ADMIN — NALOXEGOL OXALATE 12.5 MG: 12.5 TABLET, FILM COATED ORAL at 08:10

## 2020-02-20 RX ADMIN — ONDANSETRON 4 MG: 2 INJECTION INTRAMUSCULAR; INTRAVENOUS at 19:38

## 2020-02-20 RX ADMIN — ATORVASTATIN CALCIUM 80 MG: 80 TABLET, FILM COATED ORAL at 21:02

## 2020-02-20 RX ADMIN — BISACODYL 5 MG: 5 TABLET, COATED ORAL at 08:10

## 2020-02-20 RX ADMIN — FLUTICASONE PROPIONATE 1 SPRAY: 50 SPRAY, METERED NASAL at 08:25

## 2020-02-20 RX ADMIN — SODIUM CHLORIDE: 9 INJECTION, SOLUTION INTRAVENOUS at 06:29

## 2020-02-20 RX ADMIN — DEXTROSE MONOHYDRATE 2 G: 50 INJECTION, SOLUTION INTRAVENOUS at 03:40

## 2020-02-20 RX ADMIN — PROMETHAZINE HYDROCHLORIDE 12.5 MG: 25 TABLET ORAL at 16:51

## 2020-02-20 RX ADMIN — OXYCODONE HYDROCHLORIDE AND ACETAMINOPHEN 2 TABLET: 5; 325 TABLET ORAL at 19:30

## 2020-02-20 RX ADMIN — OXYCODONE HYDROCHLORIDE AND ACETAMINOPHEN 2 TABLET: 5; 325 TABLET ORAL at 13:22

## 2020-02-20 RX ADMIN — OXYCODONE HYDROCHLORIDE AND ACETAMINOPHEN 2 TABLET: 5; 325 TABLET ORAL at 08:10

## 2020-02-20 RX ADMIN — SODIUM CHLORIDE: 9 INJECTION, SOLUTION INTRAVENOUS at 16:52

## 2020-02-20 RX ADMIN — ONDANSETRON 4 MG: 2 INJECTION INTRAMUSCULAR; INTRAVENOUS at 10:37

## 2020-02-20 ASSESSMENT — PAIN SCALES - GENERAL
PAINLEVEL_OUTOF10: 6
PAINLEVEL_OUTOF10: 0
PAINLEVEL_OUTOF10: 5
PAINLEVEL_OUTOF10: 10
PAINLEVEL_OUTOF10: 7
PAINLEVEL_OUTOF10: 1
PAINLEVEL_OUTOF10: 0
PAINLEVEL_OUTOF10: 0
PAINLEVEL_OUTOF10: 8
PAINLEVEL_OUTOF10: 0
PAINLEVEL_OUTOF10: 8
PAINLEVEL_OUTOF10: 9

## 2020-02-20 ASSESSMENT — PAIN DESCRIPTION - DESCRIPTORS: DESCRIPTORS: SHARP

## 2020-02-20 ASSESSMENT — PAIN DESCRIPTION - ORIENTATION
ORIENTATION: LOWER
ORIENTATION: LOWER

## 2020-02-20 ASSESSMENT — PAIN DESCRIPTION - PROGRESSION
CLINICAL_PROGRESSION: NOT CHANGED

## 2020-02-20 ASSESSMENT — PAIN DESCRIPTION - LOCATION
LOCATION: BACK

## 2020-02-20 ASSESSMENT — PAIN DESCRIPTION - FREQUENCY: FREQUENCY: INTERMITTENT

## 2020-02-20 ASSESSMENT — PAIN DESCRIPTION - ONSET: ONSET: ON-GOING

## 2020-02-20 ASSESSMENT — PAIN DESCRIPTION - PAIN TYPE
TYPE: SURGICAL PAIN

## 2020-02-20 ASSESSMENT — PAIN - FUNCTIONAL ASSESSMENT: PAIN_FUNCTIONAL_ASSESSMENT: PREVENTS OR INTERFERES SOME ACTIVE ACTIVITIES AND ADLS

## 2020-02-20 ASSESSMENT — PAIN DESCRIPTION - DIRECTION: RADIATING_TOWARDS: RIGHT LEG

## 2020-02-20 NOTE — PROGRESS NOTES
[address] [address]  INPATIENT PHYSICAL THERAPY  EVALUATION  [facility] ORTHOPEDICS 7K - 7K-16/016-A    Time In: 8315  Time Out: 2318  Timed Code Treatment Minutes: 23 Minutes  Minutes: 31          Date: 2020  Patient Name: Gwendlyn Libman,  Gender:  female        MRN: [id number]  : 1955  (75 y.o.)      Referring Practitioner: Amita Freed PA-C  Diagnosis: cervical spinal stenosis  Additional Pertinent Hx: S/p LUMBAR LAMINECTOMY PSF L3-L5  WITH CAPTIVA      Restrictions/Precautions:  Restrictions/Precautions: General Precautions, Surgical Protocols, Fall Risk  Required Braces or Orthoses  Spinal: Lumbar Corset  Position Activity Restriction  Spinal Precautions: No Bending, No Twisting, No Lifting    Subjective:  Chart Reviewed: Yes  Patient assessed for rehabilitation services?: Yes  Family / Caregiver Present: No  Subjective: RN approved PT evaluation. Pt in supine and was agreeable to PT interventions. Pt limited by nausea, no headache reported. Post session, pt in bedside chair and was agreeable to PT interventions. General:  Overall Orientation Status: Within Functional Limits  Follows Commands: Within Functional Limits    Vision: Impaired  Vision Exceptions: Wears glasses at all times    Hearing: Within functional limits         Pain:  Yes.   Pain Assessment  Pain Assessment: 0-10(2-3)  Pain Location: Back  Non-Pharmaceutical Pain Intervention(s): Ambulation/Increased Activity  Response to Pain Intervention: Patient Satisfied       Social/Functional History:    Lives With: Spouse  Type of Home: House  Home Layout: Two level, Bed/Bath upstairs  Home Access: Stairs to enter with rails  Entrance Stairs - Number of Steps: 4  Entrance Stairs - Rails: Right  Home Equipment: Rolling walker     Bathroom Shower/Tub: Walk-in shower  Bathroom Toilet: Standard  Bathroom Equipment: (none)  Bathroom Accessibility: Not accessible       ADL Assistance: Independent  Homemaking Assistance: medical, social and functional history, completion of standardized testing, formal and informal observation of tasks, assessment of data and development of plan of care and goals. Treatment time included skilled education and facilitation of tasks to increase safety and independence with functional mobility for improved independence and quality of life. Pt instructed in spinal precautions, log rolling and brace wear. Pt reports understanding. Pt then completed short bout of gait training with 2WW. Cueing for postural awareness. Dynamic functional tasks completed in the bathroom with contact guard to stand-by assist, no LOB noted. Pt requires assist for pericare this date. Post session, pt in bedside chair with all needs in reach. RN Aware. Chair alarm on. Assessment: Body structures, Functions, Activity limitations: Decreased functional mobility , Decreased safe awareness, Decreased balance, Decreased posture, Decreased endurance, Increased pain  Assessment: Pt is s/p LUMBAR LAMINECTOMY PSF L3-L5  WITH CAPTIVA 2/19. Pt demonstrates a decrease in baseline by way of transfers, bed mobility and ambulation. Pt will benefit from skilled PT services during admission and post d/c for improved functional I and safety with mobility.    Prognosis: Good    REQUIRES PT FOLLOW UP: Yes    Discharge Recommendations:  Discharge Recommendations: Continue to assess pending progress    Patient Education:  PT Education: Goals, PT Role, Plan of Care    Equipment Recommendations:       Plan:  Times per week: 6xO/spine  Current Treatment Recommendations: Strengthening, Transfer Training, Endurance Training, Neuromuscular Re-education, Patient/Caregiver Education & Training, Balance Training, Gait Training, Home Exercise Program, Functional Mobility Training, Stair training, Safety Education & Training, Equipment Evaluation, Education, & procurement    Goals:  Patient goals : return home  Short term goals  Time Frame for Short

## 2020-02-20 NOTE — CARE COORDINATION
2/20/20, 7:40 AM  DISCHARGE PLANNING EVALUATION:    Kaia Alonso       Admitted from: PACU 2/19/2020/ Gary Leblanc 43 day: 1   Location: -16/016-A Reason for admit: Spinal stenosis, lumbar region without neurogenic claudication [M48.061]   Status: IP  Admit order signed?: yes  PMH:  has a past medical history of Bronchitis, CAD (coronary artery disease), Cerebral artery occlusion with cerebral infarction ([address] COPD (chronic obstructive pulmonary disease) ([address] Diverticulosis, Essential hypertension, Foot drop, right foot, GERD (gastroesophageal reflux disease), IBS (irritable bowel syndrome), Kidney stone, Malignant neoplasm of upper-inner quadrant of left female breast ([address] MI (myocardial infarction) ([address] and PONV (postoperative nausea and vomiting). Procedure:   02/19 by Dr Lorena Campbell  L3  & L4 laminectomy. L3, L4 and L5 bilateral posterolateral fusion of lumbar spine. Pertinent abnormal Imaging:na  Medications:  Scheduled Meds:   amLODIPine  10 mg Oral Daily    atorvastatin  80 mg Oral Nightly    fluticasone  1 spray Nasal Daily    letrozole  2.5 mg Oral Daily    metoprolol succinate  25 mg Oral Daily    glycopyrrolate-formoterol  2 puff Inhalation BID    sodium chloride flush  10 mL Intravenous 2 times per day    polyethylene glycol  17 g Oral Daily    bisacodyl  5 mg Oral Daily    naloxegol  12.5 mg Oral QAM     Continuous Infusions:   sodium chloride 100 mL/hr at 02/20/20 7425      Pertinent Info/Orders/Treatment Plan:  POD #1-  NV checks, PT and OT evals, remove gallardo, abdominal binder, monitor drain outputs, wound care, IVF, ice therapy, reposition frequently, Hypo BS, afebrile, follow labs Hgb 8.2, hct 26.3. Diet: DIET GENERAL;   Smoking status:  reports that she quit smoking about 15 months ago. Her smoking use included cigarettes. She has a 30.00 pack-year smoking history.  She has never used smokeless tobacco.   PCP: Collette Jones, DO  Readmission 30 days or less:

## 2020-02-20 NOTE — PROGRESS NOTES
surgery and to ensure safe transition to the next level of care and return to PLOF. Performance deficits / Impairments: Decreased balance, Decreased safe awareness, Decreased functional mobility , Decreased ADL status, Decreased endurance, Decreased high-level IADLs, Decreased strength  Prognosis: Fair  REQUIRES OT FOLLOW UP: Yes    Treatment Initiated: Treatment and education initiated within context of evaluation. Evaluation time included review of current medical information, gathering information related to past medical, social and functional history, completion of standardized testing, formal and informal observation of tasks, assessment of data and development of plan of care and goals. Treatment time included skilled education and facilitation of tasks to increase safety and independence with ADL's for improved functional independence and quality of life. Discharge Recommendations:  Continue to assess pending progress, Home with assist PRN    Patient Education:  OT Education: OT Role, Transfer Training, Equipment, Plan of Care, Precautions, Family Education, ADL Adaptive Strategies  Patient Education: importance of increasing activity/participation in session, progression of therapy    Equipment Recommendations:  Equipment Needed: Yes  Other: BSC and hip kit    Plan:  Times per week: 6x  Current Treatment Recommendations: Strengthening, Endurance Training, Balance Training, Functional Mobility Training, Safety Education & Training, Pain Management, Equipment Evaluation, Education, & procurement, Patient/Caregiver Education & Training, Self-Care / ADL, Home Management Training, Positioning    Goals:  Patient goals :  To go home  Short term goals  Time Frame for Short term goals: 2 weeks  Short term goal 1: Pt to demo LB ADLs using LHAE and SBA with 0 cues for back precautions for inc indep with self care  Short term goal 2: Pt to demo toileting with CGA and min cues for adaptive technique for increased indep with basic ADL tasks  Short term goal 3: Pt to demo [place] mobility and various functional t/fs with SBA and 0 cues for safety for inc indep with accessing home  Short term goal 4: Pt to verbalize 3/3 back precautions with 0 cues for recall for increased safety performing preferred occupations  Short term goal 5: Pt to demo improved activity tolerance as demonstrated by BADL routine with fewer than 3 rest breaks and SBA for increased participation in self care  Long term goals  Time Frame for Long term goals : NA d/t ELOS  See long-term goal time frame for expected duration of plan of care. If no long-term goals established, a short length of stay is anticipated. Following session, patient left in safe position with all fall risk precautions in place.

## 2020-02-20 NOTE — PROGRESS NOTES
Date: 2020  Patient Name: Chance Guzmán        MRN:   [id number]   Account: [de-identified]   : 1955  (75 y.o.)  Gender: female   Referring Practitioner: Gerard Elliott PA-C  Diagnosis: Spinal stenosis, lumbar region without neurogenic claudication  Additional Pertinent Hx: s/p LUMBAR LAMINECTOMY PSF L3-L5  WITH CAPTIVA      Chance Guzmán requires [employer] Equipment to complete bathing, toileting, dressing and grooming tasks. Patient requires a Long Handle Adaptive Equipment due to Upper Extremity/Lower Extremity Weakness and spinal precautions and is unable to complete activities of daily living without [address]. Without the [address], Chance Guzmán is at increased risk for falls and would require increased assistance for ADL's.

## 2020-02-20 NOTE — PROGRESS NOTES
spirometry used at bedside.     Electronically signed by Hesham Candelario PA-C on 2/20/2020 at 7:28 AM

## 2020-02-20 NOTE — PLAN OF CARE
Problem: Falls - Risk of:  Goal: Will remain free from falls  Description  Will remain free from falls  Outcome: Met This Shift  Note:   Patient remained free from falls this shift. Used call light appropriately. On bedrest until PT/OT sees. Bed alarm on. Call light in reach. Goal: Absence of physical injury  Description  Absence of physical injury  Outcome: Met This Shift  Note:   Patient remained free from physical injury this shift. Used call light appropriately. Bedrest. Bed alarm on. Call light in reach. Pathway clear. Problem: Infection:  Goal: Will remain free from infection  Description  Will remain free from infection  Outcome: Met This Shift  Note:   Patient afebrile this shift. Labwork this a.m. IV antibiotics per MAR. Will continue to monitor for s/s of infection. Problem: Pain:  Goal: Pain level will decrease  Description  Pain level will decrease  Outcome: Ongoing  Note:   Pain goal: 5. Patient with pain this shift. Pain medication per MAR. Non-pharmacological pain mgmt - rest, reposition, distraction, elevation, emotional support, ice provided. Will continue to monitor for pain. Problem: Daily Care:  Goal: Daily care needs are met  Description  Daily care needs are met  Outcome: Ongoing  Note:   Patient aids with turning self in bed. Pillow support provided. Emotional support provided. Daily care needs met this shift. Problem: Skin Integrity:  Goal: Skin integrity will stabilize  Description  Skin integrity will stabilize  Outcome: Ongoing  Note:   Patient with scattered bruising and surgical incision to back with dressing in place. Will continue to monitor skin integrity and encourage repositioning. Patient turns self in bed. Pillow support provided. Problem: Discharge Planning:  Goal: Patients continuum of care needs are met  Description  Patients continuum of care needs are met  Outcome: Ongoing  Note:   Patient from home with .  Plans to return home with  at discharge. Unknown discharge date at this time. Plans to work with PT/OT. Will continue to monitor for discharge needs. Patient participated in plan of care and contributed to goal setting.

## 2020-02-20 NOTE — OP NOTE
[address] [place] [address]                                OPERATIVE REPORT    PATIENT NAME: Yasir Atkins                 :        1955  MED REC NO:   [id number]                           ROOM:       0016  ACCOUNT NO:   [de-identified]                           ADMIT DATE: 2020  PROVIDER:     Scarlet Goldberg, M.D.    DATE OF PROCEDURE:  2020    PREOPERATIVE DIAGNOSES:  1. Lumbar spinal stenosis. 2.  Grade 1 spondylolisthesis of levels of L3-L4 and L4-L5. 3.  Degenerative disk disease of levels of L5-S1.  4.  Lumbar radiculopathy, bilateral.  5.  Status post previous lumbar spine decompressive hemilaminectomy and      diskectomy at level of left L5-S1 from 2 previous operations. POSTOPERATIVE DIAGNOSES:  1. Lumbar spinal stenosis. 2.  Grade 1 spondylolisthesis of levels of L3-L4 and L4-L5. 3.  Degenerative disk disease of levels of L5-S1.  4.  Lumbar radiculopathy, bilateral.  5.  Status post previous lumbar spine decompressive hemilaminectomy and      diskectomy at level of left L5-S1 from 2 previous operations    OPERATIONS PERFORMED:  1. L3 laminectomy. 2.  L4 laminectomy. 3.  Revision, left side L5 hemilaminectomy with revision, left L5 nerve      root exploration. 4.  Revision, left side S1 hemilaminectomy with revision, left S1 nerve      root exploration. 5.  L3, L4 and L5 bilateral posterolateral fusion of lumbar spine. 6.  Use of allograft bone [place] DBM 2 x 10 cm kit for fusion of lumbar      spine levels of L3 through L5.  7.  Use of local bone graft and cleaned of all soft tissue for use in      this lumbar spine fusion, cleaned of all soft tissue run through bone      mill as well for use in this bone graft for fusion.   8.  Instrumentation of lumbar spine levels of L3, L4, L5 bilateral      segmental with use of the [place] pedicle screw fixation system placed      bilateral well.  We then irrigated thoroughly  with use of Irrisept and saline solution and would proceed to suction  dry and then apply the allograft bone [place] DBM, mixing this with use  of local bone grafting over levels of L3, L4 and L5 for fusion. We  would proceed to close in layers, place two drains, and sew these in as  well and finish with a layered closure, then take the patient back to  Recovery post extubation. My first assistant was also Serene Burnette PA-C, as well throughout the operation. Serene Burnette, ROLAND, assisted throughout the procedure with positioning,  draping, retraction, wound closure, dressing, and splint application.         Patricia Burns M.D.    D: 02/19/2020 12:28:39       T: 02/19/2020 15:27:42     [email or web address]  Job#: [id number]     Doc#: [id number]    CC:

## 2020-02-21 LAB
BASOPHILS # BLD: 0.3 %
BASOPHILS ABSOLUTE: 0 THOU/MM3 (ref 0–0.1)
EOSINOPHIL # BLD: 1.3 %
EOSINOPHILS ABSOLUTE: 0.2 THOU/MM3 (ref 0–0.4)
ERYTHROCYTE [DISTWIDTH] IN BLOOD BY AUTOMATED COUNT: 12.7 % (ref 11.5–14.5)
ERYTHROCYTE [DISTWIDTH] IN BLOOD BY AUTOMATED COUNT: 44.3 FL (ref 35–45)
HCT VFR BLD CALC: 26.3 % (ref 37–47)
HEMOGLOBIN: 8.2 GM/DL (ref 12–16)
IMMATURE GRANS (ABS): 0.06 THOU/MM3 (ref 0–0.07)
IMMATURE GRANULOCYTES: 0.5 %
LYMPHOCYTES # BLD: 11.4 %
LYMPHOCYTES ABSOLUTE: 1.5 THOU/MM3 (ref 1–4.8)
MCH RBC QN AUTO: 29.4 PG (ref 26–33)
MCHC RBC AUTO-ENTMCNC: 31.2 GM/DL (ref 32.2–35.5)
MCV RBC AUTO: 94.3 FL (ref 81–99)
MONOCYTES # BLD: 12.2 %
MONOCYTES ABSOLUTE: 1.6 THOU/MM3 (ref 0.4–1.3)
NUCLEATED RED BLOOD CELLS: 0 /100 WBC
PLATELET # BLD: 180 THOU/MM3 (ref 130–400)
PMV BLD AUTO: 10.5 FL (ref 9.4–12.4)
RBC # BLD: 2.79 MILL/MM3 (ref 4.2–5.4)
SEG NEUTROPHILS: 74.3 %
SEGMENTED NEUTROPHILS ABSOLUTE COUNT: 9.5 THOU/MM3 (ref 1.8–7.7)
WBC # BLD: 12.8 THOU/MM3 (ref 4.8–10.8)

## 2020-02-21 PROCEDURE — 97116 GAIT TRAINING THERAPY: CPT

## 2020-02-21 PROCEDURE — 94761 N-INVAS EAR/PLS OXIMETRY MLT: CPT

## 2020-02-21 PROCEDURE — 36415 COLL VENOUS BLD VENIPUNCTURE: CPT

## 2020-02-21 PROCEDURE — 97110 THERAPEUTIC EXERCISES: CPT

## 2020-02-21 PROCEDURE — 97535 SELF CARE MNGMENT TRAINING: CPT

## 2020-02-21 PROCEDURE — 2580000003 HC RX 258: Performed by: PHYSICIAN ASSISTANT

## 2020-02-21 PROCEDURE — 6370000000 HC RX 637 (ALT 250 FOR IP): Performed by: PHYSICIAN ASSISTANT

## 2020-02-21 PROCEDURE — 94640 AIRWAY INHALATION TREATMENT: CPT

## 2020-02-21 PROCEDURE — 85025 COMPLETE CBC W/AUTO DIFF WBC: CPT

## 2020-02-21 PROCEDURE — 1200000000 HC SEMI PRIVATE

## 2020-02-21 RX ADMIN — OXYCODONE HYDROCHLORIDE AND ACETAMINOPHEN 2 TABLET: 5; 325 TABLET ORAL at 22:34

## 2020-02-21 RX ADMIN — OXYCODONE HYDROCHLORIDE AND ACETAMINOPHEN 2 TABLET: 5; 325 TABLET ORAL at 08:36

## 2020-02-21 RX ADMIN — OXYCODONE HYDROCHLORIDE AND ACETAMINOPHEN 2 TABLET: 5; 325 TABLET ORAL at 14:00

## 2020-02-21 RX ADMIN — OXYCODONE HYDROCHLORIDE AND ACETAMINOPHEN 2 TABLET: 5; 325 TABLET ORAL at 18:26

## 2020-02-21 RX ADMIN — PROMETHAZINE HYDROCHLORIDE 12.5 MG: 25 TABLET ORAL at 16:28

## 2020-02-21 RX ADMIN — NALOXEGOL OXALATE 12.5 MG: 12.5 TABLET, FILM COATED ORAL at 08:36

## 2020-02-21 RX ADMIN — Medication 10 ML: at 08:38

## 2020-02-21 RX ADMIN — AMLODIPINE BESYLATE 10 MG: 10 TABLET ORAL at 11:10

## 2020-02-21 RX ADMIN — GLYCOPYRROLATE AND FORMOTEROL FUMARATE 2 PUFF: 9; 4.8 AEROSOL, METERED RESPIRATORY (INHALATION) at 17:51

## 2020-02-21 RX ADMIN — METOPROLOL SUCCINATE 25 MG: 25 TABLET, FILM COATED, EXTENDED RELEASE ORAL at 08:36

## 2020-02-21 RX ADMIN — ATORVASTATIN CALCIUM 80 MG: 80 TABLET, FILM COATED ORAL at 21:45

## 2020-02-21 RX ADMIN — OXYCODONE HYDROCHLORIDE AND ACETAMINOPHEN 2 TABLET: 5; 325 TABLET ORAL at 04:00

## 2020-02-21 RX ADMIN — LETROZOLE 2.5 MG: 2.5 TABLET ORAL at 08:36

## 2020-02-21 RX ADMIN — GLYCOPYRROLATE AND FORMOTEROL FUMARATE 2 PUFF: 9; 4.8 AEROSOL, METERED RESPIRATORY (INHALATION) at 06:35

## 2020-02-21 RX ADMIN — POLYETHYLENE GLYCOL 3350 17 G: 17 POWDER, FOR SOLUTION ORAL at 08:36

## 2020-02-21 RX ADMIN — Medication 10 ML: at 21:45

## 2020-02-21 RX ADMIN — BISACODYL 5 MG: 5 TABLET, COATED ORAL at 08:36

## 2020-02-21 RX ADMIN — FLUTICASONE PROPIONATE 1 SPRAY: 50 SPRAY, METERED NASAL at 08:36

## 2020-02-21 ASSESSMENT — PAIN DESCRIPTION - ORIENTATION
ORIENTATION: LOWER
ORIENTATION: LOWER

## 2020-02-21 ASSESSMENT — PAIN DESCRIPTION - DESCRIPTORS
DESCRIPTORS: ACHING
DESCRIPTORS: ACHING

## 2020-02-21 ASSESSMENT — PAIN SCALES - GENERAL
PAINLEVEL_OUTOF10: 8
PAINLEVEL_OUTOF10: 7
PAINLEVEL_OUTOF10: 6
PAINLEVEL_OUTOF10: 7
PAINLEVEL_OUTOF10: 6
PAINLEVEL_OUTOF10: 5
PAINLEVEL_OUTOF10: 7
PAINLEVEL_OUTOF10: 0
PAINLEVEL_OUTOF10: 8
PAINLEVEL_OUTOF10: 5
PAINLEVEL_OUTOF10: 6
PAINLEVEL_OUTOF10: 7

## 2020-02-21 ASSESSMENT — PAIN DESCRIPTION - PAIN TYPE
TYPE: SURGICAL PAIN

## 2020-02-21 ASSESSMENT — PAIN DESCRIPTION - FREQUENCY
FREQUENCY: CONTINUOUS
FREQUENCY: CONTINUOUS

## 2020-02-21 ASSESSMENT — PAIN DESCRIPTION - LOCATION
LOCATION: BACK

## 2020-02-21 ASSESSMENT — PAIN DESCRIPTION - ONSET
ONSET: ON-GOING
ONSET: ON-GOING

## 2020-02-21 ASSESSMENT — PAIN DESCRIPTION - PROGRESSION
CLINICAL_PROGRESSION: NOT CHANGED
CLINICAL_PROGRESSION: NOT CHANGED

## 2020-02-21 ASSESSMENT — PAIN - FUNCTIONAL ASSESSMENT
PAIN_FUNCTIONAL_ASSESSMENT: PREVENTS OR INTERFERES SOME ACTIVE ACTIVITIES AND ADLS
PAIN_FUNCTIONAL_ASSESSMENT: PREVENTS OR INTERFERES SOME ACTIVE ACTIVITIES AND ADLS

## 2020-02-21 NOTE — PROGRESS NOTES
[facility]  INPATIENT PHYSICAL THERAPY  DAILY NOTE  [facility] ORTHOPEDICS 7K - 7K-16/016-A      Time In: 4064  Time Out: 1035  Timed Code Treatment Minutes: 27 Minutes  Minutes: 27          Date: 2020  Patient Name: Lori Epperson,  Gender:  female        MRN: [id number]  : 1955  (75 y.o.)     Referring Practitioner: Freeman Osei PA-C  Diagnosis: cervical spinal stenosis  Additional Pertinent Hx: S/p LUMBAR LAMINECTOMY PSF L3-L5  WITH CAPTIVA      Prior Level of Function:  Lives With: Spouse  Type of Home: House  Home Layout: Two level, Bed/Bath upstairs  Home Access: Stairs to enter with rails  Entrance Stairs - Number of Steps: 4  Entrance Stairs - Rails: Right  Home Equipment: Rolling walker   Bathroom Shower/Tub: Walk-in shower  Bathroom Toilet: Standard  Bathroom Equipment: (none)  Bathroom Accessibility: Not accessible    ADL Assistance: Independent  Homemaking Assistance: Independent  Ambulation Assistance: Independent  Transfer Assistance: Independent  Additional Comments: Pt reports IND at PLOF, pt's only bathroom is on the second floor and pt requires BSC at discharge. Pt is also interested in purchasing hip kit    Restrictions/Precautions:  Restrictions/Precautions: General Precautions, Surgical Protocols, Fall Risk  Required Braces or Orthoses  Spinal: Lumbar Corset  Position Activity Restriction  Spinal Precautions: No Bending, No Twisting, No Lifting    SUBJECTIVE: RN approved session. Pt in restroom with tech requesting to return to bed. Pt agreeable to session.     PAIN: 7/10:       OBJECTIVE:  Bed Mobility:  Rolling to Left: Stand By Assistance   Rolling to Right: Stand By Assistance   Sit to Supine: Contact Guard Assistance   Scooting: Stand By Assistance    Transfers:  Sit to Stand: [employer], with increased time for completion, cues for hand placement  Stand to [place] [address], with increased time for completion, cues for hand placement    Ambulation:  Contact Guard Assistance  Distance: [id number]  Surface: Level Tile  Device:Rolling Walker  Gait Deviations: Forward Flexed Posture, Slow Tameka, Decreased Step Length Bilaterally, Decreased Arm Swing, Decreased Trunk Rotation, Decreased Gait Speed, Decreased Heel Strike Bilaterally and Narrow Base of Support    Balance:  Static Sitting Balance:  Stand By Assistance  Dynamic Sitting Balance: Stand By Assistance  Static Standing Balance: Contact Guard Assistance    Exercise:  Patient was guided in seated set(s) 10 reps of exercise to both lower extremities. Ankle pumps, Glut sets, Long arc quads, Hip abduction/adduction and Seated hip flexion. Exercises were completed for increased independence with functional mobility. Functional Outcome Measures: Completed  AM-PAC Inpatient Mobility without Stair Climbing Raw Score : 15  AM-PAC Inpatient without Stair Climbing T-Scale Score : 43.03    ASSESSMENT:  Assessment: Patient progressing toward established goals. Activity Tolerance:  Patient tolerance of  treatment: good. Pt unable to recall precautions.      Equipment Recommendations:   Discharge Recommendations:    Continue to assess pending progress    Plan: Times per week: 6xO/spine  Current Treatment Recommendations: Strengthening, Transfer Training, Endurance Training, Neuromuscular Re-education, Patient/Caregiver Education & Training, Balance Training, Gait Training, Home Exercise Program, Functional Mobility Training, Stair training, Safety Education & Training, Equipment Evaluation, Education, & procurement    Patient Education  Patient Education: Plan of Care, Precautions/Restrictions, Gait    Goals:  Patient goals : return home  Short term goals  Time Frame for Short term goals: by discharge  Short term goal 1: bed mobility with I for ease of getting in/out of bed via log rolling  Short term goal 2: sit <> stand with supervision A for ease of transfers   Short term goal 3: ambulate >

## 2020-02-21 NOTE — PROGRESS NOTES
Department of Orthopedic Surgery  Spine Service  Marcus Ricks PA-C Progress Note        Subjective:  Eduardo Longoria is resting in bed. Doing well. Was up yesterday and walked about room to the chair. Felt well when up. Plan to move into the hallway today. Work on bm. Vitals  VITALS:  /66   Pulse 93   Temp 98.3 °F (36.8 °C) (Oral)   Resp 16   Ht 4' 10\" (1.473 m)   Wt 138 lb 12.8 oz (63 kg)   SpO2 92%   BMI 29.01 kg/m²   24HR INTAKE/OUTPUT:      Intake/Output Summary (Last 24 hours) at 2/21/2020 0751  Last data filed at 2/21/2020 9879  Gross per 24 hour   Intake 2940.06 ml   Output 1315 ml   Net 1625.06 ml     URINARY CATHETER OUTPUT (Holguin):  [REMOVED] Urethral Catheter Non-latex 16 fr-Output (mL): 200 mL  DRAIN/TUBE OUTPUT:  Closed/Suction Drain Inferior; Left Back Accordion-Output (ml): 30 ml  Closed/Suction Drain Inferior;Right Back-Output (ml): 30 ml      PHYSICAL EXAM:    Orientation:  alert and oriented to person, place and time    Incision:  dressing in place, clean, dry, intact    Lower Extremity Motor :  quadriceps, extensor hallucis longus, dorsiflexion, plantarflexion 5/5 bilaterally  Lower Extremity Sensory:  Intact L1-S1    Flatus:  positive    ABNORMAL EXAM FINDINGS:  none    LABS:    HgB:    Lab Results   Component Value Date    HGB 8.2 02/21/2020         ASSESSMENT AND PLAN:    Post operative day 2     1:  Monitor labs and drain output  2:  Activity Level:  OOB with therapy, walk in the santillan  3:  Pain Control:  Controlled  4:  Discharge Planning:  Home once walking better has bm  5:  Use PRN bowel meds, suppository.

## 2020-02-21 NOTE — PROGRESS NOTES
[address] [facility]  Occupational Therapy  Daily Note  Time:   Time In: 0840  Time Out: 3332  Timed Code Treatment Minutes: 38 Minutes  Minutes: 38          Date: 2020  Patient Name: Snehal Vivas,   Gender: female      Room: [facility]16/016-A  MRN: [id number]  : 1955  (75 y.o.)  Referring Practitioner: Vandana Salinas PA-C  Diagnosis: Spinal stenosis, lumbar region without neurogenic claudication  Additional Pertinent Hx: s/p LUMBAR LAMINECTOMY PSF L3-L5  WITH CAPTIVA     Restrictions/Precautions:  Restrictions/Precautions: General Precautions, Surgical Protocols, Fall Risk  Required Braces or Orthoses  Spinal: Lumbar Corset  Position Activity Restriction  Spinal Precautions: No Bending, No Twisting, No Lifting    SUBJECTIVE: received in bathroom from RN, pt reports already bathing with night nurse however agreeable to OT session    PAIN: 10: back    COGNITION: Slow Processing, Decreased Recall, Decreased Insight, Decreased Problem Solving and Decreased Safety Awareness    ADL:   Grooming: Contact Guard Assistance. handhygiene sinkside  Upper Extremity Dressing: Minimal Assistance. leny/doff lumbar corset, re-ed on purpose  Lower Extremity Dressing: Moderate Assistance, with set-up, with verbal cues  and with increased time for completion. with use of LHAE, demonstration and trial of DME completed with fair carryover noted, pt requiring frequent cueing for task success and max inc time  Toileting: [employer], with verbal cues  and with increased time for completion. with cues for problem solving for back precautions and edu on increasing independence with task  Toilet Transfer: [employer]. Marcos Avitia BALANCE:  Sitting Balance:  Stand By Assistance. Standing Balance: Contact Guard Assistance. TRANSFERS:  Sit to Stand:  Contact Guard Assistance. Stand to Sit: Contact Guard Assistance.       FUNCTIONAL MOBILITY:  Assistive Device: Rolling Walker  Assist Level:  Contact Guard Assistance. Distance: To and from bathroom  Slow pace, no LOB    ADDITIONAL ACTIVITIES:  Pt education, demonstration and trial of use of LHAE completed with fair carryover noted. Pt did require mod cues for technique throughout however no cues required for back precautions. Pt is somewhat self limiting and requires education on increasing independence and participation in tasks. ASSESSMENT:     Activity Tolerance:  Patient tolerance of  treatment: fair.         Discharge Recommendations: Continue to assess pending progress, Home with assist PRN  Equipment Recommendations: Equipment Needed: Yes  Other: BSC and hip kit  Plan: Times per week: 6x  Current Treatment Recommendations: Strengthening, Endurance Training, Balance Training, Functional Mobility Training, Safety Education & Training, Pain Management, Equipment Evaluation, Education, & procurement, Patient/Caregiver Education & Training, Self-Care / ADL, Home Management Training, Positioning    Patient Education  Patient Education: Role of OT, Plan of Care, ADL's, Precautions, Equipment Education, Reviewed Prior Education, Importance of Increasing Activity and Assistive Device Safety    Goals  Short term goals  Time Frame for Short term goals: 2 weeks  Short term goal 1: Pt to demo LB ADLs using LHAE and SBA with 0 cues for back precautions for inc indep with self care  Short term goal 2: Pt to demo toileting with CGA and min cues for adaptive technique for increased indep with basic ADL tasks  Short term goal 3: Pt to demo HH mobility and various functional t/fs with SBA and 0 cues for safety for inc indep with accessing home  Short term goal 4: Pt to verbalize 3/3 back precautions with 0 cues for recall for increased safety performing preferred occupations  Short term goal 5: Pt to demo improved activity tolerance as demonstrated by BADL routine with fewer than 3 rest breaks and SBA for increased participation in self care  Long term goals  Time Frame for Long term goals : NA d/t ELOS    Following session, patient left in safe position with all fall risk precautions in place.

## 2020-02-21 NOTE — CARE COORDINATION
2/21/20, 3:25 PM    DISCHARGE ON [address] day: 2  Location: On license of [facility]16/016A Reason for admit: Spinal stenosis, lumbar region without neurogenic claudication [M48.061]   Procedure:   02/19 by Dr Deshawn Lux  L3  & L4 laminectomy. L3, L4 and L5 bilateral posterolateral fusion of lumbar spine.     Treatment Plan of Care: POD #2, up with therapies, I/O, pain management, dressing care, ice therapy. Barriers to Discharge: not med ready      PCP: Darío Lancaster DO  Readmission Risk Score: 11%  Patient Goals/Plan/Treatment Preferences: plans home with spouse and new [facility]  for drain management. Luis Felipe Foss from [facility] aware.

## 2020-02-21 NOTE — PLAN OF CARE
physical injury to patient this shift. Bed in lowest position with wheels locked and call light in reach. Problem: Safety:  Goal: Free from intentional harm  Description  Free from intentional harm  2/20/2020 2307 by Charly Parra RN  Outcome: Completed  Note:   No intentional harm to patient this shift. Care plan reviewed with patient. Patient verbalizes understanding of plan of care and contributes to goal setting.

## 2020-02-22 LAB
ABO: NORMAL
ANTIBODY SCREEN: NORMAL
BASOPHILS # BLD: 0.6 %
BASOPHILS ABSOLUTE: 0.1 THOU/MM3 (ref 0–0.1)
EOSINOPHIL # BLD: 2.9 %
EOSINOPHILS ABSOLUTE: 0.3 THOU/MM3 (ref 0–0.4)
ERYTHROCYTE [DISTWIDTH] IN BLOOD BY AUTOMATED COUNT: 12.9 % (ref 11.5–14.5)
ERYTHROCYTE [DISTWIDTH] IN BLOOD BY AUTOMATED COUNT: 43.8 FL (ref 35–45)
HCT VFR BLD CALC: 24.3 % (ref 37–47)
HEMOGLOBIN: 7.6 GM/DL (ref 12–16)
IMMATURE GRANS (ABS): 0.03 THOU/MM3 (ref 0–0.07)
IMMATURE GRANULOCYTES: 0.3 %
LYMPHOCYTES # BLD: 14.2 %
LYMPHOCYTES ABSOLUTE: 1.5 THOU/MM3 (ref 1–4.8)
MCH RBC QN AUTO: 29.2 PG (ref 26–33)
MCHC RBC AUTO-ENTMCNC: 31.3 GM/DL (ref 32.2–35.5)
MCV RBC AUTO: 93.5 FL (ref 81–99)
MONOCYTES # BLD: 12.3 %
MONOCYTES ABSOLUTE: 1.3 THOU/MM3 (ref 0.4–1.3)
NUCLEATED RED BLOOD CELLS: 0 /100 WBC
PLATELET # BLD: 178 THOU/MM3 (ref 130–400)
PMV BLD AUTO: 9.9 FL (ref 9.4–12.4)
RBC # BLD: 2.6 MILL/MM3 (ref 4.2–5.4)
RH FACTOR: NORMAL
SEG NEUTROPHILS: 69.7 %
SEGMENTED NEUTROPHILS ABSOLUTE COUNT: 7.5 THOU/MM3 (ref 1.8–7.7)
WBC # BLD: 10.7 THOU/MM3 (ref 4.8–10.8)

## 2020-02-22 PROCEDURE — 2580000003 HC RX 258: Performed by: PHYSICIAN ASSISTANT

## 2020-02-22 PROCEDURE — 86900 BLOOD TYPING SEROLOGIC ABO: CPT

## 2020-02-22 PROCEDURE — 94760 N-INVAS EAR/PLS OXIMETRY 1: CPT

## 2020-02-22 PROCEDURE — 36430 TRANSFUSION BLD/BLD COMPNT: CPT

## 2020-02-22 PROCEDURE — 97116 GAIT TRAINING THERAPY: CPT

## 2020-02-22 PROCEDURE — 86923 COMPATIBILITY TEST ELECTRIC: CPT

## 2020-02-22 PROCEDURE — 86850 RBC ANTIBODY SCREEN: CPT

## 2020-02-22 PROCEDURE — 86901 BLOOD TYPING SEROLOGIC RH(D): CPT

## 2020-02-22 PROCEDURE — P9016 RBC LEUKOCYTES REDUCED: HCPCS

## 2020-02-22 PROCEDURE — 94640 AIRWAY INHALATION TREATMENT: CPT

## 2020-02-22 PROCEDURE — 36415 COLL VENOUS BLD VENIPUNCTURE: CPT

## 2020-02-22 PROCEDURE — 1200000000 HC SEMI PRIVATE

## 2020-02-22 PROCEDURE — 6370000000 HC RX 637 (ALT 250 FOR IP): Performed by: PHYSICIAN ASSISTANT

## 2020-02-22 PROCEDURE — 97530 THERAPEUTIC ACTIVITIES: CPT

## 2020-02-22 PROCEDURE — 85025 COMPLETE CBC W/AUTO DIFF WBC: CPT

## 2020-02-22 RX ADMIN — NALOXEGOL OXALATE 12.5 MG: 12.5 TABLET, FILM COATED ORAL at 08:57

## 2020-02-22 RX ADMIN — Medication 10 ML: at 20:10

## 2020-02-22 RX ADMIN — OXYCODONE HYDROCHLORIDE AND ACETAMINOPHEN 2 TABLET: 5; 325 TABLET ORAL at 07:27

## 2020-02-22 RX ADMIN — GLYCOPYRROLATE AND FORMOTEROL FUMARATE 2 PUFF: 9; 4.8 AEROSOL, METERED RESPIRATORY (INHALATION) at 17:51

## 2020-02-22 RX ADMIN — BISACODYL 5 MG: 5 TABLET, COATED ORAL at 08:57

## 2020-02-22 RX ADMIN — OXYCODONE HYDROCHLORIDE AND ACETAMINOPHEN 2 TABLET: 5; 325 TABLET ORAL at 22:17

## 2020-02-22 RX ADMIN — POLYETHYLENE GLYCOL 3350 17 G: 17 POWDER, FOR SOLUTION ORAL at 09:04

## 2020-02-22 RX ADMIN — GLYCOPYRROLATE AND FORMOTEROL FUMARATE 2 PUFF: 9; 4.8 AEROSOL, METERED RESPIRATORY (INHALATION) at 07:18

## 2020-02-22 RX ADMIN — OXYCODONE HYDROCHLORIDE AND ACETAMINOPHEN 2 TABLET: 5; 325 TABLET ORAL at 03:00

## 2020-02-22 RX ADMIN — OXYCODONE HYDROCHLORIDE AND ACETAMINOPHEN 2 TABLET: 5; 325 TABLET ORAL at 16:42

## 2020-02-22 RX ADMIN — Medication 1 PUFF: at 23:03

## 2020-02-22 RX ADMIN — Medication 10 ML: at 08:57

## 2020-02-22 RX ADMIN — CYCLOBENZAPRINE 10 MG: 10 TABLET, FILM COATED ORAL at 18:51

## 2020-02-22 RX ADMIN — BISACODYL 10 MG: 10 SUPPOSITORY RECTAL at 03:00

## 2020-02-22 RX ADMIN — LETROZOLE 2.5 MG: 2.5 TABLET ORAL at 08:57

## 2020-02-22 RX ADMIN — ATORVASTATIN CALCIUM 80 MG: 80 TABLET, FILM COATED ORAL at 20:08

## 2020-02-22 ASSESSMENT — PAIN SCALES - GENERAL
PAINLEVEL_OUTOF10: 7
PAINLEVEL_OUTOF10: 3
PAINLEVEL_OUTOF10: 7
PAINLEVEL_OUTOF10: 8
PAINLEVEL_OUTOF10: 0
PAINLEVEL_OUTOF10: 8
PAINLEVEL_OUTOF10: 7
PAINLEVEL_OUTOF10: 5
PAINLEVEL_OUTOF10: 7
PAINLEVEL_OUTOF10: 6

## 2020-02-22 ASSESSMENT — PAIN DESCRIPTION - ORIENTATION
ORIENTATION: LOWER
ORIENTATION: LOWER

## 2020-02-22 ASSESSMENT — PAIN DESCRIPTION - DESCRIPTORS: DESCRIPTORS: ACHING

## 2020-02-22 ASSESSMENT — PAIN DESCRIPTION - PROGRESSION
CLINICAL_PROGRESSION: GRADUALLY WORSENING
CLINICAL_PROGRESSION: GRADUALLY WORSENING
CLINICAL_PROGRESSION: GRADUALLY IMPROVING
CLINICAL_PROGRESSION: RAPIDLY WORSENING
CLINICAL_PROGRESSION: OTHER (COMMENT)

## 2020-02-22 ASSESSMENT — PAIN - FUNCTIONAL ASSESSMENT: PAIN_FUNCTIONAL_ASSESSMENT: PREVENTS OR INTERFERES SOME ACTIVE ACTIVITIES AND ADLS

## 2020-02-22 ASSESSMENT — PAIN DESCRIPTION - PAIN TYPE
TYPE: SURGICAL PAIN
TYPE: SURGICAL PAIN

## 2020-02-22 ASSESSMENT — PAIN DESCRIPTION - ONSET: ONSET: ON-GOING

## 2020-02-22 ASSESSMENT — PAIN DESCRIPTION - LOCATION
LOCATION: BACK
LOCATION: BACK

## 2020-02-22 ASSESSMENT — PAIN DESCRIPTION - FREQUENCY: FREQUENCY: INTERMITTENT

## 2020-02-22 NOTE — PLAN OF CARE
goal of a 5 on scale. Problem: Venous Thromboembolism:  Goal: Will show no signs or symptoms of venous thromboembolism  Description  Will show no signs or symptoms of venous thromboembolism  Outcome: Ongoing  Note:   Pt without s/s of DVT. Pt with SCD,S in place to help prevent development of DVT. Problem: GI  Goal: No bowel complications  Outcome: Ongoing  Note:   Pt with active bowel sounds, passing flatus, and without nausea. Taking prescribed medications to assist with [facility]      Care plan reviewed with patient. Patient verbalizes understanding of the plan of care and contributes to goal setting.

## 2020-02-22 NOTE — PROGRESS NOTES
[address] [address]  INPATIENT PHYSICAL THERAPY  DAILY NOTE  [facility] ORTHOPEDICS 7K - 7K-16/016-A    Time In: 3667  Time Out: 7095  Timed Code Treatment Minutes: 28 Minutes  Minutes: 28          Date: 2020  Patient Name: Ortiz Lozada,  Gender:  female        MRN: [id number]  : 1955  (75 y.o.)     Referring Practitioner: Kellen Resendiz PA-C  Diagnosis: cervical spinal stenosis  Additional Pertinent Hx: S/p LUMBAR LAMINECTOMY PSF L3-L5  WITH CAPTIVA      Prior Level of Function:  Lives With: Spouse  Type of Home: House  Home Layout: Two level, Bed/Bath upstairs  Home Access: Stairs to enter with rails  Entrance Stairs - Number of Steps: 4  Entrance Stairs - Rails: Right  Home Equipment: Rolling walker   Bathroom Shower/Tub: Walk-in shower  Bathroom Toilet: Standard  Bathroom Equipment: (none)  Bathroom Accessibility: Not accessible    ADL Assistance: Independent  Homemaking Assistance: Independent  Ambulation Assistance: Independent  Transfer Assistance: Independent  Additional Comments: Pt reports IND at PLOF, pt's only bathroom is on the second floor and pt requires BSC at discharge. Pt is also interested in purchasing hip kit    Restrictions/Precautions:  Restrictions/Precautions: General Precautions, Surgical Protocols, Fall Risk  Required Braces or Orthoses  Spinal: Lumbar Corset  Position Activity Restriction  Spinal Precautions: No Bending, No Twisting, No Lifting    SUBJECTIVE: RN approved therapy session. Patient sitting in BS chair upon arrival. Patient pleasant and agreeable to therapy session. PAIN: 6/10: Patient states 6/10 pain in right thigh upon initial treatment. At end of tx patient states no pain.     OBJECTIVE:  Bed Mobility:  Sit to Supine: Minimal Assistance   Scooting: Minimal Assistance    Transfers:  Sit to Stand: Contact Guard Assistance  Stand to Sit:Contact Guard Assistance    Ambulation:  Contact Guard Assistance  Distance: 250  Surface: Level Tile  Device:Rolling Walker  Gait Deviations: Forward Flexed Posture, Slow Tameka, Decreased Step Length Bilaterally, Decreased Gait Speed and Decreased Heel Strike Bilaterally     Stairs:  Contact Guard Assistance  Number of Steps: 4  Height: 6\" step with verbal cues for proper technique. Balance:  Not Tested    Exercise:  Not Tested    Functional Outcome Measures: Not completed       ASSESSMENT:  Assessment: Patient progressing toward established goals. Activity Tolerance:  Patient tolerance of  treatment: good. Equipment Recommendations:   Discharge Recommendations:  Continue to assess pending progress    Plan: Times per week: 6xO/spine  Current Treatment Recommendations: Strengthening, Transfer Training, Endurance Training, Neuromuscular Re-education, Patient/Caregiver Education & Training, Balance Training, Gait Training, Home Exercise Program, Functional Mobility Training, Stair training, Safety Education & Training, Equipment Evaluation, Education, & procurement    Patient Education  Patient Education: Plan of Care, Family Education, Transfers, Gait    Goals:  Patient goals : return home  Short term goals  Time Frame for Short term goals: by discharge  Short term goal 1: bed mobility with I for ease of getting in/out of bed via log rolling  Short term goal 2: sit <> stand with supervision A for ease of transfers   Short term goal 3: ambulate > 150ft with use of LRAD and stand-by assist to prepare for home d/c  Short term goal 4: 4 steps with contact guard assist 1 hand rail to prepare for home d/c  Long term goals  Time Frame for Long term goals : N/A secondary to short ELOS    Following session, patient left in safe position with all fall risk precautions in place.

## 2020-02-22 NOTE — PROGRESS NOTES
Department of Orthopedic Surgery  Spine Service  Char Haney PA-C Progress Note        Subjective:  Bing Costa is resting in bed. Doing well. Walking in the hallway yesterday. Was up today dn felt lightheaded, hgb was 7.6. Vitals  VITALS:  BP (!) 90/52   Pulse 79   Temp 98.4 °F (36.9 °C) (Oral)   Resp 14   Ht 4' 10\" (1.473 m)   Wt 138 lb 12.8 oz (63 kg)   SpO2 92%   BMI 29.01 kg/m²   24HR INTAKE/OUTPUT:      Intake/Output Summary (Last 24 hours) at 2/22/2020 0931  Last data filed at 2/22/2020 0028  Gross per 24 hour   Intake 600 ml   Output 1490 ml   Net -890 ml     URINARY CATHETER OUTPUT ([place]guin):  [REMOVED] Urethral Catheter Non-latex 16 fr-Output (mL): 200 mL  DRAIN/TUBE OUTPUT:  Closed/Suction Drain Inferior; Left Back Accordion-Output (ml): 10 ml  Closed/Suction Drain Inferior;Right Back-Output (ml): 30 ml      PHYSICAL EXAM:    Orientation:  alert and oriented to person, place and time    Incision:  dressing in place, clean, dry, intact    Lower Extremity Motor :  quadriceps, extensor hallucis longus, dorsiflexion, plantarflexion 5/5 bilaterally  Lower Extremity Sensory:  Intact L1-S1    Flatus:  positive    ABNORMAL EXAM FINDINGS:  none    LABS:    HgB:    Lab Results   Component Value Date    HGB 7.6 02/22/2020         ASSESSMENT AND PLAN:    Post operative day 3    1:  Monitor labs and drain output  2:  Activity Level:  OOB with therapy, walk in the santillan  3:  Pain Control:  Controlled  4:  Discharge Planning:  Planning home tomorrow  5: Enema today  6: 1 unit PRBC today.

## 2020-02-23 VITALS
BODY MASS INDEX: 29.14 KG/M2 | OXYGEN SATURATION: 97 % | HEIGHT: 58 IN | RESPIRATION RATE: 16 BRPM | SYSTOLIC BLOOD PRESSURE: 135 MMHG | TEMPERATURE: 97.6 F | WEIGHT: 138.8 LBS | HEART RATE: 99 BPM | DIASTOLIC BLOOD PRESSURE: 74 MMHG

## 2020-02-23 LAB
BASOPHILS # BLD: 0.6 %
BASOPHILS ABSOLUTE: 0.1 THOU/MM3 (ref 0–0.1)
EOSINOPHIL # BLD: 3.4 %
EOSINOPHILS ABSOLUTE: 0.3 THOU/MM3 (ref 0–0.4)
ERYTHROCYTE [DISTWIDTH] IN BLOOD BY AUTOMATED COUNT: 13.4 % (ref 11.5–14.5)
ERYTHROCYTE [DISTWIDTH] IN BLOOD BY AUTOMATED COUNT: 44.2 FL (ref 35–45)
HCT VFR BLD CALC: 31.3 % (ref 37–47)
HEMOGLOBIN: 10.3 GM/DL (ref 12–16)
IMMATURE GRANS (ABS): 0.03 THOU/MM3 (ref 0–0.07)
IMMATURE GRANULOCYTES: 0.3 %
LYMPHOCYTES # BLD: 22.8 %
LYMPHOCYTES ABSOLUTE: 2 THOU/MM3 (ref 1–4.8)
MCH RBC QN AUTO: 29.9 PG (ref 26–33)
MCHC RBC AUTO-ENTMCNC: 32.9 GM/DL (ref 32.2–35.5)
MCV RBC AUTO: 90.7 FL (ref 81–99)
MONOCYTES # BLD: 11.9 %
MONOCYTES ABSOLUTE: 1.1 THOU/MM3 (ref 0.4–1.3)
NUCLEATED RED BLOOD CELLS: 0 /100 WBC
PLATELET # BLD: 262 THOU/MM3 (ref 130–400)
PMV BLD AUTO: 9.5 FL (ref 9.4–12.4)
RBC # BLD: 3.45 MILL/MM3 (ref 4.2–5.4)
SEG NEUTROPHILS: 61 %
SEGMENTED NEUTROPHILS ABSOLUTE COUNT: 5.4 THOU/MM3 (ref 1.8–7.7)
WBC # BLD: 8.9 THOU/MM3 (ref 4.8–10.8)

## 2020-02-23 PROCEDURE — 94640 AIRWAY INHALATION TREATMENT: CPT

## 2020-02-23 PROCEDURE — 2580000003 HC RX 258: Performed by: PHYSICIAN ASSISTANT

## 2020-02-23 PROCEDURE — 85025 COMPLETE CBC W/AUTO DIFF WBC: CPT

## 2020-02-23 PROCEDURE — 94760 N-INVAS EAR/PLS OXIMETRY 1: CPT

## 2020-02-23 PROCEDURE — 6370000000 HC RX 637 (ALT 250 FOR IP): Performed by: PHYSICIAN ASSISTANT

## 2020-02-23 PROCEDURE — 36415 COLL VENOUS BLD VENIPUNCTURE: CPT

## 2020-02-23 RX ORDER — OXYCODONE HYDROCHLORIDE AND ACETAMINOPHEN 5; 325 MG/1; MG/1
1 TABLET ORAL EVERY 4 HOURS PRN
Qty: 42 TABLET | Refills: 0 | Status: SHIPPED | OUTPATIENT
Start: 2020-02-23 | End: 2020-03-01

## 2020-02-23 RX ORDER — CYCLOBENZAPRINE HCL 10 MG
10 TABLET ORAL 3 TIMES DAILY PRN
Qty: 50 TABLET | Refills: 0 | Status: SHIPPED | OUTPATIENT
Start: 2020-02-23 | End: 2020-03-04

## 2020-02-23 RX ADMIN — METOPROLOL SUCCINATE 25 MG: 25 TABLET, FILM COATED, EXTENDED RELEASE ORAL at 09:05

## 2020-02-23 RX ADMIN — Medication 1 PUFF: at 12:01

## 2020-02-23 RX ADMIN — Medication 1 PUFF: at 07:35

## 2020-02-23 RX ADMIN — CYCLOBENZAPRINE 10 MG: 10 TABLET, FILM COATED ORAL at 12:06

## 2020-02-23 RX ADMIN — POLYETHYLENE GLYCOL 3350 17 G: 17 POWDER, FOR SOLUTION ORAL at 09:07

## 2020-02-23 RX ADMIN — Medication 10 ML: at 09:05

## 2020-02-23 RX ADMIN — OXYCODONE HYDROCHLORIDE AND ACETAMINOPHEN 2 TABLET: 5; 325 TABLET ORAL at 03:43

## 2020-02-23 RX ADMIN — LETROZOLE 2.5 MG: 2.5 TABLET ORAL at 09:05

## 2020-02-23 RX ADMIN — CYCLOBENZAPRINE 10 MG: 10 TABLET, FILM COATED ORAL at 03:43

## 2020-02-23 RX ADMIN — BISACODYL 5 MG: 5 TABLET, COATED ORAL at 09:05

## 2020-02-23 RX ADMIN — OXYCODONE HYDROCHLORIDE AND ACETAMINOPHEN 1 TABLET: 5; 325 TABLET ORAL at 09:06

## 2020-02-23 RX ADMIN — NALOXEGOL OXALATE 12.5 MG: 12.5 TABLET, FILM COATED ORAL at 09:05

## 2020-02-23 RX ADMIN — AMLODIPINE BESYLATE 10 MG: 10 TABLET ORAL at 09:05

## 2020-02-23 RX ADMIN — FLUTICASONE PROPIONATE 1 SPRAY: 50 SPRAY, METERED NASAL at 09:05

## 2020-02-23 ASSESSMENT — PAIN DESCRIPTION - PROGRESSION
CLINICAL_PROGRESSION: GRADUALLY IMPROVING
CLINICAL_PROGRESSION: GRADUALLY WORSENING
CLINICAL_PROGRESSION: GRADUALLY IMPROVING

## 2020-02-23 ASSESSMENT — PAIN SCALES - GENERAL
PAINLEVEL_OUTOF10: 5
PAINLEVEL_OUTOF10: 3
PAINLEVEL_OUTOF10: 5
PAINLEVEL_OUTOF10: 9

## 2020-02-23 NOTE — PLAN OF CARE
determine acceptable level)  2/23/2020 0229 by Radhika Walsh RN  Outcome: Ongoing  Flowsheets (Taken 2/22/2020 1945)  Patient's Stated Pain Goal: 5  Note:   Pt report pain at 8 on scale. Pt states oral medication helping to achieve pain goal of a 5 on scale. Periods of rest noted after pain medication administration. Rest and repositioning in place as nonpharmacologic pain relief methods. Will monitor. Problem: Venous Thromboembolism:  Goal: Will show no signs or symptoms of venous thromboembolism  Description  Will show no signs or symptoms of venous thromboembolism  2/23/2020 0229 by Radhika Walsh RN  Outcome: Ongoing  Note:   Pt without s/s of DVT. Pt refusing to have SCD,S in place to help prevent development of DVT. Education provided. Will monitor. Problem: GI  Goal: No bowel complications  2/82/6993 0229 by Radhika Walsh RN  Outcome: Ongoing  Note:   Pt with active  bowel sounds, passing flatus, and without nausea. Will monitor. Care plan reviewed with patient and spouse. Patient and spouse verbalize understanding of the plan of care and contribute to goal setting.

## 2020-02-23 NOTE — PROGRESS NOTES
Department of Orthopedic Surgery  Spine Service  Latricia Beasley PA-C Progress Note        Subjective:  Elissa Hillman is resting in bed. Doing well. Walking in the hallway yesterday. Planning on discharge today. Vitals  VITALS:  /74   Pulse 99   Temp 97.6 °F (36.4 °C) (Oral)   Resp 16   Ht 4' 10\" (1.473 m)   Wt 138 lb 12.8 oz (63 kg)   SpO2 97%   BMI 29.01 kg/m²   24HR INTAKE/OUTPUT:      Intake/Output Summary (Last 24 hours) at 2/23/2020 1003  Last data filed at 2/23/2020 5754  Gross per 24 hour   Intake 887.5 ml   Output 1050 ml   Net -162.5 ml     URINARY CATHETER OUTPUT (Holguin):  External Urinary Catheter-Output (mL): 600 mL  [REMOVED] Urethral Catheter Non-latex 16 fr-Output (mL): 200 mL  DRAIN/TUBE OUTPUT:  Closed/Suction Drain Inferior; Left Back Accordion-Output (ml): 0 ml  Closed/Suction Drain Inferior;Right Back-Output (ml): 10 ml      PHYSICAL EXAM:    Orientation:  alert and oriented to person, place and time    Incision:  dressing in place, clean, dry, intact    Lower Extremity Motor :  quadriceps, extensor hallucis longus, dorsiflexion, plantarflexion 5/5 bilaterally  Lower Extremity Sensory:  Intact L1-S1    Flatus:  positive    ABNORMAL EXAM FINDINGS:  none    LABS:    HgB:    Lab Results   Component Value Date    HGB 10.3 02/23/2020         ASSESSMENT AND PLAN:    Post operative day 4    1:  Monitor labs and drain output  2:  Activity Level:  OOB with therapy, walk in the santillan  3:  Pain Control:  Controlled  4:  Discharge Planning:  Home today

## 2020-02-23 NOTE — PROGRESS NOTES
Discharge instructions reviewed with patient and spouse using teach back method, understanding verbalized. All questions addressed. Own medication returned to patient. Wheelchair requested for transport to discharge lobby.

## 2020-02-24 ENCOUNTER — TELEPHONE (OUTPATIENT)
Dept: FAMILY MEDICINE CLINIC | Age: 65
End: 2020-02-24

## 2020-02-24 NOTE — CARE COORDINATION
Discharged 02/23  Home with spouse and [facility]. Patient goals/plan/ treatment preferences discussed by  and . Patient goals/plan/ treatment preferences reviewed with patient/ family. Patient/ family verbalize understanding of discharge plan and are in agreement with goal/plan/treatment preferences. Understanding was demonstrated using the teach back method. AVS provided by RN at time of discharge, which includes all necessary medical information pertaining to the patients current course of illness, treatment, post-discharge goals of care, and treatment preferences.     Services After Discharge  Services At/After Discharge: Nursing Services   IMM Letter  IMM Letter given to Patient/Family/Significant other/Guardian/POA/by[de-identified] care manager  IMM Letter date given[de-identified] 02/20/20  IMM Letter time given[de-identified] 0996

## 2020-02-27 ENCOUNTER — TELEPHONE (OUTPATIENT)
Dept: FAMILY MEDICINE CLINIC | Age: 65
End: 2020-02-27

## 2020-02-27 NOTE — TELEPHONE ENCOUNTER
[facility] Transitions Initial Follow Up Call    Outreach made within 2 business days of discharge: Yes    Patient: Anival Segura Patient : 1955   MRN: [id number]  Reason for Admission: There are no discharge diagnoses documented for the most recent discharge. Discharge Date: 20       Spoke with: Kolton Yanez    Discharge department/facility: [facility] Interactive Patient Contact:  Was patient able to fill all prescriptions: Yes  Was patient instructed to bring all medications to the follow-up visit: Yes  Is patient taking all medications as directed in the discharge summary?  Yes  Does patient understand their discharge instructions: Yes  Does patient have questions or concerns that need addressed prior to 7-14 day follow up office visit: no    Scheduled appointment with PCP within 7-14 days  appt confirmed     Follow Up  Future Appointments   Date Time Provider Melissa Jin   3/5/2020  2:45 PM Jim Gregorio, [address] [place] [address]   3/19/2020  2:30 PM Gilles JOY [facility]   2020 12:15 PM Bia Luna MD [facility] Heart [facility] [place]   2020 11:20 AM Akosua Saucedo MD Oncology [facility] [place]   2020  1:40 PM STR CT IMAGING RM1  [facility] OUT EXP STR Radiolog   2020 11:20 AM Marika Vogt MD [address] [place] [address]

## 2020-03-05 ENCOUNTER — OFFICE VISIT (OUTPATIENT)
Dept: FAMILY MEDICINE CLINIC | Age: 65
End: 2020-03-05
Payer: MEDICARE

## 2020-03-05 VITALS
DIASTOLIC BLOOD PRESSURE: 66 MMHG | RESPIRATION RATE: 16 BRPM | HEART RATE: 88 BPM | TEMPERATURE: 98.4 F | SYSTOLIC BLOOD PRESSURE: 108 MMHG | HEIGHT: 58 IN | BODY MASS INDEX: 27.92 KG/M2 | WEIGHT: 133 LBS

## 2020-03-05 PROCEDURE — 99495 TRANSJ CARE MGMT MOD F2F 14D: CPT | Performed by: FAMILY MEDICINE

## 2020-03-05 RX ORDER — CYCLOBENZAPRINE HCL 10 MG
10 TABLET ORAL 3 TIMES DAILY PRN
COMMUNITY
End: 2020-05-14 | Stop reason: ALTCHOICE

## 2020-03-05 RX ORDER — OXYCODONE HYDROCHLORIDE AND ACETAMINOPHEN 5; 325 MG/1; MG/1
1 TABLET ORAL EVERY 4 HOURS PRN
COMMUNITY
End: 2020-05-14 | Stop reason: ALTCHOICE

## 2020-03-05 NOTE — PROGRESS NOTES
Family History   Problem Relation Age of Onset   Donita Self Cancer Mother         renal cell carcinoma    Kidney Cancer Mother 46    Cirrhosis Father     Diabetes Sister     Kidney Cancer Sister 54    Cancer Sister     Heart Disease Sister         CHF    Diabetes Brother     High Blood Pressure Brother     Breast Cancer Paternal Aunt 54    Ovarian Cancer Neg Hx     Stroke Neg Hx          I have reviewed the patient's past medical history, past surgical history, allergies, medications, social and family history and I have made updates where appropriate. PHYSICAL EXAM:  /66   Pulse 88   Temp 98.4 °F (36.9 °C) (Oral)   Resp 16   Ht 4' 10\" (1.473 m)   Wt 133 lb (60.3 kg)   BMI 27.80 kg/m²   General Appearance: well developed and well- nourished, in no acute distress  Head: normocephalic and atraumatic  ENT: external ear and ear canal normal bilaterally, nose without deformity, nasal mucosa and turbinates normal without polyps, oropharynx normal, dentition is normal for age, no lip or gum lesions noted  Neck: supple and non-tender without mass, no thyromegaly or thyroid nodules, no cervical lymphadenopathy  Pulmonary/Chest: clear to auscultation bilaterally- no wheezes, rales or rhonchi, normal air movement, no respiratory distress or retractions  Cardiovascular: normal rate, regular rhythm, normal S1 and S2, no murmurs, rubs, clicks, or gallops, distal pulses intact  Extremities: no cyanosis, clubbing or edema of the lower extremities  Psych:  Normal affect without evidence of depression or anxiety, insight and judgement are appropriate, memory appears intact  Skin: warm and dry, surgical incision healing well. No erythema or drainage noted      Diagnostic test results reviewed: inpatient labs    Patient risk of morbidity and mortality: moderate      ASSESSMENT & PLAN  Michael Joyce was seen today for back pain.     Diagnoses and all orders for this visit:    Spinal stenosis of lumbar region without neurogenic claudication  -     Hemoglobin and Hematocrit, Blood; Future  -     Handicap Placard MISC; by Does not apply route Expires 3/5/2024    Anemia, unspecified type  -     Hemoglobin and Hematocrit, Blood; Future        Return in about 2 months (around 5/5/2020), or if symptoms worsen or fail to improve. Level of medical complexity:  moderate    -Overall, patient is improving  -Will restart PRN T3 after she is done with the percocet  -Continue current meds  -Advised to continue to closely monitor her symptoms and if any worsening to seek treatment    Janine Arreola received counseling on the following healthy behaviors: medication adherence  Reviewed prior labs and health maintenance. Continue current medications, diet and exercise. Discussed use, benefit, and side effects of prescribed medications. Barriers to medication compliance addressed. Patient given educational materials - see patient instructions. All patient questions answered. Patient voiced understanding.

## 2020-03-16 ENCOUNTER — NURSE ONLY (OUTPATIENT)
Dept: LAB | Age: 65
End: 2020-03-16

## 2020-03-16 LAB
HCT VFR BLD CALC: 37.6 % (ref 37–47)
HEMOGLOBIN: 11.6 GM/DL (ref 12–16)

## 2020-03-17 ENCOUNTER — TELEPHONE (OUTPATIENT)
Dept: FAMILY MEDICINE CLINIC | Age: 65
End: 2020-03-17

## 2020-03-30 RX ORDER — ACETAMINOPHEN AND CODEINE PHOSPHATE 300; 30 MG/1; MG/1
1 TABLET ORAL EVERY 6 HOURS PRN
Qty: 120 TABLET | Refills: 0 | Status: SHIPPED | OUTPATIENT
Start: 2020-03-30 | End: 2020-04-29 | Stop reason: SDUPTHER

## 2020-04-16 ENCOUNTER — HOSPITAL ENCOUNTER (OUTPATIENT)
Dept: INTERVENTIONAL RADIOLOGY/VASCULAR | Age: 65
Discharge: HOME | End: 2020-04-16
Payer: MEDICARE

## 2020-04-16 PROCEDURE — 93971 EXTREMITY STUDY: CPT

## 2020-04-20 ENCOUNTER — TELEPHONE (OUTPATIENT)
Dept: PULMONOLOGY | Age: 65
End: 2020-04-20

## 2020-04-29 RX ORDER — ACETAMINOPHEN AND CODEINE PHOSPHATE 300; 30 MG/1; MG/1
1 TABLET ORAL EVERY 6 HOURS PRN
Qty: 120 TABLET | Refills: 0 | Status: SHIPPED | OUTPATIENT
Start: 2020-04-29 | End: 2020-06-02 | Stop reason: SDUPTHER

## 2020-04-29 NOTE — TELEPHONE ENCOUNTER
Edwina Carpio called requesting a refill on the following medications:  Requested Prescriptions     Pending Prescriptions Disp Refills    acetaminophen-codeine (TYLENOL #3) 300-30 MG per tablet 120 tablet 0     Sig: Take 1 tablet by mouth every 6 hours as needed for Pain for up to 30 days.      Pharmacy verified:  [facility] [place]    Date of last visit: 03/05/2020  Date of next visit (if applicable): Visit date not found

## 2020-05-14 ENCOUNTER — OFFICE VISIT (OUTPATIENT)
Dept: CARDIOLOGY CLINIC | Age: 65
End: 2020-05-14
Payer: MEDICARE

## 2020-05-14 VITALS
HEART RATE: 76 BPM | SYSTOLIC BLOOD PRESSURE: 120 MMHG | WEIGHT: 134.8 LBS | HEIGHT: 58 IN | BODY MASS INDEX: 28.29 KG/M2 | DIASTOLIC BLOOD PRESSURE: 74 MMHG

## 2020-05-14 PROCEDURE — 3017F COLORECTAL CA SCREEN DOC REV: CPT | Performed by: INTERNAL MEDICINE

## 2020-05-14 PROCEDURE — G8400 PT W/DXA NO RESULTS DOC: HCPCS | Performed by: INTERNAL MEDICINE

## 2020-05-14 PROCEDURE — G8427 DOCREV CUR MEDS BY ELIG CLIN: HCPCS | Performed by: INTERNAL MEDICINE

## 2020-05-14 PROCEDURE — 4040F PNEUMOC VAC/ADMIN/RCVD: CPT | Performed by: INTERNAL MEDICINE

## 2020-05-14 PROCEDURE — 99213 OFFICE O/P EST LOW 20 MIN: CPT | Performed by: INTERNAL MEDICINE

## 2020-05-14 PROCEDURE — G8417 CALC BMI ABV UP PARAM F/U: HCPCS | Performed by: INTERNAL MEDICINE

## 2020-05-14 PROCEDURE — 1123F ACP DISCUSS/DSCN MKR DOCD: CPT | Performed by: INTERNAL MEDICINE

## 2020-05-14 PROCEDURE — 1036F TOBACCO NON-USER: CPT | Performed by: INTERNAL MEDICINE

## 2020-05-14 PROCEDURE — 1090F PRES/ABSN URINE INCON ASSESS: CPT | Performed by: INTERNAL MEDICINE

## 2020-05-14 RX ORDER — METOPROLOL SUCCINATE 25 MG/1
25 TABLET, EXTENDED RELEASE ORAL DAILY
Qty: 90 TABLET | Refills: 3 | Status: SHIPPED | OUTPATIENT
Start: 2020-05-14 | End: 2021-06-01 | Stop reason: SDUPTHER

## 2020-05-14 RX ORDER — CLOPIDOGREL BISULFATE 75 MG/1
75 TABLET ORAL DAILY
Qty: 90 TABLET | Refills: 3 | Status: SHIPPED | OUTPATIENT
Start: 2020-05-14 | End: 2020-10-27

## 2020-05-14 RX ORDER — NITROGLYCERIN 0.4 MG/1
0.4 TABLET SUBLINGUAL EVERY 5 MIN PRN
Qty: 25 TABLET | Refills: 3 | Status: SHIPPED | OUTPATIENT
Start: 2020-05-14

## 2020-05-14 NOTE — PROGRESS NOTES
[address] [place] [address],[place] [address] [address]  [place] [address]  Dept: [phone]  Dept Fax: [phone]  Loc: [phone]    Visit Date: 5/14/2020    Ms. Lyle Rangel is a 77 y.o. female  who presented for:  Chief Complaint   Patient presents with    6 Month Follow-Up    Hyperlipidemia       HPI:   HPI   76 yo F s/p RCA STEMI 10/2018 who presents for follow. Has had back surgery and was having swelling related to arthritis. EF preserved. No DVT issues. No chest pain, angina, HORAN, orthopnea, PND, sob at rest, palpitations, LE edema, or syncope. No bleeding. Current Outpatient Medications:     acetaminophen-codeine (TYLENOL #3) 300-30 MG per tablet, Take 1 tablet by mouth every 6 hours as needed for Pain for up to 30 days. , Disp: 120 tablet, Rfl: 0    Handicap Placard MISC, by Does not apply route Expires 3/5/2024, Disp: 1 each, Rfl: 0    atorvastatin (LIPITOR) 80 MG tablet, Take 1 tablet by mouth nightly, Disp: 90 tablet, Rfl: 3    Tiotropium Bromide-Olodaterol (STIOLTO RESPIMAT) 2.5-2.5 MCG/ACT AERS, Inhale 1 puff into the lungs daily, Disp: , Rfl:     hydrocortisone (ANUSOL-HC) 2.5 % rectal cream, Place rectally 2 times daily as needed for Hemorrhoids Place rectally 2 times daily. , Disp: , Rfl:     amLODIPine (NORVASC) 10 MG tablet, Take 1 tablet by mouth daily, Disp: 90 tablet, Rfl: 3    clopidogrel (PLAVIX) 75 MG tablet, Take 1 tablet by mouth daily, Disp: 90 tablet, Rfl: 1    metoprolol succinate (TOPROL XL) 25 MG extended release tablet, Take 1 tablet by mouth daily, Disp: 90 tablet, Rfl: 1    albuterol sulfate  (90 Base) MCG/ACT inhaler, INHALE 2 PUFFS BY MOUTH EVERY 6 HOURS AS NEEDED FOR WHEEZING, Disp: 18 g, Rfl: 5    fluticasone (FLONASE) 50 MCG/ACT nasal spray, 1 spray by Nasal route daily, Disp: 1 Bottle, Rfl: 3    letrozole (FEMARA) 2.5 MG tablet, Take 1 tablet by mouth daily, Disp: 90 tablet, Rfl: 3    aspirin 81 MG EC tablet, Take 1 tablet by mouth daily, Disp: 30 tablet, Rfl: 3    nitroGLYCERIN (NITROSTAT) 0.4 MG SL tablet, Place 1 tablet under the tongue every 5 minutes as needed for Chest pain up to max of 3 total doses. If no relief after 1 dose, call 911., Disp: 25 tablet, Rfl: 0    Past Medical History  Nick Daley  has a past medical history of Bronchitis, CAD (coronary artery disease), Cerebral artery occlusion with cerebral infarction ([address] COPD (chronic obstructive pulmonary disease) ([facility] [address] Diverticulosis, Essential hypertension, Foot drop, right foot, GERD (gastroesophageal reflux disease), IBS (irritable bowel syndrome), Kidney stone, Malignant neoplasm of upper-inner quadrant of left female breast ([address] MI (myocardial infarction) ([facility] [address] and PONV (postoperative nausea and vomiting). Social History  Nick Daley  reports that she quit smoking about 18 months ago. Her smoking use included cigarettes. She has a 30.00 pack-year smoking history. She has never used smokeless tobacco. She reports that she does not drink alcohol or use drugs. Family History  Nick Daley family history includes Breast Cancer (age of onset: 54) in her paternal aunt; Cancer in her mother and sister; Cirrhosis in her father; Diabetes in her brother and sister; Heart Disease in her sister; High Blood Pressure in her brother; Kidney Cancer (age of onset: 46) in her mother; Kidney Cancer (age of onset: 54) in her sister. There is no family history of bicuspid aortic valve, aneurysms, heart transplant, pacemakers, defibrillators, or sudden cardiac death.       Past Surgical History   Past Surgical History:   Procedure Laterality Date    BACK SURGERY  1998    Romona Harada BREAST SURGERY Left 2016    exc. lymph nodes, lumpectomy    CARPAL TUNNEL RELEASE Right 06/2017    [facility]    CERVICAL FUSION      COLONOSCOPY  unsure    Dr. Curry Hawkins COLONOSCOPY  10/10/14    Dr. Wicho oMrrison COLONOSCOPY Left 6/17/2019    COLONOSCOPY POLYPECTOMY HOT BIOPSY performed by Penelope Snell MD at [address]  10/27/2018    [facility]    DILATION AND CURETTAGE OF UTERUS  80    HEMORRHOID SURGERY  12/16/2014    HYSTERECTOMY, VAGINAL  12/16/2014    KIDNEY STONE SURGERY  2014    LAMINECTOMY  06/12/2018    LUMBAR FUSION N/A 2/19/2020    LUMBAR LAMINECTOMY PSF L3-S1 WITH CAPTIVA performed by Vinnie Cazares MD at [address] [facility] OFFICE/OUTPT VISIT,PROCEDURE ONLY N/A 6/12/2018    ACDF C5-6 performed by Otto Habermann, MD at [address] [place] [address] Blvd Left        Review of Systems   Constitutional: Negative for chills and fever  HENT: Negative for congestion, sinus pressure, sneezing and sore throat. Eyes: Negative for pain, discharge, redness and itching. Respiratory: Negative for apnea, cough  Gastrointestinal: Negative for blood in stool, constipation, diarrhea   Endocrine: Negative for cold intolerance, heat intolerance, polydipsia. Genitourinary: Negative for dysuria, enuresis, flank pain and hematuria. Musculoskeletal: Negative for arthralgias, joint swelling and neck pain. Neurological: Negative for numbness and headaches. Psychiatric/Behavioral: Negative for agitation, confusion, decreased concentration and dysphoric mood. Objective:     /74   Pulse 76   Ht 4' 10\" (1.473 m)   Wt 134 lb 12.8 oz (61.1 kg)   BMI 28.17 kg/m²     Wt Readings from Last 3 Encounters:   05/14/20 134 lb 12.8 oz (61.1 kg)   03/05/20 133 lb (60.3 kg)   02/19/20 138 lb 12.8 oz (63 kg)     BP Readings from Last 3 Encounters:   05/14/20 120/74   03/05/20 108/66   02/23/20 135/74       Nursing note and vitals reviewed. Physical Exam   Constitutional: Oriented to person, place, and time. Appears well-developed and well-nourished. HENT:   Head: Normocephalic and atraumatic. Eyes: EOM are normal. Pupils are equal, round, and reactive to light.    Neck: Normal Transthoracic Echocardiography Report (TTE)     Demographics      Patient Name   Clearance Shelbi Gender               Female                  ABIOLA      MR #           [id number]        Race                                                       Ethnicity      Account #      [de-identified]        Room Number      Accession      [id number]        Date of Study        09/11/2019   Number      Date of Birth  1955       Referring Physician  Oscar Clarke MD      Age            75 year(s)       Angy Vidal MD                                   Physician     Procedure    Type of Study      TTE procedure:ECHOCARDIOGRAM COMPLETE 2D W DOPPLER W COLOR. Procedure Date  Date: 09/11/2019 Start: 09:17 AM    Study Location: Echo Lab  Technical Quality: Adequate visualization    Indications:Dyspnea on exertion. Additional Medical History:Ex Smoker, Breast Cancer, STEMI, GERD. Patient Status: Routine    Height: 58 inches Weight: 137 pounds BSA: 1.55 m^2 BMI: 28.63 kg/m^2    BP: 118/78 mmHg    Allergies    - See Epic. Conclusions      Summary   Ejection fraction was estimated at 55-60%. E/A flow reversal noted. Suggestive of diastolic dysfunction. There was trace tricuspid regurgitation. Assuming RAP = 5 mmHg, the estimated RVSP = 29 mmHg. Ascending aorta = 3.6 cm. IVC size is within normal limits with normal respiratory phasic changes. Signature      ----------------------------------------------------------------   Electronically signed by Juancarlos Blevins MD (Interpreting   physician) on 09/11/2019 at 02:02 PM   ----------------------------------------------------------------      Findings      Mitral Valve   The mitral valve structure was normal with a calcified posterior leaflet.    DOPPLER: The transmitral velocity was within the normal range with no   evidence for mitral stenosis. There was no evidence of mitral   regurgitation. Aortic Valve   The aortic valve was trileaflet with normal thickness and cuspal   separation. DOPPLER: Transaortic velocity was within the normal range with   no evidence of aortic stenosis. There was no evidence of aortic   regurgitation. Tricuspid Valve   The tricuspid valve structure was normal with normal leaflet separation. DOPPLER: There was no evidence of tricuspid stenosis. There was trace   tricuspid regurgitation. Assuming RAP = 5 mmHg, the estimated RVSP = 29   mmHg. Pulmonic Valve   The pulmonic valve leaflets were not well seen. DOPPLER: The transpulmonic   velocity was within the normal range with trace regurgitation. Left Atrium   Left atrial size was normal.      Left Ventricle   Normal left ventricular size and systolic function. There were no regional wall motion abnormalities. Wall thickness was within normal limits. Ejection fraction was estimated at 55-60%. E/A flow reversal noted. Suggestive of diastolic dysfunction. Right Atrium   Right atrial size was normal.      Right Ventricle   The right ventricular size was normal with normal systolic function and   wall thickness. Pericardial Effusion   The pericardium was normal in appearance with no evidence of a pericardial   effusion. Pleural Effusion   No evidence of pleural effusion. Aorta / Great Vessels   -Ascending aorta = 3.6 cm. -IVC size is within normal limits with normal respiratory phasic changes.      M-Mode/2D Measurements & Calculations      LV Diastolic   LV Systolic Dimension:    AV Cusp Separation: 2 cmLA   Dimension: 4.4 3.1 cm                    Dimension: 3.1 cmAO Root   cm             LV Volume Diastolic: 30.0 Dimension: 2.6 cmLA Area: 11.7   LV FS:29.6 %   ml                        cm^2   LV PW          LV Volume Systolic: 81.3   Diastolic: 0.9 ml   cm             LV EDV/LV EDV Index: 87.7   Septum         ml/57 m^2LV ESV/LV ESV    RV Diastolic Dimension: 3.2 cm   Diastolic: 1.1 Index: 98.7 ml/24 m^2   cm             EF Calculated: 56.8 %     LA/Aorta: 1.19                                            Ascending Aorta: 3.6 cm                                            LA volume/Index: 25.4 ml /16m^2     Doppler Measurements & Calculations      MV Peak E-Wave: 83.9 cm/s  AV Peak Velocity: 175  LVOT Peak Velocity: 137   MV Peak A-Wave: 94 cm/s    cm/s                   cm/s   MV E/A Ratio: 0.89         AV Peak Gradient:      LVOT Peak Gradient: 8   MV Peak Gradient: 2.82     12.25 mmHg             mmHg   mmHg                                                     TV Peak E-Wave: 48 cm/s   MV Deceleration Time: 317                         TV Peak A-Wave: 44.7   msec                                              cm/s                              IVRT: 81 msec                                                     TV Peak Gradient: 0.92   MV E' Septal Velocity: 5.2                        mmHg   cm/s                       AV DVI (Vmax):0.78     TR Velocity:244 cm/s   MV A' Septal Velocity:                            TR Gradient:23.81 mmHg   11.2 cm/s                                         PV Peak Velocity: 60.9   MV E' Lateral Velocity:                           cm/s   7.4 cm/s                                          PV Peak Gradient: 1.48   MV A' Lateral Velocity:                           mmHg   10.4 cm/s   E/E' septal: 16.13   E/E' lateral: 11.34                               [place] ED Velocity: 105 cm/s   MR Velocity: 441 cm/s     [email or web address] [id number]        Assessment/Plan   RCA STEMI PCI, 2018  Preserved EF  RVSP 29 mmHg  Orthopedic ankle issues  Stress - negative for ischemia, 9/2019  Doing well, no major issues, BP and HR well controlled. Last LDL 37.  Discussed diet/exercise/BP/weight loss/health lifestyle choices/lipids; the patient understands the goals and will try to comply.     Disposition:  1 year         Electronically signed by Florecita Javier MD   5/14/2020 at 3:24 PM EDT

## 2020-06-02 RX ORDER — ACETAMINOPHEN AND CODEINE PHOSPHATE 300; 30 MG/1; MG/1
1 TABLET ORAL EVERY 6 HOURS PRN
Qty: 120 TABLET | Refills: 0 | Status: SHIPPED | OUTPATIENT
Start: 2020-06-02 | End: 2020-07-06 | Stop reason: SDUPTHER

## 2020-06-02 NOTE — TELEPHONE ENCOUNTER
Jeaneth Ruiz called requesting a refill on the following medications:  Requested Prescriptions     Pending Prescriptions Disp Refills    acetaminophen-codeine (TYLENOL #3) 300-30 MG per tablet 120 tablet 0     Sig: Take 1 tablet by mouth every 6 hours as needed for Pain for up to 30 days. Pharmacy verified: [facility]      Date of last visit: 3/5/20  Date of next visit (if applicable): 0/68/7915

## 2020-06-24 ENCOUNTER — OFFICE VISIT (OUTPATIENT)
Dept: FAMILY MEDICINE CLINIC | Age: 65
End: 2020-06-24
Payer: MEDICARE

## 2020-06-24 VITALS
OXYGEN SATURATION: 98 % | WEIGHT: 135 LBS | RESPIRATION RATE: 12 BRPM | DIASTOLIC BLOOD PRESSURE: 64 MMHG | HEIGHT: 57 IN | HEART RATE: 64 BPM | TEMPERATURE: 98.5 F | BODY MASS INDEX: 29.12 KG/M2 | SYSTOLIC BLOOD PRESSURE: 132 MMHG

## 2020-06-24 PROCEDURE — 1036F TOBACCO NON-USER: CPT | Performed by: FAMILY MEDICINE

## 2020-06-24 PROCEDURE — G8427 DOCREV CUR MEDS BY ELIG CLIN: HCPCS | Performed by: FAMILY MEDICINE

## 2020-06-24 PROCEDURE — 4040F PNEUMOC VAC/ADMIN/RCVD: CPT | Performed by: FAMILY MEDICINE

## 2020-06-24 PROCEDURE — G8400 PT W/DXA NO RESULTS DOC: HCPCS | Performed by: FAMILY MEDICINE

## 2020-06-24 PROCEDURE — 1123F ACP DISCUSS/DSCN MKR DOCD: CPT | Performed by: FAMILY MEDICINE

## 2020-06-24 PROCEDURE — 3017F COLORECTAL CA SCREEN DOC REV: CPT | Performed by: FAMILY MEDICINE

## 2020-06-24 PROCEDURE — G8417 CALC BMI ABV UP PARAM F/U: HCPCS | Performed by: FAMILY MEDICINE

## 2020-06-24 PROCEDURE — 1090F PRES/ABSN URINE INCON ASSESS: CPT | Performed by: FAMILY MEDICINE

## 2020-06-24 PROCEDURE — 99214 OFFICE O/P EST MOD 30 MIN: CPT | Performed by: FAMILY MEDICINE

## 2020-06-24 RX ORDER — OXYBUTYNIN CHLORIDE 5 MG/1
5 TABLET ORAL NIGHTLY
Qty: 30 TABLET | Refills: 5 | Status: SHIPPED | OUTPATIENT
Start: 2020-06-24 | End: 2020-10-26

## 2020-06-24 NOTE — PROGRESS NOTES
PPSV23) 10/23/2020    Breast cancer screen  11/18/2020    Annual Wellness Visit (AWV)  11/19/2020    Low dose CT lung screening  12/27/2020    Potassium monitoring  02/06/2021    Creatinine monitoring  02/06/2021    Colon cancer screen colonoscopy  06/17/2029    DEXA (modify frequency per FRAX score)  Completed    Flu vaccine  Completed    Hepatitis A vaccine  Aged Out    Hepatitis B vaccine  Aged Out    Hib vaccine  Aged Out    Meningococcal (ACWY) vaccine  Aged Out       Past Medical History:   Diagnosis Date    Bronchitis     CAD (coronary artery disease)     Cerebral artery occlusion with cerebral infarction ([address]     TIA    COPD (chronic obstructive pulmonary disease) ([address]     Diverticulosis     Essential hypertension 5/10/2017    Foot drop, right foot     GERD (gastroesophageal reflux disease)     IBS (irritable bowel syndrome)     Kidney stone 2014    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 lymph nodes, lumpectomy    CARPAL TUNNEL RELEASE Right 06/2017    [facility]    CERVICAL FUSION      COLONOSCOPY  unsure    Dr. Monae Booth    COLONOSCOPY  10/10/14    Dr. Wicho Morrison COLONOSCOPY Left 6/17/2019    COLONOSCOPY POLYPECTOMY HOT BIOPSY performed by Anabel Giron MD at [address]  10/27/2018    [facility]    DILATION AND CURETTAGE OF UTERUS  80    HEMORRHOID SURGERY  12/16/2014    HYSTERECTOMY, VAGINAL  12/16/2014    KIDNEY STONE SURGERY  2014    LAMINECTOMY  06/12/2018    LUMBAR FUSION N/A 2/19/2020    LUMBAR LAMINECTOMY PSF L3-S1 WITH CAPTIVA performed by Blanche Sow MD at [address] OFFICE/OUTPT VISIT,PROCEDURE ONLY N/A 6/12/2018    ACDF C5-6 performed by Alan Torres

## 2020-06-26 ENCOUNTER — HOSPITAL ENCOUNTER (OUTPATIENT)
Dept: INFUSION THERAPY | Age: 65
Discharge: HOME | End: 2020-06-26
Payer: MEDICARE

## 2020-06-26 ENCOUNTER — OFFICE VISIT (OUTPATIENT)
Dept: ONCOLOGY | Age: 65
End: 2020-06-26
Payer: MEDICARE

## 2020-06-26 VITALS
OXYGEN SATURATION: 97 % | HEART RATE: 55 BPM | DIASTOLIC BLOOD PRESSURE: 70 MMHG | RESPIRATION RATE: 16 BRPM | SYSTOLIC BLOOD PRESSURE: 123 MMHG | BODY MASS INDEX: 28.8 KG/M2 | WEIGHT: 137.2 LBS | TEMPERATURE: 98.5 F | HEIGHT: 58 IN

## 2020-06-26 PROCEDURE — 1123F ACP DISCUSS/DSCN MKR DOCD: CPT | Performed by: INTERNAL MEDICINE

## 2020-06-26 PROCEDURE — G8427 DOCREV CUR MEDS BY ELIG CLIN: HCPCS | Performed by: INTERNAL MEDICINE

## 2020-06-26 PROCEDURE — G8400 PT W/DXA NO RESULTS DOC: HCPCS | Performed by: INTERNAL MEDICINE

## 2020-06-26 PROCEDURE — 1036F TOBACCO NON-USER: CPT | Performed by: INTERNAL MEDICINE

## 2020-06-26 PROCEDURE — 99211 OFF/OP EST MAY X REQ PHY/QHP: CPT

## 2020-06-26 PROCEDURE — 4040F PNEUMOC VAC/ADMIN/RCVD: CPT | Performed by: INTERNAL MEDICINE

## 2020-06-26 PROCEDURE — 99214 OFFICE O/P EST MOD 30 MIN: CPT | Performed by: INTERNAL MEDICINE

## 2020-06-26 PROCEDURE — G8417 CALC BMI ABV UP PARAM F/U: HCPCS | Performed by: INTERNAL MEDICINE

## 2020-06-26 PROCEDURE — 3017F COLORECTAL CA SCREEN DOC REV: CPT | Performed by: INTERNAL MEDICINE

## 2020-06-26 PROCEDURE — 1090F PRES/ABSN URINE INCON ASSESS: CPT | Performed by: INTERNAL MEDICINE

## 2020-06-26 RX ORDER — LETROZOLE 2.5 MG/1
2.5 TABLET, FILM COATED ORAL DAILY
Qty: 90 TABLET | Refills: 3 | Status: SHIPPED | OUTPATIENT
Start: 2020-06-26 | End: 2021-07-07

## 2020-06-26 ASSESSMENT — ENCOUNTER SYMPTOMS
SHORTNESS OF BREATH: 0
VOMITING: 0
TROUBLE SWALLOWING: 0
ABDOMINAL PAIN: 0
COLOR CHANGE: 0
CONSTIPATION: 0
COUGH: 0
BACK PAIN: 0
BLOOD IN STOOL: 0
ABDOMINAL DISTENTION: 0
SORE THROAT: 0
EYE DISCHARGE: 0
NAUSEA: 0
WHEEZING: 0
DIARRHEA: 0
CHEST TIGHTNESS: 0
FACIAL SWELLING: 0
RECTAL PAIN: 0

## 2020-06-26 NOTE — PROGRESS NOTES
[facility] PROFESSIONAL SERVS  [facility]  [facility] [facility] [address]  [address] [address],[address]  [address] [address]  Dept: [phone]  Dept Fax: [phone]: [phone]     Visit Date: 6/26/2020     Eliecer Bowens is a 77 y.o. female who presents today for:   Chief Complaint   Patient presents with   Clydell Miners        HPI   Mrs. Adolfo Kiran is a 40-year-old female with h/o stage I B left breast cancer diagnosed in September 2016. The patient had digital screening bilateral mammogram on August 31, 2016 that showed irregular density in the left breast that was indeterminate but confirmed on the compression and lateral medial views. On September 9, 2016 she underwent left breast upper inner quadrant core biopsy that showed invasive ductal carcinoma grade 2. IHC was positive for estrogen receptor expression in 95% of cells, and progesterone receptor expression in 80% of cells. Subsequently the patient met with the surgeon Dr. Zara Fletcher and after discussing her treatment options, she decided to proceed with left breast lumpectomy and sentinel lymph node mapping and biopsy. Her surgery was performed on September 29, 2016. Final pathology report showed:  Specimen Laterality  Left  Tumor Site: Invasive Carcinoma  Upper inner quadrant  Tumor Size: Size of Largest Invasive Carcinoma  Greatest dimension of largest focus of invasion  > 1 mm:  0.9 cm  Histologic Type  Invasive ductal carcinoma, no special type.   Histologic Grade: [place] Histologic Score  Glandular (Acinar)/Tubular Differentiation  Score 3:  < 10% of tumor area forming glandular/tubular structures  Overall Grade  Grade 2: scores of 6 or 7  Tumor Focality (required only if more than 1 focus of invasive  carcinoma is present)  Single focus of invasive carcinoma  Ductal Carcinoma In Situ (DCIS)  DCIS is present  Negative for extensive intraductal component (EIC)  Size (Extent) of DCIS  Estimated size (extent) of DCIS (greatest dimension using gross and  microscopic evaluation): at least 0.8 cm  Number of blocks with DCIS:  2  Number of blocks examined:  9  Nuclear Grade  Grade III (high)  Necrosis  Present, central (expansive \"comedo\" necrosis)  Margins  Invasive Carcinoma  Margins uninvolved by invasive carcinoma   Distance from closest margin:  0.9 cm   Specify margin:  skin    Lymph Nodes (required only if lymph nodes are present in the specimen)  Total number of lymph nodes examined (sentinel and nonsentinel):  1  Number of sentinel lymph nodes examined:  1    Lymph Node Involvement (required only if one or more lymph nodes have  tumor cells identified)   Number of lymph nodes with macrometastases ( > 2 mm):  0   Number of lymph nodes with micrometastases ( > 0.2 mm to 2 mm and/or  > 200 cells):  0   Number of lymph nodes with isolated tumor cells ( < or equal to 0.2 mm  and  < or equal to 200 cells):  0    BREAST BIOMARKERS* ([id number], 9/13/16)  Estrogen Receptor:  Positive 95% of cells  Progesterone Receptor: Positive 80% of cells  Ki-67  Percentage of positive nuclei:  40%  HER-2 Dual RENEE   Nonamplified - HER2/CEP17 ratio  < 2.0 AND average HER2           copy number  < 4.0 signals/cell  Her breast cancer specimen was sent for Oncotype DX assay. It came back with a recurrent score of 20, translating into 13% risk of distant disease recurrence in the next 10 years. Therefore the patient did not receive recommendation to proceed with chemotherapy. She completed radiation treatment to her left breast on December 4, 2016. She received 5 CGy of radiation treatment. Overall she tolerated radiation treatment well. In December 2016 she started adjuvant hormonal therapy with Arimidex, which was switched to Femara. The patient was hospitalized in October 2018 for STEMI, she  Is S/P PCI of the RCA. Interim history on June 26, 2020: This is her 6 months follow-up visit. She reports that she tolerates Femara well.  Denies Cancer Mother 46    Cirrhosis Father     Diabetes Sister     Kidney Cancer Sister 54    Cancer Sister     Heart Disease Sister         CHF    Diabetes Brother     High Blood Pressure Brother     Breast Cancer Paternal Aunt 54    Ovarian Cancer Neg Hx     Stroke Neg Hx       Social History     Tobacco Use    Smoking status: Former Smoker     Packs/day: 1.50     Years: 20.00     Pack years: 30.00     Types: Cigarettes     Last attempt to quit: 10/27/2018     Years since quittin.6    Smokeless tobacco: Never Used   Substance Use Topics    Alcohol use: No      Current Outpatient Medications   Medication Sig Dispense Refill    letrozole (FEMARA) 2.5 MG tablet Take 1 tablet by mouth daily 90 tablet 3    oxybutynin (DITROPAN) 5 MG tablet Take 1 tablet by mouth nightly 30 tablet 5    clopidogrel (PLAVIX) 75 MG tablet Take 1 tablet by mouth daily 90 tablet 3    metoprolol succinate (TOPROL XL) 25 MG extended release tablet Take 1 tablet by mouth daily 90 tablet 3    nitroGLYCERIN (NITROSTAT) 0.4 MG SL tablet Place 1 tablet under the tongue every 5 minutes as needed for Chest pain up to max of 3 total doses. If no relief after 1 dose, call 911. 25 tablet 3    Handicap Placard MISC by Does not apply route Expires 3/5/2024 1 each 0    atorvastatin (LIPITOR) 80 MG tablet Take 1 tablet by mouth nightly 90 tablet 3    Tiotropium Bromide-Olodaterol (STIOLTO RESPIMAT) 2.5-2.5 MCG/ACT AERS Inhale 1 puff into the lungs daily      hydrocortisone (ANUSOL-HC) 2.5 % rectal cream Place rectally 2 times daily as needed for Hemorrhoids Place rectally 2 times daily.       amLODIPine (NORVASC) 10 MG tablet Take 1 tablet by mouth daily 90 tablet 3    albuterol sulfate  (90 Base) MCG/ACT inhaler INHALE 2 PUFFS BY MOUTH EVERY 6 HOURS AS NEEDED FOR WHEEZING 18 g 5    fluticasone (FLONASE) 50 MCG/ACT nasal spray 1 spray by Nasal route daily 1 Bottle 3    aspirin 81 MG EC tablet Take 1 tablet by mouth daily 30 tablet 3     No current facility-administered medications for this visit. Allergies   Allergen Reactions    Naproxen Anaphylaxis    Prednisone Swelling and Other (See Comments)     Angioedema    Ultram [Tramadol] Itching and Rash      Health Maintenance   Topic Date Due    Hepatitis C screen  1955    DTaP/Tdap/Td vaccine (1 - Tdap) 02/28/1972    Shingles Vaccine (1 of 2) 02/28/2003    Lipid screen  02/06/2020    Flu vaccine (1) 09/01/2020    Pneumococcal 65+ years Vaccine (2 of 2 - PPSV23) 10/23/2020    Breast cancer screen  11/18/2020    Annual Wellness Visit (AWV)  11/19/2020    Low dose CT lung screening  12/27/2020    Potassium monitoring  02/06/2021    Creatinine monitoring  02/06/2021    Colon cancer screen colonoscopy  06/17/2029    DEXA (modify frequency per FRAX score)  Completed    Hepatitis A vaccine  Aged Out    Hepatitis B vaccine  Aged Out    Hib vaccine  Aged Out    Meningococcal (ACWY) vaccine  Aged Out        Subjective:   Review of Systems   Constitutional: Negative for activity change, appetite change, fatigue and fever. HENT: Negative for congestion, dental problem, facial swelling, hearing loss, mouth sores, nosebleeds, sore throat, tinnitus and trouble swallowing. Eyes: Negative for discharge and visual disturbance. Respiratory: Negative for cough, chest tightness, shortness of breath and wheezing. Cardiovascular: Negative for chest pain, palpitations and leg swelling. Gastrointestinal: Negative for abdominal distention, abdominal pain, blood in stool, constipation, diarrhea, nausea, rectal pain and vomiting. Endocrine: Negative for cold intolerance, polydipsia and polyuria. Genitourinary: Negative for decreased urine volume, difficulty urinating, dysuria, flank pain, hematuria and urgency. Musculoskeletal: Negative for arthralgias, back pain, gait problem, joint swelling, myalgias and neck stiffness.    Skin: Negative for color change, rash and wound.   Neurological: Negative for dizziness, tremors, seizures, speech difficulty, weakness, light-headedness, numbness and headaches. Hematological: Negative for adenopathy. Does not bruise/bleed easily. Psychiatric/Behavioral: Negative for confusion and sleep disturbance. The patient is not nervous/anxious. Objective:   Physical Exam   Constitutional: She is oriented to person, place, and time. She appears well-developed and well-nourished. No distress. HENT:   Head: Normocephalic. Mouth/Throat: Oropharynx is clear and moist. No oropharyngeal exudate. Eyes: Pupils are equal, round, and reactive to light. EOM are normal. Right eye exhibits no discharge. Left eye exhibits no discharge. No scleral icterus. Neck: Normal range of motion. Neck supple. No JVD present. No tracheal deviation present. No thyromegaly present. Cardiovascular: Normal rate and normal heart sounds. Exam reveals no gallop and no friction rub. No murmur heard. Pulmonary/Chest: Effort normal and breath sounds normal. No stridor. No respiratory distress. She has no wheezes. She has no rales. She exhibits no tenderness. Abdominal: Soft. Bowel sounds are normal. She exhibits no distension and no mass. There is no abdominal tenderness. There is no rebound. Musculoskeletal: Normal range of motion. General: No edema. Lymphadenopathy:     She has no cervical adenopathy. Neurological: She is alert and oriented to person, place, and time. She has normal reflexes. No cranial nerve deficit. She exhibits normal muscle tone. Skin: Skin is warm. No rash noted. No erythema. Psychiatric: She has a normal mood and affect. Her behavior is normal. Judgment and thought content normal.   Vitals reviewed.      /70 (Site: Right Upper Arm, Position: Sitting, Cuff Size: Medium Adult)   Pulse 55   Temp 98.5 °F (36.9 °C) (Infrared)   Resp 16   Ht 4' 9.99\" (1.473 m)   Wt 137 lb 3.2 oz (62.2 kg)   SpO2 97%   BMI 28.68 kg/m²      DEXA study in December 2016 showed:  IMPRESSION: OSTEOPENIA   Patient is at medium risk for fracture. Mammogram On November 18, 2019 showed:   Benign findings, BIRADS category 2. Assessment:   1. Stage I B, ER positive, OK positive, HER-2/eleazar negative left breast cancer. The patient is status post a left lumpectomy with sentinel lymph node biopsy. Her breast cancer had multiple good prognostic features: 9 mm in size, strong estrogen receptor expression, strong progesterone receptor expression, HER-2/eleazar negativity. The only concerning feature of her breast cancer was high (40%) Ki-67 proliferation index. Therefore her breast cancer was assessed by Breast Oncotype Dx assay. It showed recurrent score of 20, translating into 13% risk of distant disease recurrence during the next 10 years. Decision was made  not to proceed with adjuvant chemotherapy. She completed adjuvant radiation treatment to the reminder of her left breast on December 4, 2016.    2. Adjuvant hormonal therapy. The patient started adjuvant hormonal therapy with Arimidex in December 2016. She developed fatigue on Arimidex. Therefore she was placed on Femara which she tolerates well. She reports minimal arthralgia, minimal hot flashes. The patient denies any new complaints. There is no evidence of distant disease recurrence on today's physical examination. Her annual breast mammogram in November 2019 was benign. The patient was instructed to continue Femara she will complete 5-year therapy in December 2021. Order is placed for annual mammogram in November 2020  3. Osteopenia. The patient was instructed to take calcium with vitamin D. She also received recommendation to exercise on a regular basis. Recommendation is to walk 3-4 times per week. 4.  The patient is a heavy smoker. The lung screening low dose CT of the chest on a April 9, 2018 showed no suspicious pulmonary nodules. Lung rads category 2.  She quit smoking after  STEMI.  She had annual screening for lung cancer in December 2019, LUNGRADS ASSESSMENT VALUE: 3,       Lieutenant John MD

## 2020-07-06 RX ORDER — ACETAMINOPHEN AND CODEINE PHOSPHATE 300; 30 MG/1; MG/1
1 TABLET ORAL EVERY 6 HOURS PRN
Qty: 120 TABLET | Refills: 0 | Status: SHIPPED | OUTPATIENT
Start: 2020-07-06 | End: 2020-08-12 | Stop reason: SDUPTHER

## 2020-07-06 NOTE — TELEPHONE ENCOUNTER
ASSESSMENT & PLAN   Diagnosis Orders   1. Spinal stenosis of lumbar region without neurogenic claudication  acetaminophen-codeine (TYLENOL #3) 300-30 MG per tablet       OAARS reviewed and appropriate. Controlled Substances Monitoring: Periodic Controlled Substance Monitoring: Possible medication side effects, risk of tolerance/dependence & alternative treatments discussed., No signs of potential drug abuse or diversion identified., Obtaining appropriate analgesic effect of treatment.  Reshma Hilliard DO)      Future Appointments   Date Time Provider [place]   7/23/2020  1:40 PM STR CT IMAGING RM1  [facility] EX[facility] STR Radiolog   7/30/2020 11:20 AM [facility]   9/25/2020  3:40 PM STR MAMMOGRAPHY RM2  [facility] STR Radiolog   10/26/2020  2:30 PM Eden Grace DO [facility]   12/16/2020 11:00 AM [facility]   5/20/2021  3:15 PM Sandeep Kerry Kawasaki, MD [address] [facility]

## 2020-07-23 ENCOUNTER — HOSPITAL ENCOUNTER (OUTPATIENT)
Dept: CT IMAGING | Age: 65
Discharge: HOME | End: 2020-07-23
Payer: MEDICARE

## 2020-07-23 PROCEDURE — 71250 CT THORAX DX C-: CPT

## 2020-07-30 ENCOUNTER — TELEPHONE (OUTPATIENT)
Dept: PULMONOLOGY | Age: 65
End: 2020-07-30

## 2020-07-30 ENCOUNTER — OFFICE VISIT (OUTPATIENT)
Dept: PULMONOLOGY | Age: 65
End: 2020-07-30
Payer: MEDICARE

## 2020-07-30 VITALS
BODY MASS INDEX: 28.34 KG/M2 | HEART RATE: 77 BPM | HEIGHT: 58 IN | DIASTOLIC BLOOD PRESSURE: 62 MMHG | SYSTOLIC BLOOD PRESSURE: 110 MMHG | WEIGHT: 135 LBS | OXYGEN SATURATION: 96 % | TEMPERATURE: 99.5 F

## 2020-07-30 PROCEDURE — 3017F COLORECTAL CA SCREEN DOC REV: CPT | Performed by: INTERNAL MEDICINE

## 2020-07-30 PROCEDURE — G8926 SPIRO NO PERF OR DOC: HCPCS | Performed by: INTERNAL MEDICINE

## 2020-07-30 PROCEDURE — G8417 CALC BMI ABV UP PARAM F/U: HCPCS | Performed by: INTERNAL MEDICINE

## 2020-07-30 PROCEDURE — 1090F PRES/ABSN URINE INCON ASSESS: CPT | Performed by: INTERNAL MEDICINE

## 2020-07-30 PROCEDURE — 1036F TOBACCO NON-USER: CPT | Performed by: INTERNAL MEDICINE

## 2020-07-30 PROCEDURE — 1123F ACP DISCUSS/DSCN MKR DOCD: CPT | Performed by: INTERNAL MEDICINE

## 2020-07-30 PROCEDURE — 4040F PNEUMOC VAC/ADMIN/RCVD: CPT | Performed by: INTERNAL MEDICINE

## 2020-07-30 PROCEDURE — G8427 DOCREV CUR MEDS BY ELIG CLIN: HCPCS | Performed by: INTERNAL MEDICINE

## 2020-07-30 PROCEDURE — G8400 PT W/DXA NO RESULTS DOC: HCPCS | Performed by: INTERNAL MEDICINE

## 2020-07-30 PROCEDURE — 3023F SPIROM DOC REV: CPT | Performed by: INTERNAL MEDICINE

## 2020-07-30 PROCEDURE — 99213 OFFICE O/P EST LOW 20 MIN: CPT | Performed by: INTERNAL MEDICINE

## 2020-07-30 RX ORDER — AZITHROMYCIN 250 MG/1
250 TABLET, FILM COATED ORAL SEE ADMIN INSTRUCTIONS
Qty: 6 TABLET | Refills: 0 | Status: SHIPPED | OUTPATIENT
Start: 2020-07-30 | End: 2020-08-04

## 2020-07-30 RX ORDER — DABIGATRAN ETEXILATE 150 MG/1
150 CAPSULE, COATED PELLETS ORAL 2 TIMES DAILY
COMMUNITY
End: 2020-07-30

## 2020-07-30 ASSESSMENT — ENCOUNTER SYMPTOMS
SHORTNESS OF BREATH: 1
COUGH: 1
ABDOMINAL PAIN: 0
APNEA: 0
TROUBLE SWALLOWING: 0
CHOKING: 0
EYE DISCHARGE: 0
STRIDOR: 0
VOICE CHANGE: 0
ABDOMINAL DISTENTION: 0
EYE REDNESS: 0
BACK PAIN: 1
CHEST TIGHTNESS: 0
WHEEZING: 0

## 2020-07-30 NOTE — PROGRESS NOTES
Subjective:      Patient ID: Rai Kim is a 77 y.o. female. CC: COPD    C/O productive cough for 2 weeks, no wheezes, no fever  Tan colored sputum   Does not wish Anoro/Siriva/Stiolto,cost related shows allergy to Trelegy  CT chest (done as a follow up to a RADS 3 screening CT) reveals stable 5 mm nodule, and a 3 mm nodule, recommendation is to go back to annual screening. Previous visits. Referred by Dr Darío Duke for SOB  Orly Fairchild has moderate COPD, quit smoking last year after a ACS  Tried Spiriva in the past but did not like the \"gritty\" feeling and quit  Main c/o today is nasal congestion and obstruction, wishes something for \"sinus\"  Started smoking at age 34 years up to 2 ppd quit at age 70  Worked restaurants as a cook, , all aspects of the business   H/o L breast cancer, lumpectomy and XRT in 2016  Screening CT chest 4/2018  No family h/o lung cancer  Sister and mother Renal cell carcinoma  Codeine for back pain, needs surgery for back pain, on hold due to ACS    Review of Systems   Constitutional: Negative for activity change, fatigue and unexpected weight change. HENT: Positive for congestion. Negative for trouble swallowing and voice change. Eyes: Negative for discharge, redness and visual disturbance. Respiratory: Positive for cough and shortness of breath. Negative for apnea, choking, chest tightness, wheezing and stridor. Cardiovascular: Negative for chest pain, palpitations and leg swelling. Gastrointestinal: Negative for abdominal distention and abdominal pain. Genitourinary: Negative. Musculoskeletal: Positive for arthralgias and back pain. Negative for gait problem, joint swelling, myalgias, neck pain and neck stiffness.          All other systems reviewed and are negative    Lung Nodule Screening     [x] Qualifies    [] Does not qualify   [] Declined   [] Completed  Past Medical History:   Diagnosis Date    Bronchitis     CAD (coronary artery disease)     Cerebral artery occlusion with cerebral infarction (HCC)     TIA    COPD [facility]ronic obstructive pulmonary disease) [facility]     Diverticulosis     Essential hypertension 5/10/2017    Foot drop, right foot     GERD (gastroesophageal reflux disease)     IBS (irritable bowel syndrome)     Kidney stone     Malignant neoplasm of upper-inner quadrant of left female breast ([facility] 2016    breast    MI (myocardial infarction) ([facility] 10/2018    PONV (postoperative nausea and vomiting)      Past Surgical History:   Procedure Laterality Date    Tommy Bagley 2    Mort Pleasure BREAST SURGERY Left 2016    exc. lymph nodes, lumpectomy    CARPAL TUNNEL RELEASE Right 2017    OIO    CERVICAL FUSION      COLONOSCOPY  unsure    Dr. Mary Lopez    COLONOSCOPY  10/10/14    Dr. Amor Rahman Left 2019    COLONOSCOPY POLYPECTOMY HOT BIOPSY performed by Wilfredo Hamlin MD at [address] [facility]  10/27/2018    [facility]    DILATION AND CURETTAGE OF UTERUS  80    HEMORRHOID SURGERY  2014    HYSTERECTOMY, VAGINAL  2014    KIDNEY STONE SURGERY      LAMINECTOMY  2018    LUMBAR FUSION N/A 2020    LUMBAR LAMINECTOMY PSF L3-S1 WITH CAPTIVA performed by Meryle Mini, MD at [address] [facility] OFFICE/OUTPT [address] N/A 2018    ACDF C5-6 performed by Ileana Krabbe, MD at [address] [place] [address] Left      Social History     Tobacco Use    Smoking status: Former Smoker     Packs/day: 1.50     Years: 20.00     Pack years: 30.00     Types: Cigarettes     Last attempt to quit: 10/27/2018     Years since quittin.7    Smokeless tobacco: Never Used   Substance Use Topics    Alcohol use: No    Drug use: No      Allergies   Allergen Reactions    Naproxen Anaphylaxis    Prednisone Swelling and Other (See Comments)     Angioedema    Ultram [Tramadol] Itching and Rash      Family History   Problem Relation Age of Onset   Cheryle Elizabeth Cancer Mother         renal cell carcinoma    Kidney Cancer Mother 46    Cirrhosis Father     Diabetes Sister     Kidney Cancer Sister 54    Cancer Sister     Heart Disease Sister         CHF    Diabetes Brother     High Blood Pressure Brother     Breast Cancer Paternal Aunt 54    Ovarian Cancer Neg Hx     Stroke Neg Hx      Current Outpatient Medications   Medication Sig Dispense Refill    dabigatran (PRADAXA) 150 MG capsule Take 150 mg by mouth 2 times daily      acetaminophen-codeine (TYLENOL #3) 300-30 MG per tablet Take 1 tablet by mouth every 6 hours as needed for Pain for up to 30 days. 120 tablet 0    letrozole (FEMARA) 2.5 MG tablet Take 1 tablet by mouth daily 90 tablet 3    oxybutynin (DITROPAN) 5 MG tablet Take 1 tablet by mouth nightly 30 tablet 5    clopidogrel (PLAVIX) 75 MG tablet Take 1 tablet by mouth daily 90 tablet 3    metoprolol succinate (TOPROL XL) 25 MG extended release tablet Take 1 tablet by mouth daily 90 tablet 3    nitroGLYCERIN (NITROSTAT) 0.4 MG SL tablet Place 1 tablet under the tongue every 5 minutes as needed for Chest pain up to max of 3 total doses. If no relief after 1 dose, call 911. 25 tablet 3    Handicap Placard MISC by Does not apply route Expires 3/5/2024 1 each 0    atorvastatin (LIPITOR) 80 MG tablet Take 1 tablet by mouth nightly 90 tablet 3    Tiotropium Bromide-Olodaterol (STIOLTO RESPIMAT) 2.5-2.5 MCG/ACT AERS Inhale 1 puff into the lungs daily      hydrocortisone (ANUSOL-HC) 2.5 % rectal cream Place rectally 2 times daily as needed for Hemorrhoids Place rectally 2 times daily.       amLODIPine (NORVASC) 10 MG tablet Take 1 tablet by mouth daily 90 tablet 3    albuterol sulfate  (90 Base) MCG/ACT inhaler INHALE 2 PUFFS BY MOUTH EVERY 6 HOURS AS NEEDED FOR WHEEZING 18 g 5    fluticasone (FLONASE) 50 MCG/ACT nasal spray 1 spray by Nasal route daily 1 Bottle 3    aspirin 81 MG EC tablet Take 1 tablet by mouth daily 30 tablet 3     No current facility-administered medications for this visit. LABS - none   /62 (Site: Left Upper Arm, Position: Sitting, Cuff Size: Medium Adult)   Pulse 77   Temp 99.5 °F (37.5 °C)   Ht 4' 10\" (1.473 m)   Wt 135 lb (61.2 kg)   SpO2 96% Comment: room air at rest  BMI 28.22 kg/m²    Wt Readings from Last 3 Encounters:   07/30/20 135 lb (61.2 kg)   06/26/20 137 lb 3.2 oz (62.2 kg)   06/24/20 135 lb (61.2 kg)     Neck Circumference - 13 in; Mallampati Score -2      Objective:   Physical Exam  Vitals signs and nursing note reviewed. Constitutional:       General: She is not in acute distress. Appearance: She is well-developed. She is not diaphoretic. HENT:      Head: Normocephalic and atraumatic. Mouth/Throat:      Pharynx: No oropharyngeal exudate. Eyes:      General: No scleral icterus. Right eye: No discharge. Left eye: No discharge. Pupils: Pupils are equal, round, and reactive to light. Neck:      Musculoskeletal: Normal range of motion and neck supple. Vascular: No JVD. Trachea: No tracheal deviation. Cardiovascular:      Rate and Rhythm: Normal rate and regular rhythm. Heart sounds: No murmur. No friction rub. No gallop. Pulmonary:      Effort: Pulmonary effort is normal. No respiratory distress. Breath sounds: Normal breath sounds. No stridor. No wheezing or rales. Chest:      Chest wall: No tenderness. Abdominal:      General: Bowel sounds are normal. There is no distension. Palpations: Abdomen is soft. There is no mass. Tenderness: There is no abdominal tenderness. There is no guarding or rebound. Musculoskeletal: Normal range of motion. General: No tenderness. Lymphadenopathy:      Cervical: No cervical adenopathy. Skin:     General: Skin is warm. Coloration: Skin is not pale. Findings: No erythema or rash.    Neurological:      Mental Status: She is alert and oriented to person, place, and time. Cranial Nerves: No cranial nerve deficit. Psychiatric:         Behavior: Behavior normal.         Thought Content: Thought content normal.         Judgment: Judgment normal.                   CT screening:        FINDINGS:   LUNGS NODULES:   1. There is a 5 mm nodule in the right lateral costophrenic angle not seen on the prior exam.   Stable less than 3 mm pleural-based nodule posterior medial left lung base image 301. Calcified granuloma right lower lobes. Atelectasis in the lingula.       LYMPHADENOPATHY:   1. There are no pathologically enlarged lymph nodes.       OTHER (LUNGS/MEDIASTINUM/MUSCULOSKELETAL/ABDOMEN):   1. The ascending aorta measures 4 cm. This is stable. 4 mm pericardial effusion.                   Impression   1. 5 mm nodule right lung base. New finding. 2. There are no pathologically enlarged lymph nodes. 3. LUNGRADS ASSESSMENT VALUE: 3,            The LUNG RADS RECOMMENDATIONS for monitoring lung nodules listed below (ACR- Lung-RADS Version 1.0 Assessment Categories Release date\" April 28, 2014)       LUNG RADS RECOMMENDATIONS;   1.  Normal, continue annual screening   2.  Benign appearance or behavior, continue annual screening   3.  6 month CT recommended   4A.  3 month CT recommended; may consider PET/CT   4B.  Additional diagnostics and/or tissue sampling recommended   4X.  Additional diagnostics and/or tissue sampling recommended         **This report has been created using voice recognition software.  It may contain minor errors which are inherent in voice recognition technology. **       Final report electronically signed by Dr. Ruma Avalos on 12/27/2019 2:18 PM           CT chest:    FINDINGS: No mediastinal or hilar adenopathy. ascending aorta is stable measuring 4 cm. Normal heart size. Coronary artery stents. No pericardial or pleural effusion. No infiltrates. Stable 5 mm nodule right lateral lung base.

## 2020-07-30 NOTE — TELEPHONE ENCOUNTER
Patient called and states we have her medication list wrong. She states she is not taking dabigatran (pradaxa) and has never taken this medication. She requests to be removed from her chart.

## 2020-08-12 RX ORDER — ACETAMINOPHEN AND CODEINE PHOSPHATE 300; 30 MG/1; MG/1
1 TABLET ORAL EVERY 6 HOURS PRN
Qty: 120 TABLET | Refills: 0 | Status: SHIPPED | OUTPATIENT
Start: 2020-08-12 | End: 2020-09-09 | Stop reason: SDUPTHER

## 2020-08-12 NOTE — TELEPHONE ENCOUNTER
Lysle Space called requesting a refill on the following medications:  Requested Prescriptions     Pending Prescriptions Disp Refills    acetaminophen-codeine (TYLENOL #3) 300-30 MG per tablet 120 tablet 0     Sig: Take 1 tablet by mouth every 6 hours as needed for Pain for up to 30 days. [facility]      Date of last visit: 06/24/20  Date of next visit (if applicable): Visit date not found

## 2020-08-21 RX ORDER — ATORVASTATIN CALCIUM 80 MG/1
80 TABLET, FILM COATED ORAL NIGHTLY
Qty: 90 TABLET | Refills: 2 | Status: SHIPPED | OUTPATIENT
Start: 2020-08-21 | End: 2021-05-20 | Stop reason: SDUPTHER

## 2020-09-09 ENCOUNTER — TELEPHONE (OUTPATIENT)
Dept: FAMILY MEDICINE CLINIC | Age: 65
End: 2020-09-09

## 2020-09-09 RX ORDER — ACETAMINOPHEN AND CODEINE PHOSPHATE 300; 30 MG/1; MG/1
1 TABLET ORAL EVERY 6 HOURS PRN
Qty: 120 TABLET | Refills: 0 | Status: SHIPPED | OUTPATIENT
Start: 2020-09-09 | End: 2020-10-08 | Stop reason: SDUPTHER

## 2020-09-09 NOTE — TELEPHONE ENCOUNTER
I agree with giving it a try. This may help to calm down the nerve in her back that is causing a lot of her pain.

## 2020-09-09 NOTE — TELEPHONE ENCOUNTER
Mireille Nelson called requesting a refill on the following medications:  Requested Prescriptions     Pending Prescriptions Disp Refills    acetaminophen-codeine (TYLENOL #3) 300-30 MG per tablet 120 tablet 0     Sig: Take 1 tablet by mouth every 6 hours as needed for Pain for up to 30 days. Pharmacy verified: [facility]   . pv      Date of last visit: 6/24/2020  Date of next visit (if applicable): Visit date not found

## 2020-09-09 NOTE — TELEPHONE ENCOUNTER
Pt states Dr China Simon ordered a injection for her back on 9/17/20  She wanted to know if you thought this would be ok

## 2020-09-16 RX ORDER — BUPIVACAINE HYDROCHLORIDE 2.5 MG/ML
5 INJECTION, SOLUTION EPIDURAL; INFILTRATION; INTRACAUDAL ONCE
Status: CANCELLED | OUTPATIENT
Start: 2020-09-16 | End: 2020-09-16

## 2020-09-17 ENCOUNTER — HOSPITAL ENCOUNTER (OUTPATIENT)
Dept: INTERVENTIONAL RADIOLOGY/VASCULAR | Age: 65
Discharge: HOME | End: 2020-09-17
Payer: MEDICARE

## 2020-09-17 PROCEDURE — 6360000004 HC RX CONTRAST MEDICATION: Performed by: RADIOLOGY

## 2020-09-17 PROCEDURE — 2500000003 HC RX 250 WO HCPCS

## 2020-09-17 PROCEDURE — 6360000002 HC RX W HCPCS

## 2020-09-17 PROCEDURE — 2709999900 HC NON-CHARGEABLE SUPPLY

## 2020-09-17 PROCEDURE — G0260 INJ FOR SACROILIAC JT ANESTH: HCPCS | Performed by: RADIOLOGY

## 2020-09-17 RX ORDER — METHYLPREDNISOLONE ACETATE 80 MG/ML
80 INJECTION, SUSPENSION INTRA-ARTICULAR; INTRALESIONAL; INTRAMUSCULAR; SOFT TISSUE ONCE
Status: COMPLETED | OUTPATIENT
Start: 2020-09-17 | End: 2020-09-17

## 2020-09-17 RX ORDER — BUPIVACAINE HYDROCHLORIDE 2.5 MG/ML
5 INJECTION, SOLUTION EPIDURAL; INFILTRATION; INTRACAUDAL ONCE
Status: COMPLETED | OUTPATIENT
Start: 2020-09-17 | End: 2020-09-17

## 2020-09-17 RX ADMIN — BUPIVACAINE HYDROCHLORIDE 8 ML: 2.5 INJECTION, SOLUTION EPIDURAL; INFILTRATION; INTRACAUDAL at 08:32

## 2020-09-17 RX ADMIN — IOHEXOL 2 ML: 180 INJECTION INTRAVENOUS at 08:32

## 2020-09-17 RX ADMIN — METHYLPREDNISOLONE ACETATE 80 MG: 80 INJECTION, SUSPENSION INTRA-ARTICULAR; INTRALESIONAL; INTRAMUSCULAR; SOFT TISSUE at 08:33

## 2020-09-17 RX ADMIN — METHYLPREDNISOLONE ACETATE 80 MG: 80 INJECTION, SUSPENSION INTRA-ARTICULAR; INTRALESIONAL; INTRAMUSCULAR; SOFT TISSUE at 08:32

## 2020-09-17 ASSESSMENT — PAIN SCALES - GENERAL: PAINLEVEL_OUTOF10: 10

## 2020-09-17 NOTE — OP NOTE
Department of Radiology  Post Procedure Progress Note      Pre-Procedure Diagnosis:  SacroIliitis     Procedure Performed:  Bilateral Sacroiliac Block/Steroid injection procedure     Anesthesia: local     Findings: successful    Immediate Complications:  None    Estimated Blood Loss: minimal    SEE DICTATED PROCEDURE NOTE FOR COMPLETE DETAILS.       Amparo Seals MD   9/17/2020 8:34 AM

## 2020-09-17 NOTE — PROGRESS NOTES
8101 Pt in specials radiology for bilateral SI joint injections. Explained procedure to pt and pt verbalizes understanding. Consent signed. 5865 Dr Pimentel Home to speak to pt. Made aware of allergies. Discussed with pt.  2787 Pt positioned prone on table. 7085 Right SI complete. 2181 Left SI complete. Pt tolerated well. [address] applied to sites. Sites without redness, swelling or hematoma. 0465 Pt discharged ambulatory with steady gait. States it is still sore.

## 2020-09-17 NOTE — H&P
Formulation and discussion of sedation / procedure plans, risks, benefits, side effects and alternatives with patient and/or responsible adult completed.     Electronically signed by Cece Gillis MD on 9/17/2020 at 8:34 AM

## 2020-09-25 ENCOUNTER — HOSPITAL ENCOUNTER (OUTPATIENT)
Dept: WOMENS IMAGING | Age: 65
Discharge: HOME | End: 2020-09-25
Payer: MEDICARE

## 2020-09-25 PROCEDURE — 77063 BREAST TOMOSYNTHESIS BI: CPT

## 2020-10-08 RX ORDER — ACETAMINOPHEN AND CODEINE PHOSPHATE 300; 30 MG/1; MG/1
1 TABLET ORAL EVERY 6 HOURS PRN
Qty: 120 TABLET | Refills: 0 | Status: SHIPPED | OUTPATIENT
Start: 2020-10-08 | End: 2020-11-06 | Stop reason: SDUPTHER

## 2020-10-08 RX ORDER — ALBUTEROL SULFATE 90 UG/1
AEROSOL, METERED RESPIRATORY (INHALATION)
Qty: 18 G | Refills: 5 | Status: SHIPPED | OUTPATIENT
Start: 2020-10-08 | End: 2021-09-16 | Stop reason: SDUPTHER

## 2020-10-08 NOTE — TELEPHONE ENCOUNTER
Ameya Ley called requesting a refill on the following medications:  Requested Prescriptions     Pending Prescriptions Disp Refills    albuterol sulfate  (90 Base) MCG/ACT inhaler 18 g 5     Pharmacy verified: [facility]  . pv      Date of last visit: 7/30/2020  Date of next visit (if applicable): 90/1/2995

## 2020-10-26 ENCOUNTER — OFFICE VISIT (OUTPATIENT)
Dept: FAMILY MEDICINE CLINIC | Age: 65
End: 2020-10-26
Payer: MEDICARE

## 2020-10-26 VITALS
BODY MASS INDEX: 28.09 KG/M2 | WEIGHT: 133.8 LBS | DIASTOLIC BLOOD PRESSURE: 78 MMHG | HEART RATE: 71 BPM | RESPIRATION RATE: 17 BRPM | HEIGHT: 58 IN | SYSTOLIC BLOOD PRESSURE: 122 MMHG | OXYGEN SATURATION: 97 % | TEMPERATURE: 98.7 F

## 2020-10-26 PROCEDURE — G8417 CALC BMI ABV UP PARAM F/U: HCPCS | Performed by: FAMILY MEDICINE

## 2020-10-26 PROCEDURE — 4040F PNEUMOC VAC/ADMIN/RCVD: CPT | Performed by: FAMILY MEDICINE

## 2020-10-26 PROCEDURE — G8400 PT W/DXA NO RESULTS DOC: HCPCS | Performed by: FAMILY MEDICINE

## 2020-10-26 PROCEDURE — G8427 DOCREV CUR MEDS BY ELIG CLIN: HCPCS | Performed by: FAMILY MEDICINE

## 2020-10-26 PROCEDURE — 99214 OFFICE O/P EST MOD 30 MIN: CPT | Performed by: FAMILY MEDICINE

## 2020-10-26 PROCEDURE — 3017F COLORECTAL CA SCREEN DOC REV: CPT | Performed by: FAMILY MEDICINE

## 2020-10-26 PROCEDURE — G8484 FLU IMMUNIZE NO ADMIN: HCPCS | Performed by: FAMILY MEDICINE

## 2020-10-26 PROCEDURE — 1090F PRES/ABSN URINE INCON ASSESS: CPT | Performed by: FAMILY MEDICINE

## 2020-10-26 PROCEDURE — 1123F ACP DISCUSS/DSCN MKR DOCD: CPT | Performed by: FAMILY MEDICINE

## 2020-10-26 PROCEDURE — 1036F TOBACCO NON-USER: CPT | Performed by: FAMILY MEDICINE

## 2020-10-26 RX ORDER — GABAPENTIN 100 MG/1
100 CAPSULE ORAL 3 TIMES DAILY
Qty: 90 CAPSULE | Refills: 5 | Status: SHIPPED | OUTPATIENT
Start: 2020-10-26 | End: 2021-03-01

## 2020-10-26 RX ORDER — SOLIFENACIN SUCCINATE 5 MG/1
5 TABLET, FILM COATED ORAL DAILY
Qty: 30 TABLET | Refills: 5 | Status: SHIPPED | OUTPATIENT
Start: 2020-10-26 | End: 2021-03-01

## 2020-10-26 NOTE — PROGRESS NOTES
Mayo Fields is a 77 y.o. female that presents for     Chief Complaint   Patient presents with    Other     4 month  bp meds and urinary meds        /78   Pulse 71   Temp 98.7 °F (37.1 °C)   Resp 17   Ht 4' 9.75\" (1.467 m)   Wt 133 lb 12.8 oz (60.7 kg)   SpO2 97%   BMI 28.21 kg/m²       HPI:    Started on oxybutynin 5mg at last visit for suspected OAB. She stopped this because it was giving her some GI upset. Still having issues more at night time. Waking up 3-4 times per night to urinate. No dysuria, hematuria. States that she has a strong stream.    HTN    Does patient check BP regularly at home? - Yes - has her BP cuff with her today, BPs are consistently in the low 248Q systolically and has several readings with the SBP <80. Current Medication regimen - Norvasc, metoprolol  Tolerating medications well? - yes    Shortness of breath or chest pain? No  Headache or visual complaints? No  Neurologic changes like confusion? No  Extremity edema? No    BP Readings from Last 3 Encounters:   10/26/20 122/78   07/30/20 110/62   06/26/20 123/70     Chronic Pain    HPI:    Patient has chronic pain of the low back with radiation to the legs. Had recent epidural but states that this is did not help much. Pain control: adequate  Improvement in function and ADLs? yes  Current Medications:  Tylenol #3 q 6 hrs prn  Escalation of dosage recently?  no    Evidence for abuse or diversion? no   Seen specialist or pain clinic?: yes    Controlled Substances Monitoring:      CAD    History of myocardial infarction? Yes    History of heart failure? No.    Current symptoms of angina? No   Patient compliant with therapy? No  Tobacco use? No      Antiplatelet therapy? Yes    Beta-blocker therapy?   Yes   ACE/ARB therapy - Yes  Statin - Yes    Health Maintenance   Topic Date Due    Annual Wellness Visit (AWV)  11/19/2020    Lipid screen  10/26/2020 (Originally 2/6/2020)    DTaP/Tdap/Td vaccine (1 - Tdap) 10/26/2021 (Originally 2/28/1974)    Flu vaccine (1) 10/26/2021 (Originally 9/1/2020)    Shingles Vaccine (1 of 2) 10/26/2021 (Originally 2/28/2003)    Pneumococcal 65+ years Vaccine (2 of 2 - PPSV23) 10/26/2021 (Originally 10/23/2020)    Hepatitis C screen  10/26/2021 (Originally 1955)    Potassium monitoring  02/06/2021    Creatinine monitoring  02/06/2021    Low dose CT lung screening  07/23/2021    Breast cancer screen  09/25/2021    Colon cancer screen colonoscopy  06/17/2029    DEXA (modify frequency per FRAX score)  Completed    Hepatitis A vaccine  Aged Out    Hepatitis B vaccine  Aged Out    Hib vaccine  Aged Out    Meningococcal (ACWY) vaccine  Aged Out       Past Medical History:   Diagnosis Date    Bronchitis     CAD (coronary artery disease)     Cerebral artery occlusion with cerebral infarction ([address]     TIA    COPD (chronic obstructive pulmonary disease) ([address]     Diverticulosis     Essential hypertension 5/10/2017    Foot drop, right foot     GERD (gastroesophageal reflux disease)     IBS (irritable bowel syndrome)     Kidney stone 2014    Malignant neoplasm of upper-inner quadrant of left female breast ([address] 9/13/2016    breast    MI (myocardial infarction) ([address] 10/2018    PONV (postoperative nausea and vomiting)        Past Surgical History:   Procedure Laterality Date    BACK SURGERY  1998    Rumal[facility] Bev BREAST SURGERY Left 2016    exc. lymph nodes, lumpectomy    CARPAL TUNNEL RELEASE Right 06/2017    [facility]    CERVICAL FUSION      COLONOSCOPY  unsure    Dr. Mara Lay    COLONOSCOPY  10/10/14    Dr. Eliza Taylor    COLONOSCOPY Left 6/17/2019    COLONOSCOPY POLYPECTOMY HOT BIOPSY performed [facility] [address] [facility]  10/27/2018    [facility]    DILATION AND CURETTAGE OF UTERUS  80    HEMORRHOID SURGERY  12/16/2014    HYSTERECTOMY, VAGINAL  12/16/2014    KIDNEY STONE SURGERY  2014    LAMINECTOMY  06/12/2018    LUMBAR FUSION N/A 2020    LUMBAR LAMINECTOMY PSF L3-S1 WITH CAPTIVA performed by Gray Martínez MD at [address] OFFICE/OUTPT VISIT,PROCEDURE ONLY N/A 2018    ACDF C5-6 performed by Arlie Hashimoto, MD at [address]  89    WISDOM TOOTH EXTRACTION  1979    WRIST GANGLION EXCISION Left        Social History     Tobacco Use    Smoking status: Former Smoker     Packs/day: 1.50     Years: 20.00     Pack years: 30.00     Types: Cigarettes     Last attempt to quit: 10/27/2018     Years since quittin.0    Smokeless tobacco: Never Used   Substance Use Topics    Alcohol use: No    Drug use: No       Family History   Problem Relation Age of Onset    Cancer Mother         renal cell carcinoma    Kidney Cancer Mother 46    Cirrhosis Father     Diabetes Sister     Kidney Cancer Sister 54    Cancer Sister     Heart Disease Sister         CHF    Diabetes Brother     High Blood Pressure Brother     Breast Cancer Paternal Aunt 54    Ovarian Cancer Neg Hx     Stroke Neg Hx          I have reviewed the patient's past medical history, past surgical history, allergies, medications, social and family history and I have made updates where appropriate.     Review of Systems        PHYSICAL EXAM:  /78   Pulse 71   Temp 98.7 °F (37.1 °C)   Resp 17   Ht 4' 9.75\" (1.467 m)   Wt 133 lb 12.8 oz (60.7 kg)   SpO2 97%   BMI 28.21 kg/m²     General Appearance: well developed and well- nourished, in no acute distress  Head: normocephalic and atraumatic  ENT: external ear and ear canal normal bilaterally, nose without deformity, nasal mucosa and turbinates normal without polyps, oropharynx normal, dentition is normal for age, no lipor gum lesions noted  Neck: supple and non-tender without mass, no thyromegaly or thyroid nodules, no cervical lymphadenopathy  Pulmonary/Chest: clear to auscultation bilaterally- no wheezes, rales or rhonchi, normal air movement, no respiratory distress or retractions  Cardiovascular: normal rate, regular rhythm, normal S1 and S2, no murmurs, rubs, clicks, or gallops, distal pulses intact  Extremities: no cyanosis, clubbing or edema of the lower extremities  Psych:  Normal affect without evidence of depression oranxiety, insight and judgement are appropriate, memory appears intact  Skin: warm and dry, no rash or erythema      ASSESSMENT & PLAN  Marcus Ortega was seen today for other. Diagnoses and all orders for this visit:    OAB (overactive bladder)  -     solifenacin (VESICARE) 5 MG tablet; Take 1 tablet by mouth daily    Essential hypertension  -     Comprehensive Metabolic Panel; Future  -     Lipid Panel; Future    Hypotension due to drugs    Spinal stenosis of lumbar region without neurogenic claudication  -     gabapentin (NEURONTIN) 100 MG capsule; Take 1 capsule by mouth 3 times daily for 180 days. Intended supply: 30 days    Coronary artery disease involving native coronary artery of native heart without angina pectoris  -     Comprehensive Metabolic Panel; Future  -     Lipid Panel; Future         -Declining pain management at this time, we will try gabapentin in addition to her T3  -Will trial vesicare for her OAB  -Will d/c norvasc due to hypotension  -Other chronic issues are stable, continue current medications  -Advised to call if any issues      Return in about 4 months (around 2/26/2021). Controlled Substance Monitoring:    Acute and Chronic Pain Monitoring:   RX Monitoring 7/6/2020   Periodic Controlled Substance Monitoring Possible medication side effects, risk of tolerance/dependence & alternative treatments discussed. ;No signs of potential drug abuse or diversion identified.;Obtaining appropriate analgesic effect of treatment. Marcus Ortega received counseling on the following healthy behaviors: medication adherence  Reviewed prior labs and health maintenance. Continue current medications, diet and exercise.   Discussed use, benefit, and side effects of prescribed medications. Barriers to medication compliance addressed. Patient given educational materials - see patient instructions. All patient questions answered. Patient voiced understanding.

## 2020-10-27 ENCOUNTER — TELEPHONE (OUTPATIENT)
Dept: FAMILY MEDICINE CLINIC | Age: 65
End: 2020-10-27

## 2020-10-27 NOTE — TELEPHONE ENCOUNTER
----- Message from William Stacy DO sent at 10/27/2020  8:47 AM EDT -----  Please contact patient and let her know that Dr Nayely Mei got back to me and said that it was ok to stop the Plavix. She should still continue with the Aspirin 81mg once per day.    ----- Message -----  From: Ronnie Dickens MD  Sent: 10/26/2020   6:29 PM EDT  To: William Stacy DO    Yes ASA 81 mg should be fine. Thanks.  ----- Message -----  From: William Stacy DO  Sent: 10/26/2020   2:40 PM EDT  To: MD Dr Nayely Garcia,    I saw Mrs Yolanda Arias today. She is current on ASA and plavix for a stent placed on 10/27/18. She is having more GI upset and bruising recently. Would she be a candidate to stop the plavix at this point? Thanks!     Sulma Martinez

## 2020-10-28 ENCOUNTER — TELEPHONE (OUTPATIENT)
Dept: FAMILY MEDICINE CLINIC | Age: 65
End: 2020-10-28

## 2020-10-28 RX ORDER — FESOTERODINE FUMARATE 4 MG/1
4 TABLET, FILM COATED, EXTENDED RELEASE ORAL DAILY
Qty: 30 TABLET | Refills: 1 | Status: SHIPPED | OUTPATIENT
Start: 2020-10-28 | End: 2021-01-04 | Stop reason: SDUPTHER

## 2020-10-28 NOTE — TELEPHONE ENCOUNTER
Will trial Toviaz x 1 month  Call in 1 month and let us know how she is doing   At that point we can increase the dose or try something different  Electronically signed by GREGORIO Rangel CNP on 10/28/2020 at 9:47 AM

## 2020-11-02 ENCOUNTER — NURSE ONLY (OUTPATIENT)
Dept: LAB | Age: 65
End: 2020-11-02

## 2020-11-02 LAB
ALBUMIN SERPL-MCNC: 4.4 G/DL (ref 3.5–5.1)
ALP BLD-CCNC: 106 U/L (ref 38–126)
ALT SERPL-CCNC: 13 U/L (ref 11–66)
ANION GAP SERPL CALCULATED.3IONS-SCNC: 13 MEQ/L (ref 8–16)
AST SERPL-CCNC: 14 U/L (ref 5–40)
BILIRUB SERPL-MCNC: 0.4 MG/DL (ref 0.3–1.2)
BUN BLDV-MCNC: 16 MG/DL (ref 7–22)
CALCIUM SERPL-MCNC: 9.4 MG/DL (ref 8.5–10.5)
CHLORIDE BLD-SCNC: 105 MEQ/L (ref 98–111)
CHOLESTEROL, TOTAL: 132 MG/DL (ref 100–199)
CO2: 24 MEQ/L (ref 23–33)
CREAT SERPL-MCNC: 0.6 MG/DL (ref 0.4–1.2)
GFR SERPL CREATININE-BSD FRML MDRD: > 90 ML/MIN/1.73M2
GLUCOSE BLD-MCNC: 95 MG/DL (ref 70–108)
HDLC SERPL-MCNC: 71 MG/DL
LDL CHOLESTEROL CALCULATED: 44 MG/DL
POTASSIUM SERPL-SCNC: 3.9 MEQ/L (ref 3.5–5.2)
SODIUM BLD-SCNC: 142 MEQ/L (ref 135–145)
TOTAL PROTEIN: 7 G/DL (ref 6.1–8)
TRIGL SERPL-MCNC: 83 MG/DL (ref 0–199)

## 2020-11-04 ENCOUNTER — TELEPHONE (OUTPATIENT)
Dept: PULMONOLOGY | Age: 65
End: 2020-11-04

## 2020-11-04 ENCOUNTER — OFFICE VISIT (OUTPATIENT)
Dept: PULMONOLOGY | Age: 65
End: 2020-11-04
Payer: MEDICARE

## 2020-11-04 VITALS
SYSTOLIC BLOOD PRESSURE: 132 MMHG | HEIGHT: 57 IN | OXYGEN SATURATION: 96 % | WEIGHT: 135.2 LBS | DIASTOLIC BLOOD PRESSURE: 84 MMHG | BODY MASS INDEX: 29.17 KG/M2 | HEART RATE: 85 BPM | TEMPERATURE: 98.4 F

## 2020-11-04 PROCEDURE — G8427 DOCREV CUR MEDS BY ELIG CLIN: HCPCS | Performed by: INTERNAL MEDICINE

## 2020-11-04 PROCEDURE — 1090F PRES/ABSN URINE INCON ASSESS: CPT | Performed by: INTERNAL MEDICINE

## 2020-11-04 PROCEDURE — 1036F TOBACCO NON-USER: CPT | Performed by: INTERNAL MEDICINE

## 2020-11-04 PROCEDURE — 4040F PNEUMOC VAC/ADMIN/RCVD: CPT | Performed by: INTERNAL MEDICINE

## 2020-11-04 PROCEDURE — 1123F ACP DISCUSS/DSCN MKR DOCD: CPT | Performed by: INTERNAL MEDICINE

## 2020-11-04 PROCEDURE — G8400 PT W/DXA NO RESULTS DOC: HCPCS | Performed by: INTERNAL MEDICINE

## 2020-11-04 PROCEDURE — 3017F COLORECTAL CA SCREEN DOC REV: CPT | Performed by: INTERNAL MEDICINE

## 2020-11-04 PROCEDURE — G8417 CALC BMI ABV UP PARAM F/U: HCPCS | Performed by: INTERNAL MEDICINE

## 2020-11-04 PROCEDURE — G8926 SPIRO NO PERF OR DOC: HCPCS | Performed by: INTERNAL MEDICINE

## 2020-11-04 PROCEDURE — 3023F SPIROM DOC REV: CPT | Performed by: INTERNAL MEDICINE

## 2020-11-04 PROCEDURE — 99213 OFFICE O/P EST LOW 20 MIN: CPT | Performed by: INTERNAL MEDICINE

## 2020-11-04 PROCEDURE — G8484 FLU IMMUNIZE NO ADMIN: HCPCS | Performed by: INTERNAL MEDICINE

## 2020-11-04 ASSESSMENT — ENCOUNTER SYMPTOMS
ABDOMINAL PAIN: 0
BACK PAIN: 1
WHEEZING: 0
ABDOMINAL DISTENTION: 0
COUGH: 1
STRIDOR: 0
VOICE CHANGE: 0
CHOKING: 0
EYE REDNESS: 0
SHORTNESS OF BREATH: 1
EYE DISCHARGE: 0
TROUBLE SWALLOWING: 0
APNEA: 0
CHEST TIGHTNESS: 0

## 2020-11-04 NOTE — PROGRESS NOTES
Neck Circumference -   13  Mallampati - 2    Lung Nodule Screening     [x] Qualifies    [] Does not qualify   [] Declined    [] Completed

## 2020-11-04 NOTE — PROGRESS NOTES
Mother         renal cell carcinoma    Kidney Cancer Mother 46    Cirrhosis Father     Diabetes Sister     Kidney Cancer Sister 54    Cancer Sister     Heart Disease Sister         CHF    Diabetes Brother     High Blood Pressure Brother     Breast Cancer Paternal Aunt 54    Ovarian Cancer Neg Hx     Stroke Neg Hx      Current Outpatient Medications   Medication Sig Dispense Refill    fesoterodine (TOVIAZ) 4 MG TB24 ER tablet Take 1 tablet by mouth daily 30 tablet 1    gabapentin (NEURONTIN) 100 MG capsule Take 1 capsule by mouth 3 times daily for 180 days. Intended supply: 30 days 90 capsule 5    solifenacin (VESICARE) 5 MG tablet Take 1 tablet by mouth daily 30 tablet 5    acetaminophen-codeine (TYLENOL #3) 300-30 MG per tablet Take 1 tablet by mouth every 6 hours as needed for Pain for up to 30 days. 120 tablet 0    albuterol sulfate  (90 Base) MCG/ACT inhaler INHALE 2 PUFFS BY MOUTH EVERY 6 HOURS AS NEEDED FOR WHEEZING 18 g 5    atorvastatin (LIPITOR) 80 MG tablet Take 1 tablet by mouth nightly 90 tablet 2    letrozole (FEMARA) 2.5 MG tablet Take 1 tablet by mouth daily 90 tablet 3    metoprolol succinate (TOPROL XL) 25 MG extended release tablet Take 1 tablet by mouth daily 90 tablet 3    nitroGLYCERIN (NITROSTAT) 0.4 MG SL tablet Place 1 tablet under the tongue every 5 minutes as needed for Chest pain up to max of 3 total doses. If no relief after 1 dose, call 911. 25 tablet 3    Handicap Placard MISC by Does not apply route Expires 3/5/2024 1 each 0    hydrocortisone (ANUSOL-HC) 2.5 % rectal cream Place rectally 2 times daily as needed for Hemorrhoids Place rectally 2 times daily.  fluticasone (FLONASE) 50 MCG/ACT nasal spray 1 spray by Nasal route daily 1 Bottle 3    aspirin 81 MG EC tablet Take 1 tablet by mouth daily 30 tablet 3     No current facility-administered medications for this visit.       LABS - none   /84 (Site: Right Upper Arm, Position: Sitting, Cuff Size: Medium Adult)   Pulse 85   Temp 98.4 °F (36.9 °C)   Ht 4' 9\" (1.448 m)   Wt 135 lb 3.2 oz (61.3 kg)   SpO2 96% Comment: on room air  BMI 29.26 kg/m²    Wt Readings from Last 3 Encounters:   11/04/20 135 lb 3.2 oz (61.3 kg)   10/26/20 133 lb 12.8 oz (60.7 kg)   07/30/20 135 lb (61.2 kg)     Neck Circumference - 13 in; Mallampati Score -2      Objective:   Physical Exam  Vitals signs and nursing note reviewed. Constitutional:       General: She is not in acute distress. Appearance: She is well-developed. She is not diaphoretic. HENT:      Head: Normocephalic and atraumatic. Mouth/Throat:      Pharynx: No oropharyngeal exudate. Eyes:      General: No scleral icterus. Right eye: No discharge. Left eye: No discharge. Pupils: Pupils are equal, round, and reactive to light. Neck:      Musculoskeletal: Normal range of motion and neck supple. Vascular: No JVD. Trachea: No tracheal deviation. Cardiovascular:      Rate and Rhythm: Normal rate and regular rhythm. Heart sounds: No murmur. No friction rub. No gallop. Pulmonary:      Effort: Pulmonary effort is normal. No respiratory distress. Breath sounds: Normal breath sounds. No stridor. No wheezing or rales. Chest:      Chest wall: No tenderness. Abdominal:      General: Bowel sounds are normal. There is no distension. Palpations: Abdomen is soft. There is no mass. Tenderness: There is no abdominal tenderness. There is no guarding or rebound. Musculoskeletal: Normal range of motion. General: No tenderness. Lymphadenopathy:      Cervical: No cervical adenopathy. Skin:     General: Skin is warm. Coloration: Skin is not pale. Findings: No erythema or rash. Neurological:      Mental Status: She is alert and oriented to person, place, and time. Cranial Nerves: No cranial nerve deficit.    Psychiatric:         Behavior: Behavior normal.         Thought Content: Thought content normal.         Judgment: Judgment normal.                   CT screening:        FINDINGS:   LUNGS NODULES:   1. There is a 5 mm nodule in the right lateral costophrenic angle not seen on the prior exam.   Stable less than 3 mm pleural-based nodule posterior medial left lung base image 301. Calcified granuloma right lower lobes. Atelectasis in the lingula.       LYMPHADENOPATHY:   1. There are no pathologically enlarged lymph nodes.       OTHER (LUNGS/MEDIASTINUM/MUSCULOSKELETAL/ABDOMEN):   1. The ascending aorta measures 4 cm. This is stable. 4 mm pericardial effusion.                   Impression   1. 5 mm nodule right lung base. New finding. 2. There are no pathologically enlarged lymph nodes. 3. LUNGRADS ASSESSMENT VALUE: 3,            The LUNG RADS RECOMMENDATIONS for monitoring lung nodules listed below (ACR- Lung-RADS Version 1.0 Assessment Categories Release date\" April 28, 2014)       LUNG RADS RECOMMENDATIONS;   1.  Normal, continue annual screening   2.  Benign appearance or behavior, continue annual screening   3.  6 month CT recommended   4A.  3 month CT recommended; may consider PET/CT   4B.  Additional diagnostics and/or tissue sampling recommended   4X.  Additional diagnostics and/or tissue sampling recommended         **This report has been created using voice recognition software.  It may contain minor errors which are inherent in voice recognition technology. **       Final report electronically signed by Dr. Carmen Jimenez on 12/27/2019 2:18 PM           CT chest:    FINDINGS: No mediastinal or hilar adenopathy. ascending aorta is stable measuring 4 cm. Normal heart size. Coronary artery stents. No pericardial or pleural effusion. No infiltrates. Stable 5 mm nodule right lateral lung base. Calcified granuloma right lower lobe. Plate atelectasis right base.     3 mm nodule medial left lung base is only partially imaged on today's exam.         Upper abdomen    No abnormalities.         CHEST WALL:    Postsurgical changes left breast. Postsurgical changes left axilla. Bones    No suspicious bone lesions              Impression    Stable CT chest. Stable 5 mm nodule right lower lobe and partially imaged 3 mm nodule left lower lobe. Continue annual screening.                   **This report has been created using voice recognition software.  It may contain minor errors which are inherent in voice recognition technology. **         Final report electronically signed by Dr. Jaime Sadler on 7/23/2020 2:40 PM            Assessment:       Diagnosis Orders   1.  Chronic obstructive pulmonary disease, unspecified COPD type ([facility]             Plan:            Orders Placed This Encounter   Medications    Budeson-Glycopyrrol-Formoterol 160-9-4.8 MCG/ACT AERO     Sig: Inhale 2 puffs into the lungs 2 times daily     Dispense:  5.9 g     Refill:  5        Refill albuterol  Due for screening CT next year   RTC 4 mos

## 2020-11-04 NOTE — TELEPHONE ENCOUNTER
Generic Symbicort is not on  Formulary but Brand Symbicort is. Would you be willing to resend RX as Brand Symbicort? Please advise.

## 2020-11-06 RX ORDER — ACETAMINOPHEN AND CODEINE PHOSPHATE 300; 30 MG/1; MG/1
1 TABLET ORAL EVERY 6 HOURS PRN
Qty: 120 TABLET | Refills: 0 | Status: SHIPPED | OUTPATIENT
Start: 2020-11-06 | End: 2020-12-17 | Stop reason: SDUPTHER

## 2020-11-06 RX ORDER — BUDESONIDE AND FORMOTEROL FUMARATE DIHYDRATE 160; 4.5 UG/1; UG/1
2 AEROSOL RESPIRATORY (INHALATION) 2 TIMES DAILY
Qty: 1 INHALER | Refills: 11 | Status: SHIPPED | OUTPATIENT
Start: 2020-11-06 | End: 2021-09-16

## 2020-11-06 NOTE — TELEPHONE ENCOUNTER
Genie Watters called requesting a refill on the following medications: acetaminophen-codeine (TYLENOL #3) 300-30 MG per tablet     Pharmacy verified: yes  . pv      Date of last visit: 10/26/2020    Date of next visit (if applicable): Visit date not found

## 2020-12-16 ENCOUNTER — OFFICE VISIT (OUTPATIENT)
Dept: ONCOLOGY | Age: 65
End: 2020-12-16
Payer: MEDICARE

## 2020-12-16 ENCOUNTER — HOSPITAL ENCOUNTER (OUTPATIENT)
Dept: INFUSION THERAPY | Age: 65
Discharge: HOME | End: 2020-12-16
Payer: MEDICARE

## 2020-12-16 VITALS
SYSTOLIC BLOOD PRESSURE: 169 MMHG | DIASTOLIC BLOOD PRESSURE: 77 MMHG | HEIGHT: 58 IN | OXYGEN SATURATION: 96 % | RESPIRATION RATE: 18 BRPM | TEMPERATURE: 97.6 F | HEART RATE: 65 BPM | BODY MASS INDEX: 28.04 KG/M2 | WEIGHT: 133.6 LBS

## 2020-12-16 DIAGNOSIS — Z98.890 S/P LUMPECTOMY, LEFT BREAST: ICD-10-CM

## 2020-12-16 DIAGNOSIS — Z85.3 ENCOUNTER FOR FOLLOW-UP SURVEILLANCE OF BREAST CANCER: ICD-10-CM

## 2020-12-16 DIAGNOSIS — Z79.811 PROPHYLACTIC USE OF LETROZOLE (FEMARA): ICD-10-CM

## 2020-12-16 DIAGNOSIS — Z08 ENCOUNTER FOR FOLLOW-UP SURVEILLANCE OF BREAST CANCER: ICD-10-CM

## 2020-12-16 DIAGNOSIS — Z85.3 HISTORY OF LEFT BREAST CANCER: Primary | ICD-10-CM

## 2020-12-16 PROCEDURE — G8400 PT W/DXA NO RESULTS DOC: HCPCS | Performed by: INTERNAL MEDICINE

## 2020-12-16 PROCEDURE — G8484 FLU IMMUNIZE NO ADMIN: HCPCS | Performed by: INTERNAL MEDICINE

## 2020-12-16 PROCEDURE — 3017F COLORECTAL CA SCREEN DOC REV: CPT | Performed by: INTERNAL MEDICINE

## 2020-12-16 PROCEDURE — 4040F PNEUMOC VAC/ADMIN/RCVD: CPT | Performed by: INTERNAL MEDICINE

## 2020-12-16 PROCEDURE — 99211 OFF/OP EST MAY X REQ PHY/QHP: CPT

## 2020-12-16 PROCEDURE — 99214 OFFICE O/P EST MOD 30 MIN: CPT | Performed by: INTERNAL MEDICINE

## 2020-12-16 PROCEDURE — G8417 CALC BMI ABV UP PARAM F/U: HCPCS | Performed by: INTERNAL MEDICINE

## 2020-12-16 PROCEDURE — 1123F ACP DISCUSS/DSCN MKR DOCD: CPT | Performed by: INTERNAL MEDICINE

## 2020-12-16 PROCEDURE — 1036F TOBACCO NON-USER: CPT | Performed by: INTERNAL MEDICINE

## 2020-12-16 PROCEDURE — G8427 DOCREV CUR MEDS BY ELIG CLIN: HCPCS | Performed by: INTERNAL MEDICINE

## 2020-12-16 PROCEDURE — 1090F PRES/ABSN URINE INCON ASSESS: CPT | Performed by: INTERNAL MEDICINE

## 2020-12-16 ASSESSMENT — ENCOUNTER SYMPTOMS
SORE THROAT: 0
EYE DISCHARGE: 0
CONSTIPATION: 0
BACK PAIN: 0
SHORTNESS OF BREATH: 0
DIARRHEA: 0
VOMITING: 0
COUGH: 0
CHEST TIGHTNESS: 0
RECTAL PAIN: 0
NAUSEA: 0
FACIAL SWELLING: 0
WHEEZING: 0
COLOR CHANGE: 0
TROUBLE SWALLOWING: 0
ABDOMINAL PAIN: 0
ABDOMINAL DISTENTION: 0
BLOOD IN STOOL: 0

## 2020-12-16 NOTE — PROGRESS NOTES
[facility] PROFESSIONAL SERVS  ONCOL[facility] SPECIALISTS OF [facility]  [address] [facility] [address]  [address] [address],[address]  [address] [address]  Dept: [phone]  Dept Fax: [phone]: [phone]     Visit Date: 12/16/2020     Keily Carrillo is a 77 y.o. female who presents today for:   Chief Complaint   Patient presents with    Follow-up     History of left breast cancer    Results     RV Scans        HPI   Mrs. Spencer Arnold is a 64-year-old female with h/o stage I B left breast cancer diagnosed in September 2016. The patient had digital screening bilateral mammogram on August 31, 2016 that showed irregular density in the left breast that was indeterminate but confirmed on the compression and lateral medial views. On September 9, 2016 she underwent left breast upper inner quadrant core biopsy that showed invasive ductal carcinoma grade 2. IHC was positive for estrogen receptor expression in 95% of cells, and progesterone receptor expression in 80% of cells. Subsequently the patient met with the surgeon Dr. Brent Gonsalez and after discussing her treatment options, she decided to proceed with left breast lumpectomy and sentinel lymph node mapping and biopsy. Her surgery was performed on September 29, 2016. Final pathology report showed:  Specimen Laterality  Left  Tumor Site: Invasive Carcinoma  Upper inner quadrant  Tumor Size: Size of Largest Invasive Carcinoma  Greatest dimension of largest focus of invasion  > 1 mm:  0.9 cm  Histologic Type  Invasive ductal carcinoma, no special type.   Histologic Grade: [place] Histologic Score  Glandular (Acinar)/Tubular Differentiation  Score 3:  < 10% of tumor area forming glandular/tubular structures  Overall Grade  Grade 2: scores of 6 or 7  Tumor Focality (required only if more than 1 focus of invasive  carcinoma is present)  Single focus of invasive carcinoma  Ductal Carcinoma In Situ (DCIS)  DCIS is present  Negative for extensive intraductal component (EIC)  Size (Extent) of DCIS  Estimated size (extent) of DCIS (greatest dimension using gross and  microscopic evaluation): at least 0.8 cm  Number of blocks with DCIS:  2  Number of blocks examined:  9  Nuclear Grade  Grade III (high)  Necrosis  Present, central (expansive \"comedo\" necrosis)  Margins  Invasive Carcinoma  Margins uninvolved by invasive carcinoma   Distance from closest margin:  0.9 cm   Specify margin:  skin    Lymph Nodes (required only if lymph nodes are present in the specimen)  Total number of lymph nodes examined (sentinel and nonsentinel):  1  Number of sentinel lymph nodes examined:  1    Lymph Node Involvement (required only if one or more lymph nodes have  tumor cells identified)   Number of lymph nodes with macrometastases ( > 2 mm):  0   Number of lymph nodes with micrometastases ( > 0.2 mm to 2 mm and/or  > 200 cells):  0   Number of lymph nodes with isolated tumor cells ( < or equal to 0.2 mm  and  < or equal to 200 cells):  0    BREAST BIOMARKERS* ([id number], 9/13/16)  Estrogen Receptor:  Positive 95% of cells  Progesterone Receptor: Positive 80% of cells  Ki-67  Percentage of positive nuclei:  40%  HER-2 Dual RENEE   Nonamplified - HER2/CEP17 ratio  < 2.0 AND average HER2           copy number  < 4.0 signals/cell  Her breast cancer specimen was sent for Oncotype DX assay. It came back with a recurrent score of 20, translating into 13% risk of distant disease recurrence in the next 10 years. Therefore the patient did not receive recommendation to proceed with chemotherapy. She completed radiation treatment to her left breast on December 4, 2016. She received 5 CGy of radiation treatment. Overall she tolerated radiation treatment well. In December 2016 she started adjuvant hormonal therapy with Arimidex, which was switched to Femara. The patient was hospitalized in October 2018 for STEMI, she  Is S/P PCI of the RCA. Interim history on 12/16/2020: This is her 6 months follow-up visit.   She reports that she tolerates Femara well. Denies fatigue, minimal and stable hot flashes. She has chronic back pain related to degenerative  disease. She had a bilateral sacroiliac block/steroid injection procedure in September 2020. No other AI related arthralgia. No complaints related to her breasts. She had annual mammogram in September 2020  She has COPD, she is under pulmonologist care. She had CT of the chest in July 2020 that showed stable 5 mm nodule in the right lower lobe and 3 mm nodule in the left lower lobe.  Remaining 12 point review of system is negative      Past Medical History:   Diagnosis Date    Bronchitis     CAD (coronary artery disease)     Cerebral artery occlusion with cerebral infarction ([address]     TIA    COPD (chronic obstructive pulmonary disease) ([address]     Diverticulosis     Essential hypertension 5/10/2017    Foot drop, right foot     GERD (gastroesophageal reflux disease)     IBS (irritable bowel syndrome)     Kidney stone 2014    Malignant neoplasm of upper-inner quadrant of left female breast ([address] 9/13/2016    breast    MI (myocardial infarction) ([address] 10/2018    PONV (postoperative nausea and vomiting)       Past Surgical History:   Procedure Laterality Date    BACK SURGERY  1998    Dea Nims BREAST SURGERY Left 2016    exc. lymph nodes, lumpectomy    CARPAL TUNNEL RELEASE Right 06/2017    [facility]    CERVICAL FUSION      COLONOSCOPY  unsure    Dr. Carola Navarrete    COLONOSCOPY  10/10/14    Dr. Key Weaver COLONOSCOPY Left 6/17/2019    COLONOSCOPY POLYPECTOMY HOT BIOPSY performed by Dalila Chou MD at [address] [place] [address]  10/27/2018    [facility]    DILATION AND CURETTAGE OF UTERUS  80    HEMORRHOID SURGERY  12/16/2014    HYSTERECTOMY, VAGINAL  12/16/2014    KIDNEY STONE SURGERY  2014    LAMINECTOMY  06/12/2018    LUMBAR FUSION N/A 2/19/2020    LUMBAR LAMINECTOMY PSF L3-S1 WITH CAPTIVA performed by Michael Salinas MD at [place] AR[facility] Bruno  LA OFFICE/OUTPT VISIT,PROCEDURE ONLY N/A 2018    ACDF C5-6 performed by Lynn Echavarria MD at [address]  [address]    WISDOM TOOTH EXTRACTION  1979    WRIST GANGLION EXCISION Left       Family History   Problem Relation Age of Onset    Cancer Mother         renal cell carcinoma    Kidney Cancer Mother 46    Cirrhosis Father     Diabetes Sister     Kidney Cancer Sister 54    Cancer Sister     Heart Disease Sister         CHF    Diabetes Brother     High Blood Pressure Brother     Breast Cancer Paternal Aunt 54    Ovarian Cancer Neg Hx     Stroke Neg Hx       Social History     Tobacco Use    Smoking status: Former Smoker     Packs/day: 1.50     Years: 20.00     Pack years: 30.00     Types: Cigarettes     Quit date: 10/27/2018     Years since quittin.1    Smokeless tobacco: Never Used   Substance Use Topics    Alcohol use: No      Current Outpatient Medications   Medication Sig Dispense Refill    budesonide-formoterol (SYMBICORT) 160-4.5 MCG/ACT AERO Inhale 2 puffs into the lungs 2 times daily 1 Inhaler 11    Budeson-Glycopyrrol-Formoterol 160-9-4.8 MCG/ACT AERO Inhale 2 puffs into the lungs 2 times daily 5.9 g 5    fesoterodine (TOVIAZ) 4 MG TB24 ER tablet Take 1 tablet by mouth daily 30 tablet 1    gabapentin (NEURONTIN) 100 MG capsule Take 1 capsule by mouth 3 times daily for 180 days.  Intended supply: 30 days 90 capsule 5    solifenacin (VESICARE) 5 MG tablet Take 1 tablet by mouth daily 30 tablet 5    albuterol sulfate  (90 Base) MCG/ACT inhaler INHALE 2 PUFFS BY MOUTH EVERY 6 HOURS AS NEEDED FOR WHEEZING 18 g 5    atorvastatin (LIPITOR) 80 MG tablet Take 1 tablet by mouth nightly 90 tablet 2    letrozole (FEMARA) 2.5 MG tablet Take 1 tablet by mouth daily 90 tablet 3    metoprolol succinate (TOPROL XL) 25 MG extended release tablet Take 1 tablet by mouth daily 90 tablet 3    nitroGLYCERIN (NITROSTAT) 0.4 MG SL tablet Place 1 tablet under the tongue every 5 minutes as needed for Chest pain up to max of 3 total doses. If no relief after 1 dose, call 911. 25 tablet 3    Handicap Placard MISC by Does not apply route Expires 3/5/2024 1 each 0    hydrocortisone (ANUSOL-HC) 2.5 % rectal cream Place rectally 2 times daily as needed for Hemorrhoids Place rectally 2 times daily.  fluticasone (FLONASE) 50 MCG/ACT nasal spray 1 spray by Nasal route daily 1 Bottle 3    aspirin 81 MG EC tablet Take 1 tablet by mouth daily 30 tablet 3     No current facility-administered medications for this visit. Allergies   Allergen Reactions    Naproxen Anaphylaxis    Ultram [Tramadol] Itching and Rash      Health Maintenance   Topic Date Due    Annual Wellness Visit (AWV)  05/29/2019    DTaP/Tdap/Td vaccine (1 - Tdap) 10/26/2021 (Originally 2/28/1972)    Flu vaccine (1) 10/26/2021 (Originally 9/1/2020)    Shingles Vaccine (1 of 2) 10/26/2021 (Originally 2/28/2003)    Pneumococcal 65+ years Vaccine (2 of 2 - PPSV23) 10/26/2021 (Originally 10/23/2020)    Hepatitis C screen  10/26/2021 (Originally 1955)    Low dose CT lung screening  07/23/2021    Breast cancer screen  09/25/2021    Lipid screen  11/02/2021    Potassium monitoring  11/02/2021    Creatinine monitoring  11/02/2021    Colon cancer screen colonoscopy  06/17/2029    DEXA (modify frequency per FRAX score)  Completed    Hepatitis A vaccine  Aged Out    Hepatitis B vaccine  Aged Out    Hib vaccine  Aged Out    Meningococcal (ACWY) vaccine  Aged Out        Subjective:   Review of Systems   Constitutional: Negative for activity change, appetite change, fatigue and fever. HENT: Negative for congestion, dental problem, facial swelling, hearing loss, mouth sores, nosebleeds, sore throat, tinnitus and trouble swallowing. Eyes: Negative for discharge and visual disturbance. Respiratory: Negative for cough, chest tightness, shortness of breath and wheezing.     Cardiovascular: Negative for chest pain, palpitations and leg swelling. Gastrointestinal: Negative for abdominal distention, abdominal pain, blood in stool, constipation, diarrhea, nausea, rectal pain and vomiting. Endocrine: Negative for cold intolerance, polydipsia and polyuria. Genitourinary: Negative for decreased urine volume, difficulty urinating, dysuria, flank pain, hematuria and urgency. Musculoskeletal: Negative for arthralgias, back pain, gait problem, joint swelling, myalgias and neck stiffness. Skin: Negative for color change, rash and wound. Neurological: Negative for dizziness, tremors, seizures, speech difficulty, weakness, light-headedness, numbness and headaches. Hematological: Negative for adenopathy. Does not bruise/bleed easily. Psychiatric/Behavioral: Negative for confusion and sleep disturbance. The patient is not nervous/anxious. Objective:   Physical Exam   Constitutional: She is oriented to person, place, and time. She appears well-developed and well-nourished. No distress. HENT:   Head: Normocephalic. Mouth/Throat: Oropharynx is clear and moist. No oropharyngeal exudate. Eyes: Pupils are equal, round, and reactive to light. EOM are normal. Right eye exhibits no discharge. Left eye exhibits no discharge. No scleral icterus. Neck: Normal range of motion. Neck supple. No JVD present. No tracheal deviation present. No thyromegaly present. Cardiovascular: Normal rate and normal heart sounds. Exam reveals no gallop and no friction rub. No murmur heard. Pulmonary/Chest: Effort normal and breath sounds normal. No stridor. No respiratory distress. She has no wheezes. She has no rales. She exhibits no tenderness. Abdominal: Soft. Bowel sounds are normal. She exhibits no distension and no mass. There is no abdominal tenderness. There is no rebound. Musculoskeletal: Normal range of motion. General: No edema. Lymphadenopathy:     She has no cervical adenopathy.    Neurological: She is alert and oriented to person, place, and time. She has normal reflexes. No cranial nerve deficit. She exhibits normal muscle tone. Skin: Skin is warm. No rash noted. No erythema. Psychiatric: She has a normal mood and affect. Her behavior is normal. Judgment and thought content normal.   Vitals reviewed. BP (!) 169/77 (Site: Right Lower Arm, Position: Sitting, Cuff Size: Medium Adult)   Pulse 65   Temp 97.6 °F (36.4 °C) (Infrared)   Resp 18   Ht 4' 10\" (1.473 m)   Wt 133 lb 9.6 oz (60.6 kg)   SpO2 96%   BMI 27.92 kg/m²      DEXA study in December 2016 showed:  IMPRESSION: OSTEOPENIA   Patient is at medium risk for fracture. CT of the chest on July 23, 2020 showed:  Stable CT chest. Stable 5 mm nodule right lower lobe and partially imaged 3 mm nodule left lower lobe. Continue annual screening. Mammogram on September 25, 2020 showed:   Benign findings, BIRADS category 2. Assessment:   1. Stage I B, ER positive, OK positive, HER-2/eleazar negative left breast cancer. The patient is status post a left lumpectomy with sentinel lymph node biopsy. She had Oncotype Dx assay. It showed recurrent score of 20, translating into 13% risk of distant disease recurrence during the next 10 years. Decision was made  not to proceed with adjuvant chemotherapy. She completed adjuvant radiation treatment to the reminder of her left breast on December 4, 2016.    2. Adjuvant hormonal therapy. The patient started adjuvant hormonal therapy with Arimidex in December 2016. She developed fatigue on Arimidex. Therefore she was placed on Femara which she tolerates well. She reports minimal arthralgia, minimal hot flashes. The patient denies any new complaints. There is no evidence of distant disease recurrence on today's physical examination. Her annual breast mammogram in September 2020 was benign. The patient was instructed to continue Femara she will complete 5-year therapy in December 2021.    3.  Osteopenia.

## 2020-12-17 RX ORDER — ACETAMINOPHEN AND CODEINE PHOSPHATE 300; 30 MG/1; MG/1
1 TABLET ORAL EVERY 6 HOURS PRN
Qty: 120 TABLET | Refills: 1 | Status: SHIPPED | OUTPATIENT
Start: 2020-12-17 | End: 2021-01-27 | Stop reason: SDUPTHER

## 2020-12-17 NOTE — TELEPHONE ENCOUNTER
Patient requesting a medication refill.   Medication: acetaminophen-codeine (TYLENOL #3) 300-30 MG per tablet  Pharmacy: [address], [address] [place] [address], [address] [place] [address],[address]   Phone:  [phone]  Fax:  [phone]  Last office visit: 10/26/20  Next office visit: Visit date not found re

## 2021-01-11 ENCOUNTER — TELEPHONE (OUTPATIENT)
Dept: FAMILY MEDICINE CLINIC | Age: 66
End: 2021-01-11

## 2021-01-11 NOTE — TELEPHONE ENCOUNTER
Symptoms started couple weeks ago. Pt has a productive cough, and SOB. Pt has no other symptoms. Pt has no knowledge of being exposed to covid.     [facility]      Please advise

## 2021-01-12 ENCOUNTER — TELEPHONE (OUTPATIENT)
Dept: FAMILY MEDICINE CLINIC | Age: 66
End: 2021-01-12

## 2021-01-12 ENCOUNTER — HOSPITAL ENCOUNTER (OUTPATIENT)
Age: 66
Discharge: HOME | End: 2021-01-12
Payer: MEDICARE

## 2021-01-12 DIAGNOSIS — R05.9 COUGH: ICD-10-CM

## 2021-01-12 DIAGNOSIS — R05.9 COUGH: Primary | ICD-10-CM

## 2021-01-12 PROCEDURE — U0003 INFECTIOUS AGENT DETECTION BY NUCLEIC ACID (DNA OR RNA); SEVERE ACUTE RESPIRATORY SYNDROME CORONAVIRUS 2 (SARS-COV-2) (CORONAVIRUS DISEASE [COVID-19]), AMPLIFIED PROBE TECHNIQUE, MAKING USE OF HIGH THROUGHPUT TECHNOLOGIES AS DESCRIBED BY CMS-2020-01-R: HCPCS

## 2021-01-12 NOTE — PROGRESS NOTES
Patient ambulated to negative pressure room, nasal swab obtained. Labeled, taken to lab, tolerated well. Nurse wearing PPE.

## 2021-01-12 NOTE — TELEPHONE ENCOUNTER
ECC received a call from:    Name of Caller: Cale Certain    Relationship to patient: Self     Best contact number: [phone] or cell [phone]    Reason for call: Patient called back today regarding prescription that was sent to [facility]. Advised patient no medication was sent because PCP suggested visit. Patient refused VV. She stated she does not know how to do it. Patient has cough, congestion, and fatigue. Patient's  tested positive for Covid-19 yesterday.

## 2021-01-14 LAB — SARS-COV-2: DETECTED

## 2021-01-15 ENCOUNTER — TELEPHONE (OUTPATIENT)
Dept: FAMILY MEDICINE CLINIC | Age: 66
End: 2021-01-15

## 2021-01-15 NOTE — TELEPHONE ENCOUNTER
Pt informed. Pt states she is having weakness, fatigue, cough worse at night, labored breathing but not real bad, no fever.     [facility]

## 2021-01-15 NOTE — TELEPHONE ENCOUNTER
If she would like, we can do a red appt to evaluate. If her symptoms continue to worsen, I would recommend that she go to the ER.

## 2021-01-15 NOTE — TELEPHONE ENCOUNTER
----- Message from Lucille Greer DO sent at 1/15/2021  8:06 AM EST -----  She did test positive for COVID. She should isolate for 10 days from the day that the symptoms started. How is she feeling today?

## 2021-01-18 NOTE — TELEPHONE ENCOUNTER
Spoke with pt she verbalized understanding  Pt states she is doing better pt more concerned about her  than herself

## 2021-01-27 DIAGNOSIS — M48.061 SPINAL STENOSIS OF LUMBAR REGION WITHOUT NEUROGENIC CLAUDICATION: ICD-10-CM

## 2021-01-27 RX ORDER — ACETAMINOPHEN AND CODEINE PHOSPHATE 300; 30 MG/1; MG/1
1 TABLET ORAL EVERY 6 HOURS PRN
Qty: 120 TABLET | Refills: 1 | Status: SHIPPED | OUTPATIENT
Start: 2021-01-27 | End: 2021-03-30 | Stop reason: SDUPTHER

## 2021-03-01 ENCOUNTER — OFFICE VISIT (OUTPATIENT)
Dept: FAMILY MEDICINE CLINIC | Age: 66
End: 2021-03-01
Payer: MEDICARE

## 2021-03-01 VITALS
RESPIRATION RATE: 16 BRPM | HEIGHT: 57 IN | TEMPERATURE: 97.3 F | WEIGHT: 130.2 LBS | DIASTOLIC BLOOD PRESSURE: 82 MMHG | BODY MASS INDEX: 28.09 KG/M2 | HEART RATE: 66 BPM | SYSTOLIC BLOOD PRESSURE: 138 MMHG | OXYGEN SATURATION: 98 %

## 2021-03-01 DIAGNOSIS — R49.0 DYSPHONIA: ICD-10-CM

## 2021-03-01 DIAGNOSIS — I10 ESSENTIAL HYPERTENSION: ICD-10-CM

## 2021-03-01 DIAGNOSIS — I25.10 CORONARY ARTERY DISEASE INVOLVING NATIVE CORONARY ARTERY OF NATIVE HEART WITHOUT ANGINA PECTORIS: ICD-10-CM

## 2021-03-01 DIAGNOSIS — M48.061 SPINAL STENOSIS OF LUMBAR REGION WITHOUT NEUROGENIC CLAUDICATION: ICD-10-CM

## 2021-03-01 DIAGNOSIS — Z00.00 ROUTINE GENERAL MEDICAL EXAMINATION AT A HEALTH CARE FACILITY: Primary | ICD-10-CM

## 2021-03-01 PROCEDURE — 1123F ACP DISCUSS/DSCN MKR DOCD: CPT | Performed by: FAMILY MEDICINE

## 2021-03-01 PROCEDURE — G8417 CALC BMI ABV UP PARAM F/U: HCPCS | Performed by: FAMILY MEDICINE

## 2021-03-01 PROCEDURE — G8484 FLU IMMUNIZE NO ADMIN: HCPCS | Performed by: FAMILY MEDICINE

## 2021-03-01 PROCEDURE — G8427 DOCREV CUR MEDS BY ELIG CLIN: HCPCS | Performed by: FAMILY MEDICINE

## 2021-03-01 PROCEDURE — 3017F COLORECTAL CA SCREEN DOC REV: CPT | Performed by: FAMILY MEDICINE

## 2021-03-01 PROCEDURE — 1090F PRES/ABSN URINE INCON ASSESS: CPT | Performed by: FAMILY MEDICINE

## 2021-03-01 PROCEDURE — 4040F PNEUMOC VAC/ADMIN/RCVD: CPT | Performed by: FAMILY MEDICINE

## 2021-03-01 PROCEDURE — 1036F TOBACCO NON-USER: CPT | Performed by: FAMILY MEDICINE

## 2021-03-01 PROCEDURE — G0439 PPPS, SUBSEQ VISIT: HCPCS | Performed by: FAMILY MEDICINE

## 2021-03-01 PROCEDURE — G8400 PT W/DXA NO RESULTS DOC: HCPCS | Performed by: FAMILY MEDICINE

## 2021-03-01 PROCEDURE — 99213 OFFICE O/P EST LOW 20 MIN: CPT | Performed by: FAMILY MEDICINE

## 2021-03-01 SDOH — ECONOMIC STABILITY: TRANSPORTATION INSECURITY
IN THE PAST 12 MONTHS, HAS LACK OF TRANSPORTATION KEPT YOU FROM MEETINGS, WORK, OR FROM GETTING THINGS NEEDED FOR DAILY LIVING?: NO

## 2021-03-01 SDOH — ECONOMIC STABILITY: TRANSPORTATION INSECURITY
IN THE PAST 12 MONTHS, HAS THE LACK OF TRANSPORTATION KEPT YOU FROM MEDICAL APPOINTMENTS OR FROM GETTING MEDICATIONS?: NO

## 2021-03-01 SDOH — ECONOMIC STABILITY: FOOD INSECURITY: WITHIN THE PAST 12 MONTHS, YOU WORRIED THAT YOUR FOOD WOULD RUN OUT BEFORE YOU GOT MONEY TO BUY MORE.: NEVER TRUE

## 2021-03-01 ASSESSMENT — PATIENT HEALTH QUESTIONNAIRE - PHQ9
2. FEELING DOWN, DEPRESSED OR HOPELESS: 0
SUM OF ALL RESPONSES TO PHQ9 QUESTIONS 1 & 2: 0
1. LITTLE INTEREST OR PLEASURE IN DOING THINGS: 0
SUM OF ALL RESPONSES TO PHQ QUESTIONS 1-9: 0

## 2021-03-01 NOTE — PROGRESS NOTES
Medicare Annual Wellness Visit  Name: Lucina Julien Date: 3/1/2021   MRN: [id number] Sex: Female   Age: 74 y.o. Ethnicity: Non-/Non    : 1955 Race: Peg Clay is here for Medicare AWV (back pain and loosing voice  almost everyday  dry throat gray phlegm stuck in throat ) and Follow-up (4 month )    Screenings for behavioral, psychosocial and functional/safety risks, and cognitive dysfunction are all negative except as indicated below. These results, as well as other patient data from the [address] form, are documented in Flowsheets linked to this Encounter. Allergies   Allergen Reactions    Naproxen Anaphylaxis    Ultram [Tramadol] Itching and Rash         Prior to Visit Medications    Medication Sig Taking? Authorizing Provider   fesoterodine (TOVIAZ) 4 MG TB24 ER tablet Take 1 tablet by mouth daily Yes Gilles Mccullough DO   budesonide-formoterol (SYMBICORT) 160-4.5 MCG/ACT AERO Inhale 2 puffs into the lungs 2 times daily Yes Darío Barrera MD   Budeson-Glycopyrrol-Formoterol 160-9-4.8 MCG/ACT AERO Inhale 2 puffs into the lungs 2 times daily Yes Darío Barrera MD   albuterol sulfate  (90 Base) MCG/ACT inhaler INHALE 2 PUFFS BY MOUTH EVERY 6 HOURS AS NEEDED FOR WHEEZING Yes Darío Barrera MD   atorvastatin (LIPITOR) 80 MG tablet Take 1 tablet by mouth nightly Yes Jennifer Cook PA-C   letrozole (FEMARA) 2.5 MG tablet Take 1 tablet by mouth daily Yes Darlina Saint, MD   metoprolol succinate (TOPROL XL) 25 MG extended release tablet Take 1 tablet by mouth daily Yes Megan Rosas MD   nitroGLYCERIN (NITROSTAT) 0.4 MG SL tablet Place 1 tablet under the tongue every 5 minutes as needed for Chest pain up to max of 3 total doses. If no relief after 1 dose, call 911.  Yes Megan Rosas MD   Handicap Placard MISC by Does not apply route Expires 3/5/2024 Yes Arianne Sotoy, DO   hydrocortisone (ANUSOL-HC) 2.5 % rectal Mother         renal cell carcinoma    Kidney Cancer Mother 46    Cirrhosis Father     Diabetes Sister     Kidney Cancer Sister 54    Cancer Sister     Heart Disease Sister         CHF    Diabetes Brother     High Blood Pressure Brother     Breast Cancer Paternal Aunt 54    Ovarian Cancer Neg Hx     Stroke Neg Hx        CareTeam (Including outside providers/suppliers regularly involved in providing care):   Patient Care Team:  Lilliam Sabillon DO as PCP - General  Lilliam Sabillon DO as PCP - [facility] Empaneled Provider  Annie Reid RN as Nurse Navigator    Wt Readings from Last 3 Encounters:   03/01/21 130 lb 3.2 oz (59.1 kg)   12/16/20 133 lb 9.6 oz (60.6 kg)   11/04/20 135 lb 3.2 oz (61.3 kg)     Vitals:    03/01/21 1354   BP: 138/82   Pulse: 66   Resp: 16   Temp: 97.3 °F (36.3 °C)   SpO2: 98%   Weight: 130 lb 3.2 oz (59.1 kg)   Height: 4' 9\" (1.448 m)     Body mass index is 28.18 kg/m². Based upon direct observation of the patient, evaluation of cognition reveals recent and remote memory intact. Patient's complete Health Risk Assessment and screening values have been reviewed and are found in Flowsheets. The following problems were reviewed today and where indicated follow up appointments were made and/or referrals ordered. Positive Risk Factor Screenings with Interventions:      Cognitive:   Words recalled: 2 Words Recalled  Clock Drawing Test (CDT) Score: (!) Abnormal  Total Score Interpretation: Positive Mini-Cog  Did the patient refuse to take the cognition test?: No  Cognitive Impairment Interventions:  · Patient declines any further evaluation/treatment for cognitive impairment    Depression:  Depression Unable to Assess: Functional capacity motivation limits accuracy  PHQ-2 Score: 0  PHQ-9 Total Score: 0    Severity:1-4 = minimal depression, 5-9 = mild depression, 10-14 = moderate depression, 15-19 = moderately severe depression, 20-27 = severe depression       Health Habits/Nutrition:  Health Habits/Nutrition  Do you exercise for at least 20 minutes 2-3 times per week?: (!) No  Have you lost any weight without trying in the past 3 months?: No  Do you eat only one meal per day?: No  Have you seen the dentist within the past year?: (!) No  Body mass index: (!) 28.17  Health Habits/Nutrition Interventions:  · Dental exam overdue:  patient encouraged to make appointment with his/her dentist  · exercise limited due to low back pain    Hearing/Vision:  No exam data present  Hearing/Vision  Do you or your family notice any trouble with your hearing that hasn't been managed with hearing aids?: (!) Yes  Do you have difficulty driving, watching TV, or doing any of your daily activities because of your eyesight?: No  Have you had an eye exam within the past year?: (!) No  Hearing/Vision Interventions:  · Hearing concerns:  patient declines any further evaluation/treatment for hearing issues  · Vision concerns:  patient encouraged to make appointment with his/her eye specialist      Personalized Preventive Plan   Current Health Maintenance Status  Immunization History   Administered Date(s) Administered    Influenza Vaccine, unspecified formulation 10/13/2016    Influenza Virus Vaccine 10/05/2017, 10/23/2019    Influenza, Quadv, IM, PF (6 mo and older Fluzone, Flulaval, Fluarix, and 3 yrs and older Afluria) 10/08/2018    Pneumococcal Conjugate 13-valent ([place]) 10/08/2018, 10/23/2019        Health Maintenance   Topic Date Due    COVID-19 Vaccine (1 of 2) 02/28/1969    Annual Wellness Visit (AWV)  05/29/2019    DTaP/Tdap/Td vaccine (1 - Tdap) 10/26/2021 (Originally 2/28/1972)    Flu vaccine (1) 10/26/2021 (Originally 9/1/2020)    Shingles Vaccine (1 of 2) 10/26/2021 (Originally 2/28/2003)    Pneumococcal 65+ years Vaccine (2 of 2 - PPSV23) 10/26/2021 (Originally 10/23/2020)    Hepatitis C screen  10/26/2021 (Originally 1955)    Low dose CT lung screening  07/23/2021    Breast cancer screen  09/25/2021    Lipid screen  11/02/2021    Potassium monitoring  11/02/2021    Creatinine monitoring  11/02/2021    Colon cancer screen colonoscopy  06/17/2029    DEXA (modify frequency per FRAX score)  Completed    Hepatitis A vaccine  Aged Out    Hepatitis B vaccine  Aged Out    Hib vaccine  Aged Out    Meningococcal (ACWY) vaccine  Aged Out     Recommendations for [employer] Due: see orders and patient instructions/AVS.  . Recommended screening schedule for the next 5-10 years is provided to the patient in written form: see Patient Instructions/AVS.    Gigi Jose was seen today for medicare awv and follow-up.     Diagnoses and all orders for this visit:    Routine general medical examination at a health care facility

## 2021-03-01 NOTE — PROGRESS NOTES
Nura Diaz is a 74 y.o. female that presents for     Chief Complaint   Patient presents with   [place] [employer] AWV     back pain and loosing voice  almost everyday  dry throat gray phlegm stuck in throat     Follow-up     4 month        /82   Pulse 66   Temp 97.3 °F (36.3 °C)   Resp 16   Ht 4' 9\" (1.448 m)   Wt 130 lb 3.2 oz (59.1 kg)   SpO2 98%   BMI 28.18 kg/m²       HPI:    Notes that she is noting more mucus in her throat which causes her to have to clear her throat frequently and then she will start to lose her voice through the day. She will wake up most days and her voice will be ok. These started about the time that she started [place]. HTN    Does patient check BP regularly at home? - Yes -   Current Medication regimen - Norvasc, metoprolol  Tolerating medications well? - yes    Shortness of breath or chest pain? No  Headache or visual complaints? No  Neurologic changes like confusion? No  Extremity edema? No    BP Readings from Last 3 Encounters:   03/01/21 138/82   12/16/20 (!) 169/77   11/04/20 132/84     Chronic Pain    HPI:    Patient has chronic pain of the low back with radiation to the legs. Taking the T3 which does help, but seems to wear off after about an hour. She is able to be a little more active with the T3. She is able to sleep a little more. Stopped the gabapentin due to SEs. Pain control: adequate  Improvement in function and ADLs? yes  Current Medications:  Tylenol #3 q 6 hrs prn  Escalation of dosage recently?  no    Evidence for abuse or diversion? no   Seen specialist or pain clinic?: yes    Controlled Substances Monitoring:      CAD  Has quit smoking for close to 2 years now! History of myocardial infarction? Yes    History of heart failure? No.    Current symptoms of angina? No   Patient compliant with therapy? No  Tobacco use? No      Antiplatelet therapy? Yes    Beta-blocker therapy?   Yes   ACE/ARB therapy - Yes  Statin - Yes    Health Maintenance   Topic Date Due    COVID-19 Vaccine (1 of 2) 02/28/1969    Annual Wellness Visit (AWV)  05/29/2019    DTaP/Tdap/Td vaccine (1 - Tdap) 10/26/2021 (Originally 2/28/1972)    Flu vaccine (1) 10/26/2021 (Originally 9/1/2020)    Shingles Vaccine (1 of 2) 10/26/2021 (Originally 2/28/2003)    Pneumococcal 65+ years Vaccine (2 of 2 - PPSV23) 10/26/2021 (Originally 10/23/2020)    Hepatitis C screen  10/26/2021 (Originally 1955)    Low dose CT lung screening  07/23/2021    Breast cancer screen  09/25/2021    Lipid screen  11/02/2021    Potassium monitoring  11/02/2021    Creatinine monitoring  11/02/2021    Colon cancer screen colonoscopy  06/17/2029    DEXA (modify frequency per FRAX score)  Completed    Hepatitis A vaccine  Aged Out    Hepatitis B vaccine  Aged Out    Hib vaccine  Aged Out    Meningococcal (ACWY) vaccine  Aged Out       Past Medical History:   Diagnosis Date    Bronchitis     CAD (coronary artery disease)     Cerebral artery occlusion with cerebral infarction ([address]     TIA    COPD (chronic obstructive pulmonary disease) ([address]     Diverticulosis     Essential hypertension 5/10/2017    Foot drop, right foot     GERD (gastroesophageal reflux disease)     IBS (irritable bowel syndrome)     Kidney stone 2014    Malignant neoplasm of upper-inner quadrant of left female breast ([address] 9/13/2016    breast    MI (myocardial infarction) ([address] 10/2018    PONV (postoperative nausea and vomiting)        Past Surgical History:   Procedure Laterality Date    BACK SURGERY  1998    Nelia Randle BREAST SURGERY Left 2016    exc. lymph nodes, lumpectomy    CARPAL TUNNEL RELEASE Right 06/2017    [facility]IO    CERVICAL FUSION      COLONOSCOPY  unsure    Dr. Bennie Pascual    COLONOSCOPY  10/10/14    Dr. Yoav Castillo COLONOSCOPY Left 6/17/2019    COLONOSCOPY POLYPECTOMY HOT BIOPSY performed by Samreen Chapman MD at [address] [facility]  10/27/2018    [address] [place] [address]  DILATION AND CURETTAGE OF UTERUS  80    HEMORRHOID SURGERY  2014    HYSTERECTOMY, VAGINAL  2014    KIDNEY STONE SURGERY      LAMINECTOMY  2018    LUMBAR FUSION N/A 2020    LUMBAR LAMINECTOMY PSF L3-S1 WITH CAPTIVA performed by Bran Nagel MD at [address] [facility] OFFICE/OUTPT VISIT,PROCEDURE ONLY N/A 2018    ACDF C5-6 performed by Fuad Vega MD at [address] [facility]  89    WISDOM TOOTH EXTRACTION  1979    WRIST GANGLION EXCISION Left        Social History     Tobacco Use    Smoking status: Former Smoker     Packs/day: 1.50     Years: 20.00     Pack years: 30.00     Types: Cigarettes     Quit date: 10/27/2018     Years since quittin.3    Smokeless tobacco: Never Used   Substance Use Topics    Alcohol use: No    Drug use: No       Family History   Problem Relation Age of Onset    Cancer Mother         renal cell carcinoma    Kidney Cancer Mother 46    Cirrhosis Father     Diabetes Sister     Kidney Cancer Sister 54    Cancer Sister     Heart Disease Sister         CHF    Diabetes Brother     High Blood Pressure Brother     Breast Cancer Paternal Aunt 54    Ovarian Cancer Neg Hx     Stroke Neg Hx          I have reviewed the patient's past medical history, past surgical history, allergies, medications, social and family history and I have made updates where appropriate.     Review of Systems        PHYSICAL EXAM:  /82   Pulse 66   Temp 97.3 °F (36.3 °C)   Resp 16   Ht 4' 9\" (1.448 m)   Wt 130 lb 3.2 oz (59.1 kg)   SpO2 98%   BMI 28.18 kg/m²     General Appearance: well developed and well- nourished, in no acute distress  Head: normocephalic and atraumatic  ENT: external ear and ear canal normal bilaterally, nose without deformity, nasal mucosa and turbinates normal without polyps, oropharynx normal, dentition is normal for age, no lipor gum lesions noted  Neck: supple and non-tender without mass, no thyromegaly or thyroid nodules, no cervical lymphadenopathy  Pulmonary/Chest: clear to auscultation bilaterally- no wheezes, rales or rhonchi, normal air movement, no respiratory distress or retractions  Cardiovascular: normal rate, regular rhythm, normal S1 and S2, no murmurs, rubs, clicks, or gallops, distal pulses intact  Extremities: no cyanosis, clubbing or edema of the lower extremities  Psych:  Normal affect without evidence of depression oranxiety, insight and judgement are appropriate, memory appears intact  Skin: warm and dry, no rash or erythema      ASSESSMENT & PLAN  Ozzy Burgos was seen today for medicare awv and follow-up. Diagnoses and all orders for this visit:    Routine general medical examination at a health care facility    Spinal stenosis of lumbar region without neurogenic claudication    Dysphonia    Essential hypertension    Coronary artery disease involving native coronary artery of native heart without angina pectoris         -Discussed stopping the T3 and trialing Norco, she wants to hold on this for now and will get in with Dr Rani Hermosillo  -She is going to see Dr Sheela Link regarding if Gearldean Roles is causing her dysphonia. If he does not feel that this is, will start treatment for allergies/post nasal drip.  -Other chronic issues are stable, continue current medications  -Advised to call if any issues      Return for Medicare Annual Wellness Visit in 1 year. Controlled Substance Monitoring:    Acute and Chronic Pain Monitoring:   RX Monitoring 7/6/2020   Periodic Controlled Substance Monitoring Possible medication side effects, risk of tolerance/dependence & alternative treatments discussed. ;No signs of potential drug abuse or diversion identified.;Obtaining appropriate analgesic effect of treatment. Ozzy Burgos received counseling on the following healthy behaviors: medication adherence  Reviewed prior labs and health maintenance. Continue current medications, diet and exercise.   Discussed use, benefit, and side effects of prescribed medications. Barriers to medication compliance addressed. Patient given educational materials - see patient instructions. All patient questions answered. Patient voiced understanding.

## 2021-03-01 NOTE — PATIENT INSTRUCTIONS
Personalized Preventive Plan for Severo Stinson - 3/1/2021  Medicare offers a range of preventive health benefits. Some of the tests and screenings are paid in full while other may be subject to a deductible, co-insurance, and/or copay. Some of these benefits include a comprehensive review of your medical history including lifestyle, illnesses that may run in your family, and various assessments and screenings as appropriate. After reviewing your medical record and screening and assessments performed today your provider may have ordered immunizations, labs, imaging, and/or referrals for you. A list of these orders (if applicable) as well as your Preventive Care list are included within your After Visit Summary for your review. Other Preventive Recommendations:    · A preventive eye exam performed by an eye specialist is recommended every 1-2 years to screen for glaucoma; cataracts, macular degeneration, and other eye disorders. · A preventive dental visit is recommended every 6 months. · Try to get at least 150 minutes of exercise per week or 10,000 steps per day on a pedometer . · Order or download the FREE \"Exercise & Physical Activity: Your Everyday Guide\" from [employer]. Call 5-996.241.9865 or search [employer] online. · You need 4798-1466 mg of calcium and 5806-1267 IU of vitamin D per day. It is possible to meet your calcium requirement with diet alone, but a vitamin D supplement is usually necessary to meet this goal.  · When exposed to the sun, use a sunscreen that protects against both UVA and UVB radiation with an SPF of 30 or greater. Reapply every 2 to 3 hours or after sweating, drying off with a towel, or swimming. · Always wear a seat belt when traveling in a car. Always wear a helmet when riding a bicycle or motorcycle.

## 2021-03-04 ENCOUNTER — OFFICE VISIT (OUTPATIENT)
Dept: PULMONOLOGY | Age: 66
End: 2021-03-04
Payer: MEDICARE

## 2021-03-04 VITALS
BODY MASS INDEX: 27.83 KG/M2 | TEMPERATURE: 98 F | SYSTOLIC BLOOD PRESSURE: 118 MMHG | OXYGEN SATURATION: 95 % | WEIGHT: 129 LBS | HEIGHT: 57 IN | HEART RATE: 71 BPM | DIASTOLIC BLOOD PRESSURE: 82 MMHG

## 2021-03-04 DIAGNOSIS — R91.8 LUNG NODULES: ICD-10-CM

## 2021-03-04 DIAGNOSIS — J44.9 CHRONIC OBSTRUCTIVE PULMONARY DISEASE, UNSPECIFIED COPD TYPE (HCC): Primary | ICD-10-CM

## 2021-03-04 PROCEDURE — 99213 OFFICE O/P EST LOW 20 MIN: CPT | Performed by: INTERNAL MEDICINE

## 2021-03-04 PROCEDURE — G8427 DOCREV CUR MEDS BY ELIG CLIN: HCPCS | Performed by: INTERNAL MEDICINE

## 2021-03-04 PROCEDURE — G8400 PT W/DXA NO RESULTS DOC: HCPCS | Performed by: INTERNAL MEDICINE

## 2021-03-04 PROCEDURE — 4040F PNEUMOC VAC/ADMIN/RCVD: CPT | Performed by: INTERNAL MEDICINE

## 2021-03-04 PROCEDURE — 1090F PRES/ABSN URINE INCON ASSESS: CPT | Performed by: INTERNAL MEDICINE

## 2021-03-04 PROCEDURE — G8417 CALC BMI ABV UP PARAM F/U: HCPCS | Performed by: INTERNAL MEDICINE

## 2021-03-04 PROCEDURE — 3017F COLORECTAL CA SCREEN DOC REV: CPT | Performed by: INTERNAL MEDICINE

## 2021-03-04 PROCEDURE — 1123F ACP DISCUSS/DSCN MKR DOCD: CPT | Performed by: INTERNAL MEDICINE

## 2021-03-04 PROCEDURE — 3023F SPIROM DOC REV: CPT | Performed by: INTERNAL MEDICINE

## 2021-03-04 PROCEDURE — G8926 SPIRO NO PERF OR DOC: HCPCS | Performed by: INTERNAL MEDICINE

## 2021-03-04 PROCEDURE — 1036F TOBACCO NON-USER: CPT | Performed by: INTERNAL MEDICINE

## 2021-03-04 PROCEDURE — G8484 FLU IMMUNIZE NO ADMIN: HCPCS | Performed by: INTERNAL MEDICINE

## 2021-03-04 ASSESSMENT — ENCOUNTER SYMPTOMS
BACK PAIN: 1
WHEEZING: 0
CHOKING: 0
TROUBLE SWALLOWING: 0
ABDOMINAL PAIN: 0
CHEST TIGHTNESS: 0
EYE DISCHARGE: 0
SHORTNESS OF BREATH: 1
VOICE CHANGE: 0
APNEA: 0
STRIDOR: 0
EYE REDNESS: 0
ABDOMINAL DISTENTION: 0
COUGH: 1

## 2021-03-04 NOTE — PROGRESS NOTES
Neck Circumference -   13  Mallampati - 2    Lung Nodule Screening     [] Qualifies    [] Does not qualify   [] Declined    [] Completed

## 2021-03-04 NOTE — PROGRESS NOTES
Subjective:      Patient ID: Gui Ng is a 74 y.o. female. CC: COPD    Doing well with Trelegy, decreased cough, sputum production. Similar to previous therapeutic interventions  she is worried about the side effects, this time is dry mouth. Previous visits. Referred by Dr Marycruz Michelle for SOB  Abdiel Kunz has moderate COPD, quit smoking last year after a ACS  Tried Spiriva in the past but did not like the \"gritty\" feeling and quit  Main c/o today is nasal congestion and obstruction, wishes something for \"sinus\"  Started smoking at age 34 years up to 2 ppd quit at age 70  Worked [email or web address] as a cook, , all aspects of the business   H/o L breast cancer, lumpectomy and XRT in 2016  Screening CT chest 4/2018  No family h/o lung cancer  Sister and mother Renal cell carcinoma  Codeine for back pain, needs surgery for back pain, on hold due to ACS    Review of Systems   Constitutional: Negative for activity change, fatigue and unexpected weight change. HENT: Positive for congestion. Negative for trouble swallowing and voice change. Eyes: Negative for discharge, redness and visual disturbance. Respiratory: Positive for cough and shortness of breath. Negative for apnea, choking, chest tightness, wheezing and stridor. Cardiovascular: Negative for chest pain, palpitations and leg swelling. Gastrointestinal: Negative for abdominal distention and abdominal pain. Genitourinary: Negative. Musculoskeletal: Positive for arthralgias and back pain. Negative for gait problem, joint swelling, myalgias, neck pain and neck stiffness.          All other systems reviewed and are negative    Lung Nodule Screening     [x] Qualifies    [] Does not qualify   [] Declined   [] Completed  Past Medical History:   Diagnosis Date    Bronchitis     CAD (coronary artery disease)     Cerebral artery occlusion with cerebral infarction ([facility]     TIA    COPD (chronic obstructive pulmonary disease) ([facility]     Diverticulosis     Essential hypertension 5/10/2017    Foot drop, right foot     GERD (gastroesophageal reflux disease)     IBS (irritable bowel syndrome)     Kidney stone     Malignant neoplasm of upper-inner quadrant of left female breast [facility] 2016    breast    MI (myocardial infarction) ([facility] 10/2018    PONV (postoperative nausea and vomiting)      Past Surgical History:   Procedure Laterality Date    Tommy Keane BREAST SURGERY Left 2016    exc. lymph nodes, lumpectomy    CARPAL TUNNEL RELEASE Right 2017    [facility]IO    CERVICAL FUSION      COLONOSCOPY  unsure    Dr. Alma Chamberlain COLONOSCOPY  10/10/14    Dr. Marine Palmer Left 2019    COLONOSCOPY POLYPECTOMY HOT BIOPSY performed by Bri Saleh MD at [facility]  10/27/2018    [facility]    DILATION AND CURETTAGE OF UTERUS  80    HEMORRHOID SURGERY  2014    HYSTERECTOMY, VAGINAL  2014    KIDNEY STONE SURGERY  2014    LAMINECTOMY  2018    LUMBAR FUSION N/A 2020    LUMBAR LAMINECTOMY PSF L3-S1 WITH CAPTIVA performed by Cristy Gray MD at [facility] OFFICE/OUTPT [facility] N/A 2018    ACDF C5-6 performed by Germán Brown MD at [address] [place]      Social History     Tobacco Use    Smoking status: Former Smoker     Packs/day: 1.50     Years: 20.00     Pack years: 30.00     Types: Cigarettes     Quit date: 10/27/2018     Years since quittin.3    Smokeless tobacco: Never Used   Substance Use Topics    Alcohol use: No    Drug use: No      Allergies   Allergen Reactions    Naproxen Anaphylaxis    Ultram [Tramadol] Itching and Rash      Family History   Problem Relation Age of Onset    Cancer Mother         renal cell carcinoma    Kidney Cancer Mother 46    Cirrhosis Father     Diabetes Sister     Kidney Cancer Sister 54  Cancer Sister     Heart Disease Sister         CHF    Diabetes Brother     High Blood Pressure Brother     Breast Cancer Paternal Aunt 54    Ovarian Cancer Neg Hx     Stroke Neg Hx      Current Outpatient Medications   Medication Sig Dispense Refill    fesoterodine (TOVIAZ) 4 MG TB24 ER tablet Take 1 tablet by mouth daily 90 tablet 3    budesonide-formoterol (SYMBICORT) 160-4.5 MCG/ACT AERO Inhale 2 puffs into the lungs 2 times daily 1 Inhaler 11    Budeson-Glycopyrrol-Formoterol 160-9-4.8 MCG/ACT AERO Inhale 2 puffs into the lungs 2 times daily 5.9 g 5    albuterol sulfate  (90 Base) MCG/ACT inhaler INHALE 2 PUFFS BY MOUTH EVERY 6 HOURS AS NEEDED FOR WHEEZING 18 g 5    atorvastatin (LIPITOR) 80 MG tablet Take 1 tablet by mouth nightly 90 tablet 2    letrozole (FEMARA) 2.5 MG tablet Take 1 tablet by mouth daily 90 tablet 3    metoprolol succinate (TOPROL XL) 25 MG extended release tablet Take 1 tablet by mouth daily 90 tablet 3    nitroGLYCERIN (NITROSTAT) 0.4 MG SL tablet Place 1 tablet under the tongue every 5 minutes as needed for Chest pain up to max of 3 total doses. If no relief after 1 dose, call 911. 25 tablet 3    Handicap Placard MISC by Does not apply route Expires 3/5/2024 1 each 0    hydrocortisone (ANUSOL-HC) 2.5 % rectal cream Place rectally 2 times daily as needed for Hemorrhoids Place rectally 2 times daily.  fluticasone (FLONASE) 50 MCG/ACT nasal spray 1 spray by Nasal route daily 1 Bottle 3    aspirin 81 MG EC tablet Take 1 tablet by mouth daily 30 tablet 3     No current facility-administered medications for this visit.       LABS - none   /82 (Site: Right Upper Arm, Position: Sitting, Cuff Size: Medium Adult)   Pulse 71   Temp 98 °F (36.7 °C)   Ht 4' 9\" (1.448 m)   Wt 129 lb (58.5 kg)   SpO2 95% Comment: on room air  BMI 27.92 kg/m²    Wt Readings from Last 3 Encounters:   03/04/21 129 lb (58.5 kg)   03/01/21 130 lb 3.2 oz (59.1 kg)   12/16/20 133 lb 9.6 oz (60.6 kg)     Neck Circumference - 13 in; Mallampati Score -2      Objective:   Physical Exam  Vitals signs and nursing note reviewed. Constitutional:       General: She is not in acute distress. Appearance: She is well-developed. She is not diaphoretic. HENT:      Head: Normocephalic and atraumatic. Mouth/Throat:      Pharynx: No oropharyngeal exudate. Eyes:      General: No scleral icterus. Right eye: No discharge. Left eye: No discharge. Pupils: Pupils are equal, round, and reactive to light. Neck:      Musculoskeletal: Normal range of motion and neck supple. Vascular: No JVD. Trachea: No tracheal deviation. Cardiovascular:      Rate and Rhythm: Normal rate and regular rhythm. Heart sounds: No murmur. No friction rub. No gallop. Pulmonary:      Effort: Pulmonary effort is normal. No respiratory distress. Breath sounds: Normal breath sounds. No stridor. No wheezing or rales. Chest:      Chest wall: No tenderness. Abdominal:      General: Bowel sounds are normal. There is no distension. Palpations: Abdomen is soft. There is no mass. Tenderness: There is no abdominal tenderness. There is no guarding or rebound. Musculoskeletal: Normal range of motion. General: No tenderness. Lymphadenopathy:      Cervical: No cervical adenopathy. Skin:     General: Skin is warm. Coloration: Skin is not pale. Findings: No erythema or rash. Neurological:      Mental Status: She is alert and oriented to person, place, and time. Cranial Nerves: No cranial nerve deficit. Psychiatric:         Behavior: Behavior normal.         Thought Content: Thought content normal.         Judgment: Judgment normal.                   CT screening:        FINDINGS:   LUNGS NODULES:   1.  There is a 5 mm nodule in the right lateral costophrenic angle not seen on the prior exam.   Stable less than 3 mm pleural-based nodule posterior medial left lung base image 301. Calcified granuloma right lower lobes. Atelectasis in the lingula.       LYMPHADENOPATHY:   1. There are no pathologically enlarged lymph nodes.       OTHER (LUNGS/MEDIASTINUM/MUSCULOSKELETAL/ABDOMEN):   1. The ascending aorta measures 4 cm. This is stable. 4 mm pericardial effusion.                   Impression   1. 5 mm nodule right lung base. New finding. 2. There are no pathologically enlarged lymph nodes. 3. LUNGRADS ASSESSMENT VALUE: 3,            The LUNG RADS RECOMMENDATIONS for monitoring lung nodules listed below (ACR- Lung-RADS Version 1.0 Assessment Categories Release date\" April 28, 2014)       LUNG RADS RECOMMENDATIONS;   1.  Normal, continue annual screening   2.  Benign appearance or behavior, continue annual screening   3.  6 month CT recommended   4A.  3 month CT recommended; may consider PET/CT   4B.  Additional diagnostics and/or tissue sampling recommended   4X.  Additional diagnostics and/or tissue sampling recommended         **This report has been created using voice recognition software.  It may contain minor errors which are inherent in voice recognition technology. **       Final report electronically signed by Dr. Haley Araiza on 12/27/2019 2:18 PM           CT chest:    FINDINGS: No mediastinal or hilar adenopathy. ascending aorta is stable measuring 4 cm. Normal heart size. Coronary artery stents. No pericardial or pleural effusion. No infiltrates. Stable 5 mm nodule right lateral lung base. Calcified granuloma right lower lobe. Plate atelectasis right base. 3 mm nodule medial left lung base is only partially imaged on today's exam.         Upper abdomen    No abnormalities.         CHEST WALL:    Postsurgical changes left breast. Postsurgical changes left axilla. Bones    No suspicious bone lesions              Impression    Stable CT chest. Stable 5 mm nodule right lower lobe and partially imaged 3 mm nodule left lower lobe.  Continue annual screening.                   **This report has been created using voice recognition software.  It may contain minor errors which are inherent in voice recognition technology. **         Final report electronically signed by Dr. Haley Araiza on 7/23/2020 2:40 PM            Assessment:       Diagnosis Orders   1.  Chronic obstructive pulmonary disease, unspecified COPD type ([facility]     2. Lung nodules             Plan:           Continue Trelegy, samples given  Due for screening CT next year   RTC 4 mos

## 2021-03-30 DIAGNOSIS — M48.061 SPINAL STENOSIS OF LUMBAR REGION WITHOUT NEUROGENIC CLAUDICATION: ICD-10-CM

## 2021-03-30 RX ORDER — ACETAMINOPHEN AND CODEINE PHOSPHATE 300; 30 MG/1; MG/1
1 TABLET ORAL EVERY 6 HOURS PRN
Qty: 120 TABLET | Refills: 1 | Status: SHIPPED | OUTPATIENT
Start: 2021-03-30 | End: 2021-05-19 | Stop reason: SDUPTHER

## 2021-03-30 NOTE — TELEPHONE ENCOUNTER
Recent Visits  Date Type Provider Dept   03/01/21 Office Visit Bang Zuleta DO [facility]   10/26/20 Office Visit Bang Zuleta, [facility]   06/24/20 Office Visit Bang Zuleta, DO [facility]   03/05/20 Office Visit Bang Zuleta, DO [facility]   02/11/20 Office Visit Bang Zuleta, DO [facility]   11/19/19 Office Visit Bang Zuleta, DO [facility]   Showing recent visits within past 540 days with a meds authorizing provider and meeting all other requirements     Future Appointments  Date Type Provider Dept   06/08/21 Appointment Bang Zuleta DO [facility] [address] future appointments within next 150 days with a meds authorizing provider and meeting all other requirements     Future Appointments   Date Time Provider Melissa Jin   5/20/2021  3:15 PM Flory Burton MD N [facility] [place] [facility]   6/8/2021  2:20 PM Bang Zuleta DO [facility]   6/16/2021  3:45 PM [facility]   9/16/2021  2:00 PM Paula Morgan, [address]

## 2021-03-30 NOTE — TELEPHONE ENCOUNTER
Med Refill    Refill: Acetaminophen 300-30 MG with Codeine    Pharmacy: [facility]    Last ov: 3/1/21

## 2021-05-19 DIAGNOSIS — M48.061 SPINAL STENOSIS OF LUMBAR REGION WITHOUT NEUROGENIC CLAUDICATION: ICD-10-CM

## 2021-05-19 RX ORDER — ACETAMINOPHEN AND CODEINE PHOSPHATE 300; 30 MG/1; MG/1
1 TABLET ORAL EVERY 6 HOURS PRN
Qty: 120 TABLET | Refills: 1 | Status: SHIPPED | OUTPATIENT
Start: 2021-05-19 | End: 2021-07-13 | Stop reason: SDUPTHER

## 2021-05-20 ENCOUNTER — OFFICE VISIT (OUTPATIENT)
Dept: CARDIOLOGY CLINIC | Age: 66
End: 2021-05-20
Payer: MEDICARE

## 2021-05-20 VITALS
HEIGHT: 57 IN | DIASTOLIC BLOOD PRESSURE: 82 MMHG | WEIGHT: 128 LBS | BODY MASS INDEX: 27.61 KG/M2 | HEART RATE: 72 BPM | SYSTOLIC BLOOD PRESSURE: 136 MMHG

## 2021-05-20 DIAGNOSIS — Z95.820 S/P ANGIOPLASTY WITH STENT: Primary | ICD-10-CM

## 2021-05-20 PROCEDURE — 1090F PRES/ABSN URINE INCON ASSESS: CPT | Performed by: INTERNAL MEDICINE

## 2021-05-20 PROCEDURE — 4040F PNEUMOC VAC/ADMIN/RCVD: CPT | Performed by: INTERNAL MEDICINE

## 2021-05-20 PROCEDURE — 1123F ACP DISCUSS/DSCN MKR DOCD: CPT | Performed by: INTERNAL MEDICINE

## 2021-05-20 PROCEDURE — 99212 OFFICE O/P EST SF 10 MIN: CPT | Performed by: INTERNAL MEDICINE

## 2021-05-20 PROCEDURE — G8400 PT W/DXA NO RESULTS DOC: HCPCS | Performed by: INTERNAL MEDICINE

## 2021-05-20 PROCEDURE — G8417 CALC BMI ABV UP PARAM F/U: HCPCS | Performed by: INTERNAL MEDICINE

## 2021-05-20 PROCEDURE — 93000 ELECTROCARDIOGRAM COMPLETE: CPT | Performed by: INTERNAL MEDICINE

## 2021-05-20 PROCEDURE — 3017F COLORECTAL CA SCREEN DOC REV: CPT | Performed by: INTERNAL MEDICINE

## 2021-05-20 PROCEDURE — G8427 DOCREV CUR MEDS BY ELIG CLIN: HCPCS | Performed by: INTERNAL MEDICINE

## 2021-05-20 PROCEDURE — 1036F TOBACCO NON-USER: CPT | Performed by: INTERNAL MEDICINE

## 2021-05-20 RX ORDER — ATORVASTATIN CALCIUM 80 MG/1
80 TABLET, FILM COATED ORAL NIGHTLY
Qty: 90 TABLET | Refills: 2 | Status: SHIPPED | OUTPATIENT
Start: 2021-05-20 | End: 2022-02-09

## 2021-05-20 NOTE — PROGRESS NOTES
[id number] [facility] [address]  [place] [address]  Dept: [phone]  Dept Fax: [phone]  Loc: [phone]    Visit Date: 5/20/2021    Ms. Atilio Avila is a 74 y.o. female  who presented for:  Chief Complaint   Patient presents with    1 Year Follow Up    Other     angioplasty with stent        HPI:   HPI   75 yo F s/p RCA STEMI 10/2018 who presents for follow. Had back fusion but has significant back pain. BP stable. No NTG SL prn. No chest pain, angina, HORAN, orthopnea, PND, sob at rest, palpitations, LE edema, or syncope. Current Outpatient Medications:     acetaminophen-codeine (TYLENOL #3) 300-30 MG per tablet, Take 1 tablet by mouth every 6 hours as needed for Pain for up to 30 days. , Disp: 120 tablet, Rfl: 1    fesoterodine (TOVIAZ) 4 MG TB24 ER tablet, Take 1 tablet by mouth daily, Disp: 90 tablet, Rfl: 3    budesonide-formoterol (SYMBICORT) 160-4.5 MCG/ACT AERO, Inhale 2 puffs into the lungs 2 times daily, Disp: 1 Inhaler, Rfl: 11    Budeson-Glycopyrrol-Formoterol 160-9-4.8 MCG/ACT AERO, Inhale 2 puffs into the lungs 2 times daily, Disp: 5.9 g, Rfl: 5    albuterol sulfate  (90 Base) MCG/ACT inhaler, INHALE 2 PUFFS BY MOUTH EVERY 6 HOURS AS NEEDED FOR WHEEZING, Disp: 18 g, Rfl: 5    atorvastatin (LIPITOR) 80 MG tablet, Take 1 tablet by mouth nightly, Disp: 90 tablet, Rfl: 2    letrozole (FEMARA) 2.5 MG tablet, Take 1 tablet by mouth daily, Disp: 90 tablet, Rfl: 3    metoprolol succinate (TOPROL XL) 25 MG extended release tablet, Take 1 tablet by mouth daily, Disp: 90 tablet, Rfl: 3    nitroGLYCERIN (NITROSTAT) 0.4 MG SL tablet, Place 1 tablet under the tongue every 5 minutes as needed for Chest pain up to max of 3 total doses.  If no relief after 1 dose, call 911., Disp: 25 tablet, Rfl: 3    Handicap Placard [facility], by Does not apply route Expires 3/5/2024, Disp: 1 each, Rfl: 0    hydrocortisone reactive to light. Neck: Normal range of motion. Neck supple. No JVD present. Cardiovascular: Normal rate, regular rhythm, normal heart sounds and intact distal pulses. No murmur heard. Pulmonary/Chest: Effort normal and breath sounds normal. No respiratory distress. No wheezes. No rales. Abdominal: Soft. Bowel sounds are normal. No distension. There is no tenderness. Musculoskeletal: Normal range of motion. No edema. Neurological: Alert and oriented to person, place, and time. No cranial nerve deficit. Coordination normal.   Skin: Skin is warm and dry. Psychiatric: Normal mood and affect.        Lab Results   Component Value Date    CKTOTAL 50 04/05/2018       Lab Results   Component Value Date    WBC 8.9 02/23/2020    RBC 3.45 02/23/2020    HGB 11.6 03/16/2020    HCT 37.6 03/16/2020    MCV 90.7 02/23/2020    MCH 29.9 02/23/2020    MCHC 32.9 02/23/2020    RDW 12.7 05/18/2018     02/23/2020    MPV 9.5 02/23/2020       Lab Results   Component Value Date     11/02/2020    K 3.9 11/02/2020     11/02/2020    CO2 24 11/02/2020    BUN 16 11/02/2020    LABALBU 4.4 11/02/2020    CREATININE 0.6 11/02/2020    CALCIUM 9.4 11/02/2020    LABGLOM >90 11/02/2020    GLUCOSE 95 11/02/2020       Lab Results   Component Value Date    ALKPHOS 106 11/02/2020    ALT 13 11/02/2020    AST 14 11/02/2020    PROT 7.0 11/02/2020    BILITOT 0.4 11/02/2020    BILIDIR <0.2 02/06/2019    LABALBU 4.4 11/02/2020       Lab Results   Component Value Date    MG 1.9 10/28/2018       Lab Results   Component Value Date    INR 0.92 05/18/2018         No results found for: LABA1C    Lab Results   Component Value Date    TRIG 83 11/02/2020    HDL 71 11/02/2020    LDLCALC 44 11/02/2020       Lab Results   Component Value Date    TSH 1.260 04/02/2019         Testing Reviewed:      I have individually reviewed the cardiac test below:    ECHO: Results for orders placed during the hospital encounter of 09/11/19    Echo 2D w doppler w color complete    Narrative  Transthoracic Echocardiography Report (TTE)    Demographics    Patient Name   Isiah Le Gender               Female  L    MR #           [id number]        Race                     Ethnicity    Account #      [de-identified]        Room Number    Accession      [id number]        Date of Study        09/11/2019  Number    Date of Birth  1955       Referring Physician  Mara Underwood MD    Age            75 year(s)       Linn Wolf MD  Physician    Procedure    Type of Study    TTE procedure:ECHOCARDIOGRAM COMPLETE 2D W DOPPLER W COLOR. Procedure Date  Date: 09/11/2019 Start: 09:17 AM    Study Location: Echo Lab  Technical Quality: Adequate visualization    Indications:Dyspnea on exertion. Additional Medical History:Ex Smoker, Breast Cancer, STEMI, GERD. Patient Status: Routine    Height: 58 inches Weight: 137 pounds BSA: 1.55 m^2 BMI: 28.63 kg/m^2    BP: 118/78 mmHg    Allergies  - See Epic. Conclusions    Summary  Ejection fraction was estimated at 55-60%. E/A flow reversal noted. Suggestive of diastolic dysfunction. There was trace tricuspid regurgitation. Assuming RAP = 5 mmHg, the estimated RVSP = 29 mmHg. Ascending aorta = 3.6 cm. IVC size is within normal limits with normal respiratory phasic changes. Signature    ----------------------------------------------------------------  Electronically signed by Mariah Barkley MD (Interpreting  physician) on 09/11/2019 at 02:02 PM  ----------------------------------------------------------------    Findings    Mitral Valve  The mitral valve structure was normal with a calcified posterior leaflet. DOPPLER: The transmitral velocity was within the normal range with no  evidence for mitral stenosis. There was no evidence of mitral  regurgitation.     Aortic Valve  The aortic valve was trileaflet with normal thickness and cuspal  separation. DOPPLER: Transaortic velocity was within the normal range with  no evidence of aortic stenosis. There was no evidence of aortic  regurgitation. Tricuspid Valve  The tricuspid valve structure was normal with normal leaflet separation. DOPPLER: There was no evidence of tricuspid stenosis. There was trace  tricuspid regurgitation. Assuming RAP = 5 mmHg, the estimated RVSP = 29  mmHg. Pulmonic Valve  The pulmonic valve leaflets were not well seen. DOPPLER: The transpulmonic  velocity was within the normal range with trace regurgitation. Left Atrium  Left atrial size was normal.    Left Ventricle  Normal left ventricular size and systolic function. There were no regional wall motion abnormalities. Wall thickness was within normal limits. Ejection fraction was estimated at 55-60%. E/A flow reversal noted. Suggestive of diastolic dysfunction. Right Atrium  Right atrial size was normal.    Right Ventricle  The right ventricular size was normal with normal systolic function and  wall thickness. Pericardial Effusion  The pericardium was normal in appearance with no evidence of a pericardial  effusion. Pleural Effusion  No evidence of pleural effusion. Aorta / Great Vessels  -Ascending aorta = 3.6 cm. -IVC size is within normal limits with normal respiratory phasic changes.     M-Mode/2D Measurements & Calculations    LV Diastolic   LV Systolic Dimension:    AV Cusp Separation: 2 cmLA  Dimension: 4.4 3.1 cm                    Dimension: 3.1 cmAO Root  cm             LV Volume Diastolic: 25.5 Dimension: 2.6 cmLA Area: 11.7  LV FS:29.6 %   ml                        cm^2  LV PW          LV Volume Systolic: 39.3  Diastolic: 0.9 ml  cm             LV EDV/LV EDV Index: 87.7  Septum         ml/57 m^2LV ESV/LV ESV    RV Diastolic Dimension: 3.2 cm  Diastolic: 1.1 Index: 55.0 ml/24 m^2  cm             EF Calculated: 56.8 %     LA/Aorta: 1.19  Ascending Aorta: 3.6 cm  LA volume/Index: 25.4 ml /16m^2    Doppler Measurements & Calculations    MV Peak E-Wave: 83.9 cm/s  AV Peak Velocity: 175  LVOT Peak Velocity: 137  MV Peak A-Wave: 94 cm/s    cm/s                   cm/s  MV E/A Ratio: 0.89         AV Peak Gradient:      LVOT Peak Gradient: 8  MV Peak Gradient: 2.82     12.25 mmHg             mmHg  mmHg  TV Peak E-Wave: 48 cm/s  MV Deceleration Time: 317                         TV Peak A-Wave: 44.7  msec                                              cm/s  IVRT: 81 msec  TV Peak Gradient: 0.92  MV E' Septal Velocity: 5.2                        mmHg  cm/s                       AV DVI (Vmax):0.78     TR Velocity:244 cm/s  MV A' Septal Velocity:                            TR Gradient:23.81 mmHg  11.2 cm/s                                         PV Peak Velocity: 60.9  MV E' Lateral Velocity:                           cm/s  7.4 cm/s                                          PV Peak Gradient: 1.48  MV A' Lateral Velocity:                           mmHg  10.4 cm/s  E/E' septal: 16.13  E/E' lateral: 11.34                               SD ED Velocity: 105 cm/s  MR Velocity: 441 cm/s    [id number]       Assessment/Plan   RCA STEMI PCI, 2018  Preserved EF  RVSP 29 mmHg  Orthopedic ankle issues  Stress - negative for ischemia, 9/2019  No issues, no issues on ECG, BP and HR well controlled. Has quit smoking since 2018. RF management. Last LDL 44. Discussed diet/exercise/BP/weight loss/health lifestyle choices/lipids; the patient understands the goals and will try to comply.     Disposition:  1 year         Electronically signed by Claudia Amador MD   5/20/2021 at 4:11 PM EDT

## 2021-06-02 RX ORDER — METOPROLOL SUCCINATE 25 MG/1
25 TABLET, EXTENDED RELEASE ORAL DAILY
Qty: 90 TABLET | Refills: 2 | Status: SHIPPED | OUTPATIENT
Start: 2021-06-02 | End: 2022-02-24

## 2021-06-04 ENCOUNTER — TELEPHONE (OUTPATIENT)
Dept: FAMILY MEDICINE CLINIC | Age: 66
End: 2021-06-04

## 2021-06-04 NOTE — TELEPHONE ENCOUNTER
----- Message from Miky Stanton sent at 6/4/2021 12:44 PM EDT -----  Subject: Message to Provider    QUESTIONS  Information for Provider? Pt has an appt with neurology on 6/29 with Dr. Brigido Rivas and would like to know if she reschedule her 6/8 appt until after   her neurology appt.   ---------------------------------------------------------------------------  --------------  CALL BACK INFO  What is the best way for the office to contact you? OK to leave message on   voicemail  Preferred Call Back Phone Number? [phone]  ---------------------------------------------------------------------------  --------------  SCRIPT ANSWERS  Relationship to Patient?  Self

## 2021-07-07 RX ORDER — LETROZOLE 2.5 MG/1
TABLET, FILM COATED ORAL
Qty: 90 TABLET | Refills: 0 | Status: SHIPPED | OUTPATIENT
Start: 2021-07-07 | End: 2022-07-19 | Stop reason: CLARIF

## 2021-07-13 ENCOUNTER — OFFICE VISIT (OUTPATIENT)
Dept: FAMILY MEDICINE CLINIC | Age: 66
End: 2021-07-13
Payer: MEDICARE

## 2021-07-13 VITALS
TEMPERATURE: 97.3 F | BODY MASS INDEX: 26.84 KG/M2 | WEIGHT: 124.4 LBS | DIASTOLIC BLOOD PRESSURE: 74 MMHG | RESPIRATION RATE: 16 BRPM | HEART RATE: 70 BPM | HEIGHT: 57 IN | OXYGEN SATURATION: 96 % | SYSTOLIC BLOOD PRESSURE: 136 MMHG

## 2021-07-13 DIAGNOSIS — M48.061 SPINAL STENOSIS OF LUMBAR REGION WITHOUT NEUROGENIC CLAUDICATION: ICD-10-CM

## 2021-07-13 DIAGNOSIS — I10 ESSENTIAL HYPERTENSION: ICD-10-CM

## 2021-07-13 DIAGNOSIS — R30.0 DYSURIA: Primary | ICD-10-CM

## 2021-07-13 DIAGNOSIS — I25.10 CORONARY ARTERY DISEASE INVOLVING NATIVE CORONARY ARTERY OF NATIVE HEART WITHOUT ANGINA PECTORIS: ICD-10-CM

## 2021-07-13 PROCEDURE — 3017F COLORECTAL CA SCREEN DOC REV: CPT | Performed by: FAMILY MEDICINE

## 2021-07-13 PROCEDURE — 1123F ACP DISCUSS/DSCN MKR DOCD: CPT | Performed by: FAMILY MEDICINE

## 2021-07-13 PROCEDURE — 4040F PNEUMOC VAC/ADMIN/RCVD: CPT | Performed by: FAMILY MEDICINE

## 2021-07-13 PROCEDURE — 1036F TOBACCO NON-USER: CPT | Performed by: FAMILY MEDICINE

## 2021-07-13 PROCEDURE — 1090F PRES/ABSN URINE INCON ASSESS: CPT | Performed by: FAMILY MEDICINE

## 2021-07-13 PROCEDURE — 99214 OFFICE O/P EST MOD 30 MIN: CPT | Performed by: FAMILY MEDICINE

## 2021-07-13 PROCEDURE — G8427 DOCREV CUR MEDS BY ELIG CLIN: HCPCS | Performed by: FAMILY MEDICINE

## 2021-07-13 PROCEDURE — G8400 PT W/DXA NO RESULTS DOC: HCPCS | Performed by: FAMILY MEDICINE

## 2021-07-13 PROCEDURE — G8417 CALC BMI ABV UP PARAM F/U: HCPCS | Performed by: FAMILY MEDICINE

## 2021-07-13 RX ORDER — ACETAMINOPHEN AND CODEINE PHOSPHATE 300; 30 MG/1; MG/1
TABLET ORAL
COMMUNITY
Start: 2021-06-21 | End: 2021-07-13

## 2021-07-13 RX ORDER — ACETAMINOPHEN AND CODEINE PHOSPHATE 300; 30 MG/1; MG/1
TABLET ORAL
Status: CANCELLED | OUTPATIENT
Start: 2021-07-13

## 2021-07-13 RX ORDER — SULFAMETHOXAZOLE AND TRIMETHOPRIM 800; 160 MG/1; MG/1
1 TABLET ORAL 2 TIMES DAILY
Qty: 6 TABLET | Refills: 0 | Status: SHIPPED | OUTPATIENT
Start: 2021-07-13 | End: 2021-07-16

## 2021-07-13 RX ORDER — ACETAMINOPHEN AND CODEINE PHOSPHATE 300; 30 MG/1; MG/1
1 TABLET ORAL EVERY 6 HOURS PRN
Qty: 120 TABLET | Refills: 1 | Status: SHIPPED | OUTPATIENT
Start: 2021-07-13 | End: 2021-09-13 | Stop reason: SDUPTHER

## 2021-07-13 NOTE — PROGRESS NOTES
Cassandra Lamb is a 74 y.o. female that presents for     Chief Complaint   Patient presents with    Follow-up     3 month  , burning when urination thinks may be a yeast infection         /74   Pulse 70   Temp 97.3 °F (36.3 °C)   Resp 16   Ht 4' 9\" (1.448 m)   Wt 124 lb 6.4 oz (56.4 kg)   SpO2 96%   BMI 26.92 kg/m²       HPI:    HTN    Does patient check BP regularly at home? - Yes -   Current Medication regimen - Norvasc, metoprolol  Tolerating medications well? - yes    Shortness of breath or chest pain? No  Headache or visual complaints? No  Neurologic changes like confusion? No  Extremity edema? No    BP Readings from Last 3 Encounters:   07/13/21 136/74   05/20/21 136/82   03/04/21 118/82     Chronic Pain    HPI:    Patient has chronic pain of the low back with radiation to the legs. She is scheduled for an MRI of her low back. Also scheduled for an EMG with Dr Art Alex. Plans to follow up with Dr Bessy Lima. Pain has been 'bad'. States that pain in back is mostly around previous surgical scar. Pain control: adequate  Improvement in function and ADLs? yes  Current Medications:  Tylenol #3 q 6 hrs prn  Escalation of dosage recently?  no    Evidence for abuse or diversion? no   Seen specialist or pain clinic?: yes    Controlled Substances Monitoring:      CAD  Has quit smoking for close to 2 years now! History of myocardial infarction? Yes    History of heart failure? No.    Current symptoms of angina? No   Patient compliant with therapy? No  Tobacco use? No      Antiplatelet therapy? Yes    Beta-blocker therapy?   Yes   ACE/ARB therapy - Yes  Statin - Yes    Health Maintenance   Topic Date Due    COVID-19 Vaccine (1) Never done    DTaP/Tdap/Td vaccine (1 - Tdap) 10/26/2021 (Originally 2/28/1974)    Shingles Vaccine (1 of 2) 10/26/2021 (Originally 2/28/2003)    Pneumococcal 65+ years Vaccine (2 of 2 - PPSV23) 10/26/2021 (Originally 10/23/2020)    Hepatitis C screen  10/26/2021 (Originally 1955)    Low dose CT lung screening  07/23/2021    Flu vaccine (1) 09/01/2021    Breast cancer screen  09/25/2021    Lipid screen  11/02/2021    Potassium monitoring  11/02/2021    Creatinine monitoring  11/02/2021    Annual Wellness Visit (AWV)  03/02/2022    Colon cancer screen colonoscopy  06/17/2029    DEXA (modify frequency per FRAX score)  Completed    Hepatitis A vaccine  Aged Out    Hepatitis B vaccine  Aged Out    Hib vaccine  Aged Out    Meningococcal (ACWY) vaccine  Aged Out       Past Medical History:   Diagnosis Date    Bronchitis     CAD (coronary artery disease)     Cerebral artery occlusion with cerebral infarction ([address]     TIA    COPD (chronic obstructive pulmonary disease) ([address]     Diverticulosis     Essential hypertension 5/10/2017    Foot drop, right foot     GERD (gastroesophageal reflux disease)     IBS (irritable bowel syndrome)     Kidney stone 2014    Malignant neoplasm of upper-inner quadrant of left female breast ([address] 9/13/2016    breast    MI (myocardial infarction) ([address] 10/2018    PONV (postoperative nausea and vomiting)        Past Surgical History:   Procedure Laterality Date    BACK SURGERY  1998    Irineo Hailee BREAST SURGERY Left 2016    exc. lymph nodes, lumpectomy    CARPAL TUNNEL RELEASE Right 06/2017    [facility]    CERVICAL FUSION      COLONOSCOPY  unsure    Dr. Watson Carrel    COLONOSCOPY  10/10/14    Dr. Terral Opitz COLONOSCOPY Left 6/17/2019    COLONOSCOPY POLYPECTOMY HOT BIOPSY performed by Dejuan Diaz MD at [address]  10/27/2018    [facility]    DILATION AND CURETTAGE OF UTERUS  78    HEMORRHOID SURGERY  12/16/2014    HYSTERECTOMY, VAGINAL  12/16/2014    KIDNEY STONE SURGERY  2014    LAMINECTOMY  06/12/2018    LUMBAR FUSION N/A 2/19/2020    LUMBAR LAMINECTOMY PSF L3-S1 WITH CAPTIVA performed by Gray Martínez MD at [address] OFFICE/OUTPT [facility] N/A 6/12/2018 ACDF C5-6 performed by Elmo Aponte MD at [facility]    WRIST GANGLION EXCISION Left        Social History     Tobacco Use    Smoking status: Former Smoker     Packs/day: 1.50     Years: 20.00     Pack years: 30.00     Types: Cigarettes     Quit date: 10/27/2018     Years since quittin.7    Smokeless tobacco: Never Used   Vaping Use    Vaping Use: Never used   Substance Use Topics    Alcohol use: No    Drug use: No       Family History   Problem Relation Age of Onset    Cancer Mother         renal cell carcinoma    Kidney Cancer Mother 46    Cirrhosis Father     Diabetes Sister     Kidney Cancer Sister 54    Cancer Sister     Heart Disease Sister         CHF    Diabetes Brother     High Blood Pressure Brother     Breast Cancer Paternal Aunt 54    Ovarian Cancer Neg Hx     Stroke Neg Hx          I have reviewed the patient's past medical history, past surgical history, allergies, medications, social and family history and I have made updates where appropriate.     Review of Systems        PHYSICAL EXAM:  /74   Pulse 70   Temp 97.3 °F (36.3 °C)   Resp 16   Ht 4' 9\" (1.448 m)   Wt 124 lb 6.4 oz (56.4 kg)   SpO2 96%   BMI 26.92 kg/m²     General Appearance: well developed and well- nourished, in no acute distress  Head: normocephalic and atraumatic  ENT: external ear and ear canal normal bilaterally, nose without deformity, nasal mucosa and turbinates normal without polyps, oropharynx normal, dentition is normal for age, no lipor gum lesions noted  Neck: supple and non-tender without mass, no thyromegaly or thyroid nodules, no cervical lymphadenopathy  Pulmonary/Chest: clear to auscultation bilaterally- no wheezes, rales or rhonchi, normal air movement, no respiratory distress or retractions  Cardiovascular: normal rate, regular rhythm, normal S1 and S2, no murmurs, rubs, clicks, or gallops, distal pulses intact  Extremities: no cyanosis, clubbing or edema of the lower extremities  Psych:  Normal affect without evidence of depression oranxiety, insight and judgement are appropriate, memory appears intact  Skin: warm and dry, no rash or erythema      ASSESSMENT & PLAN  Mikal Primrose was seen today for follow-up. Diagnoses and all orders for this visit:    Dysuria  -     sulfamethoxazole-trimethoprim (BACTRIM DS) 800-160 MG per tablet; Take 1 tablet by mouth 2 times daily for 3 days  -     Culture, Urine; Future    Spinal stenosis of lumbar region without neurogenic claudication  -     acetaminophen-codeine (TYLENOL #3) 300-30 MG per tablet; Take 1 tablet by mouth every 6 hours as needed for Pain for up to 30 days. Essential hypertension    Coronary artery disease involving native coronary artery of native heart without angina pectoris         -Start bactrim and obtain UC for dysuria  -Will check results of MRI, continue T3 for now  -Other chronic issues are stable, continue current medications  -Advised to call if any issues      Return in about 6 months (around 1/13/2022), or if symptoms worsen or fail to improve. Controlled Substance Monitoring:    Acute and Chronic Pain Monitoring:   RX Monitoring 7/6/2020   Periodic Controlled Substance Monitoring Possible medication side effects, risk of tolerance/dependence & alternative treatments discussed. ;No signs of potential drug abuse or diversion identified.;Obtaining appropriate analgesic effect of treatment. Mikal Primrose received counseling on the following healthy behaviors: medication adherence  Reviewed prior labs and health maintenance. Continue current medications, diet and exercise. Discussed use, benefit, and side effects of prescribed medications. Barriers to medication compliance addressed. Patient given educational materials - see patient instructions. All patient questions answered. Patient voiced understanding.

## 2021-07-16 LAB
ORGANISM: ABNORMAL
URINE CULTURE, ROUTINE: ABNORMAL

## 2021-08-02 ENCOUNTER — HOSPITAL ENCOUNTER (OUTPATIENT)
Dept: INFUSION THERAPY | Age: 66
Discharge: HOME | End: 2021-08-02
Payer: MEDICARE

## 2021-08-02 ENCOUNTER — OFFICE VISIT (OUTPATIENT)
Dept: ONCOLOGY | Age: 66
End: 2021-08-02
Payer: MEDICARE

## 2021-08-02 VITALS
HEIGHT: 57 IN | TEMPERATURE: 98.4 F | BODY MASS INDEX: 26.75 KG/M2 | DIASTOLIC BLOOD PRESSURE: 81 MMHG | WEIGHT: 124 LBS | OXYGEN SATURATION: 97 % | RESPIRATION RATE: 16 BRPM | SYSTOLIC BLOOD PRESSURE: 178 MMHG | HEART RATE: 55 BPM

## 2021-08-02 DIAGNOSIS — Z78.0 POSTMENOPAUSAL: ICD-10-CM

## 2021-08-02 DIAGNOSIS — Z12.31 ENCOUNTER FOR SCREENING MAMMOGRAM FOR MALIGNANT NEOPLASM OF BREAST: ICD-10-CM

## 2021-08-02 DIAGNOSIS — Z08 ENCOUNTER FOR FOLLOW-UP SURVEILLANCE OF BREAST CANCER: ICD-10-CM

## 2021-08-02 DIAGNOSIS — Z85.3 HISTORY OF LEFT BREAST CANCER: ICD-10-CM

## 2021-08-02 DIAGNOSIS — Z85.3 ENCOUNTER FOR FOLLOW-UP SURVEILLANCE OF BREAST CANCER: ICD-10-CM

## 2021-08-02 DIAGNOSIS — Z79.811 PROPHYLACTIC USE OF LETROZOLE (FEMARA): ICD-10-CM

## 2021-08-02 DIAGNOSIS — Z12.39 BREAST CANCER SCREENING, HIGH RISK PATIENT: Primary | ICD-10-CM

## 2021-08-02 DIAGNOSIS — Z98.890 S/P LUMPECTOMY, LEFT BREAST: ICD-10-CM

## 2021-08-02 PROCEDURE — G8417 CALC BMI ABV UP PARAM F/U: HCPCS | Performed by: INTERNAL MEDICINE

## 2021-08-02 PROCEDURE — G8427 DOCREV CUR MEDS BY ELIG CLIN: HCPCS | Performed by: INTERNAL MEDICINE

## 2021-08-02 PROCEDURE — 99211 OFF/OP EST MAY X REQ PHY/QHP: CPT

## 2021-08-02 PROCEDURE — 1036F TOBACCO NON-USER: CPT | Performed by: INTERNAL MEDICINE

## 2021-08-02 PROCEDURE — 1123F ACP DISCUSS/DSCN MKR DOCD: CPT | Performed by: INTERNAL MEDICINE

## 2021-08-02 PROCEDURE — 1090F PRES/ABSN URINE INCON ASSESS: CPT | Performed by: INTERNAL MEDICINE

## 2021-08-02 PROCEDURE — 99214 OFFICE O/P EST MOD 30 MIN: CPT | Performed by: INTERNAL MEDICINE

## 2021-08-02 PROCEDURE — 4040F PNEUMOC VAC/ADMIN/RCVD: CPT | Performed by: INTERNAL MEDICINE

## 2021-08-02 PROCEDURE — 3017F COLORECTAL CA SCREEN DOC REV: CPT | Performed by: INTERNAL MEDICINE

## 2021-08-02 PROCEDURE — G8400 PT W/DXA NO RESULTS DOC: HCPCS | Performed by: INTERNAL MEDICINE

## 2021-08-02 ASSESSMENT — ENCOUNTER SYMPTOMS
ABDOMINAL PAIN: 0
RECTAL PAIN: 0
DIARRHEA: 0
COUGH: 0
ABDOMINAL DISTENTION: 0
SORE THROAT: 0
SHORTNESS OF BREATH: 0
VOMITING: 0
WHEEZING: 0
FACIAL SWELLING: 0
NAUSEA: 0
CONSTIPATION: 0
TROUBLE SWALLOWING: 0
BLOOD IN STOOL: 0
COLOR CHANGE: 0
CHEST TIGHTNESS: 0
EYE DISCHARGE: 0
BACK PAIN: 0

## 2021-08-02 NOTE — PATIENT INSTRUCTIONS
1.  Annual screening mammogram and DEXA study beginning of October 2021  2.   RTC to see me in 1 year

## 2021-08-02 NOTE — PROGRESS NOTES
[facility] PROFESSIONAL SERVS  [facility]  [address] [facility] [address]  [address] [address],[address]  [address] [address]  Dept: [phone]  Dept Fax: [phone]: [phone]     Visit Date: 8/2/2021     Rosendo Wisdom is a 74 y.o. female who presents today for:   Chief Complaint   Patient presents with    Follow-up     Malignant neoplasm of upper-inner quadrant of left breast in female, estrogen receptor positive         HPI   Mrs. Selvin Matthews is a 53-year-old female with h/o stage I B left breast cancer diagnosed in September 2016. The patient had digital screening bilateral mammogram on August 31, 2016 that showed irregular density in the left breast that was indeterminate but confirmed on the compression and lateral medial views. On September 9, 2016 she underwent left breast upper inner quadrant core biopsy that showed invasive ductal carcinoma grade 2. IHC was positive for estrogen receptor expression in 95% of cells, and progesterone receptor expression in 80% of cells. Subsequently the patient met with the surgeon Dr. Tawny Mendoza and after discussing her treatment options, she decided to proceed with left breast lumpectomy and sentinel lymph node mapping and biopsy. Her surgery was performed on September 29, 2016. Final pathology report showed:  Specimen Laterality  Left  Tumor Site: Invasive Carcinoma  Upper inner quadrant  Tumor Size: Size of Largest Invasive Carcinoma  Greatest dimension of largest focus of invasion  > 1 mm:  0.9 cm  Histologic Type  Invasive ductal carcinoma, no special type.   Histologic Grade: Johnny Histologic Score  Glandular (Acinar)/Tubular Differentiation  Score 3:  < 10% of tumor area forming glandular/tubular structures  Overall Grade  Grade 2: scores of 6 or 7  Tumor Focality (required only if more than 1 focus of invasive  carcinoma is present)  Single focus of invasive carcinoma  Ductal Carcinoma In Situ (DCIS)  DCIS is present  Negative for extensive intraductal component (EIC)  Size (Extent) of DCIS  Estimated size (extent) of DCIS (greatest dimension using gross and  microscopic evaluation): at least 0.8 cm  Number of blocks with DCIS:  2  Number of blocks examined:  9  Nuclear Grade  Grade III (high)  Necrosis  Present, central (expansive \"comedo\" necrosis)  Margins  Invasive Carcinoma  Margins uninvolved by invasive carcinoma   Distance from closest margin:  0.9 cm   Specify margin:  skin    Lymph Nodes (required only if lymph nodes are present in the specimen)  Total number of lymph nodes examined (sentinel and nonsentinel):  1  Number of sentinel lymph nodes examined:  1    Lymph Node Involvement (required only if one or more lymph nodes have  tumor cells identified)   Number of lymph nodes with macrometastases ( > 2 mm):  0   Number of lymph nodes with micrometastases ( > 0.2 mm to 2 mm and/or  > 200 cells):  0   Number of lymph nodes with isolated tumor cells ( < or equal to 0.2 mm  and  < or equal to 200 cells):  0    BREAST BIOMARKERS* ([id number], 9/13/16)  Estrogen Receptor:  Positive 95% of cells  Progesterone Receptor: Positive 80% of cells  Ki-67  Percentage of positive nuclei:  40%  HER-2 Dual RENEE   Nonamplified - HER2/CEP17 ratio  < 2.0 AND average HER2           copy number  < 4.0 signals/cell  Her breast cancer specimen was sent for Oncotype DX assay. It came back with a recurrent score of 20, translating into 13% risk of distant disease recurrence in the next 10 years. Therefore the patient did not receive recommendation to proceed with chemotherapy. She completed radiation treatment to her left breast on December 4, 2016. She received 5 CGy of radiation treatment. Overall she tolerated radiation treatment well. In December 2016 she started adjuvant hormonal therapy with Arimidex, which was switched to Femara. The patient was hospitalized in October 2018 for STEMI, she  Is S/P PCI of the RCA. Interim history on 08/02/2021:   This is her 6 ACDF C5-6 performed by Arlie Hashimoto, MD at [facility]  89    WISDOM TOOTH EXTRACTION  1979    WRIST GANGLION EXCISION Left       Family History   Problem Relation Age of Onset    Cancer Mother         renal cell carcinoma    Kidney Cancer Mother 46    Cirrhosis Father     Diabetes Sister     Kidney Cancer Sister 54    Cancer Sister     Heart Disease Sister         CHF    Diabetes Brother     High Blood Pressure Brother     Breast Cancer Paternal Aunt 54    Ovarian Cancer Neg Hx     Stroke Neg Hx       Social History     Tobacco Use    Smoking status: Former Smoker     Packs/day: 1.50     Years: 20.00     Pack years: 30.00     Types: Cigarettes     Quit date: 10/27/2018     Years since quittin.8    Smokeless tobacco: Never Used   Substance Use Topics    Alcohol use: No      Current Outpatient Medications   Medication Sig Dispense Refill    letrozole (FEMARA) 2.5 MG tablet Take 1 tablet by mouth once daily 90 tablet 0    metoprolol succinate (TOPROL XL) 25 MG extended release tablet Take 1 tablet by mouth daily 90 tablet 2    atorvastatin (LIPITOR) 80 MG tablet Take 1 tablet by mouth nightly 90 tablet 2    fesoterodine (TOVIAZ) 4 MG TB24 ER tablet Take 1 tablet by mouth daily 90 tablet 3    budesonide-formoterol (SYMBICORT) 160-4.5 MCG/ACT AERO Inhale 2 puffs into the lungs 2 times daily 1 Inhaler 11    Budeson-Glycopyrrol-Formoterol 160-9-4.8 MCG/ACT AERO Inhale 2 puffs into the lungs 2 times daily 5.9 g 5    albuterol sulfate  (90 Base) MCG/ACT inhaler INHALE 2 PUFFS BY MOUTH EVERY 6 HOURS AS NEEDED FOR WHEEZING 18 g 5    Handicap Placard MISC by Does not apply route Expires 3/5/2024 1 each 0    hydrocortisone (ANUSOL-HC) 2.5 % rectal cream Place rectally 2 times daily as needed for Hemorrhoids Place rectally 2 times daily.       fluticasone (FLONASE) 50 MCG/ACT nasal spray 1 spray by Nasal route daily 1 Bottle 3    aspirin 81 MG EC tablet Take 1 tablet by mouth daily 30 tablet 3    nitroGLYCERIN (NITROSTAT) 0.4 MG SL tablet Place 1 tablet under the tongue every 5 minutes as needed for Chest pain up to max of 3 total doses. If no relief after 1 dose, call 911. (Patient not taking: Reported on 8/2/2021) 25 tablet 3     No current facility-administered medications for this visit. Allergies   Allergen Reactions    Naproxen Anaphylaxis    Ultram [Tramadol] Itching and Rash      Health Maintenance   Topic Date Due    COVID-19 Vaccine (1) Never done    Low dose CT lung screening  07/23/2021    DTaP/Tdap/Td vaccine (1 - Tdap) 10/26/2021 (Originally 2/28/1974)    Shingles Vaccine (1 of 2) 10/26/2021 (Originally 2/28/2003)    Pneumococcal 65+ years Vaccine (2 of 2 - PPSV23) 10/26/2021 (Originally 10/23/2020)    Hepatitis C screen  10/26/2021 (Originally 1955)    Flu vaccine (1) 09/01/2021    Breast cancer screen  09/25/2021    Lipid screen  11/02/2021    Potassium monitoring  11/02/2021    Annual Wellness Visit (AWV)  03/02/2022    Creatinine monitoring  07/20/2022    Colon cancer screen colonoscopy  06/17/2029    DEXA (modify frequency per FRAX score)  Completed    Hepatitis A vaccine  Aged Out    Hepatitis B vaccine  Aged Out    Hib vaccine  Aged Out    Meningococcal (ACWY) vaccine  Aged Out        Subjective:   Review of Systems   Constitutional: Negative for activity change, appetite change, fatigue and fever. HENT: Negative for congestion, dental problem, facial swelling, hearing loss, mouth sores, nosebleeds, sore throat, tinnitus and trouble swallowing. Eyes: Negative for discharge and visual disturbance. Respiratory: Negative for cough, chest tightness, shortness of breath and wheezing. Cardiovascular: Negative for chest pain, palpitations and leg swelling. Gastrointestinal: Negative for abdominal distention, abdominal pain, blood in stool, constipation, diarrhea, nausea, rectal pain and vomiting.    Endocrine: Negative for cold intolerance, polydipsia and polyuria. Genitourinary: Negative for decreased urine volume, difficulty urinating, dysuria, flank pain, hematuria and urgency. Musculoskeletal: Negative for arthralgias, back pain, gait problem, joint swelling, myalgias and neck stiffness. Skin: Negative for color change, rash and wound. Neurological: Negative for dizziness, tremors, seizures, speech difficulty, weakness, light-headedness, numbness and headaches. Hematological: Negative for adenopathy. Does not bruise/bleed easily. Psychiatric/Behavioral: Negative for confusion and sleep disturbance. The patient is not nervous/anxious. Objective:   Physical Exam  Vitals reviewed. Constitutional:       General: She is not in acute distress. Appearance: She is well-developed. HENT:      Head: Normocephalic. Mouth/Throat:      Pharynx: No oropharyngeal exudate. Eyes:      General: No scleral icterus. Right eye: No discharge. Left eye: No discharge. Pupils: Pupils are equal, round, and reactive to light. Neck:      Thyroid: No thyromegaly. Vascular: No JVD. Trachea: No tracheal deviation. Cardiovascular:      Rate and Rhythm: Normal rate. Heart sounds: Normal heart sounds. No murmur heard. No friction rub. No gallop. Pulmonary:      Effort: Pulmonary effort is normal. No respiratory distress. Breath sounds: Normal breath sounds. No stridor. No wheezing or rales. Chest:      Chest wall: No tenderness. Abdominal:      General: Bowel sounds are normal. There is no distension. Palpations: Abdomen is soft. There is no mass. Tenderness: There is no abdominal tenderness. There is no rebound. Musculoskeletal:         General: Normal range of motion. Cervical back: Normal range of motion and neck supple. Lymphadenopathy:      Cervical: No cervical adenopathy. Skin:     General: Skin is warm. Findings: No erythema or rash. Neurological:      Mental Status: She is alert and oriented to person, place, and time. Cranial Nerves: No cranial nerve deficit. Motor: No abnormal muscle tone. Deep Tendon Reflexes: Reflexes are normal and symmetric. Psychiatric:         Behavior: Behavior normal.         Thought Content: Thought content normal.         Judgment: Judgment normal.        BP (!) 178/81 (Site: Right Upper Arm, Position: Sitting, Cuff Size: Medium Adult)   Pulse 55   Temp 98.4 °F (36.9 °C) (Oral)   Resp 16   Ht 4' 9\" (1.448 m)   Wt 124 lb (56.2 kg)   SpO2 97%   BMI 26.83 kg/m²      DEXA study in December 2016 showed:  IMPRESSION: OSTEOPENIA   Patient is at medium risk for fracture. CT of the chest on July 23, 2020 showed:  Stable CT chest. Stable 5 mm nodule right lower lobe and partially imaged 3 mm nodule left lower lobe. Continue annual screening. Mammogram on September 25, 2020 showed:   Benign findings, BIRADS category 2. Assessment:   1. Stage I B, ER positive, CA positive, HER-2/eleazar negative left breast cancer. The patient is status post a left lumpectomy with sentinel lymph node biopsy. She had Oncotype Dx assay. It showed recurrent score of 20, translating into 13% risk of distant disease recurrence during the next 10 years. Decision was made  not to proceed with adjuvant chemotherapy. She completed adjuvant radiation treatment to the reminder of her left breast on December 4, 2016.    2. Adjuvant hormonal therapy. The patient started adjuvant hormonal therapy with Arimidex in December 2016. She developed fatigue on Arimidex. Therefore she was placed on Femara which she tolerates well. She reports minimal arthralgia, minimal hot flashes. The patient denies any new complaints. There is no evidence of distant disease recurrence on today's physical examination. Her annual breast mammogram in September 2020 was benign.   The patient was instructed to continue Femara she will complete 5-year therapy in December 2021.    3.  Osteopenia. The patient was instructed to take calcium with vitamin D. She also received recommendation to exercise on a regular basis. Recommendation is to walk 3-4 times per week. 4.  The patient is a heavy smoker. The lung screening low dose CT of the chest on a April 9, 2018 showed no suspicious pulmonary nodules. Lung rads category 2. CT of the chest in July 2020 that showed 2 stable subcentimeter pulmonary nodule  The patient quit smoking 2 years ago.     Tiff Miner MD

## 2021-09-13 DIAGNOSIS — M48.061 SPINAL STENOSIS OF LUMBAR REGION WITHOUT NEUROGENIC CLAUDICATION: ICD-10-CM

## 2021-09-13 RX ORDER — ACETAMINOPHEN AND CODEINE PHOSPHATE 300; 30 MG/1; MG/1
1 TABLET ORAL EVERY 6 HOURS PRN
Qty: 120 TABLET | Refills: 1 | Status: SHIPPED | OUTPATIENT
Start: 2021-09-13 | End: 2021-11-04 | Stop reason: SDUPTHER

## 2021-09-16 ENCOUNTER — OFFICE VISIT (OUTPATIENT)
Dept: PULMONOLOGY | Age: 66
End: 2021-09-16
Payer: MEDICARE

## 2021-09-16 VITALS
HEIGHT: 58 IN | WEIGHT: 122.6 LBS | OXYGEN SATURATION: 96 % | BODY MASS INDEX: 25.73 KG/M2 | TEMPERATURE: 97.5 F | SYSTOLIC BLOOD PRESSURE: 118 MMHG | HEART RATE: 71 BPM | DIASTOLIC BLOOD PRESSURE: 81 MMHG

## 2021-09-16 DIAGNOSIS — Z87.891 PERSONAL HISTORY OF TOBACCO USE: Primary | ICD-10-CM

## 2021-09-16 DIAGNOSIS — R91.8 LUNG NODULES: ICD-10-CM

## 2021-09-16 DIAGNOSIS — J44.9 CHRONIC OBSTRUCTIVE PULMONARY DISEASE, UNSPECIFIED COPD TYPE (HCC): ICD-10-CM

## 2021-09-16 PROCEDURE — 1036F TOBACCO NON-USER: CPT | Performed by: INTERNAL MEDICINE

## 2021-09-16 PROCEDURE — G8926 SPIRO NO PERF OR DOC: HCPCS | Performed by: INTERNAL MEDICINE

## 2021-09-16 PROCEDURE — 1123F ACP DISCUSS/DSCN MKR DOCD: CPT | Performed by: INTERNAL MEDICINE

## 2021-09-16 PROCEDURE — G8417 CALC BMI ABV UP PARAM F/U: HCPCS | Performed by: INTERNAL MEDICINE

## 2021-09-16 PROCEDURE — G8400 PT W/DXA NO RESULTS DOC: HCPCS | Performed by: INTERNAL MEDICINE

## 2021-09-16 PROCEDURE — 3017F COLORECTAL CA SCREEN DOC REV: CPT | Performed by: INTERNAL MEDICINE

## 2021-09-16 PROCEDURE — 1090F PRES/ABSN URINE INCON ASSESS: CPT | Performed by: INTERNAL MEDICINE

## 2021-09-16 PROCEDURE — 99213 OFFICE O/P EST LOW 20 MIN: CPT | Performed by: INTERNAL MEDICINE

## 2021-09-16 PROCEDURE — G0296 VISIT TO DETERM LDCT ELIG: HCPCS | Performed by: INTERNAL MEDICINE

## 2021-09-16 PROCEDURE — 3023F SPIROM DOC REV: CPT | Performed by: INTERNAL MEDICINE

## 2021-09-16 PROCEDURE — G8427 DOCREV CUR MEDS BY ELIG CLIN: HCPCS | Performed by: INTERNAL MEDICINE

## 2021-09-16 PROCEDURE — 4040F PNEUMOC VAC/ADMIN/RCVD: CPT | Performed by: INTERNAL MEDICINE

## 2021-09-16 RX ORDER — ALBUTEROL SULFATE 90 UG/1
AEROSOL, METERED RESPIRATORY (INHALATION)
Qty: 18 G | Refills: 5 | Status: SHIPPED | OUTPATIENT
Start: 2021-09-16 | End: 2022-07-19 | Stop reason: SDUPTHER

## 2021-09-16 ASSESSMENT — ENCOUNTER SYMPTOMS
VOICE CHANGE: 0
EYE DISCHARGE: 0
COUGH: 0
WHEEZING: 0
ABDOMINAL PAIN: 0
EYE REDNESS: 0
SHORTNESS OF BREATH: 1
BACK PAIN: 1
APNEA: 0
ABDOMINAL DISTENTION: 0
TROUBLE SWALLOWING: 0
STRIDOR: 0
CHEST TIGHTNESS: 0
CHOKING: 0

## 2021-09-16 NOTE — PROGRESS NOTES
Subjective:      Patient ID: Leonardo Rossi is a 74 y.o. female. CC: COPD  Has been unable to use anything but Albuterol for her COPD, last visit was started on Trelegy which Rolando Mejia is no longer using, doing well, active, will not take covid vaccine, does not trust it. Continues having issues with back pain. Past due for screening Ct chest  Will complete 5 years of Letrozole soon, happy about it. Previous visits. Referred by Dr Jaimee Pierce for SOB  Rolando Mejia has moderate COPD, quit smoking last year after a ACS  Tried Spiriva in the past but did not like the \"gritty\" feeling and quit  Main c/o today is nasal congestion and obstruction, wishes something for \"sinus\"  Started smoking at age 34 years up to 2 ppd quit at age 70  Worked restaurants as a cook, , all aspects of the business   H/o L breast cancer, lumpectomy and XRT in 2016  Screening CT chest 4/2018  No family h/o lung cancer  Sister and mother Renal cell carcinoma  Codeine for back pain, needs surgery for back pain, on hold due to ACS    Review of Systems   Constitutional: Negative for activity change, fatigue and unexpected weight change. HENT: Positive for congestion. Negative for trouble swallowing and voice change. Eyes: Negative for discharge, redness and visual disturbance. Respiratory: Positive for shortness of breath. Negative for apnea, cough, choking, chest tightness, wheezing and stridor. Cardiovascular: Negative for chest pain, palpitations and leg swelling. Gastrointestinal: Negative for abdominal distention and abdominal pain. Genitourinary: Negative. Musculoskeletal: Positive for arthralgias and back pain. Negative for gait problem, joint swelling, myalgias, neck pain and neck stiffness.          All other systems reviewed and are negative    Lung Nodule Screening     [x] Qualifies    [] Does not qualify   [] Declined   [] Completed  Past Medical History:   Diagnosis Date    Bronchitis     CAD (coronary artery disease)     Cerebral artery occlusion with cerebral infarction [facility]     TIA    COPD (chronic obstructive pulmonary disease) ([facility]     Diverticulosis     Essential hypertension 5/10/2017    Foot drop, right foot     GERD (gastroesophageal reflux disease)     IBS (irritable bowel syndrome)     Kidney stone     Malignant neoplasm of upper-inner quadrant of left female breast ([facility] [address] [facility] 2016    breast    MI (myocardial infarction) ([facility] [address] 10/2018    PONV (postoperative nausea and vomiting)      Past Surgical History:   Procedure Laterality Date    BACK SURGERY      Juanice Corolla BREAST SURGERY Left 2016    exc. lymph nodes, lumpectomy    CARPAL TUNNEL RELEASE Right 2017    OIO    CERVICAL FUSION      COLONOSCOPY  unsure    Dr. Lori Lees    COLONOSCOPY  10/10/14    Dr. Radha Butt Left 2019    COLONOSCOPY POLYPECTOMY HOT BIOPSY performed by Lakhwinder Alas MD at [address] [place] [address]  10/27/2018    [facility]    DILATION AND CURETTAGE OF UTERUS  80    HEMORRHOID SURGERY  2014    HYSTERECTOMY, VAGINAL  2014    KIDNEY STONE SURGERY  2014    LAMINECTOMY  2018    LUMBAR FUSION N/A 2020    LUMBAR LAMINECTOMY PSF L3-S1 WITH CAPTIVA performed by Adina Pal MD at [address] [place] [address] OFFICE/OUTPT VISIT,PROCEDURE ONLY N/A 2018    ACDF C5-6 performed by Mel Painter MD at [facility]    WRIST GANGLION EXCISION Left      Social History     Tobacco Use    Smoking status: Former Smoker     Packs/day: 1.50     Years: 20.00     Pack years: 30.00     Types: Cigarettes     Quit date: 10/27/2018     Years since quittin.8    Smokeless tobacco: Never Used   Vaping Use    Vaping Use: Never used   Substance Use Topics    Alcohol use: No    Drug use: No      Allergies   Allergen Reactions    Naproxen Anaphylaxis    Ultram [Tramadol] Itching and Rash      Family History Problem Relation Age of Onset    Cancer Mother         renal cell carcinoma    Kidney Cancer Mother 46    Cirrhosis Father     Diabetes Sister     Kidney Cancer Sister 54    Cancer Sister     Heart Disease Sister         CHF    Diabetes Brother     High Blood Pressure Brother     Breast Cancer Paternal Aunt 54    Ovarian Cancer Neg Hx     Stroke Neg Hx      Current Outpatient Medications   Medication Sig Dispense Refill    acetaminophen-codeine (TYLENOL #3) 300-30 MG per tablet Take 1 tablet by mouth every 6 hours as needed for Pain for up to 30 days. 120 tablet 1    letrozole (FEMARA) 2.5 MG tablet Take 1 tablet by mouth once daily 90 tablet 0    metoprolol succinate (TOPROL XL) 25 MG extended release tablet Take 1 tablet by mouth daily 90 tablet 2    atorvastatin (LIPITOR) 80 MG tablet Take 1 tablet by mouth nightly 90 tablet 2    fesoterodine (TOVIAZ) 4 MG TB24 ER tablet Take 1 tablet by mouth daily 90 tablet 3    budesonide-formoterol (SYMBICORT) 160-4.5 MCG/ACT AERO Inhale 2 puffs into the lungs 2 times daily 1 Inhaler 11    Budeson-Glycopyrrol-Formoterol 160-9-4.8 MCG/ACT AERO Inhale 2 puffs into the lungs 2 times daily 5.9 g 5    albuterol sulfate  (90 Base) MCG/ACT inhaler INHALE 2 PUFFS BY MOUTH EVERY 6 HOURS AS NEEDED FOR WHEEZING 18 g 5    nitroGLYCERIN (NITROSTAT) 0.4 MG SL tablet Place 1 tablet under the tongue every 5 minutes as needed for Chest pain up to max of 3 total doses. If no relief after 1 dose, call 911. 25 tablet 3    Handicap Placard MISC by Does not apply route Expires 3/5/2024 1 each 0    hydrocortisone (ANUSOL-HC) 2.5 % rectal cream Place rectally 2 times daily as needed for Hemorrhoids Place rectally 2 times daily.  fluticasone (FLONASE) 50 MCG/ACT nasal spray 1 spray by Nasal route daily 1 Bottle 3    aspirin 81 MG EC tablet Take 1 tablet by mouth daily 30 tablet 3     No current facility-administered medications for this visit.      LABS - none /81 (Site: Right Upper Arm, Position: Sitting, Cuff Size: Small Adult)   Pulse 71   Temp 97.5 °F (36.4 °C)   Ht 4' 10\" (1.473 m)   Wt 122 lb 9.6 oz (55.6 kg)   SpO2 96% Comment: on r/a  BMI 25.62 kg/m²    Wt Readings from Last 3 Encounters:   09/16/21 122 lb 9.6 oz (55.6 kg)   08/02/21 124 lb (56.2 kg)   07/13/21 124 lb 6.4 oz (56.4 kg)     Neck Circumference - 13 in; Mallampati Score -2      Objective:   Physical Exam  Vitals and nursing note reviewed. Constitutional:       General: She is not in acute distress. Appearance: She is well-developed. She is not diaphoretic. HENT:      Head: Normocephalic and atraumatic. Mouth/Throat:      Pharynx: No oropharyngeal exudate. Eyes:      General: No scleral icterus. Right eye: No discharge. Left eye: No discharge. Neck:      Vascular: No JVD. Trachea: No tracheal deviation. Cardiovascular:      Rate and Rhythm: Normal rate and regular rhythm. Heart sounds: No murmur heard. No friction rub. No gallop. Pulmonary:      Effort: Pulmonary effort is normal. No respiratory distress. Breath sounds: Normal breath sounds. No stridor. No wheezing or rales. Chest:      Chest wall: No tenderness. Abdominal:      General: Bowel sounds are normal. There is no distension. Palpations: Abdomen is soft. There is no mass. Tenderness: There is no abdominal tenderness. There is no guarding or rebound. Musculoskeletal:         General: No tenderness. Normal range of motion. Cervical back: Normal range of motion and neck supple. Lymphadenopathy:      Cervical: No cervical adenopathy. Skin:     General: Skin is warm. Coloration: Skin is not pale. Findings: No erythema or rash. Neurological:      Mental Status: She is alert and oriented to person, place, and time. Cranial Nerves: No cranial nerve deficit. Psychiatric:         Behavior: Behavior normal.         Thought Content:  Thought content normal.         Judgment: Judgment normal.                   CT screening:        FINDINGS:   LUNGS NODULES:   1. There is a 5 mm nodule in the right lateral costophrenic angle not seen on the prior exam.   Stable less than 3 mm pleural-based nodule posterior medial left lung base image 301. Calcified granuloma right lower lobes. Atelectasis in the lingula.       LYMPHADENOPATHY:   1. There are no pathologically enlarged lymph nodes.       OTHER (LUNGS/MEDIASTINUM/MUSCULOSKELETAL/ABDOMEN):   1. The ascending aorta measures 4 cm. This is stable. 4 mm pericardial effusion.                   Impression   1. 5 mm nodule right lung base. New finding. 2. There are no pathologically enlarged lymph nodes. 3. LUNGRADS ASSESSMENT VALUE: 3,            The LUNG RADS RECOMMENDATIONS for monitoring lung nodules listed below (ACR- Lung-RADS Version 1.0 Assessment Categories Release date\" April 28, 2014)       LUNG RADS RECOMMENDATIONS;   1.  Normal, continue annual screening   2.  Benign appearance or behavior, continue annual screening   3.  6 month CT recommended   4A.  3 month CT recommended; may consider PET/CT   4B.  Additional diagnostics and/or tissue sampling recommended   4X.  Additional diagnostics and/or tissue sampling recommended         **This report has been created using voice recognition software.  It may contain minor errors which are inherent in voice recognition technology. **       Final report electronically signed by Dr. Lyle Beasley on 12/27/2019 2:18 PM           CT chest:    FINDINGS: No mediastinal or hilar adenopathy. ascending aorta is stable measuring 4 cm. Normal heart size. Coronary artery stents. No pericardial or pleural effusion. No infiltrates. Stable 5 mm nodule right lateral lung base. Calcified granuloma right lower lobe. Plate atelectasis right base. 3 mm nodule medial left lung base is only partially imaged on today's exam.         Upper abdomen    No abnormalities.      CHEST WALL:    Postsurgical changes left breast. Postsurgical changes left axilla. Bones    No suspicious bone lesions              Impression    Stable CT chest. Stable 5 mm nodule right lower lobe and partially imaged 3 mm nodule left lower lobe. Continue annual screening.                   **This report has been created using voice recognition software.  It may contain minor errors which are inherent in voice recognition technology. **         Final report electronically signed by Dr. Sadie Johnson on 2020 2:40 PM            Assessment:       Diagnosis Orders   1. Personal history of tobacco use  OR VISIT TO DISCUSS LUNG CA SCREEN W LDCT    CT Lung Screen (Annual)   2. Chronic obstructive pulmonary disease, unspecified COPD type (HCC)  albuterol sulfate  (90 Base) MCG/ACT inhaler   3. Lung nodules       On Albuterol only for personal preference  Refuses coronavirus vaccine. Plan:      Orders Placed This Encounter   Procedures    CT Lung Screen (Annual)     Age: Patient is 74 y.o. Smoking History: Social History    Tobacco Use      Smoking status: Former Smoker        Packs/day: 1.50        Years: 20.00        Pack years: 30        Types: Cigarettes        Quit date: 10/27/2018        Years since quittin.8      Smokeless tobacco: Never Used    Vaping Use      Vaping Use: Never used    Alcohol use: No    Drug use: No   Pack years: 30    Date of last lung cancer screenin2019     Standing Status:   Future     Standing Expiration Date:   3/16/2023     Order Specific Question:   Is there documentation of shared decision making? Answer:   Yes     Order Specific Question:   Is this a low dose CT or a routine CT? Answer:   Low Dose CT [1]     Order Specific Question:   Is this the first (baseline) CT or an annual exam?     Answer: Annual [2]     Order Specific Question:   Does the patient show any signs or symptoms of lung cancer?      Answer:   No     Order Specific abstinence from smoking is critical

## 2021-10-04 ENCOUNTER — HOSPITAL ENCOUNTER (OUTPATIENT)
Dept: WOMENS IMAGING | Age: 66
Discharge: HOME | End: 2021-10-04
Payer: MEDICARE

## 2021-10-04 DIAGNOSIS — Z12.39 BREAST CANCER SCREENING, HIGH RISK PATIENT: ICD-10-CM

## 2021-10-04 DIAGNOSIS — Z12.31 ENCOUNTER FOR SCREENING MAMMOGRAM FOR MALIGNANT NEOPLASM OF BREAST: ICD-10-CM

## 2021-10-04 DIAGNOSIS — Z78.0 POSTMENOPAUSAL: ICD-10-CM

## 2021-10-04 PROCEDURE — 77063 BREAST TOMOSYNTHESIS BI: CPT

## 2021-10-04 PROCEDURE — 77080 DXA BONE DENSITY AXIAL: CPT

## 2021-10-06 ENCOUNTER — HOSPITAL ENCOUNTER (OUTPATIENT)
Dept: CT IMAGING | Age: 66
Discharge: HOME | End: 2021-10-06
Payer: MEDICARE

## 2021-10-06 DIAGNOSIS — Z87.891 PERSONAL HISTORY OF TOBACCO USE: ICD-10-CM

## 2021-10-06 PROCEDURE — 71271 CT THORAX LUNG CANCER SCR C-: CPT

## 2021-10-08 ENCOUNTER — TELEPHONE (OUTPATIENT)
Dept: PULMONOLOGY | Age: 66
End: 2021-10-08

## 2021-10-08 DIAGNOSIS — R91.1 NODULE OF LOWER LOBE OF RIGHT LUNG: Primary | ICD-10-CM

## 2021-10-08 NOTE — TELEPHONE ENCOUNTER
Would you like patient to follow up in 3mo with CT that was completed 10/6/21? Or would you like patient to follow up in 3mo with another CT prior. Next follow up scheduled, 3/17/22. Please advise.

## 2021-10-08 NOTE — TELEPHONE ENCOUNTER
----- Message from Fox Wei MD sent at 10/8/2021 11:37 AM EDT -----  Will need CT follow up in 3 mos, keep same appointment

## 2021-10-11 NOTE — TELEPHONE ENCOUNTER
Scheduled patient for 3mo follow up and CT testing. Phoned and notified patient of testing and appointment dates and times. Patient is concerned and questioning why she needs to follow up in 3 months with another CT. Please advise. Thanks.

## 2021-10-12 ENCOUNTER — NURSE ONLY (OUTPATIENT)
Dept: FAMILY MEDICINE CLINIC | Age: 66
End: 2021-10-12

## 2021-10-12 VITALS — SYSTOLIC BLOOD PRESSURE: 174 MMHG | DIASTOLIC BLOOD PRESSURE: 104 MMHG

## 2021-10-12 DIAGNOSIS — I10 ESSENTIAL HYPERTENSION: Primary | ICD-10-CM

## 2021-10-12 NOTE — PROGRESS NOTES
Pt stopped in for BP check. BP noted to be elevated at pain management, has been very well controlled. Took her BP meds a little different today. No CP, SOB, HA, visual changes, LE edema. Is noting a little fatigue. Advised to monitor sx at home. Will call tomorrow to see where her BP is tomorrow.

## 2021-10-13 NOTE — TELEPHONE ENCOUNTER
Patient is calling back regarding this, stating she has not heard anything back. She is still wanting to know why she is needing another CT?  Please advise

## 2021-10-14 NOTE — TELEPHONE ENCOUNTER
Patient called back and wants to know why she needs to have another CT in 3 months. She wants the results from the first one before she will schedule anything.

## 2021-10-27 ENCOUNTER — OFFICE VISIT (OUTPATIENT)
Dept: FAMILY MEDICINE CLINIC | Age: 66
End: 2021-10-27
Payer: MEDICARE

## 2021-10-27 VITALS
SYSTOLIC BLOOD PRESSURE: 161 MMHG | DIASTOLIC BLOOD PRESSURE: 98 MMHG | TEMPERATURE: 97.7 F | WEIGHT: 124 LBS | HEART RATE: 94 BPM | OXYGEN SATURATION: 96 % | RESPIRATION RATE: 16 BRPM | BODY MASS INDEX: 27.9 KG/M2 | HEIGHT: 56 IN

## 2021-10-27 DIAGNOSIS — I10 ESSENTIAL HYPERTENSION: Primary | ICD-10-CM

## 2021-10-27 PROCEDURE — G8417 CALC BMI ABV UP PARAM F/U: HCPCS | Performed by: FAMILY MEDICINE

## 2021-10-27 PROCEDURE — G8484 FLU IMMUNIZE NO ADMIN: HCPCS | Performed by: FAMILY MEDICINE

## 2021-10-27 PROCEDURE — 1036F TOBACCO NON-USER: CPT | Performed by: FAMILY MEDICINE

## 2021-10-27 PROCEDURE — 99214 OFFICE O/P EST MOD 30 MIN: CPT | Performed by: FAMILY MEDICINE

## 2021-10-27 PROCEDURE — 4040F PNEUMOC VAC/ADMIN/RCVD: CPT | Performed by: FAMILY MEDICINE

## 2021-10-27 PROCEDURE — 3017F COLORECTAL CA SCREEN DOC REV: CPT | Performed by: FAMILY MEDICINE

## 2021-10-27 PROCEDURE — 1090F PRES/ABSN URINE INCON ASSESS: CPT | Performed by: FAMILY MEDICINE

## 2021-10-27 PROCEDURE — 1123F ACP DISCUSS/DSCN MKR DOCD: CPT | Performed by: FAMILY MEDICINE

## 2021-10-27 PROCEDURE — G8399 PT W/DXA RESULTS DOCUMENT: HCPCS | Performed by: FAMILY MEDICINE

## 2021-10-27 PROCEDURE — G8427 DOCREV CUR MEDS BY ELIG CLIN: HCPCS | Performed by: FAMILY MEDICINE

## 2021-10-27 RX ORDER — AMLODIPINE BESYLATE 10 MG/1
10 TABLET ORAL DAILY
Qty: 30 TABLET | Refills: 5 | Status: SHIPPED | OUTPATIENT
Start: 2021-10-27 | End: 2022-04-13

## 2021-10-27 NOTE — PROGRESS NOTES
Alicia Christie is a 74 y.o. female that presents for     Chief Complaint   Patient presents with    Blood Pressure Check     Had high blood pressure at [facility] (227/116) - needs checked to be cleared to have procedure       BP (!) 161/98   Pulse 94   Temp 97.7 °F (36.5 °C) (Infrared)   Resp 16   Ht 4' 8\" (1.422 m)   Wt 124 lb (56.2 kg)   SpO2 96%   BMI 27.80 kg/m²       HPI    HTN    Does patient check BP regularly at home? - Yes - Was set to have an epidural today and SBP was >200, prior to this her BP had been elevated   Current Medication regimen - metoprolol 25mg  Tolerating medications well? - yes    Shortness of breath or chest pain? No  Headache or visual complaints? Does have a 'little bit of a headache'  Neurologic changes like confusion? no  Extremity edema?  No    BP Readings from Last 3 Encounters:   10/27/21 (!) 161/98   10/12/21 (!) 174/104   09/16/21 118/81           Health Maintenance   Topic Date Due    Hepatitis C screen  Never done    COVID-19 Vaccine (1) Never done    DTaP/Tdap/Td vaccine (1 - Tdap) Never done    Shingles Vaccine (1 of 2) Never done    Pneumococcal 65+ years Vaccine (2 of 2 - PPSV23) 10/23/2020    Flu vaccine (1) 09/01/2021    Lipid screen  11/02/2021    Potassium monitoring  11/02/2021    Annual Wellness Visit (AWV)  03/02/2022    Creatinine monitoring  07/20/2022    Breast cancer screen  10/04/2022    Low dose CT lung screening  10/06/2022    Colon cancer screen colonoscopy  06/17/2029    DEXA (modify frequency per FRAX score)  Completed    Hepatitis A vaccine  Aged Out    Hepatitis B vaccine  Aged Out    Hib vaccine  Aged Out    Meningococcal (ACWY) vaccine  Aged Out       Past Medical History:   Diagnosis Date    Breast cancer [facility] [address] 2016    Bronchitis     CAD (coronary artery disease)     Cerebral artery occlusion with cerebral infarction ([address]     TIA    COPD (chronic obstructive pulmonary disease) ([address]     Diverticulosis     Essential hypertension 5/10/2017    Foot drop, right foot     GERD (gastroesophageal reflux disease)     History of therapeutic radiation 2016    IBS (irritable bowel syndrome)     Kidney stone 2014    Malignant neoplasm of upper-inner quadrant of 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 lymph nodes, lumpectomy    CARPAL TUNNEL RELEASE Right 06/2017    OIO    CERVICAL FUSION      COLONOSCOPY  unsure    Dr. Sha Ruffin COLONOSCOPY  10/10/14    Dr. Nela Robison Left 6/17/2019    COLONOSCOPY POLYPECTOMY HOT BIOPSY performed by Dario Veronica MD at [address] [place] [address]  10/27/2018    [address] [place] [address]    [address] [facility]  [address]    HEMORRHOID SURGERY  12/16/2014    HYSTERECTOMY  2014    HYSTERECTOMY, VAGINAL  12/16/2014    KIDNEY STONE SURGERY  2014    LAMINECTOMY  06/12/2018    LUMBAR FUSION N/A 2/19/2020    LUMBAR LAMINECTOMY PSF L3-S1 WITH CAPTIVA performed by Nick Roman MD at [address] [place] [address] OFFICE/OUTPT [address] [place] [address] N/A 6/12/2018    ACDF C5-6 performed by Sabrina Pompa MD at [facility]    WRIST GANGLION EXCISION Left        Social History     Tobacco Use    Smoking status: Former Smoker     Packs/day: 1.50     Years: 20.00     Pack years: 30.00     Types: Cigarettes     Quit date: 10/27/2018     Years since quitting: 3.0    Smokeless tobacco: Never Used   Vaping Use    Vaping Use: Never used   Substance Use Topics    Alcohol use: No    Drug use: No       Family History   Problem Relation Age of Onset    Cancer Mother         renal cell carcinoma    Kidney Cancer Mother 46    Cirrhosis Father     Diabetes Sister    Sherif Petties Kidney Cancer Sister 54    Cancer Sister     Heart Disease Sister         CHF    Diabetes Brother     High Blood Pressure Brother     Breast Cancer Paternal Aunt 54    Ovarian Cancer Neg Hx     Stroke Neg Hx          I have reviewed the patient's past medical history, past surgical history, allergies, medications, social and family history and I have made updates where appropriate. Review of Systems        PHYSICAL EXAM:  BP (!) 161/98   Pulse 94   Temp 97.7 °F (36.5 °C) (Infrared)   Resp 16   Ht 4' 8\" (1.422 m)   Wt 124 lb (56.2 kg)   SpO2 96%   BMI 27.80 kg/m²     Physical Exam  Vitals and nursing note reviewed. Constitutional:       General: She is not in acute distress. Appearance: She is well-developed. HENT:      Head: Normocephalic and atraumatic. Right Ear: Hearing and external ear normal.      Left Ear: Hearing and external ear normal.      Nose: Nose normal.   Eyes:      General:         Right eye: No discharge. Left eye: No discharge. Conjunctiva/sclera: Conjunctivae normal.   Neck:      Thyroid: No thyromegaly. Trachea: No tracheal deviation. Cardiovascular:      Rate and Rhythm: Normal rate and regular rhythm. Heart sounds: Normal heart sounds. No murmur heard. No friction rub. No gallop. Pulmonary:      Effort: Pulmonary effort is normal. No respiratory distress. Breath sounds: No wheezing or rales. Chest:      Chest wall: No tenderness. Musculoskeletal:         General: No deformity. Cervical back: Normal range of motion and neck supple. Lymphadenopathy:      Cervical: No cervical adenopathy. Skin:     General: Skin is warm and dry. Findings: No erythema or rash. Neurological:      Mental Status: She is alert. Motor: No abnormal muscle tone. Coordination: Coordination normal.   Psychiatric:         Behavior: Behavior normal.         Thought Content:  Thought content normal.         Judgment: Judgment normal. ASSESSMENT & PLAN  Anabel Garg was seen today for blood pressure check. Diagnoses and all orders for this visit:    Essential hypertension  -     amLODIPine (NORVASC) 10 MG tablet; Take 1 tablet by mouth daily    -HTN uncontrolled and worsening. Continue metoprolol and will add norvasc. Advised to return in 2 days for BP recheck.  -Patient advised to call immediately or go to ER if any worsening of symptoms      Return if symptoms worsen or fail to improve. The most recent results of the following tests were reviewed with the patient today: none     All copied or forwarded information in the progress note was verified by me to be accurate at the time of visit  Patient's past medical, surgical, social and family history were reviewed and updated     All patient questions answered. Patient voiced understanding.

## 2021-10-29 ENCOUNTER — NURSE ONLY (OUTPATIENT)
Dept: FAMILY MEDICINE CLINIC | Age: 66
End: 2021-10-29

## 2021-10-29 VITALS — SYSTOLIC BLOOD PRESSURE: 132 MMHG | DIASTOLIC BLOOD PRESSURE: 78 MMHG

## 2021-11-04 DIAGNOSIS — M48.061 SPINAL STENOSIS OF LUMBAR REGION WITHOUT NEUROGENIC CLAUDICATION: ICD-10-CM

## 2021-11-04 RX ORDER — ACETAMINOPHEN AND CODEINE PHOSPHATE 300; 30 MG/1; MG/1
1 TABLET ORAL EVERY 6 HOURS PRN
Qty: 120 TABLET | Refills: 1 | Status: SHIPPED | OUTPATIENT
Start: 2021-11-04 | End: 2022-01-03 | Stop reason: SDUPTHER

## 2022-01-03 DIAGNOSIS — M48.061 SPINAL STENOSIS OF LUMBAR REGION WITHOUT NEUROGENIC CLAUDICATION: ICD-10-CM

## 2022-01-03 RX ORDER — ACETAMINOPHEN AND CODEINE PHOSPHATE 300; 30 MG/1; MG/1
1 TABLET ORAL EVERY 6 HOURS PRN
Qty: 120 TABLET | Refills: 1 | Status: SHIPPED | OUTPATIENT
Start: 2022-01-03 | End: 2022-03-17 | Stop reason: SDUPTHER

## 2022-01-10 ENCOUNTER — HOSPITAL ENCOUNTER (OUTPATIENT)
Dept: CT IMAGING | Age: 67
Discharge: HOME | End: 2022-01-10
Payer: MEDICARE

## 2022-01-10 DIAGNOSIS — R91.1 NODULE OF LOWER LOBE OF RIGHT LUNG: ICD-10-CM

## 2022-01-10 PROCEDURE — 71250 CT THORAX DX C-: CPT

## 2022-01-13 ENCOUNTER — OFFICE VISIT (OUTPATIENT)
Dept: CARDIOLOGY CLINIC | Age: 67
End: 2022-01-13
Payer: MEDICARE

## 2022-01-13 VITALS — BODY MASS INDEX: 27.71 KG/M2 | HEIGHT: 56 IN | WEIGHT: 123.2 LBS

## 2022-01-13 DIAGNOSIS — Z01.810 PREOPERATIVE CARDIOVASCULAR EXAMINATION: ICD-10-CM

## 2022-01-13 DIAGNOSIS — I25.10 CORONARY ARTERY DISEASE INVOLVING NATIVE CORONARY ARTERY OF NATIVE HEART WITHOUT ANGINA PECTORIS: Primary | ICD-10-CM

## 2022-01-13 DIAGNOSIS — I10 ESSENTIAL HYPERTENSION: ICD-10-CM

## 2022-01-13 PROCEDURE — 3017F COLORECTAL CA SCREEN DOC REV: CPT | Performed by: INTERNAL MEDICINE

## 2022-01-13 PROCEDURE — 4040F PNEUMOC VAC/ADMIN/RCVD: CPT | Performed by: INTERNAL MEDICINE

## 2022-01-13 PROCEDURE — G8484 FLU IMMUNIZE NO ADMIN: HCPCS | Performed by: INTERNAL MEDICINE

## 2022-01-13 PROCEDURE — 1036F TOBACCO NON-USER: CPT | Performed by: INTERNAL MEDICINE

## 2022-01-13 PROCEDURE — G8417 CALC BMI ABV UP PARAM F/U: HCPCS | Performed by: INTERNAL MEDICINE

## 2022-01-13 PROCEDURE — 1090F PRES/ABSN URINE INCON ASSESS: CPT | Performed by: INTERNAL MEDICINE

## 2022-01-13 PROCEDURE — 1123F ACP DISCUSS/DSCN MKR DOCD: CPT | Performed by: INTERNAL MEDICINE

## 2022-01-13 PROCEDURE — G8399 PT W/DXA RESULTS DOCUMENT: HCPCS | Performed by: INTERNAL MEDICINE

## 2022-01-13 PROCEDURE — 99213 OFFICE O/P EST LOW 20 MIN: CPT | Performed by: INTERNAL MEDICINE

## 2022-01-13 PROCEDURE — G8427 DOCREV CUR MEDS BY ELIG CLIN: HCPCS | Performed by: INTERNAL MEDICINE

## 2022-01-13 NOTE — PROGRESS NOTES
[id number] [facility] [address]  [place] [address]  Dept: [phone]  Dept Fax: [phone]  Loc: [phone]    Visit Date: 1/13/2022    Ms. Amy Zayas is a 74 y.o. female  who presented for:  F/u PCI    HPI:   HPI   75 yo F s/p RCA STEMI 2018 who presents for follow-up. Needed back injection, had HTN, treated by PCP. Taking all meds. BP controlled more. No NTG SL prn. Needs RFA of the lumbar nerves. She is just on ASA now, no bleeding. No chest pain, angina, HORAN, orthopnea, PND, sob at rest, palpitations, LE edema, or syncope. Last LDL 44, 11/2020. Can do all ADLs. Current Outpatient Medications:     TOVIAZ 4 MG TB24 ER tablet, Take 1 tablet by mouth once daily, Disp: 90 tablet, Rfl: 3    acetaminophen-codeine (TYLENOL #3) 300-30 MG per tablet, Take 1 tablet by mouth every 6 hours as needed for Pain for up to 30 days. , Disp: 120 tablet, Rfl: 1    amLODIPine (NORVASC) 10 MG tablet, Take 1 tablet by mouth daily (Patient taking differently: Take 10 mg by mouth every morning ), Disp: 30 tablet, Rfl: 5    albuterol sulfate  (90 Base) MCG/ACT inhaler, INHALE 2 PUFFS BY MOUTH EVERY 6 HOURS AS NEEDED FOR WHEEZING, Disp: 18 g, Rfl: 5    letrozole (FEMARA) 2.5 MG tablet, Take 1 tablet by mouth once daily, Disp: 90 tablet, Rfl: 0    metoprolol succinate (TOPROL XL) 25 MG extended release tablet, Take 1 tablet by mouth daily (Patient taking differently: Take 25 mg by mouth daily Taking it nightly), Disp: 90 tablet, Rfl: 2    atorvastatin (LIPITOR) 80 MG tablet, Take 1 tablet by mouth nightly, Disp: 90 tablet, Rfl: 2    nitroGLYCERIN (NITROSTAT) 0.4 MG SL tablet, Place 1 tablet under the tongue every 5 minutes as needed for Chest pain up to max of 3 total doses.  If no relief after 1 dose, call 911., Disp: 25 tablet, Rfl: 3    Handicap Placard MISC, by Does not apply route Expires 3/5/2024, Disp: 1 each, Rfl: 0   COLONOSCOPY  unsure    Dr. Addison Haro  10/10/14    Dr. Ludivina Dinero Left 6/17/2019    COLONOSCOPY POLYPECTOMY HOT BIOPSY performed by Kate Moon MD at [address] [facility] [address] [facility]  10/27/2018    [facility]    [address] UTERUS  80    HEMORRHOID SURGERY  12/16/2014    HYSTERECTOMY  2014    HYSTERECTOMY, VAGINAL  12/16/2014    KIDNEY STONE SURGERY  2014    LAMINECTOMY  06/12/2018    LUMBAR FUSION N/A 2/19/2020    LUMBAR LAMINECTOMY PSF L3-S1 WITH CAPTIVA performed by Mariana Carson MD at [address] OFFICE/OUTPT [address] N/A 6/12/2018    ACDF C5-6 performed by Lorenza Garza MD at [address] [place], [place] [address]vd Left        Review of Systems   Constitutional: Negative for chills and fever  HENT: Negative for congestion, sinus pressure, sneezing and sore throat. Eyes: Negative for pain, discharge, redness and itching. Respiratory: Negative for apnea, cough  Gastrointestinal: Negative for blood in stool, constipation, diarrhea   Endocrine: Negative for cold intolerance, heat intolerance, polydipsia. Genitourinary: Negative for dysuria, enuresis, flank pain and hematuria. Musculoskeletal: Negative for arthralgias, joint swelling and neck pain. Neurological: Negative for numbness and headaches. Psychiatric/Behavioral: Negative for agitation, confusion, decreased concentration and dysphoric mood. Objective:     Ht 4' 8\" (1.422 m)   Wt 123 lb 3.2 oz (55.9 kg)   BMI 27.62 kg/m²     Wt Readings from Last 3 Encounters:   01/13/22 123 lb 3.2 oz (55.9 kg)   10/27/21 124 lb (56.2 kg)   09/16/21 122 lb 9.6 oz (55.6 kg)     BP Readings from Last 3 Encounters:   10/29/21 132/78   10/27/21 (!) 161/98   10/12/21 (!) 174/104       Nursing note and vitals reviewed. Physical Exam   Constitutional: Oriented to person, place, and time.  Appears well-developed and well-nourished. HENT:   Head: Normocephalic and atraumatic. Eyes: EOM are normal. Pupils are equal, round, and reactive to light. Neck: Normal range of motion. Neck supple. No JVD present. Cardiovascular: Normal rate, regular rhythm, normal heart sounds and intact distal pulses. No murmur heard. Pulmonary/Chest: Effort normal and breath sounds normal. No respiratory distress. No wheezes. No rales. Abdominal: Soft. Bowel sounds are normal. No distension. There is no tenderness. Musculoskeletal: Normal range of motion. No edema. Neurological: Alert and oriented to person, place, and time. No cranial nerve deficit. Coordination normal.   Skin: Skin is warm and dry. Psychiatric: Normal mood and affect.        Lab Results   Component Value Date    CKTOTAL 50 04/05/2018       Lab Results   Component Value Date    WBC 8.9 02/23/2020    RBC 3.45 02/23/2020    HGB 11.6 03/16/2020    HCT 37.6 03/16/2020    MCV 90.7 02/23/2020    MCH 29.9 02/23/2020    MCHC 32.9 02/23/2020    RDW 12.7 05/18/2018     02/23/2020    MPV 9.5 02/23/2020       Lab Results   Component Value Date     11/02/2020    K 3.9 11/02/2020     11/02/2020    CO2 24 11/02/2020    BUN 16 11/02/2020    LABALBU 4.4 11/02/2020    CREATININE 0.7 07/20/2021    CALCIUM 9.4 11/02/2020    LABGLOM >90 11/02/2020    GLUCOSE 95 11/02/2020       Lab Results   Component Value Date    ALKPHOS 106 11/02/2020    ALT 13 11/02/2020    AST 14 11/02/2020    PROT 7.0 11/02/2020    BILITOT 0.4 11/02/2020    BILIDIR <0.2 02/06/2019    LABALBU 4.4 11/02/2020       Lab Results   Component Value Date    MG 1.9 10/28/2018       Lab Results   Component Value Date    INR 0.92 05/18/2018         No results found for: LABA1C    Lab Results   Component Value Date    TRIG 83 11/02/2020    HDL 71 11/02/2020    LDLCALC 44 11/02/2020       Lab Results   Component Value Date    TSH 1.260 04/02/2019         Testing Reviewed:      I have individually reviewed the cardiac test below:    ECHO: Results for orders placed during the hospital encounter of 09/11/19    Echo 2D w doppler w color complete    Narrative  Transthoracic Echocardiography Report (TTE)    Demographics    Patient Name   Kim Funez Gender               Female  L    MR #           [id number]        Race                     Ethnicity    Account #      [de-identified]        Room Number    Accession      [id number]        Date of Study        09/11/2019  Number    Date of Birth  1955       Referring Physician  Anival Wadsworth MD    Age            75 year(s)       Dayan Estevez MD  Physician    Procedure    Type of Study    TTE procedure:ECHOCARDIOGRAM COMPLETE 2D W DOPPLER W COLOR. Procedure Date  Date: 09/11/2019 Start: 09:17 AM    Study Location: Echo Lab  Technical Quality: Adequate visualization    Indications:Dyspnea on exertion. Additional Medical History:Ex Smoker, Breast Cancer, STEMI, GERD. Patient Status: Routine    Height: 58 inches Weight: 137 pounds BSA: 1.55 m^2 BMI: 28.63 kg/m^2    BP: 118/78 mmHg    Allergies  - See Epic. Conclusions    Summary  Ejection fraction was estimated at 55-60%. E/A flow reversal noted. Suggestive of diastolic dysfunction. There was trace tricuspid regurgitation. Assuming RAP = 5 mmHg, the estimated RVSP = 29 mmHg. Ascending aorta = 3.6 cm. IVC size is within normal limits with normal respiratory phasic changes. Signature    ----------------------------------------------------------------  Electronically signed by Sabrina Box MD (Interpreting  physician) on 09/11/2019 at 02:02 PM  ----------------------------------------------------------------    Findings    Mitral Valve  The mitral valve structure was normal with a calcified posterior leaflet. DOPPLER: The transmitral velocity was within the normal range with no  evidence for mitral stenosis. There was no evidence of mitral  regurgitation. Aortic Valve  The aortic valve was trileaflet with normal thickness and cuspal  separation. DOPPLER: Transaortic velocity was within the normal range with  no evidence of aortic stenosis. There was no evidence of aortic  regurgitation. Tricuspid Valve  The tricuspid valve structure was normal with normal leaflet separation. DOPPLER: There was no evidence of tricuspid stenosis. There was trace  tricuspid regurgitation. Assuming RAP = 5 mmHg, the estimated RVSP = 29  mmHg. Pulmonic Valve  The pulmonic valve leaflets were not well seen. DOPPLER: The transpulmonic  velocity was within the normal range with trace regurgitation. Left Atrium  Left atrial size was normal.    Left Ventricle  Normal left ventricular size and systolic function. There were no regional wall motion abnormalities. Wall thickness was within normal limits. Ejection fraction was estimated at 55-60%. E/A flow reversal noted. Suggestive of diastolic dysfunction. Right Atrium  Right atrial size was normal.    Right Ventricle  The right ventricular size was normal with normal systolic function and  wall thickness. Pericardial Effusion  The pericardium was normal in appearance with no evidence of a pericardial  effusion. Pleural Effusion  No evidence of pleural effusion. Aorta / Great Vessels  -Ascending aorta = 3.6 cm. -IVC size is within normal limits with normal respiratory phasic changes.     M-Mode/2D Measurements & Calculations    LV Diastolic   LV Systolic Dimension:    AV Cusp Separation: 2 cmLA  Dimension: 4.4 3.1 cm                    Dimension: 3.1 cmAO Root  cm             LV Volume Diastolic: 54.3 Dimension: 2.6 cmLA Area: 11.7  LV FS:29.6 %   ml                        cm^2  LV PW          LV Volume Systolic: 61.4  Diastolic: 0.9 ml  cm             LV EDV/LV EDV Index: 87.7  Septum         ml/57 m^2LV ESV/LV ESV    RV Diastolic Dimension: 3.2 cm  Diastolic: 1.1 Index: 37.9 ml/24 m^2  cm             EF Calculated: 56.8 %     LA/Aorta: 1.19  Ascending Aorta: 3.6 cm  LA volume/Index: 25.4 ml /16m^2    Doppler Measurements & Calculations    MV Peak E-Wave: 83.9 cm/s  AV Peak Velocity: 175  LVOT Peak Velocity: 137  MV Peak A-Wave: 94 cm/s    cm/s                   cm/s  MV E/A Ratio: 0.89         AV Peak Gradient:      LVOT Peak Gradient: 8  MV Peak Gradient: 2.82     12.25 mmHg             mmHg  mmHg  TV Peak E-Wave: 48 cm/s  MV Deceleration Time: 317                         TV Peak A-Wave: 44.7  msec                                              cm/s  IVRT: 81 msec  TV Peak Gradient: 0.92  MV E' Septal Velocity: 5.2                        mmHg  cm/s                       AV DVI (Vmax):0.78     TR Velocity:244 cm/s  MV A' Septal Velocity:                            TR Gradient:23.81 mmHg  11.2 cm/s                                         PV Peak Velocity: 60.9  MV E' Lateral Velocity:                           cm/s  7.4 cm/s                                          PV Peak Gradient: 1.48  MV A' Lateral Velocity:                           mmHg  10.4 cm/s  E/E' septal: 16.13  E/E' lateral: 11.34                               [place] ED Velocity: 105 cm/s  MR Velocity: 441 cm/s    [email or web address] [id number]       Assessment/Plan   RCA STEMI PCI, 2018 - tolerating ASA, taking statin, LDL well controlled. Preserved EF  RVSP 29 mmHg  Orthopedic back issues  Stress - negative for ischemia, 9/2019  Pre-operative CV exam  Possible upcoming lumbar nerve ablation, unclear date (Low-intermediate risk surgery)  Intermediate risk patient  No active cardiac complaints. No cardiac issues on exams. Able to do > 4 METS.   Patient accepts the risk of the planned procedure and understands the possibility of justine-operative cardiac event, including but not limited to MI, VT/VF, cardiac arrest, and death, and wishes to proceed with the procedure. No further cardiac testing prior to upcoming surgery. ECG without any acute issues.         Disposition:  1 year         Electronically signed by Landon Aguirre MD   1/13/2022 at 2:25 PM EST

## 2022-02-09 RX ORDER — ATORVASTATIN CALCIUM 80 MG/1
80 TABLET, FILM COATED ORAL NIGHTLY
Qty: 90 TABLET | Refills: 3 | Status: SHIPPED | OUTPATIENT
Start: 2022-02-09

## 2022-02-22 NOTE — PROGRESS NOTES
[facility]    Patient: Rodolfo Block, 69 y.o.   : 1955  3/15/2022    Patient of Dr. Jerry Zuniga   Patient presents with    Follow-up     6 month COPD no testing        Seanjacek Garcia is here for follow up for Moderate COPD. Patient was last seen by Dr. Roseline Joseph on 2021. At that time, she was about to complete 5 years of Letrozole for her breast cancer. She also wished to continue as needed albuterol for her COPD and declined a daily controller inhaler. She had CT lung screening that revealed increase in size of linear scarlike opacity in RLL with nodular component at the inferior margin with recommendation for 3 month CT follow up. Her repeat CT Chest revealed parenchymal bandlike opacity at right lung base abutting the diaphragm (unchanged from previous exam), nodular component along inferior aspect measuring 1.2 x 0.8 cm (unchanged) with recommendation for 6 month CT follow up. She also had a stable ascending thoracic aortic aneurysm 4.1 cm. She is using her flonase. Overall patient reports respiratory symptoms have been stable since last appointment. Patient using albuterol 2-3 times per day on average. Patient reports some physical limitation due to respiratory symptoms. She reports that she is physically limited by her back. Her past medical history is significant for COPD, left breast cancer status post lumpectomy and XRT in 2016, bronchitis, CAD, diverticulosis, hypertension, right foot drop, GERD, IBS, kidney stone, and MI. COPD  She complains of cough, shortness of breath, sputum production and wheezing. There is no chest tightness or hemoptysis. This is a chronic problem. The current episode started more than 1 year ago. The problem occurs daily. The problem has been unchanged. The cough is productive of sputum (clear to grey in color).  Associated symptoms include dyspnea on exertion, nasal congestion and postnasal drip Letty Shove little\"). Pertinent negatives include no chest pain, fever, rhinorrhea, sneezing or sore throat. Her symptoms are aggravated by strenuous activity, exercise and change in weather (hot humid weather). Her symptoms are alleviated by beta-agonist. She reports significant improvement on treatment. Risk factors for lung disease include animal exposure. Her past medical history is significant for COPD. MMRC Dyspnea Scale:   0: Dyspneic on strenuous exercise  1: Dyspneic on walking a slight hill  2: Dyspneic on walking level ground; must stop occasionally due to breathlessness  3: Must stop for breathlessness after walking 100 yards or after a few minutes  4: Cannot leave house; breathless on dressing/undressing    MMRC dyspnea score: 2    Progress History:   Since last visit any new medical issues? Yes back injections for back pain  New ER or hospital visits? No  Any new or changes in medicines? No  Using inhalers? Yes as needed albuterol   Are they helpful? Yes   Previous inhalers? Spiriva - patient did not like, Trelegy, Breztri - hoarse voice  Any recent exacerbations? No  Last PFT: 10/17/2018 - moderate COPD  Last 6 MWT: None in Epic  Last A1AT: MM    Smoking History:  30 PY history, quit in 2018  Her daughter smokes but goes outside when smoking    Social History:  Patient job history: Retired, previously worked at [employer] as a cook, , and all aspects of the business  She has not had exposure to aerosolized particles or hazardous fumes. (Coal, dust, asbestos, molds ie Hay)  Denies living on a farm that primarily raised crops, livestock or combination  Admits to exposure to pets/animals at home. 1 dog  Denies exposure to tuberculosis. Denies history of glaucoma or urinary retention.     Flu vaccine: Has not received and does not plan to  Pneumonia vaccine: PCV13 10/23/2019 and 10/8/2018  COVID-19 vaccine: Not vaccinated and does not wish to receive  Past Medical hx   PMH:  Past Medical History: Diagnosis Date    Breast cancer ([facility] 2016    Bronchitis     CAD (coronary artery disease)     Cerebral artery occlusion with cerebral infarction ([facility]     TIA    COPD (chronic obstructive pulmonary disease) ([facility]     Diverticulosis     Essential hypertension 5/10/2017    Foot drop, right foot     GERD (gastroesophageal reflux disease)     History of therapeutic radiation 2016    IBS (irritable bowel syndrome)     Kidney stone 2014    Malignant neoplasm of upper-inner quadrant of left female breast ([facility] 9/13/2016    breast    MI (myocardial infarction) ([facility] 10/2018    PONV (postoperative nausea and vomiting)      SURGICAL HISTORY:  Past Surgical History:   Procedure Laterality Date    BACK SURGERY  1998    Hansa Ply BREAST LUMPECTOMY Left 2016    BREAST SURGERY Left 2016    exc. lymph nodes, lumpectomy    CARPAL TUNNEL RELEASE Right 06/2017    [facility]    CERVICAL FUSION      COLONOSCOPY  unsure    Dr. Kacey Chavez COLONOSCOPY  10/10/14    Dr. Seun Sam COLONOSCOPY Left 6/17/2019    COLONOSCOPY POLYPECTOMY HOT BIOPSY performed by Quinton iVrk MD at [address] [facility]  10/27/2018    [facility]    [address] OF UTERUS  80    HEMORRHOID SURGERY  12/16/2014    HYSTERECTOMY  2014    HYSTERECTOMY, VAGINAL  12/16/2014    KIDNEY STONE SURGERY  2014    LAMINECTOMY  06/12/2018    LUMBAR FUSION N/A 2/19/2020    LUMBAR LAMINECTOMY PSF L3-S1 WITH CAPTIVA performed by Aliya Mustafa MD at [facility] OFFICE/OUTPT VISIT,PROCEDURE ONLY N/A 6/12/2018    ACDF C5-6 performed by Jamal Yeung MD at [address] [place] [address] Left      SOCIAL HISTORY:  Social History     Tobacco Use    Smoking status: Former Smoker     Packs/day: 1.50     Years: 20.00     Pack years: 30.00     Types: Cigarettes     Quit date: 10/27/2018     Years since quitting: 3.3    Smokeless tobacco: Never Used Vaping Use    Vaping Use: Never used   Substance Use Topics    Alcohol use: No    Drug use: No     ALLERGIES:  Allergies   Allergen Reactions    Naproxen Anaphylaxis    Ultram [Tramadol] Itching and Rash     FAMILY HISTORY:  Family History   Problem Relation Age of Onset    Cancer Mother         renal cell carcinoma    Kidney Cancer Mother 46    Cirrhosis Father     Diabetes Sister     Kidney Cancer Sister 54    Cancer Sister     Heart Disease Sister         CHF    Diabetes Brother     High Blood Pressure Brother     Breast Cancer Paternal Aunt 54    Ovarian Cancer Neg Hx     Stroke Neg Hx      CURRENT MEDICATIONS:  Current Outpatient Medications   Medication Sig Dispense Refill    metoprolol succinate (TOPROL XL) 25 MG extended release tablet Take 1 tablet by mouth daily Taking it nightly 90 tablet 3    atorvastatin (LIPITOR) 80 MG tablet Take 1 tablet by mouth nightly 90 tablet 3    TOVIAZ 4 MG TB24 ER tablet Take 1 tablet by mouth once daily 90 tablet 3    amLODIPine (NORVASC) 10 MG tablet Take 1 tablet by mouth daily (Patient taking differently: Take 10 mg by mouth every morning ) 30 tablet 5    albuterol sulfate  (90 Base) MCG/ACT inhaler INHALE 2 PUFFS BY MOUTH EVERY 6 HOURS AS NEEDED FOR WHEEZING 18 g 5    letrozole (FEMARA) 2.5 MG tablet Take 1 tablet by mouth once daily 90 tablet 0    nitroGLYCERIN (NITROSTAT) 0.4 MG SL tablet Place 1 tablet under the tongue every 5 minutes as needed for Chest pain up to max of 3 total doses. If no relief after 1 dose, call 911. 25 tablet 3    Handicap Placard [facility] [place] Does not apply route Expires 3/5/2024 1 each 0    hydrocortisone (ANUSOL-HC) 2.5 % rectal cream Place rectally 2 times daily as needed for Hemorrhoids Place rectally 2 times daily.       fluticasone (FLONASE) 50 MCG/ACT nasal spray 1 spray by Nasal route daily 1 Bottle 3    aspirin 81 MG EC tablet Take 1 tablet by mouth daily 30 tablet 3     No current facility-administered medications for this visit. Joya DAVALOS   Review of Systems   Constitutional: Negative for fever and unexpected weight change. Patient reports a chronic good to poor appetite due to her back pain and injections   HENT: Positive for congestion, postnasal drip (\"a little\") and sinus pressure. Negative for rhinorrhea, sneezing and sore throat. Respiratory: Positive for cough, sputum production, shortness of breath and wheezing. Negative for hemoptysis. Cardiovascular: Positive for dyspnea on exertion and leg swelling (chronic - with long standing). Negative for chest pain and palpitations. Genitourinary: Positive for frequency and urgency. Negative for difficulty urinating. Musculoskeletal: Positive for back pain. Follows pain management  Drop foot left leg   Allergic/Immunologic: Negative for environmental allergies. Physical exam   /72 (Site: Right Upper Arm, Position: Sitting, Cuff Size: Medium Adult)   Pulse 92   Temp 98.1 °F (36.7 °C) (Oral)   Ht 4' 8\" (1.422 m)   Wt 121 lb 9.6 oz (55.2 kg)   SpO2 96% Comment: room air  BMI 27.26 kg/m²    Wt Readings from Last 3 Encounters:   03/15/22 121 lb 9.6 oz (55.2 kg)   01/13/22 123 lb 3.2 oz (55.9 kg)   10/27/21 124 lb (56.2 kg)       Physical Exam  Constitutional:       Appearance: She is well-developed. Comments: BMI 27   HENT:      Right Ear: External ear normal.      Left Ear: External ear normal.      Mouth/Throat:      Mouth: Mucous membranes are moist.      Pharynx: Oropharynx is clear. No oropharyngeal exudate. Eyes:      General:         Right eye: No discharge. Left eye: No discharge. Cardiovascular:      Rate and Rhythm: Normal rate and regular rhythm. Pulmonary:      Effort: Pulmonary effort is normal.      Breath sounds: No wheezing, rhonchi or rales. Chest:      Chest wall: No tenderness. Abdominal:      General: There is no distension. Tenderness: There is no abdominal tenderness. Musculoskeletal:      Cervical back: Neck supple. Right lower leg: No edema. Left lower leg: No edema. Skin:     General: Skin is warm and dry. Neurological:      General: No focal deficit present. Mental Status: She is alert. Psychiatric:         Mood and Affect: Mood normal.         Behavior: Behavior normal.         Thought Content: Thought content normal.         Judgment: Judgment normal.          Results   Lung Nodule Screening     [x] Qualifies    [] Does not qualify   [] Declined    [] Completed  30 PY history, quit in 2018   The USPSTF recommends annual screening for lung cancer with low-dose computed tomography (LDCT) in adults aged 48 to [de-identified] years who have a 20 pack-year smoking history and currently smoke or have quit within the past 15 years. Screening should be discontinued once a person has not smoked for 15 years or develops a health problem that substantially limits life expectancy or the ability or willingness to have curative lung surgery. CT Chest 1/10/2022 (Reviewed) parenchymal bandlike opacity at right lung base abutting the diaphragm (unchanged from previous exam), nodular component along inferior aspect measuring 1.2 x 0.8 cm (unchanged) with recommendation for 6 month CT follow up  Narrative   PROCEDURE: CT CHEST WO CONTRAST       CLINICAL INFORMATION: Nodule of lower lobe of right lung        COMPARISON: 10/6/2021, 7/23/2020, 12/27/2019       TECHNIQUE:  Axial CT images were obtained through the chest without contrast. Coronal and sagittal reformatted images were rendered. All CT scans at this facility use dose modulation, iterative reconstruction, and/or weight-based dosing when appropriate    to reduce radiation dose to as low as reasonably achievable.       FINDINGS:        The central airways appear patent.        There is a parenchymal bandlike opacity at the right lung base abutting the diaphragm which appears unchanged from prior examination.  There is a nodular component along its inferior aspect again seen which measures approximately 1.2 x 0.8 cm. This is    unchanged. Recommend additional six-month follow-up CT imaging to document continued stability.       There is no focal consolidation, pleural effusion or pneumothorax.       No lymphadenopathy is seen. Limited evaluation of the upper abdomen appears unremarkable. No acute osseous findings are seen.       There is an ascending thoracic aortic aneurysm demonstrated measuring 4.1 cm this does not appear significantly changed from the prior examination.           Impression   1. There is a parenchymal bandlike opacity at the right lung base abutting the diaphragm which appears unchanged from prior examination. There is a nodular component along its inferior aspect again seen which measures approximately 1.2 x 0.8 cm. This is    unchanged. Recommend additional six-month follow-up CT imaging to document continued stability.       2. Stable appearing ascending thoracic aortic aneurysm measuring 4.1 cm in transverse dimension.       **This report has been created using voice recognition software.  It may contain minor errors which are inherent in voice recognition technology. **       Final report electronically signed by Dr. Agustina Mejia on 1/10/2022 1:56 PM         Assessment      Diagnosis Orders   1. Stage 2 moderate COPD by GOLD classification ([address]     2. Abnormal CT of the chest  CT CHEST WO CONTRAST   3. Nodule of lower lobe of right lung  CT CHEST WO CONTRAST   4. Personal history of tobacco use           Plan   1. Stage 2 moderate COPD by GOLD classification ([address]  - Symptoms are fairly well controlled. Discussed initiation of daily inhaler to help with symptom control, patient declines daily inhaler  - Discussed albuterol inhaler use. Reviewed signs and symptoms indicating the need for use including shortness of breath and wheezing.  Discussed with the patient the importance of using the inhaler within the prescribed time frames. Patient verbalized understanding to use within the prescribed time frame.  - Advised repeat pulmonary function test, patient declined  - Advised to maintain pneumonia vaccine with PCP  - Declines COVID-19 and flu vaccination    2. Abnormal CT of the chest  - CT Chest in 6 months to follow parenchymal bandlike opacity at right lung base abutting the diaphragm and nodular component along inferior aspect measuring 1.2 x 0.8 cm     3. Nodule of lower lobe of right lung  - Stable nodular component along inferior aspect measuring 1.2 x 0.8 cm   - Repeat CT Chest in 6 months    4. Personal history of tobacco use  - Will repeat CT Chest in 6 months and if stable at that time, resume lung screenings    Advised patient to call office with any changes, questions, or concerns regarding respiratory status or issues with prescribed medications    Return in about 4 months (around 7/15/2022) for COPD and lung nodule with CT prior.        Electronically signed by GREGORIO Haynes CNP on 3/15/2022 at 1:20 PM

## 2022-02-24 RX ORDER — METOPROLOL SUCCINATE 25 MG/1
25 TABLET, EXTENDED RELEASE ORAL DAILY
Qty: 90 TABLET | Refills: 3 | Status: SHIPPED | OUTPATIENT
Start: 2022-02-24

## 2022-03-15 ENCOUNTER — OFFICE VISIT (OUTPATIENT)
Dept: PULMONOLOGY | Age: 67
End: 2022-03-15
Payer: MEDICARE

## 2022-03-15 VITALS
SYSTOLIC BLOOD PRESSURE: 130 MMHG | BODY MASS INDEX: 27.36 KG/M2 | HEART RATE: 92 BPM | DIASTOLIC BLOOD PRESSURE: 72 MMHG | OXYGEN SATURATION: 96 % | WEIGHT: 121.6 LBS | HEIGHT: 56 IN | TEMPERATURE: 98.1 F

## 2022-03-15 DIAGNOSIS — R91.1 NODULE OF LOWER LOBE OF RIGHT LUNG: ICD-10-CM

## 2022-03-15 DIAGNOSIS — Z87.891 PERSONAL HISTORY OF TOBACCO USE: ICD-10-CM

## 2022-03-15 DIAGNOSIS — J44.9 STAGE 2 MODERATE COPD BY GOLD CLASSIFICATION (HCC): Primary | ICD-10-CM

## 2022-03-15 DIAGNOSIS — R93.89 ABNORMAL CT OF THE CHEST: ICD-10-CM

## 2022-03-15 PROCEDURE — G8427 DOCREV CUR MEDS BY ELIG CLIN: HCPCS

## 2022-03-15 PROCEDURE — 1036F TOBACCO NON-USER: CPT

## 2022-03-15 PROCEDURE — G8399 PT W/DXA RESULTS DOCUMENT: HCPCS

## 2022-03-15 PROCEDURE — 3023F SPIROM DOC REV: CPT

## 2022-03-15 PROCEDURE — 3017F COLORECTAL CA SCREEN DOC REV: CPT

## 2022-03-15 PROCEDURE — 1123F ACP DISCUSS/DSCN MKR DOCD: CPT

## 2022-03-15 PROCEDURE — 4040F PNEUMOC VAC/ADMIN/RCVD: CPT

## 2022-03-15 PROCEDURE — 1090F PRES/ABSN URINE INCON ASSESS: CPT

## 2022-03-15 PROCEDURE — G8484 FLU IMMUNIZE NO ADMIN: HCPCS

## 2022-03-15 PROCEDURE — G8417 CALC BMI ABV UP PARAM F/U: HCPCS

## 2022-03-15 PROCEDURE — 99214 OFFICE O/P EST MOD 30 MIN: CPT

## 2022-03-15 ASSESSMENT — COPD QUESTIONNAIRES: COPD: 1

## 2022-03-15 ASSESSMENT — ENCOUNTER SYMPTOMS
SINUS PRESSURE: 1
BACK PAIN: 1
SORE THROAT: 0
COUGH: 1
SHORTNESS OF BREATH: 1
SPUTUM PRODUCTION: 1
WHEEZING: 1
CHEST TIGHTNESS: 0
RHINORRHEA: 0
HEMOPTYSIS: 0

## 2022-03-15 NOTE — PATIENT INSTRUCTIONS
Continue your albuterol inhaler. You may take 2 puffs every 6 hours as needed for shortness of breath or wheezing. We will repeat your CT scan in July. Chronic Obstructive Pulmonary Disease (COPD): Care Instructions  Your Care Instructions     Chronic obstructive pulmonary disease (COPD) is a general term for a group of lung diseases, including emphysema and chronic bronchitis. People with COPD have decreased airflow in and out of the lungs, which makes it hard to breathe. The airways also can get clogged with thick mucus. Cigarette smoking is a major cause of COPD. Although there is no cure for COPD, you can slow its progress. Following your treatment plan and taking care of yourself can help you feel better and live longer. Follow-up care is a key part of your treatment and safety. Be sure to make and go to all appointments, and call your doctor if you are having problems. It's also a good idea to know your test results and keep a list of the medicines you take. How can you care for yourself at home? Staying healthy    · Do not smoke. This is the most important step you can take to prevent more damage to your lungs. If you need help quitting, talk to your doctor about stop-smoking programs and medicines. These can increase your chances of quitting for good. · Avoid colds and flu. Get a pneumococcal vaccine shot. If you have had one before, ask your doctor whether you need a second dose. Get the flu vaccine every fall. If you must be around people with colds or the flu, wash your hands often. · Avoid secondhand smoke, air pollution, and high altitudes. Also avoid cold, dry air and hot, humid air. Stay at home with your windows closed when air pollution is bad. Medicines and oxygen therapy    1. Take your medicines exactly as prescribed. Call your doctor if you think you are having a problem with your medicine. You may be taking medicines such as:  ? Bronchodilators.  These help open your airways and make breathing easier. They are either short-acting (work for 6 to 9 hours) or long-acting (work for 24 hours). You inhale most bronchodilators, so they start to act quickly. Always carry your quick-relief inhaler with you in case you need it while you are away from home. ? Corticosteroids (prednisone, budesonide). These reduce airway inflammation. They come in pill or inhaled form. You must take these medicines every day for them to work well. · Ask your doctor or pharmacist if a spacer is right for you. A spacer may help you get more inhaled medicine to your lungs. If you use one, ask how to use it properly. · Do not take any vitamins, over-the-counter medicine, or herbal products without talking to your doctor first.     · If your doctor prescribed antibiotics, take them as directed. Do not stop taking them just because you feel better. You need to take the full course of antibiotics. · If you use oxygen therapy, use the flow rate your doctor has recommended. Don't change it without talking to your doctor first. Oxygen therapy boosts the amount of oxygen in your blood and helps you breathe easier. Activity    · Get regular exercise. Walking is an easy way to get exercise. Start out slowly, and walk a little more each day. · Pay attention to your breathing. You are exercising too hard if you can't talk while you exercise. · Take short rest breaks when doing household chores and other activities. · Learn breathing methods--such as breathing through pursed lips--to help you become less short of breath. · If your doctor has not set you up with a pulmonary rehabilitation program, ask if rehab is right for you. Rehab includes exercise programs, education about your disease and how to manage it, help with diet and other changes, and emotional support. Diet    · Eat regular, healthy meals. Use bronchodilators about 1 hour before you eat to make it easier to eat.  Eat several small meals instead of three large ones. Drink beverages at the end of the meal. Avoid foods that are hard to chew. · Eat foods that contain protein so you don't lose muscle mass. · Talk with your doctor if you gain too much weight or if you lose weight without trying. Mental health    · Talk to your family, friends, or a therapist about your feelings. Some people feel frightened, angry, hopeless, helpless, and even guilty. Talking openly about bad feelings can help you cope. If these feelings last, talk to your doctor. When should you call for help? Call 911 anytime you think you may need emergency care. For example, call if:    · You have severe trouble breathing. Call your doctor now or seek immediate medical care if:    · You have new or worse trouble breathing. · You cough up blood. · You have a fever. Watch closely for changes in your health, and be sure to contact your doctor if:    · You cough more deeply or more often, especially if you notice more mucus or a change in the color of your mucus. · You have new or worse swelling in your legs or belly. · You are not getting better as expected. Where can you learn more? Go to [email or web address]. org and sign in to your [employer] account. Enter R187 in the [facility] box to learn more about \"Chronic Obstructive Pulmonary Disease (COPD): Care Instructions. \"     If you do not have an account, please click on the \"Sign Up Now\" link. Current as of: October 26, 2020               Content Version: 12.9  © 2006-2021 [employer]. Care instructions adapted under license by [facility] If you have questions about a medical condition or this instruction, always ask your healthcare professional. [place] [address] any warranty or liability for your use of this information.

## 2022-03-16 DIAGNOSIS — M48.061 SPINAL STENOSIS OF LUMBAR REGION WITHOUT NEUROGENIC CLAUDICATION: ICD-10-CM

## 2022-03-16 NOTE — TELEPHONE ENCOUNTER
Patient requesting the following to go to [facility]  Please approve or refuse Rx request:  Requested Prescriptions     Pending Prescriptions Disp Refills    acetaminophen-codeine (TYLENOL #3) 300-30 MG per tablet 120 tablet 1     Sig: Take 1 tablet by mouth every 6 hours as needed for Pain for up to 30 days.        Next appointment:  Visit date not found

## 2022-03-17 RX ORDER — ACETAMINOPHEN AND CODEINE PHOSPHATE 300; 30 MG/1; MG/1
1 TABLET ORAL EVERY 6 HOURS PRN
Qty: 120 TABLET | Refills: 1 | Status: SHIPPED | OUTPATIENT
Start: 2022-03-17 | End: 2022-04-16

## 2022-04-07 ENCOUNTER — OFFICE VISIT (OUTPATIENT)
Dept: FAMILY MEDICINE CLINIC | Age: 67
End: 2022-04-07
Payer: MEDICARE

## 2022-04-07 VITALS
WEIGHT: 123.8 LBS | HEIGHT: 56 IN | OXYGEN SATURATION: 98 % | TEMPERATURE: 97 F | SYSTOLIC BLOOD PRESSURE: 104 MMHG | HEART RATE: 91 BPM | DIASTOLIC BLOOD PRESSURE: 78 MMHG | BODY MASS INDEX: 27.85 KG/M2 | RESPIRATION RATE: 16 BRPM

## 2022-04-07 DIAGNOSIS — R68.89 WITHDRAWAL COMPLAINT: ICD-10-CM

## 2022-04-07 DIAGNOSIS — M48.061 SPINAL STENOSIS OF LUMBAR REGION WITHOUT NEUROGENIC CLAUDICATION: Primary | ICD-10-CM

## 2022-04-07 PROCEDURE — 99214 OFFICE O/P EST MOD 30 MIN: CPT | Performed by: NURSE PRACTITIONER

## 2022-04-07 PROCEDURE — 1123F ACP DISCUSS/DSCN MKR DOCD: CPT | Performed by: NURSE PRACTITIONER

## 2022-04-07 PROCEDURE — 4040F PNEUMOC VAC/ADMIN/RCVD: CPT | Performed by: NURSE PRACTITIONER

## 2022-04-07 PROCEDURE — 1090F PRES/ABSN URINE INCON ASSESS: CPT | Performed by: NURSE PRACTITIONER

## 2022-04-07 PROCEDURE — G8427 DOCREV CUR MEDS BY ELIG CLIN: HCPCS | Performed by: NURSE PRACTITIONER

## 2022-04-07 PROCEDURE — G8399 PT W/DXA RESULTS DOCUMENT: HCPCS | Performed by: NURSE PRACTITIONER

## 2022-04-07 PROCEDURE — 3017F COLORECTAL CA SCREEN DOC REV: CPT | Performed by: NURSE PRACTITIONER

## 2022-04-07 PROCEDURE — 1036F TOBACCO NON-USER: CPT | Performed by: NURSE PRACTITIONER

## 2022-04-07 PROCEDURE — G8417 CALC BMI ABV UP PARAM F/U: HCPCS | Performed by: NURSE PRACTITIONER

## 2022-04-07 RX ORDER — NORTRIPTYLINE HYDROCHLORIDE 25 MG/1
25 CAPSULE ORAL NIGHTLY
COMMUNITY
End: 2022-11-01 | Stop reason: SDUPTHER

## 2022-04-07 ASSESSMENT — PATIENT HEALTH QUESTIONNAIRE - PHQ9
SUM OF ALL RESPONSES TO PHQ9 QUESTIONS 1 & 2: 0
SUM OF ALL RESPONSES TO PHQ QUESTIONS 1-9: 0
1. LITTLE INTEREST OR PLEASURE IN DOING THINGS: 0
SUM OF ALL RESPONSES TO PHQ QUESTIONS 1-9: 0
SUM OF ALL RESPONSES TO PHQ QUESTIONS 1-9: 0
2. FEELING DOWN, DEPRESSED OR HOPELESS: 0
SUM OF ALL RESPONSES TO PHQ QUESTIONS 1-9: 0

## 2022-04-07 ASSESSMENT — ENCOUNTER SYMPTOMS
SHORTNESS OF BREATH: 1
BACK PAIN: 1
EYES NEGATIVE: 1
NAUSEA: 0
VOMITING: 0

## 2022-04-07 NOTE — PATIENT INSTRUCTIONS
Follow up April 18th, walk in clinic with Dr Kannan Calloway  Call sooner for any questions or concerns.      Resume Tylenol #3  Nortriptyline 1 capsule nightly

## 2022-04-07 NOTE — PROGRESS NOTES
Shiraz Adhikari is a 69 y.o. female thatpresents for Anxiety (started nortriptylin on Tuesday night and is having side effects) and Other (feels restless)      History obtained today from Patient. HPI    Started on Nortriptyline by Dr Mtz Husbands, instead of her Tylenol #3 for pain, she was told to stop the Tylenol #3. Started Nortriptyline on Tuesday and Wednesday night. Now feeling restless, anxious, and jittery  Symptoms worse this morning, tossing and turning  Headache    Always shortness of breath, COPD  No chest pain  Eating ok   Diarrhea, but due to taking laxative  Sleeping ok, able to fall asleep but takes a while  No nausea or vomiting. No sweats or feeling clammy      I have reviewed the patient's past medical history, past surgical history, allergies,medications, social and family history and I have made updates where appropriate.     Past Medical History:   Diagnosis Date    Breast cancer ([facility] 2016    Bronchitis     CAD (coronary artery disease)     Cerebral artery occlusion with cerebral infarction ([facility]     TIA    COPD (chronic obstructive pulmonary disease) ([facility]     Diverticulosis     Essential hypertension 5/10/2017    Foot drop, right foot     GERD (gastroesophageal reflux disease)     History of therapeutic radiation 2016    IBS (irritable bowel syndrome)     Kidney stone 2014    Malignant neoplasm of upper-inner quadrant of left female breast ([facility] 9/13/2016    breast    MI (myocardial infarction) ([facility] 10/2018    PONV (postoperative nausea and vomiting)        Social History     Tobacco Use    Smoking status: Former Smoker     Packs/day: 1.50     Years: 20.00     Pack years: 30.00     Types: Cigarettes     Quit date: 10/27/2018     Years since quitting: 3.4    Smokeless tobacco: Never Used   Vaping Use    Vaping Use: Never used   Substance Use Topics    Alcohol use: No    Drug use: No       Family History   Problem Relation Age of Onset    Cancer Mother         renal cell carcinoma    Kidney Cancer Mother 46    Cirrhosis Father     Diabetes Sister     Kidney Cancer Sister 54    Cancer Sister     Heart Disease Sister         CHF    Diabetes Brother     High Blood Pressure Brother     Breast Cancer Paternal Aunt 54    Ovarian Cancer Neg Hx     Stroke Neg Hx          Review of Systems   Constitutional: Negative for diaphoresis. Eyes: Negative. Respiratory: Positive for shortness of breath. Cardiovascular: Positive for palpitations. Negative for chest pain. Gastrointestinal: Negative for nausea and vomiting. Musculoskeletal: Positive for arthralgias and back pain. Psychiatric/Behavioral: Positive for sleep disturbance. Negative for agitation. The patient is nervous/anxious. PHYSICAL EXAM:  /78   Pulse 91   Temp 97 °F (36.1 °C) (Infrared)   Resp 16   Ht 4' 8\" (1.422 m)   Wt 123 lb 12.8 oz (56.2 kg)   SpO2 98%   BMI 27.76 kg/m²     Physical Exam  HENT:      Nose: Nose normal.   Cardiovascular:      Rate and Rhythm: Regular rhythm. Heart sounds: Normal heart sounds. Pulmonary:      Effort: Pulmonary effort is normal.      Breath sounds: Normal breath sounds. Abdominal:      Palpations: Abdomen is soft. Skin:     General: Skin is warm and dry. Neurological:      General: No focal deficit present. Mental Status: She is alert and oriented to person, place, and time. Psychiatric:         Mood and Affect: Mood normal.         Behavior: Behavior normal.         Thought Content: Thought content normal.         Judgment: Judgment normal.           ASSESSMENT & PLAN  Irineo Cartwright was seen today for anxiety and other. Diagnoses and all orders for this visit:    Spinal stenosis of lumbar region without neurogenic claudication    Withdrawal complaint    FU April 18th to reassess    No follow-ups on file.     Symptoms consistent with possible withdrawal from Tylenol #3 and No red flag sx    All copied or forwarded information in the progress note was verified by me to be accurate at the time of visit  Patient's past medical, surgical, social and family history were reviewed and updated     All patient questions answered. Patient voiced understanding.

## 2022-04-13 DIAGNOSIS — I10 ESSENTIAL HYPERTENSION: ICD-10-CM

## 2022-04-13 RX ORDER — AMLODIPINE BESYLATE 10 MG/1
TABLET ORAL
Qty: 90 TABLET | Refills: 3 | Status: SHIPPED | OUTPATIENT
Start: 2022-04-13

## 2022-04-20 ENCOUNTER — OFFICE VISIT (OUTPATIENT)
Dept: FAMILY MEDICINE CLINIC | Age: 67
End: 2022-04-20
Payer: MEDICARE

## 2022-04-20 VITALS
HEIGHT: 56 IN | RESPIRATION RATE: 16 BRPM | SYSTOLIC BLOOD PRESSURE: 109 MMHG | TEMPERATURE: 96.8 F | DIASTOLIC BLOOD PRESSURE: 72 MMHG | WEIGHT: 122.8 LBS | HEART RATE: 83 BPM | OXYGEN SATURATION: 100 % | BODY MASS INDEX: 27.62 KG/M2

## 2022-04-20 DIAGNOSIS — M48.061 SPINAL STENOSIS OF LUMBAR REGION WITHOUT NEUROGENIC CLAUDICATION: Primary | ICD-10-CM

## 2022-04-20 PROCEDURE — 1090F PRES/ABSN URINE INCON ASSESS: CPT | Performed by: NURSE PRACTITIONER

## 2022-04-20 PROCEDURE — 1123F ACP DISCUSS/DSCN MKR DOCD: CPT | Performed by: NURSE PRACTITIONER

## 2022-04-20 PROCEDURE — G8417 CALC BMI ABV UP PARAM F/U: HCPCS | Performed by: NURSE PRACTITIONER

## 2022-04-20 PROCEDURE — 1036F TOBACCO NON-USER: CPT | Performed by: NURSE PRACTITIONER

## 2022-04-20 PROCEDURE — 4040F PNEUMOC VAC/ADMIN/RCVD: CPT | Performed by: NURSE PRACTITIONER

## 2022-04-20 PROCEDURE — G8427 DOCREV CUR MEDS BY ELIG CLIN: HCPCS | Performed by: NURSE PRACTITIONER

## 2022-04-20 PROCEDURE — G8399 PT W/DXA RESULTS DOCUMENT: HCPCS | Performed by: NURSE PRACTITIONER

## 2022-04-20 PROCEDURE — 3017F COLORECTAL CA SCREEN DOC REV: CPT | Performed by: NURSE PRACTITIONER

## 2022-04-20 PROCEDURE — 99212 OFFICE O/P EST SF 10 MIN: CPT | Performed by: NURSE PRACTITIONER

## 2022-04-20 ASSESSMENT — ENCOUNTER SYMPTOMS: BACK PAIN: 1

## 2022-04-20 NOTE — PROGRESS NOTES
Cassandra Lamb is a 69 y.o. female thatpresents for Other (Medication question)      History obtained today from Patient. HPI    SUBJECTIVE:  Cassandra Lamb is a 69 y.o. y/o female that presents with Other (Medication question)  . HPI:      Chronic back pain  On Tylenol #3 every 6 hours for many years. On Nortriptyline 25 mg by Dr Estephania Ashraf which helps her sleep and helps a little with pain  Rates pain 5/10 when she takes medications but not at goal.    Doesn't want to see pain management any more. Prefers Dr Micha Garcia to manage pain medications. OBJECTIVE:  /72   Pulse 83   Temp 96.8 °F (36 °C) (Infrared)   Resp 16   Ht 4' 8\" (1.422 m)   Wt 122 lb 12.8 oz (55.7 kg)   SpO2 100%   BMI 27.53 kg/m²   Physical Examination: General appearance - alert, well appearing, and in no distress  Chest - clear to auscultation, no wheezes, rales or rhonchi, symmetric air entry  Heart - normal rate, regular rhythm, normal S1, S2, no murmurs, rubs, clicks or gallops  Back exam - limited range of motion, pain with motion noted during exam, right thigh feels like 'water fall',  globally and symmetrically in the LEs  Extremities - peripheral pulses normal, no pedal edema, no clubbing or cyanosis  Skin -  Skin color, texture, turgor normal. No rashes or lesions. Psych - Affect normal, no evidence of depression or anxiety    ASSESSMENT & PLAN  There are no diagnoses linked to this encounter. Return in about 3 months (around 7/20/2022) for med check chronic back pain, chronic conditions. .      -Presentation is consistent with chronic back pain  Will discuss with Dr Micha Garcia, regarding any medication changes or additions for back pain.    -Patient advised to contact our office immediately if symptoms worsen or persist  -Patient counseled on conservative care including activity modification and OTC meds    All patient questions answered. Patient voiced understanding.        I have reviewed the patient's past medical history, past surgical history, allergies,medications, social and family history and I have made updates where appropriate. Past Medical History:   Diagnosis Date    Breast cancer ([facility] 2016    Bronchitis     CAD (coronary artery disease)     Cerebral artery occlusion with cerebral infarction ([facility]     TIA    COPD (chronic obstructive pulmonary disease) ([facility]     Diverticulosis     Essential hypertension 5/10/2017    Foot drop, right foot     GERD (gastroesophageal reflux disease)     History of therapeutic radiation 2016    IBS (irritable bowel syndrome)     Kidney stone 2014    Malignant neoplasm of upper-inner quadrant of left female breast ([facility] 9/13/2016    breast    MI (myocardial infarction) ([facility] 10/2018    PONV (postoperative nausea and vomiting)        Social History     Tobacco Use    Smoking status: Former Smoker     Packs/day: 1.50     Years: 20.00     Pack years: 30.00     Types: Cigarettes     Quit date: 10/27/2018     Years since quitting: 3.4    Smokeless tobacco: Never Used   Vaping Use    Vaping Use: Never used   Substance Use Topics    Alcohol use: No    Drug use: No       Family History   Problem Relation Age of Onset    Cancer Mother         renal cell carcinoma    Kidney Cancer Mother 46    Cirrhosis Father     Diabetes Sister     Kidney Cancer Sister 54    Cancer Sister     Heart Disease Sister         CHF    Diabetes Brother     High Blood Pressure Brother     Breast Cancer Paternal Aunt 54    Ovarian Cancer Neg Hx     Stroke Neg Hx          Review of Systems   HENT: Negative. Musculoskeletal: Positive for arthralgias, back pain and gait problem. PHYSICAL EXAM:  /72   Pulse 83   Temp 96.8 °F (36 °C) (Infrared)   Resp 16   Ht 4' 8\" (1.422 m)   Wt 122 lb 12.8 oz (55.7 kg)   SpO2 100%   BMI 27.53 kg/m²     Physical Exam  Abdominal:      General: Abdomen is flat. Palpations: Abdomen is soft.    Skin:     General: Skin is warm and dry. Neurological:      Mental Status: She is alert and oriented to person, place, and time. Psychiatric:         Mood and Affect: Mood normal.         Behavior: Behavior normal.         Thought Content: Thought content normal.         Judgment: Judgment normal.           ASSESSMENT & PLAN  There are no diagnoses linked to this encounter. Return in about 3 months (around 7/20/2022) for med check chronic back pain, chronic conditions. .    No red flag sx    Will discuss with Dr Lilibeth Banda regarding any medication changes. All copied or forwarded information in the progress note was verified by me to be accurate at the time of visit  Patient's past medical, surgical, social and family history were reviewed and updated     All patient questions answered. Patient voiced understanding.

## 2022-04-21 ENCOUNTER — TELEPHONE (OUTPATIENT)
Dept: FAMILY MEDICINE CLINIC | Age: 67
End: 2022-04-21

## 2022-04-21 DIAGNOSIS — M48.061 SPINAL STENOSIS OF LUMBAR REGION WITHOUT NEUROGENIC CLAUDICATION: Primary | ICD-10-CM

## 2022-04-21 RX ORDER — HYDROCODONE BITARTRATE AND ACETAMINOPHEN 5; 325 MG/1; MG/1
1 TABLET ORAL EVERY 6 HOURS PRN
Qty: 120 TABLET | Refills: 0 | Status: SHIPPED | OUTPATIENT
Start: 2022-04-21 | End: 2022-05-11 | Stop reason: SDUPTHER

## 2022-04-21 NOTE — TELEPHONE ENCOUNTER
Patient notified. She advised that she had some issues coming off of Tylenol 3 in the past and is asking if she should taper off of it and then start the [address] or if she should just switch right away?

## 2022-04-21 NOTE — TELEPHONE ENCOUNTER
Left a message at [address] [employer] that we are changing from Tylenol 3 to [place] so it is ok to fill. Left a message on the machine for patient to call back regarding her questions.

## 2022-04-21 NOTE — TELEPHONE ENCOUNTER
The pharmacy called to inform the provider that the patient picked up a rx for tylenol w/codine on 04.13.2022 and wanted to know if it was ok to fill the norco    Please call [facility]

## 2022-04-21 NOTE — TELEPHONE ENCOUNTER
Because she is switching to Norco, she should not have the issues that she had previously with coming off of the T3.

## 2022-04-21 NOTE — TELEPHONE ENCOUNTER
Pt seen yesterday, would like Dr Tiffanie Montaño to manage chronic back pain, declines to go back to pain management. Still not at treatment goal, would like something else to help with back and right thigh pain. Taking Tylenol #3 and Nortriptyline.    Thanks

## 2022-05-11 DIAGNOSIS — M48.061 SPINAL STENOSIS OF LUMBAR REGION WITHOUT NEUROGENIC CLAUDICATION: ICD-10-CM

## 2022-05-11 RX ORDER — HYDROCODONE BITARTRATE AND ACETAMINOPHEN 5; 325 MG/1; MG/1
1 TABLET ORAL EVERY 6 HOURS PRN
Qty: 120 TABLET | Refills: 0 | Status: SHIPPED | OUTPATIENT
Start: 2022-05-11 | End: 2022-06-17 | Stop reason: SDUPTHER

## 2022-05-11 NOTE — TELEPHONE ENCOUNTER
Patient needs  Please approve or refuse Rx request:  Requested Prescriptions     Pending Prescriptions Disp Refills    HYDROcodone-acetaminophen (NORCO) 5-325 MG per tablet 120 tablet 0     Sig: Take 1 tablet by mouth every 6 hours as needed for Pain for up to 30 days.  Intended supply: 30 days       Next appointment:  7/26/2022

## 2022-06-17 DIAGNOSIS — M48.061 SPINAL STENOSIS OF LUMBAR REGION WITHOUT NEUROGENIC CLAUDICATION: ICD-10-CM

## 2022-06-17 RX ORDER — HYDROCODONE BITARTRATE AND ACETAMINOPHEN 5; 325 MG/1; MG/1
1 TABLET ORAL EVERY 6 HOURS PRN
Qty: 120 TABLET | Refills: 0 | Status: SHIPPED | OUTPATIENT
Start: 2022-06-17 | End: 2022-07-18 | Stop reason: SDUPTHER

## 2022-07-12 NOTE — PROGRESS NOTES
[facility]    Patient: Susan Eubanks, 69 y.o.   : 1955    Patient of Dr. Gallo Villanueva   Patient presents with    Follow-up     COPD CT Chest 7/15/22      Fern Laws is here for follow up for Moderate COPD. At patient's last appointment, she declined a daily controller inhaler. She is here today with CT Chest that revealed stable RLL calcified granuloma. Overall patient reports respiratory symptoms have been worse since last appointment. Patient using albuterol 2-3 times per day on average. Patient reports some physical limitation due to respiratory symptoms. Patient reports back hurts when walking and causes her some shortness of breath. Her past medical history is significant for COPD, left breast cancer status post lumpectomy and XRT in 2016, bronchitis, CAD, diverticulosis, hypertension, right foot drop, GERD, IBS, kidney stone, and MI. COPD  She complains of cough, shortness of breath, sputum production and wheezing. There is no chest tightness or hemoptysis. This is a chronic problem. The current episode started more than 1 year ago. The problem occurs daily. The problem has been gradually worsening. The cough is productive of sputum (white). Associated symptoms include rhinorrhea. Pertinent negatives include no appetite change, chest pain, fever, nasal congestion, postnasal drip, sneezing or sore throat. Her symptoms are aggravated by minimal activity, change in weather and exposure to fumes. Her symptoms are alleviated by beta-agonist. She reports significant improvement on treatment. Risk factors for lung disease include smoking/tobacco exposure and animal exposure. Her past medical history is significant for COPD.        MMRC Dyspnea Scale:   0: Dyspneic on strenuous exercise  1: Dyspneic on walking a slight hill  2: Dyspneic on walking level ground; must stop occasionally due to breathlessness  3: Must stop for breathlessness after walking 100 yards or after a few minutes  4: Cannot leave house; breathless on dressing/undressing    MMRC dyspnea score: 3    HPI from previous visit  Jamaal Lane is here for follow up for Moderate COPD. Patient was last seen by Dr. Candelario Bañuelos on 9/16/2021. At that time, she was about to complete 5 years of Letrozole for her breast cancer. She also wished to continue as needed albuterol for her COPD and declined a daily controller inhaler. She had CT lung screening that revealed increase in size of linear scarlike opacity in RLL with nodular component at the inferior margin with recommendation for 3 month CT follow up. Her repeat CT Chest revealed parenchymal bandlike opacity at right lung base abutting the diaphragm (unchanged from previous exam), nodular component along inferior aspect measuring 1.2 x 0.8 cm (unchanged) with recommendation for 6 month CT follow up. She also had a stable ascending thoracic aortic aneurysm 4.1 cm. She is using her flonase. Overall patient reports respiratory symptoms have been stable since last appointment. Patient using albuterol 2-3 times per day on average. Patient reports some physical limitation due to respiratory symptoms. She reports that she is physically limited by her back. Progress History:   Since last visit any new medical issues? Yes back injections - now following with B[facility]  New ER or hospital visits? No  Any new or changes in medicines? Yes back injections  Using inhalers? Yes as needed albuterol   Are they helpful? Yes   Previous inhalers? Spiriva - patient did not like, Trelegy, Breztri - hoarse voice  Any recent exacerbations?  No  Last PFT: 10/17/2018 - moderate COPD  Last 6 MWT: None in Epic  Last A1AT: MM     Smoking History:  30 PY history, quit in 2018  Her daughter smokes but goes outside when smoking     Social History:  Patient job history: Retired, previously worked at [employer] as a cook, , and all aspects of the business  She has not had exposure to aerosolized particles or hazardous fumes. (Coal, dust, asbestos, molds ie Hay)  Denies living on a farm that primarily raised crops, livestock or combination  Admits to exposure to pets/animals at home. 1 dog  Denies exposure to tuberculosis. Denies history of glaucoma or urinary retention.      Flu vaccine: Has not received and does not plan to  Pneumonia vaccine: PCV13 10/23/2019 and 10/8/2018  COVID-19 vaccine: Not vaccinated and does not wish to receive  Past Medical hx   PMH:  Past Medical History:   Diagnosis Date    Breast cancer ([address] 2016    Bronchitis     CAD (coronary artery disease)     Cerebral artery occlusion with cerebral infarction ([address]     TIA    COPD (chronic obstructive pulmonary disease) ([address]     Diverticulosis     Essential hypertension 5/10/2017    Foot drop, right foot     GERD (gastroesophageal reflux disease)     History of therapeutic radiation 2016    IBS (irritable bowel syndrome)     Kidney stone 2014    Malignant neoplasm of upper-inner quadrant of left female breast ([address] 9/13/2016    breast    MI (myocardial infarction) ([address] 10/2018    PONV (postoperative nausea and vomiting)      SURGICAL HISTORY:  Past Surgical History:   Procedure Laterality Date    [address] [facility] LUMPECTOMY Left 2016    BREAST SURGERY Left 2016    exc. lymph nodes, lumpectomy    CARPAL TUNNEL RELEASE Right 06/2017    [facility]    CERVICAL FUSION      COLONOSCOPY  unsure    Dr. Juan Francisco Trevino    COLONOSCOPY  10/10/14    Dr. Angel Muse    COLONOSCOPY Left 6/17/2019    COLONOSCOPY POLYPECTOMY HOT BIOPSY performed by Joseph Reid MD at [address]  10/27/2018    [facility]    DILATION AND CURETTAGE OF UTERUS  78    HEMORRHOID SURGERY  12/16/2014    HYSTERECTOMY (CERVIX STATUS UNKNOWN)  2014    HYSTERECTOMY, VAGINAL  12/16/2014    KIDNEY STONE SURGERY  2014    LAMINECTOMY  06/12/2018    LUMBAR FUSION N/A 2/19/2020    LUMBAR LAMINECTOMY PSF L3-S1 WITH CAPTIVA performed by Philly Montoya MD at [address] OFFICE/OUTPT VISIT,PROCEDURE ONLY N/A 6/12/2018    ACDF C5-6 performed by Ramon Price MD at [address] Left      SOCIAL HISTORY:  Social History     Tobacco Use    Smoking status: Former     Packs/day: 1.50     Years: 20.00     Pack years: 30.00     Types: Cigarettes     Quit date: 10/27/2018     Years since quitting: 3.7    Smokeless tobacco: Never   Vaping Use    Vaping Use: Never used   Substance Use Topics    Alcohol use: No    Drug use: No     ALLERGIES:  Allergies   Allergen Reactions    Naproxen Anaphylaxis    Ultram [Tramadol] Itching and Rash     FAMILY HISTORY:  Family History   Problem Relation Age of Onset    Cancer Mother         renal cell carcinoma    Kidney Cancer Mother 46    Cirrhosis Father     Diabetes Sister     Kidney Cancer Sister 54    Cancer Sister     Heart Disease Sister         CHF    Diabetes Brother     High Blood Pressure Brother     Breast Cancer Paternal Aunt 54    Ovarian Cancer Neg Hx     Stroke Neg Hx      CURRENT MEDICATIONS:  Current Outpatient Medications   Medication Sig Dispense Refill    glycopyrrolate-formoterol (BEVESPI AEROSPHERE) 9-4.8 MCG/ACT AERO Inhale 2 puffs into the lungs in the morning and 2 puffs before bedtime. 10.7 g 11    albuterol sulfate HFA (PROVENTIL;VENTOLIN;PROAIR) 108 (90 Base) MCG/ACT inhaler INHALE 2 PUFFS BY MOUTH EVERY 6 HOURS AS NEEDED FOR WHEEZING or shortness of breath 18 g 11    HYDROcodone-acetaminophen (NORCO) 5-325 MG per tablet Take 1 tablet by mouth every 6 hours as needed for Pain for up to 30 days.  Intended supply: 30 days 120 tablet 0    amLODIPine (NORVASC) 10 MG tablet Take 1 tablet by mouth once daily 90 tablet 3    nortriptyline (PAMELOR) 25 MG capsule Take 25 mg by mouth nightly      metoprolol succinate (TOPROL XL) 25 MG extended release tablet Take 1 tablet by mouth daily Taking it nightly 90 tablet 3    atorvastatin (LIPITOR) 80 MG tablet Take 1 tablet by mouth nightly 90 tablet 3    TOVIAZ 4 MG TB24 ER tablet Take 1 tablet by mouth once daily 90 tablet 3    nitroGLYCERIN (NITROSTAT) 0.4 MG SL tablet Place 1 tablet under the tongue every 5 minutes as needed for Chest pain up to max of 3 total doses. If no relief after 1 dose, call 911. 25 tablet 3    Handicap Placard MISC by Does not apply route Expires 3/5/2024 1 each 0    fluticasone (FLONASE) 50 MCG/ACT nasal spray 1 spray by Nasal route daily 1 Bottle 3    aspirin 81 MG EC tablet Take 1 tablet by mouth daily 30 tablet 3     No current facility-administered medications for this visit. Lia DAVALOS   Review of Systems   Constitutional:  Negative for appetite change and fever. HENT:  Positive for rhinorrhea. Negative for postnasal drip, sneezing and sore throat. Respiratory:  Positive for cough, sputum production, shortness of breath and wheezing. Negative for hemoptysis. Cardiovascular:  Negative for chest pain, palpitations and leg swelling. Genitourinary:  Negative for difficulty urinating. Allergic/Immunologic: Negative for environmental allergies. Physical exam   /62 (Site: Right Upper Arm)   Pulse 79   Temp 97.5 °F (36.4 °C)   Ht 4' 8\" (1.422 m)   Wt 124 lb (56.2 kg)   SpO2 95% Comment: on RA  BMI 27.80 kg/m²    Wt Readings from Last 3 Encounters:   07/19/22 124 lb (56.2 kg)   04/20/22 122 lb 12.8 oz (55.7 kg)   04/07/22 123 lb 12.8 oz (56.2 kg)       Physical Exam  Constitutional:       Appearance: She is well-developed and normal weight. Comments: BMI 27.8   HENT:      Right Ear: External ear normal.      Left Ear: External ear normal.      Mouth/Throat:      Mouth: Mucous membranes are moist.      Pharynx: Oropharynx is clear. No oropharyngeal exudate. Eyes:      General:         Right eye: No discharge. Left eye: No discharge.    Cardiovascular:      Rate and Rhythm: Normal rate and regular rhythm. Pulmonary:      Effort: Pulmonary effort is normal. No respiratory distress. Breath sounds: No wheezing, rhonchi or rales. Comments: Diminished breath sounds  Chest:      Chest wall: No tenderness. Abdominal:      General: Bowel sounds are normal.   Musculoskeletal:      Cervical back: Neck supple. Right lower leg: No edema. Left lower leg: No edema. Skin:     General: Skin is warm and dry. Neurological:      General: No focal deficit present. Mental Status: She is alert. Psychiatric:         Mood and Affect: Mood normal.         Behavior: Behavior normal.         Thought Content: Thought content normal.         Judgment: Judgment normal.        Results   Lung Nodule Screening     [x] Qualifies    [] Does not qualify   [] Declined    [] Completed  30 PY history, quit in 2018   The USPSTF recommends annual screening for lung cancer with low-dose computed tomography (LDCT) in adults aged 48 to [de-identified] years who have a 20 pack-year smoking history and currently smoke or have quit within the past 15 years. Screening should be discontinued once a person has not smoked for 15 years or develops a health problem that substantially limits life expectancy or the ability or willingness to have curative lung surgery. CT Chest 7/15/22  Narrative   PROCEDURE: CT CHEST WO CONTRAST       CLINICAL INFORMATION: Pulmonary nodule. COMPARISON: CT chest 1/10/2022. Low-dose CT chest 10/6/2021. CT chest 7/23/2020. TECHNIQUE:    5 mm axial imaging through the chest without contrast. Coronal and sagittal reconstructions were performed. All CT scans at this facility use dose modulation, iterative reconstruction, and/or weight based dosing when appropriate to reduce the radiation dose to as low as reasonably achievable. FINDINGS:   Heart/mediastinum: The heart size is normal. Coronary artery calcifications are visualized. Trace pericardial fluid is unchanged. The ascending thoracic aorta is dilated measuring 4.1 cm in diameter, stable. Calcified right hilar lymph nodes are stable. No mediastinal, hilar, or axillary lymphadenopathy is observed. The thyroid gland is unremarkable. Postoperative changes with surgical clips in the left breast and left axilla appear stable. Lungs: Scarring along the right major fissure is unchanged. A benign calcified right lower lobe granuloma is stable (series 2, image 33). No focal consolidation, pleural effusion, or pneumothorax is visualized. No new pulmonary mass or nodule is    observed. The central airways are patent and unremarkable bilaterally. Upper abdomen: No acute findings are noted in the limited images through the upper abdomen. Musculoskeletal:  The visualized skeletal structures appear intact. Multilevel degenerative disc disease appear stable. Impression   Linear scarring along the right major fissure is unchanged when compared to examinations dating back to 2020. No new or suspicious pulmonary mass or nodule is identified. No acute intrathoracic process is observed. Stable benign calcified right lower lobe granuloma and chronic findings are discussed. **This report has been created using voice recognition software. It may contain minor errors which are inherent in voice recognition technology. **       Final report electronically signed by Dr Mignon Lobo on 7/15/2022 3:58 PM         Assessment      Diagnosis Orders   1. Stage 2 moderate COPD by GOLD classification ([address]  glycopyrrolate-formoterol (BEVESPI AEROSPHERE) 9-4.8 MCG/ACT AERO    albuterol sulfate HFA (PROVENTIL;VENTOLIN;PROAIR) 108 (90 Base) MCG/ACT inhaler    Full PFT Study With Bronchodilator      2. Nodule of lower lobe of right lung        3. Personal history of tobacco use        4. Chronic obstructive pulmonary disease, unspecified COPD type ([address]              Plan   1.  Stage 2 moderate COPD by GOLD classification ([facility]  - Start daily controller inhaler due to increase in symptoms. Start Bevespi 2 puffs twice daily. Instructed patient on use  - Continue albuterol 2 puffs every 6 hours as needed for shortness of breath or wheezing  - Full PFT Study With Bronchodilator prior to next appointment  - Maintain pneumonia vaccine with primary care provider   - Patient has previously declined flu and COVID-19 vaccinations    2. Nodule of lower lobe of right lung  - Stable on CT Chest, will plan to resume annual CT Lung Screenings - this will need ordered at patient's next appointment  - Stable RLL calcified granuloma     3. Personal history of tobacco use  - Repeat CT Lung Screen in 1 year    Advised patient to call office with any changes, questions, or concerns regarding respiratory status or issues with prescribed medications    Return in about 3 months (around 10/19/2022) for COPD with PFT prior.        Electronically signed by GREGORIO Vazquez CNP on 7/19/2022 at 2:29 PM

## 2022-07-15 ENCOUNTER — HOSPITAL ENCOUNTER (OUTPATIENT)
Dept: CT IMAGING | Age: 67
Discharge: HOME | End: 2022-07-15
Payer: MEDICARE

## 2022-07-15 DIAGNOSIS — R91.1 NODULE OF LOWER LOBE OF RIGHT LUNG: ICD-10-CM

## 2022-07-15 DIAGNOSIS — R93.89 ABNORMAL CT OF THE CHEST: ICD-10-CM

## 2022-07-15 PROCEDURE — 71250 CT THORAX DX C-: CPT

## 2022-07-18 DIAGNOSIS — M48.061 SPINAL STENOSIS OF LUMBAR REGION WITHOUT NEUROGENIC CLAUDICATION: ICD-10-CM

## 2022-07-18 RX ORDER — HYDROCODONE BITARTRATE AND ACETAMINOPHEN 5; 325 MG/1; MG/1
1 TABLET ORAL EVERY 6 HOURS PRN
Qty: 120 TABLET | Refills: 0 | Status: SHIPPED | OUTPATIENT
Start: 2022-07-18 | End: 2022-07-26

## 2022-07-18 NOTE — TELEPHONE ENCOUNTER
Patient requesting the following Please approve or refuse Rx request:  Requested Prescriptions     Pending Prescriptions Disp Refills    HYDROcodone-acetaminophen (NORCO) 5-325 MG per tablet 120 tablet 0     Sig: Take 1 tablet by mouth every 6 hours as needed for Pain for up to 30 days.  Intended supply: 30 days       Next appointment:  7/26/2022 General

## 2022-07-19 ENCOUNTER — OFFICE VISIT (OUTPATIENT)
Dept: PULMONOLOGY | Age: 67
End: 2022-07-19
Payer: MEDICARE

## 2022-07-19 VITALS
HEIGHT: 56 IN | WEIGHT: 124 LBS | HEART RATE: 79 BPM | DIASTOLIC BLOOD PRESSURE: 62 MMHG | OXYGEN SATURATION: 95 % | BODY MASS INDEX: 27.9 KG/M2 | SYSTOLIC BLOOD PRESSURE: 104 MMHG | TEMPERATURE: 97.5 F

## 2022-07-19 DIAGNOSIS — R91.1 NODULE OF LOWER LOBE OF RIGHT LUNG: ICD-10-CM

## 2022-07-19 DIAGNOSIS — J44.9 STAGE 2 MODERATE COPD BY GOLD CLASSIFICATION (HCC): Primary | ICD-10-CM

## 2022-07-19 DIAGNOSIS — Z87.891 PERSONAL HISTORY OF TOBACCO USE: ICD-10-CM

## 2022-07-19 PROCEDURE — 1036F TOBACCO NON-USER: CPT

## 2022-07-19 PROCEDURE — G8427 DOCREV CUR MEDS BY ELIG CLIN: HCPCS

## 2022-07-19 PROCEDURE — G8417 CALC BMI ABV UP PARAM F/U: HCPCS

## 2022-07-19 PROCEDURE — 3017F COLORECTAL CA SCREEN DOC REV: CPT

## 2022-07-19 PROCEDURE — 1123F ACP DISCUSS/DSCN MKR DOCD: CPT

## 2022-07-19 PROCEDURE — 99214 OFFICE O/P EST MOD 30 MIN: CPT

## 2022-07-19 PROCEDURE — 3023F SPIROM DOC REV: CPT

## 2022-07-19 PROCEDURE — G8399 PT W/DXA RESULTS DOCUMENT: HCPCS

## 2022-07-19 PROCEDURE — 1090F PRES/ABSN URINE INCON ASSESS: CPT

## 2022-07-19 RX ORDER — ALBUTEROL SULFATE 90 UG/1
AEROSOL, METERED RESPIRATORY (INHALATION)
Qty: 18 G | Refills: 11 | Status: SHIPPED | OUTPATIENT
Start: 2022-07-19

## 2022-07-19 ASSESSMENT — ENCOUNTER SYMPTOMS
SPUTUM PRODUCTION: 1
COUGH: 1
CHEST TIGHTNESS: 0
WHEEZING: 1
SORE THROAT: 0
HEMOPTYSIS: 0
RHINORRHEA: 1
SHORTNESS OF BREATH: 1

## 2022-07-19 ASSESSMENT — COPD QUESTIONNAIRES: COPD: 1

## 2022-07-19 NOTE — PATIENT INSTRUCTIONS
I am starting you on an inhaler called Bevespi. This will be 2 puffs twice daily. Continue your albuterol inhaler. You may take 2 puffs every 6 hours as needed for shortness of breath or wheezing. I will see you back in 3 months with a pulmonary function test prior to your appointment. Chronic Obstructive Pulmonary Disease (COPD): Care Instructions  Your Care Instructions     Chronic obstructive pulmonary disease (COPD) is a general term for a group of lung diseases, including emphysema and chronic bronchitis. People with COPD have decreased airflow in and out of the lungs, which makes it hard to breathe. The airways also can get clogged with thick mucus. Cigarette smoking is a major cause of COPD. Although there is no cure for COPD, you can slow its progress. Following your treatment plan and taking care of yourself can help you feel better and live longer. Follow-up care is a key part of your treatment and safety. Be sure to make and go to all appointments, and call your doctor if you are having problems. It's also a good idea to know your test results and keep a list of the medicines you take. How can you care for yourself at home? Staying healthy    Do not smoke. This is the most important step you can take to prevent more damage to your lungs. If you need help quitting, talk to your doctor about stop-smoking programs and medicines. These can increase your chances of quitting for good. Avoid colds and flu. Get a pneumococcal vaccine shot. If you have had one before, ask your doctor whether you need a second dose. Get the flu vaccine every fall. If you must be around people with colds or the flu, wash your hands often. Avoid secondhand smoke, air pollution, and high altitudes. Also avoid cold, dry air and hot, humid air. Stay at home with your windows closed when air pollution is bad. Medicines and oxygen therapy    Take your medicines exactly as prescribed.  Call your doctor if you think you are having a problem with your medicine. You may be taking medicines such as:  Bronchodilators. These help open your airways and make breathing easier. They are either short-acting (work for 6 to 9 hours) or long-acting (work for 24 hours). You inhale most bronchodilators, so they start to act quickly. Always carry your quick-relief inhaler with you in case you need it while you are away from home. Corticosteroids (prednisone, budesonide). These reduce airway inflammation. They come in pill or inhaled form. You must take these medicines every day for them to work well. Ask your doctor or pharmacist if a spacer is right for you. A spacer may help you get more inhaled medicine to your lungs. If you use one, ask how to use it properly. Do not take any vitamins, over-the-counter medicine, or herbal products without talking to your doctor first.     If your doctor prescribed antibiotics, take them as directed. Do not stop taking them just because you feel better. You need to take the full course of antibiotics. If you use oxygen therapy, use the flow rate your doctor has recommended. Don't change it without talking to your doctor first. Oxygen therapy boosts the amount of oxygen in your blood and helps you breathe easier. Activity    Get regular exercise. Walking is an easy way to get exercise. Start out slowly, and walk a little more each day. Pay attention to your breathing. You are exercising too hard if you can't talk while you exercise. Take short rest breaks when doing household chores and other activities. Learn breathing methods--such as breathing through pursed lips--to help you become less short of breath. If your doctor has not set you up with a pulmonary rehabilitation program, ask if rehab is right for you. Rehab includes exercise programs, education about your disease and how to manage it, help with diet and other changes, and emotional support.    Diet    Eat regular, healthy meals. Use bronchodilators about 1 hour before you eat to make it easier to eat. Eat several small meals instead of three large ones. Drink beverages at the end of the meal. Avoid foods that are hard to chew. Eat foods that contain protein so you don't lose muscle mass. Talk with your doctor if you gain too much weight or if you lose weight without trying. Mental health    Talk to your family, friends, or a therapist about your feelings. Some people feel frightened, angry, hopeless, helpless, and even guilty. Talking openly about bad feelings can help you cope. If these feelings last, talk to your doctor. When should you call for help? Call 911 anytime you think you may need emergency care. For example, call if:    You have severe trouble breathing. Call your doctor now or seek immediate medical care if:    You have new or worse trouble breathing. You cough up blood. You have a fever. Watch closely for changes in your health, and be sure to contact your doctor if:    You cough more deeply or more often, especially if you notice more mucus or a change in the color of your mucus. You have new or worse swelling in your legs or belly. You are not getting better as expected. Where can you learn more? Go to https://Check[employer]. org and sign in to your [employer] account. Enter C190 in the [employer] box to learn more about \"Chronic Obstructive Pulmonary Disease (COPD): Care Instructions. \"     If you do not have an account, please click on the \"Sign Up Now\" link. Current as of: October 26, 2020               Content Version: 12.9  © 2006-2021 Healthwise, Incorporated. Care instructions adapted under license by [facility] [employer] [facility] ([facility]). If you have questions about a medical condition or this instruction, always ask your healthcare professional. [place] [address] any warranty or liability for your use of this information.

## 2022-07-22 ENCOUNTER — TELEPHONE (OUTPATIENT)
Dept: ONCOLOGY | Age: 67
End: 2022-07-22

## 2022-07-22 NOTE — TELEPHONE ENCOUNTER
CALLED AND TALKED TO PT REGARDING HER APPT TO SEE US. PT WAS PREVIOUSLY  A PT OF DR. Richard Balderas NEEDING TO BE SWITCHED OVER      SCHEDULED APPT WITH DR. Rafael Anglin.  PT OKED DATE AND TIME

## 2022-07-26 ENCOUNTER — OFFICE VISIT (OUTPATIENT)
Dept: FAMILY MEDICINE CLINIC | Age: 67
End: 2022-07-26
Payer: MEDICARE

## 2022-07-26 VITALS
TEMPERATURE: 97 F | HEART RATE: 83 BPM | HEIGHT: 56 IN | DIASTOLIC BLOOD PRESSURE: 64 MMHG | BODY MASS INDEX: 29.11 KG/M2 | WEIGHT: 129.4 LBS | SYSTOLIC BLOOD PRESSURE: 98 MMHG | RESPIRATION RATE: 14 BRPM | OXYGEN SATURATION: 94 %

## 2022-07-26 DIAGNOSIS — I25.10 CORONARY ARTERY DISEASE INVOLVING NATIVE CORONARY ARTERY OF NATIVE HEART WITHOUT ANGINA PECTORIS: ICD-10-CM

## 2022-07-26 DIAGNOSIS — I10 ESSENTIAL HYPERTENSION: ICD-10-CM

## 2022-07-26 DIAGNOSIS — J44.9 CHRONIC OBSTRUCTIVE PULMONARY DISEASE, UNSPECIFIED COPD TYPE (HCC): ICD-10-CM

## 2022-07-26 DIAGNOSIS — M48.061 SPINAL STENOSIS OF LUMBAR REGION WITHOUT NEUROGENIC CLAUDICATION: Primary | ICD-10-CM

## 2022-07-26 PROCEDURE — 3023F SPIROM DOC REV: CPT | Performed by: FAMILY MEDICINE

## 2022-07-26 PROCEDURE — 3017F COLORECTAL CA SCREEN DOC REV: CPT | Performed by: FAMILY MEDICINE

## 2022-07-26 PROCEDURE — G8399 PT W/DXA RESULTS DOCUMENT: HCPCS | Performed by: FAMILY MEDICINE

## 2022-07-26 PROCEDURE — 99214 OFFICE O/P EST MOD 30 MIN: CPT | Performed by: FAMILY MEDICINE

## 2022-07-26 PROCEDURE — 1036F TOBACCO NON-USER: CPT | Performed by: FAMILY MEDICINE

## 2022-07-26 PROCEDURE — 1123F ACP DISCUSS/DSCN MKR DOCD: CPT | Performed by: FAMILY MEDICINE

## 2022-07-26 PROCEDURE — G8427 DOCREV CUR MEDS BY ELIG CLIN: HCPCS | Performed by: FAMILY MEDICINE

## 2022-07-26 PROCEDURE — 1090F PRES/ABSN URINE INCON ASSESS: CPT | Performed by: FAMILY MEDICINE

## 2022-07-26 PROCEDURE — G8417 CALC BMI ABV UP PARAM F/U: HCPCS | Performed by: FAMILY MEDICINE

## 2022-07-26 RX ORDER — OXYCODONE HYDROCHLORIDE AND ACETAMINOPHEN 5; 325 MG/1; MG/1
1 TABLET ORAL EVERY 6 HOURS PRN
Qty: 28 TABLET | Refills: 0 | Status: SHIPPED | OUTPATIENT
Start: 2022-07-26 | End: 2022-08-01 | Stop reason: SDUPTHER

## 2022-07-26 SDOH — ECONOMIC STABILITY: FOOD INSECURITY: WITHIN THE PAST 12 MONTHS, THE FOOD YOU BOUGHT JUST DIDN'T LAST AND YOU DIDN'T HAVE MONEY TO GET MORE.: NEVER TRUE

## 2022-07-26 SDOH — ECONOMIC STABILITY: FOOD INSECURITY: WITHIN THE PAST 12 MONTHS, YOU WORRIED THAT YOUR FOOD WOULD RUN OUT BEFORE YOU GOT MONEY TO BUY MORE.: NEVER TRUE

## 2022-07-26 ASSESSMENT — SOCIAL DETERMINANTS OF HEALTH (SDOH): HOW HARD IS IT FOR YOU TO PAY FOR THE VERY BASICS LIKE FOOD, HOUSING, MEDICAL CARE, AND HEATING?: NOT VERY HARD

## 2022-07-26 NOTE — PROGRESS NOTES
Candice Plummer is a 69 y.o. female that presents for     Chief Complaint   Patient presents with    3 Month Follow-Up     Back pain    Shortness of Breath     With exertion    Bleeding/Bruising     easily       BP 98/64   Pulse 83   Temp 97 °F (36.1 °C) (Infrared)   Resp 14   Ht 4' 8\" (1.422 m)   Wt 129 lb 6.4 oz (58.7 kg)   SpO2 94%   BMI 29.01 kg/m²       HPI      Notes that she does have some SOB with exertion and certain movements. Has had this for a long time. Has not gotten worse over that time. Has a hx of COPD, has upcoming PFTs. Has a hx of CAD as well. Notes that she does have a lot of low back pain with this. Follows with pulm and cardiology. Discussed further evaluation and she would like to hold at this time and follow up with specialists as this has been a chronic issue. HTN    Does patient check BP regularly at home? - Yes - controlled  Current Medication regimen - metoprolol 25mg  Tolerating medications well? - yes    Shortness of breath or chest pain? No  Headache or visual complaints? Does have a 'little bit of a headache'  Neurologic changes like confusion? no  Extremity edema? No    BP Readings from Last 3 Encounters:   07/26/22 98/64   07/19/22 104/62   04/20/22 109/72       Chronic Pain    HPI:    Patient has chronic pain of the back and legs. States that the Rick Grace has not been much improvement in pain control. We discussed a trial of changing to lower dosing percocet. Pain control: inadequate  Improvement in function and ADLs? yes  Current Medications:  Norco q 6 hrs  Escalation of dosage recently?  no    Evidence for abuse or diversion?  no   Seen specialist or pain clinic?: no  Pain contract: yes    Controlled Substances Monitoring:        Health Maintenance   Topic Date Due    COVID-19 Vaccine (1) Never done    Hepatitis C screen  Never done    DTaP/Tdap/Td vaccine (1 - Tdap) Never done    Shingles vaccine (1 of 2) Never done    Pneumococcal 65+ years Vaccine (2 - PPSV23 or PCV20) 10/23/2020    Lipids  11/02/2021    Annual Wellness Visit (AWV)  03/02/2022    Flu vaccine (1) 09/01/2022    Breast cancer screen  10/04/2022    Low dose CT lung screening  10/06/2022    Depression Screen  04/07/2023    Colorectal Cancer Screen  06/17/2029    DEXA (modify frequency per FRAX score)  Completed    Hepatitis A vaccine  Aged Out    Hepatitis B vaccine  Aged Out    Hib vaccine  Aged Out    Meningococcal (ACWY) vaccine  Aged Out       Past Medical History:   Diagnosis Date    Breast cancer ([address] 2016    Bronchitis     CAD (coronary artery disease)     Cerebral artery occlusion with cerebral infarction ([address]     TIA    COPD (chronic obstructive pulmonary disease) ([address]     Diverticulosis     Essential hypertension 5/10/2017    Foot drop, right foot     GERD (gastroesophageal reflux disease)     History of therapeutic radiation 2016    IBS (irritable bowel syndrome)     Kidney stone 2014    Malignant 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 lymph nodes, lumpectomy    CARPAL TUNNEL RELEASE Right 06/2017    OIO    CERVICAL FUSION      COLONOSCOPY  unsure    Dr. Sergei Castaneda    COLONOSCOPY  10/10/14    Dr. Martha Aragon    COLONOSCOPY Left 6/17/2019    COLONOSCOPY POLYPECTOMY HOT BIOPSY performed by Ridge Benavidez MD at [address]  10/27/2018    [facility]    DILATION AND CURETTAGE OF UTERUS  78    HEMORRHOID SURGERY  12/16/2014    HYSTERECTOMY (CERVIX STATUS UNKNOWN)  2014    HYSTERECTOMY, VAGINAL  12/16/2014    KIDNEY STONE SURGERY  2014    LAMINECTOMY  06/12/2018    LUMBAR FUSION N/A 2/19/2020    LUMBAR LAMINECTOMY PSF L3-S1 WITH CAPTIVA performed by Sarika Toscano MD at [address] [facility] OR    [place] OFFICE/OUTPT VISIT,PROCEDURE ONLY N/A 6/12/2018    ACDF C5-6 performed by Lan Louise MD at [facility] Left        Social History     Tobacco Use    Smoking status: Former     Packs/day: 1.50     Years: 20.00     Pack years: 30.00     Types: Cigarettes     Quit date: 10/27/2018     Years since quitting: 3.7    Smokeless tobacco: Never   Vaping Use    Vaping Use: Never used   Substance Use Topics    Alcohol use: No    Drug use: No       Family History   Problem Relation Age of Onset    Cancer Mother         renal cell carcinoma    Kidney Cancer Mother 46    Cirrhosis Father     Diabetes Sister     Kidney Cancer Sister 54    Cancer Sister     Heart Disease Sister         CHF    Diabetes Brother     High Blood Pressure Brother     Breast Cancer Paternal Aunt 54    Ovarian Cancer Neg Hx     Stroke Neg Hx          I have reviewed the patient's past medical history, past surgical history, allergies, medications, social and family history and I have made updates where appropriate. Review of Systems        PHYSICAL EXAM:  BP 98/64   Pulse 83   Temp 97 °F (36.1 °C) (Infrared)   Resp 14   Ht 4' 8\" (1.422 m)   Wt 129 lb 6.4 oz (58.7 kg)   SpO2 94%   BMI 29.01 kg/m²     Physical Exam  Vitals and nursing note reviewed. Constitutional:       General: She is not in acute distress. Appearance: She is well-developed. HENT:      Head: Normocephalic and atraumatic. Right Ear: Hearing and external ear normal.      Left Ear: Hearing and external ear normal.      Nose: Nose normal.   Eyes:      General:         Right eye: No discharge. Left eye: No discharge. Conjunctiva/sclera: Conjunctivae normal.   Neck:      Thyroid: No thyromegaly. Trachea: No tracheal deviation. Cardiovascular:      Rate and Rhythm: Normal rate and regular rhythm. Heart sounds: Normal heart sounds. No murmur heard.     No friction rub. No gallop. Pulmonary:      Effort: Pulmonary effort is normal. No respiratory distress. Breath sounds: No wheezing or rales. Chest:      Chest wall: No tenderness. Musculoskeletal:         General: No deformity. Cervical back: Normal range of motion and neck supple. Lymphadenopathy:      Cervical: No cervical adenopathy. Skin:     General: Skin is warm and dry. Findings: No erythema or rash. Neurological:      Mental Status: She is alert. Motor: No abnormal muscle tone. Coordination: Coordination normal.   Psychiatric:         Behavior: Behavior normal.         Thought Content: Thought content normal.         Judgment: Judgment normal.           ASSESSMENT & PLAN  Luis Alberto Lino was seen today for 3 month follow-up, shortness of breath and bleeding/bruising. Diagnoses and all orders for this visit:    Spinal stenosis of lumbar region without neurogenic claudication  -     oxyCODONE-acetaminophen (PERCOCET) 5-325 MG per tablet; Take 1 tablet by mouth every 6 hours as needed for Pain for up to 7 days. Essential hypertension    Coronary artery disease involving native coronary artery of native heart without angina pectoris    Chronic obstructive pulmonary disease, unspecified COPD type ([facility]    -HTN improved, continue current regimen  -Will do a 1 week trial of oxycodone, will let me know if this is effective  -Other chronic issues are stable, continue current medications  -Advised to call if any issues        Return in about 3 months (around 10/26/2022). The most recent results of the following tests were reviewed with the patient today: none     All copied or forwarded information in the progress note was verified by me to be accurate at the time of visit  Patient's past medical, surgical, social and family history were reviewed and updated     All patient questions answered. Patient voiced understanding.

## 2022-08-01 DIAGNOSIS — M48.061 SPINAL STENOSIS OF LUMBAR REGION WITHOUT NEUROGENIC CLAUDICATION: ICD-10-CM

## 2022-08-01 RX ORDER — OXYCODONE HYDROCHLORIDE AND ACETAMINOPHEN 5; 325 MG/1; MG/1
1 TABLET ORAL EVERY 6 HOURS PRN
Qty: 28 TABLET | Refills: 0 | Status: SHIPPED | OUTPATIENT
Start: 2022-08-01 | End: 2022-08-03

## 2022-08-01 NOTE — TELEPHONE ENCOUNTER
Recent Visits  Date Type Provider Dept   07/26/22 Office Visit Pastora Zuleta, [address] Pct   04/20/22 Office Visit Aleida Valdez, APRN - CNP [facility] [address]   04/07/22 Office Visit Aleida Valdez APRN - CNP [facility] [place] [facility]   10/27/21 Office Visit Pasotra Zuleta, [address]   07/13/21 Office Visit Jenn [address] [address]   03/01/21 Office Visit Pastora Zuleta, [address] recent visits within past 540 days with a meds authorizing provider and meeting all other requirements  Future Appointments  Date Type Provider Dept   11/01/22 Appointment Pastora MerlynDO [facility] [address] future appointments within next 150 days with a meds authorizing provider and meeting all other requirements      Future Appointments   Date Time Provider Melissa Vangi   8/2/2022 12:45 PM STR OUT PT ONC CMA STRZ OP [facility]   8/2/2022  1:00 PM Elias Ruiz MD N Oncology P - [place]   10/13/2022  1:00 PM STR PULMONARY FUNCTION ROOM 1 [facility] [place] [facility] [facility]   10/19/2022  2:30 PM Kimberly Hong APRN - [facility] MHP - [place]   11/1/2022  2:40 PM Pastora Zuleta DO [place] PCT P - [place]   1/9/2023  2:15 PM Yang Herrera MD N SRPX Heart MHP - [place]

## 2022-08-02 ENCOUNTER — OFFICE VISIT (OUTPATIENT)
Dept: ONCOLOGY | Age: 67
End: 2022-08-02
Payer: MEDICARE

## 2022-08-02 ENCOUNTER — HOSPITAL ENCOUNTER (OUTPATIENT)
Dept: INFUSION THERAPY | Age: 67
Discharge: HOME | End: 2022-08-02
Payer: MEDICARE

## 2022-08-02 VITALS
TEMPERATURE: 98.3 F | HEART RATE: 78 BPM | OXYGEN SATURATION: 95 % | HEIGHT: 56 IN | DIASTOLIC BLOOD PRESSURE: 71 MMHG | SYSTOLIC BLOOD PRESSURE: 114 MMHG | BODY MASS INDEX: 28.21 KG/M2 | RESPIRATION RATE: 18 BRPM | WEIGHT: 125.4 LBS

## 2022-08-02 VITALS
DIASTOLIC BLOOD PRESSURE: 71 MMHG | TEMPERATURE: 98.3 F | SYSTOLIC BLOOD PRESSURE: 114 MMHG | RESPIRATION RATE: 18 BRPM | HEART RATE: 78 BPM | OXYGEN SATURATION: 95 %

## 2022-08-02 DIAGNOSIS — C50.212 MALIGNANT NEOPLASM OF UPPER-INNER QUADRANT OF LEFT BREAST IN FEMALE, ESTROGEN RECEPTOR POSITIVE (HCC): Primary | ICD-10-CM

## 2022-08-02 DIAGNOSIS — Z17.0 MALIGNANT NEOPLASM OF UPPER-INNER QUADRANT OF LEFT BREAST IN FEMALE, ESTROGEN RECEPTOR POSITIVE (HCC): Primary | ICD-10-CM

## 2022-08-02 PROCEDURE — G8417 CALC BMI ABV UP PARAM F/U: HCPCS | Performed by: INTERNAL MEDICINE

## 2022-08-02 PROCEDURE — G8427 DOCREV CUR MEDS BY ELIG CLIN: HCPCS | Performed by: INTERNAL MEDICINE

## 2022-08-02 PROCEDURE — 1090F PRES/ABSN URINE INCON ASSESS: CPT | Performed by: INTERNAL MEDICINE

## 2022-08-02 PROCEDURE — 99215 OFFICE O/P EST HI 40 MIN: CPT | Performed by: INTERNAL MEDICINE

## 2022-08-02 PROCEDURE — G8399 PT W/DXA RESULTS DOCUMENT: HCPCS | Performed by: INTERNAL MEDICINE

## 2022-08-02 PROCEDURE — 1123F ACP DISCUSS/DSCN MKR DOCD: CPT | Performed by: INTERNAL MEDICINE

## 2022-08-02 PROCEDURE — 1036F TOBACCO NON-USER: CPT | Performed by: INTERNAL MEDICINE

## 2022-08-02 PROCEDURE — 99211 OFF/OP EST MAY X REQ PHY/QHP: CPT

## 2022-08-02 PROCEDURE — 3017F COLORECTAL CA SCREEN DOC REV: CPT | Performed by: INTERNAL MEDICINE

## 2022-08-02 ASSESSMENT — ENCOUNTER SYMPTOMS
ANAL BLEEDING: 0
COUGH: 1
SORE THROAT: 0
RHINORRHEA: 0
DIARRHEA: 0
BACK PAIN: 1
SHORTNESS OF BREATH: 1
BLOOD IN STOOL: 0
NAUSEA: 0
VOMITING: 0
TROUBLE SWALLOWING: 0

## 2022-08-02 NOTE — PROGRESS NOTES
[address] CANCER [address] [place] [address]  Dept: [phone]  Loc: [address]   1955   No ref. provider found   Jeanie DO Kate Olivarez is a 69 y.o.  female with a history of stage Ib left breast cancer. CHIEF COMPLAINT    She is here today in follow-up accompanied by her . HISTORY OF PRESENT ILLNESS    8/31/2016. Digital screening mammography bilaterally showed an irregular density in the left breast that was indeterminate. Compression and lateral medial views confirmed the presence of the mass. 9/9/2016. Left breast upper inner quadrant core biopsy showed invasive ductal carcinoma grade 2. Estrogen receptor was positive in 95% of cells and progesterone receptor was positive in 80% of cells. 9/29/2016. Left breast lumpectomy and sentinel node biopsy by Dr. Coco Coulter. Final pathology showed invasive adenocarcinoma 0.9 cm, grade 2, with negative margins and negative nodes. Estrogen and progesterone receptor were positive Ki-67 was 40% HER2 was not amplified. Oncotype DX assay came back with a recurrence score 20. The patient did not receive chemotherapy. 12/4/2016. Completed radiation therapy to the left breast receiving 5256 cGy of radiation treatment. She tolerated it well. After radiation, she started adjuvant hormonal therapy with anastrozole which was subsequently changed to letrozole. 10/2018. Hospitalized for STEMI. She is status post PCI of the RCA.    8/2/2021. Tolerating letrozole well with minimal and stable hot flashes. She has chronic back pain related to degenerative joint disease. She does not have any AI induced arthralgias. She has no complaints related to her breasts. She has annual mammography. 10/5/2021. DEXA scan shows osteoporosis. Mammography was without change.   The recommendation was to come back in a year. 7/15/2022. Screening CT scan of the chest shows no new or suspicious pulmonary masses or nodules. Comorbidities include COPD for which she is under the care of pulmonology. She has several nodules in her lungs for which she is on surveillance. She has had a TIA in the past and has had hypertension, GERD, irritable bowel syndrome, kidney stones. INTERVAL NOTE    She is here in follow-up today continuing to take her letrozole. She is doing well and has some hot flashes and sweats but she is coping well with them. She says that she has some arthralgias but nothing that stops her doing what she wants to do. She has been up-to-date on her mammography having had a mammogram in October 2021 it was negative as well as DEXA scan which shows osteoporosis. I do not see that she was started on any calcium, vitamin D or any other disease modifying agents. We will pursue this. She is due for a screening lung CT    MONITORING PARAMETERS    Physical examination, mammography and DEXA scans. PAST MEDICAL HISTORY  Past Medical History:   Diagnosis Date    Breast cancer ([address] 2016    Bronchitis     CAD (coronary artery disease)     Cerebral artery occlusion with cerebral infarction ([address]     TIA    COPD (chronic obstructive pulmonary disease) ([address]     Diverticulosis     Essential hypertension 5/10/2017    Foot drop, right foot     GERD (gastroesophageal reflux disease)     History of therapeutic radiation 2016    IBS (irritable bowel syndrome)     Kidney stone 2014    Malignant neoplasm of upper-inner quadrant of left female breast ([address] 9/13/2016    breast    MI (myocardial infarction) ([address] 10/2018    PONV (postoperative nausea and vomiting)         REVIEW OF SYSTEMS  Review of Systems   Constitutional:  Negative for appetite change, chills, fatigue and fever. HENT:  Negative for hearing loss, rhinorrhea, sore throat and trouble swallowing. Eyes:  Negative for visual disturbance.    Respiratory: Positive for cough (occasional) and shortness of breath (sometimes with activity). Cardiovascular:  Negative for palpitations and leg swelling. Gastrointestinal:  Negative for anal bleeding, blood in stool, diarrhea, nausea and vomiting. Endocrine: Positive for polyuria. Genitourinary:  Positive for dysuria (up every few hours to void) and urgency. Negative for hematuria and vaginal discharge. Musculoskeletal:  Positive for arthralgias (leg pain since her leg surgery 2.5 years ago), back pain (has ongoing back pain) and myalgias (generalized). Skin:  Positive for pallor. Negative for rash and wound. Neurological:  Negative for dizziness, tremors, seizures and headaches. Hematological:  Negative for adenopathy. Does not bruise/bleed easily. Psychiatric/Behavioral:  Negative for agitation, dysphoric mood, sleep disturbance and suicidal ideas.        FAMILY HISTORY  Family History   Problem Relation Age of Onset    Cancer Mother         renal cell carcinoma    Kidney Cancer Mother 46    Cirrhosis Father     Diabetes Sister     Kidney Cancer Sister 54    Cancer Sister     Heart Disease Sister         CHF    Diabetes Brother     High Blood Pressure Brother     Breast Cancer Paternal Aunt 54    Ovarian Cancer Neg Hx     Stroke Neg Hx         SOCIAL HISTORY  Social History     Socioeconomic History    Marital status:      Spouse name: Not on file    Number of children: Not on file    Years of education: Not on file    Highest education level: Not on file   Occupational History    Occupation: [employer]   Tobacco Use    Smoking status: Former     Packs/day: 1.50     Years: 20.00     Pack years: 30.00     Types: Cigarettes     Quit date: 10/27/2018     Years since quitting: 3.7    Smokeless tobacco: Never   Vaping Use    Vaping Use: Never used   Substance and Sexual Activity    Alcohol use: No    Drug use: No    Sexual activity: Not on file   Other Topics Concern    Not on file   Social History Narrative    Not on file     Social Determinants of Health     Financial Resource Strain: Low Risk     Difficulty of Paying Living Expenses: Not very hard   Food Insecurity: No Food Insecurity    Worried About Running Out of Food in the Last Year: Never true    Ran Out of Food in the Last Year: Never true   Transportation Needs: Not on file   Physical Activity: Not on file   Stress: Not on file   Social Connections: Not on file   Intimate Partner Violence: Not on file   Housing Stability: Not on file        CURRENT MEDICATIONS  Current Outpatient Medications   Medication Sig Dispense Refill    albuterol sulfate HFA (PROVENTIL;VENTOLIN;PROAIR) 108 (90 Base) MCG/ACT inhaler INHALE 2 PUFFS BY MOUTH EVERY 6 HOURS AS NEEDED FOR WHEEZING or shortness of breath 18 g 11    amLODIPine (NORVASC) 10 MG tablet Take 1 tablet by mouth once daily 90 tablet 3    nortriptyline (PAMELOR) 25 MG capsule Take 25 mg by mouth nightly      metoprolol succinate (TOPROL XL) 25 MG extended release tablet Take 1 tablet by mouth daily Taking it nightly 90 tablet 3    atorvastatin (LIPITOR) 80 MG tablet Take 1 tablet by mouth nightly 90 tablet 3    TOVIAZ 4 MG TB24 ER tablet Take 1 tablet by mouth once daily 90 tablet 3    Handicap Placard MISC by Does not apply route Expires 3/5/2024 1 each 0    fluticasone (FLONASE) 50 MCG/ACT nasal spray 1 spray by Nasal route daily 1 Bottle 3    aspirin 81 MG EC tablet Take 1 tablet by mouth daily 30 tablet 3    HYDROcodone-acetaminophen (NORCO) 5-325 MG per tablet Take 1 tablet by mouth every 6 hours as needed for Pain for up to 30 days. Intended supply: 30 days 120 tablet 0    glycopyrrolate-formoterol (BEVESPI AEROSPHERE) 9-4.8 MCG/ACT AERO Inhale 2 puffs into the lungs in the morning and 2 puffs before bedtime.  (Patient not taking: Reported on 8/2/2022) 10.7 g 11    nitroGLYCERIN (NITROSTAT) 0.4 MG SL tablet Place 1 tablet under the tongue every 5 minutes as needed for Chest pain up to max of 3 total doses. If no relief after 1 dose, call 630. 92 tablet 3     No current facility-administered medications for this visit. OARRS    PDMP Monitoring:    Last PDMP Yanna  as Reviewed [facility]):  Review User Review Instant Review Result   Garfield Gotti 8/2/2022  8:31 PM Reviewed PDMP [1]     Last Controlled Substance Monitoring Documentation      Flowsheet Row Refill from 7/6/2020 in [address] [place] [address]  Family Medicine   Periodic Controlled Substance Monitoring Possible medication side effects, risk of tolerance/dependence & alternative treatments discussed., No signs of potential drug abuse or diversion identified., Obtaining appropriate analgesic effect of treatment. filed at 07/06/2020 1309          Urine Drug Screenings (1 yr)    No resulted procedures found. Medication Contract and Consent for Opioid Use Documents Filed        No documents found                     PHYSICAL EXAM    Physical Exam  Vitals and nursing note reviewed. Exam conducted with a chaperone present. Constitutional:       General: She is not in acute distress. Appearance: She is normal weight. She is not ill-appearing, toxic-appearing or diaphoretic. Comments: Short thin quiet  female who speaks softly and is in no acute distress. HENT:      Head: Normocephalic and atraumatic. Nose: Nose normal.      Mouth/Throat:      Mouth: Mucous membranes are dry. Pharynx: Oropharynx is clear. No oropharyngeal exudate or posterior oropharyngeal erythema. Eyes:      General: No scleral icterus. Extraocular Movements: Extraocular movements intact. Conjunctiva/sclera: Conjunctivae normal.      Pupils: Pupils are equal, round, and reactive to light. Neck:      Comments: No axillary adenopathy  Cardiovascular:      Rate and Rhythm: Normal rate and regular rhythm. Pulses: Normal pulses. Heart sounds: Murmur (2 out of 6 ejection murmur) heard.    Pulmonary:      Effort: Pulmonary effort is normal. No respiratory distress. Breath sounds: No stridor. Comments: Decreased bilaterally in the bases  Chest:   Breasts:     Right: Normal. No mass or nipple discharge. Left: No swelling or mass. Comments: well healed surgical scar in the left upper inner quadrant  Abdominal:      General: Abdomen is flat. Bowel sounds are normal. There is no distension. Palpations: Abdomen is soft. There is no mass. Tenderness: There is no abdominal tenderness. Hernia: No hernia is present. Musculoskeletal:         General: No swelling or tenderness. Normal range of motion. Cervical back: Normal range of motion and neck supple. No rigidity or tenderness. Right lower leg: No edema. Left lower leg: No edema. Lymphadenopathy:      Cervical: No cervical adenopathy. Skin:     General: Skin is warm and dry. Findings: No lesion. Comments: Scattered scratches on her arms from her dog   Neurological:      General: No focal deficit present. Mental Status: She is alert and oriented to person, place, and time. Cranial Nerves: No cranial nerve deficit. Psychiatric:         Mood and Affect: Mood normal.         Behavior: Behavior normal.         Thought Content: Thought content normal.         Judgment: Judgment normal.        LABS/IMAGING  CT CHEST WO CONTRAST    Result Date: 7/15/2022  Linear scarring along the right major fissure is unchanged when compared to examinations dating back to 2020. No new or suspicious pulmonary mass or nodule is identified. No acute intrathoracic process is observed. Stable benign calcified right lower lobe granuloma and chronic findings are discussed. **This report has been created using voice recognition software. It may contain minor errors which are inherent in voice recognition technology. ** Final report electronically signed by Dr Renu Light on 7/15/2022 3:58 PM     PATHOLOGY/GENETICS    Invasive ductal carcinoma the breast, grade 2 with an Oncotype Dx score of 20. ASSESSMENT and PLAN    1. Carcinoma of the upper inner quadrant of left breast, ER positive, OR positive Ki-67 40% HER2 not amplified, T1b, N0, M0 stage Ia status postlumpectomy and radiation therapy. She continues on Femara which she is tolerating better than the Arimidex that she had been put on initially. She will continue on this medication. 2.  Osteoporosis. We have discussed with her but need to verify that she is taking calcium and vitamin D which are not on her current medication lists. We will make sure that we have discussed with her the possibility of treatment for her osteoporosis. She has been a smoker for many years and is certainly at risk for fractures with her thin body habitus. 3.  Use of aromatase inhibitors. The patient is tolerating her Femara well. She will continue on it she seems to be coping with her hot flashes sweats and arthralgias appropriately. 4.  Pulmonary nodules. She continues to have yearly screening tests to ensure the stability of these lesions. She was congratulated for smoking cessation. The patient and her family know that she should call us for any change in her situation, questions or concerns.         Lexus Hawk MD 8/6/2022 5:19 PM

## 2022-08-03 ENCOUNTER — TELEPHONE (OUTPATIENT)
Dept: FAMILY MEDICINE CLINIC | Age: 67
End: 2022-08-03

## 2022-08-03 DIAGNOSIS — M48.061 SPINAL STENOSIS OF LUMBAR REGION WITHOUT NEUROGENIC CLAUDICATION: ICD-10-CM

## 2022-08-03 RX ORDER — HYDROCODONE BITARTRATE AND ACETAMINOPHEN 5; 325 MG/1; MG/1
1 TABLET ORAL EVERY 6 HOURS PRN
Qty: 120 TABLET | Refills: 0 | Status: SHIPPED | OUTPATIENT
Start: 2022-08-03 | End: 2022-09-01 | Stop reason: SDUPTHER

## 2022-08-03 NOTE — TELEPHONE ENCOUNTER
[facility] called and stated it is too early to fill the patient's Noroc since she filled a 30 day supply on 7/18. The earliest this can be filled is 8/31. Patient informed and verbalized understanding.

## 2022-08-03 NOTE — TELEPHONE ENCOUNTER
Patient called in and would like to go back to being on Norco instead of Percocet. She does not like having to go back and forth every 7 days to get a refill, and wants a 30 day supply of the Norco. Patient stated the Sofiya Meza did help when she took it previously.

## 2022-09-01 DIAGNOSIS — M48.061 SPINAL STENOSIS OF LUMBAR REGION WITHOUT NEUROGENIC CLAUDICATION: ICD-10-CM

## 2022-09-01 RX ORDER — HYDROCODONE BITARTRATE AND ACETAMINOPHEN 5; 325 MG/1; MG/1
1 TABLET ORAL EVERY 6 HOURS PRN
Qty: 120 TABLET | Refills: 0 | Status: SHIPPED | OUTPATIENT
Start: 2022-09-01 | End: 2022-10-05 | Stop reason: SDUPTHER

## 2022-09-01 NOTE — TELEPHONE ENCOUNTER
Patient requesting the following to go to [facility] pharmacy Please approve or refuse Rx request:  Requested Prescriptions     Pending Prescriptions Disp Refills    HYDROcodone-acetaminophen (NORCO) 5-325 MG per tablet 120 tablet 0     Sig: Take 1 tablet by mouth every 6 hours as needed for Pain for up to 30 days.  Intended supply: 30 days       Next appointment:  11/1/2022

## 2022-10-05 DIAGNOSIS — M48.061 SPINAL STENOSIS OF LUMBAR REGION WITHOUT NEUROGENIC CLAUDICATION: ICD-10-CM

## 2022-10-05 RX ORDER — HYDROCODONE BITARTRATE AND ACETAMINOPHEN 5; 325 MG/1; MG/1
1 TABLET ORAL EVERY 6 HOURS PRN
Qty: 120 TABLET | Refills: 0 | Status: SHIPPED | OUTPATIENT
Start: 2022-10-05 | End: 2022-11-04

## 2022-10-05 NOTE — TELEPHONE ENCOUNTER
Patient requests the following to go to [facility]  Please approve or refuse Rx request:  Requested Prescriptions     Pending Prescriptions Disp Refills    HYDROcodone-acetaminophen (NORCO) 5-325 MG per tablet 120 tablet 0     Sig: Take 1 tablet by mouth every 6 hours as needed for Pain for up to 30 days.  Intended supply: 30 days       Next appointment:  11/1/2022

## 2022-10-13 ENCOUNTER — HOSPITAL ENCOUNTER (OUTPATIENT)
Dept: PULMONOLOGY | Age: 67
Discharge: HOME | End: 2022-10-13
Payer: MEDICARE

## 2022-10-13 DIAGNOSIS — J44.9 STAGE 2 MODERATE COPD BY GOLD CLASSIFICATION (HCC): ICD-10-CM

## 2022-10-13 PROCEDURE — 94726 PLETHYSMOGRAPHY LUNG VOLUMES: CPT

## 2022-10-13 PROCEDURE — 94729 DIFFUSING CAPACITY: CPT

## 2022-10-13 PROCEDURE — 94060 EVALUATION OF WHEEZING: CPT

## 2022-10-14 NOTE — PROGRESS NOTES
[facility]    Patient: Marcela Marquis, 69 y.o.   : 1955  10/19/2022    Patient of Dr. Arsenio Santiago   Patient presents with    Follow-up     3 month f/u with PFT 10/13/22      Cecilio Chavez is here for follow up for Moderate COPD. After patient's last appointment, she was started on a daily maintenance inhaler. She is here today with PFT that revealed a decline in FEV1 with good BD response. Overall patient reports respiratory symptoms have been stable since last appointment. Patient reports that she never picked up Hadley Marcos. Patient using albuterol 5-6 times per day on average. She reports that she is using albuterol for cough and for shortness of breath. Patient reports physical limitation due to respiratory symptoms. Her past medical history is significant for COPD, left breast cancer status post lumpectomy and XRT in 2016, bronchitis, CAD, diverticulosis, hypertension, right foot drop, GERD, IBS, kidney stone, and MI. COPD  She complains of cough (\"some\"), shortness of breath, sputum production (\"some\") and wheezing (\"some\"). This is a chronic problem. The current episode started more than 1 year ago. The cough is productive of sputum (white and clear). Pertinent negatives include no appetite change, chest pain, fever, postnasal drip, rhinorrhea or sneezing. Her symptoms are aggravated by minimal activity (walking). Her symptoms are alleviated by rest and beta-agonist. She reports significant improvement on treatment. MMRC Dyspnea Scale:   0: Dyspneic on strenuous exercise  1: Dyspneic on walking a slight hill  2: Dyspneic on walking level ground; must stop occasionally due to breathlessness  3: Must stop for breathlessness after walking 100 yards or after a few minutes  4: Cannot leave house; breathless on dressing/undressing    MMRC dyspnea score: 1      Progress History:   Since last visit any new medical issues?  No  New ER or hospital visits? No  Any new or changes in medicines? No  Using inhalers? Yes as needed albuterol  Are they helpful? Yes   Previous inhalers? Breztri - lost voice and Letty Lubin - never picked up due to cost  Any recent exacerbations? No  Last PFT: 10/13/22- moderate COPD  Last 6 MWT: None in Epic  Last A1AT: MM     Smoking History:  30 PY history, quit in 2018  Her daughter smokes but goes outside when smoking     Social History:  Patient job history: Retired, previously worked at [employer] as a cook, , and all aspects of the business  She has not had exposure to aerosolized particles or hazardous fumes. (Coal, dust, asbestos, molds ie Hay)  Denies living on a farm that primarily raised crops, livestock or combination  Admits to exposure to pets/animals at home. 1 dog  Denies exposure to tuberculosis. Denies history of glaucoma or urinary retention.      Flu vaccine: Has not received and does not plan to  Pneumonia vaccine: PCV13 10/23/2019 and 10/8/2018  COVID-19 vaccine: Not vaccinated and does not wish to receive  Past Medical hx   PMH:  Past Medical History:   Diagnosis Date    Breast cancer ([address] 2016    Bronchitis     CAD (coronary artery disease)     Cerebral artery occlusion with cerebral infarction ([address]     TIA    COPD (chronic obstructive pulmonary disease) ([address]     Diverticulosis     Essential hypertension 5/10/2017    Foot drop, right foot     GERD (gastroesophageal reflux disease)     History of therapeutic radiation 2016    IBS (irritable bowel syndrome)     Kidney stone 2014    Malignant neoplasm of upper-inner quadrant of left female breast ([address] 9/13/2016    breast    MI (myocardial infarction) ([address] 10/2018    PONV (postoperative nausea and vomiting)      SURGICAL HISTORY:  Past Surgical History:   Procedure Laterality Date    [address] [facility] [address] LUMPECTOMY Left 2016    BREAST SURGERY Left 2016    exc. lymph nodes, lumpectomy    CARPAL TUNNEL RELEASE Right 06/2017    [facility] CERVICAL FUSION      COLONOSCOPY  unsure    Dr. Raisa Valdes    COLONOSCOPY  10/10/2014    Dr. Ekaterina Aponte Left 06/17/2019    COLONOSCOPY POLYPECTOMY HOT BIOPSY performed by Selma Leung MD at [address] [place] [address]  10/27/2018    [address] SURGERY  12/16/2014    HYSTERECTOMY (CERVIX STATUS UNKNOWN)  2014    HYSTERECTOMY, VAGINAL  12/16/2014    KIDNEY STONE SURGERY  2014    LAMINECTOMY  06/12/2018    LUMBAR FUSION N/A 02/19/2020    LUMBAR LAMINECTOMY PSF L3-S1 WITH CAPTIVA performed by Sylvie Barth MD at [address] [place] [address]      Pain injection    [place] OFFICE/OUTPT VISIT,PROCEDURE ONLY N/A 06/12/2018    ACDF C5-6 performed by Gloria Min MD at [facility]      SOCIAL HISTORY:  Social History     Tobacco Use    Smoking status: Former     Packs/day: 1.50     Years: 20.00     Pack years: 30.00     Types: Cigarettes     Quit date: 10/27/2018     Years since quitting: 3.9    Smokeless tobacco: Never   Vaping Use    Vaping Use: Never used   Substance Use Topics    Alcohol use: No    Drug use: No     ALLERGIES:  Allergies   Allergen Reactions    Naproxen Anaphylaxis    Ultram [Tramadol] Itching and Rash     FAMILY HISTORY:  Family History   Problem Relation Age of Onset    Cancer Mother         renal cell carcinoma    Kidney Cancer Mother 46    Cirrhosis Father     Diabetes Sister     Kidney Cancer Sister 54    Cancer Sister     Heart Disease Sister         CHF    Diabetes Brother     High Blood Pressure Brother     Breast Cancer Paternal Aunt 54    Ovarian Cancer Neg Hx     Stroke Neg Hx      CURRENT MEDICATIONS:  Current Outpatient Medications   Medication Sig Dispense Refill    guaiFENesin (MUCINEX) 600 MG extended release tablet Take 1 tablet by mouth 2 times daily 60 tablet 6    tiotropium-olodaterol (STIOLTO RESPIMAT) 2.5-2.5 MCG/ACT AERS Inhale 2 puffs into the lungs daily 4 g 11    Respiratory Therapy Supplies (NEBULIZER AIR TUBE/PLUGS) MISC 1 each by Does not apply route every 6 hours as needed (Wheezing/shortness of breath) Please provide nebulizer kit and supplies. 1 each 0    albuterol (PROVENTIL) (2.5 MG/3ML) 0.083% nebulizer solution Take 3 mLs by nebulization every 6 hours as needed for Wheezing or Shortness of Breath 120 each 11    sodium chloride (BRONCHO SALINE) 0.9 % inhaler solution Use 3 mL by nebulization three times per day as needed for throat/airway secretions. 270 mL 11    HYDROcodone-acetaminophen (NORCO) 5-325 MG per tablet Take 1 tablet by mouth every 6 hours as needed for Pain for up to 30 days. Intended supply: 30 days 120 tablet 0    glycopyrrolate-formoterol (BEVESPI AEROSPHERE) 9-4.8 MCG/ACT AERO Inhale 2 puffs into the lungs in the morning and 2 puffs before bedtime. 10.7 g 11    albuterol sulfate HFA (PROVENTIL;VENTOLIN;PROAIR) 108 (90 Base) MCG/ACT inhaler INHALE 2 PUFFS BY MOUTH EVERY 6 HOURS AS NEEDED FOR WHEEZING or shortness of breath 18 g 11    amLODIPine (NORVASC) 10 MG tablet Take 1 tablet by mouth once daily 90 tablet 3    nortriptyline (PAMELOR) 25 MG capsule Take 25 mg by mouth nightly      metoprolol succinate (TOPROL XL) 25 MG extended release tablet Take 1 tablet by mouth daily Taking it nightly 90 tablet 3    atorvastatin (LIPITOR) 80 MG tablet Take 1 tablet by mouth nightly 90 tablet 3    TOVIAZ 4 MG TB24 ER tablet Take 1 tablet by mouth once daily 90 tablet 3    nitroGLYCERIN (NITROSTAT) 0.4 MG SL tablet Place 1 tablet under the tongue every 5 minutes as needed for Chest pain up to max of 3 total doses.  If no relief after 1 dose, call 911. 25 tablet 3    Handicap Placard MISC by Does not apply route Expires 3/5/2024 1 each 0    fluticasone (FLONASE) 50 MCG/ACT nasal spray 1 spray by Nasal route daily 1 Bottle 3    aspirin 81 MG EC tablet Take 1 tablet by mouth daily 30 Content: Thought content normal.        Results   Lung Nodule Screening     [x] Qualifies    [] Does not qualify   [] Declined    [] Completed  30 PY history, quit in 2018   The USPSTF recommends annual screening for lung cancer with low-dose computed tomography (LDCT) in adults aged 48 to 80 years who have a 20 pack-year smoking history and currently smoke or have quit within the past 15 years. Screening should be discontinued once a person has not smoked for 15 years or develops a health problem that substantially limits life expectancy or the ability or willingness to have curative lung surgery. Assessment      Diagnosis Orders   1. Stage 2 moderate COPD by GOLD classification ([facility])  guaiFENesin (MUCINEX) 600 MG extended release tablet    tiotropium-olodaterol (STIOLTO RESPIMAT) 2.5-2.5 MCG/ACT AERS    DME Order for Nebulizer as OP    Respiratory Therapy Supplies (NEBULIZER AIR TUBE/PLUGS) MISC    albuterol (PROVENTIL) (2.5 MG/3ML) 0.083% nebulizer solution    sodium chloride (BRONCHO SALINE) 0.9 % inhaler solution      2. Personal history of tobacco use  [place] VISIT TO DISCUSS LUNG CA SCREEN W LDCT    CT Lung Screen (Annual)      3. Nodule of lower lobe of right lung  [place] VISIT TO DISCUSS LUNG CA SCREEN W LDCT    CT Lung Screen (Annual)            Plan   1. Stage 2 moderate COPD by GOLD classification ([facility]  -Symptoms are not at goal as patient did not start Bevespi inhaler. Start tiotropium-olodaterol ([address]) 2.5-2.5 MCG/ACT AERS; Inhale 2 puffs into the lungs daily. Patient was given 2 samples in office today. She was given demonstration for use by myself and instructed on how to install the inhaler. Patient was advised to call the office if this inhaler is unaffordable for further recommendations for inhaler therapy. -DME Order for Nebulizer as OP.  Anne Ambrosio was evaluated today and a DME order was entered for a nebulizer compressor in order to administer Albuterol and saline  due to the diagnosis of COPD. The need for this equipment and treatment was discussed with the patient and she understands and is in agreement. - Continue albuterol inhaler and start albuterol nebulizer, one or the other, every 6 hours as needed for shortness of breath or wheezing   -Respiratory Therapy Supplies (NEBULIZER AIR TUBE/PLUGS) MISC; 1 each by Does not apply route every 6 hours as needed (Wheezing/shortness of breath) Please provide nebulizer kit and supplies. Dispense: 1 each; Refill: 0  -Start sodium chloride (BRONCHO SALINE) 0.9 % inhaler solution; Use 3 mL by nebulization three times per day as needed for throat/airway secretions. Patient was instructed that this is not to be used as a rescue medication  -Start guaiFENesin (MUCINEX) 600 MG extended release tablet; Take 1 tablet by mouth 2 times daily   -Reviewed PFT testing with patient and  today. Discussed with patient decline in FEV1 and FVC. We will plan to repeat spirometry testing in 1 year to monitor for further decline  -Advised patient to avoid secondhand smoke to help prevent further pulmonary decline  -Patient declines flu vaccination  -Maintain pneumonia vaccine with primary care provider     2. Personal history of tobacco use  - [place] VISIT TO DISCUSS LUNG CA SCREEN W LDCT  - CT Lung Screen (Annual); Future    3. Nodule of lower lobe of right lung  -We will follow with CT lung screening in July 2023  -[place] VISIT TO DISCUSS LUNG CA SCREEN W LDCT  -CT Lung Screen (Annual); Future     Advised patient to call office with any changes, questions, or concerns regarding respiratory status or issues with prescribed medications    Return in about 3 months (around 1/19/2023) for COPD (2nd appt in July with CT Lung Screening). Patient and  were given significant education regarding inhaler therapy and new medications. Instructions were also typed for patient for further clarification.      Electronically signed by Usama Davis APRN - CNP on 10/19/2022 at 4:08 PM     (Please note that portions of this note may have been completed with a voice recognition program. Efforts were made to edit the dictation but occasionally words are mis-transcribed)   Low Dose CT (LDCT) Lung Screening criteria met   Age 49-80   Pack year smoking >20   Still smoking or less than 15 year since quit   No sign or symptoms of lung cancer   > 11 months since last LDCT     Risks and benefits of lung cancer screening with LDCT scans discussed:    Significance of positive screen - False-positive LDCT results often occur. 95% of all positive results do not lead to a diagnosis of cancer. Usually further imaging can resolve most false-positive results; however, some patients may require invasive procedures. Over diagnosis risk - 10% to 12% of screen-detected lung cancer cases are over diagnosed--that is, the cancer would not have been detected in the patient's lifetime without the screening. Need for follow up screens annually to continue lung cancer screening effectiveness     Risks associated with radiation from annual LDCT- Radiation exposure is about the same as for a mammogram, which is about 1/3 of the annual background radiation exposure from everyday life. Starting screening at age 48 is not likely to increase cancer risk from radiation exposure. Patients with comorbidities resulting in life expectancy of < 10 years, or that would preclude treatment of an abnormality identified on CT, should not be screened due to lack of benefit.     To obtain maximal benefit from this screening, smoking cessation and long-term abstinence from smoking is critical

## 2022-10-19 ENCOUNTER — OFFICE VISIT (OUTPATIENT)
Dept: PULMONOLOGY | Age: 67
End: 2022-10-19
Payer: MEDICARE

## 2022-10-19 VITALS
HEIGHT: 56 IN | HEART RATE: 84 BPM | SYSTOLIC BLOOD PRESSURE: 122 MMHG | WEIGHT: 126.4 LBS | TEMPERATURE: 98.1 F | BODY MASS INDEX: 28.43 KG/M2 | OXYGEN SATURATION: 97 % | DIASTOLIC BLOOD PRESSURE: 68 MMHG

## 2022-10-19 DIAGNOSIS — J44.9 STAGE 2 MODERATE COPD BY GOLD CLASSIFICATION (HCC): Primary | ICD-10-CM

## 2022-10-19 DIAGNOSIS — Z87.891 PERSONAL HISTORY OF TOBACCO USE: ICD-10-CM

## 2022-10-19 DIAGNOSIS — R91.1 NODULE OF LOWER LOBE OF RIGHT LUNG: ICD-10-CM

## 2022-10-19 PROCEDURE — 1036F TOBACCO NON-USER: CPT

## 2022-10-19 PROCEDURE — G0296 VISIT TO DETERM LDCT ELIG: HCPCS

## 2022-10-19 PROCEDURE — 1123F ACP DISCUSS/DSCN MKR DOCD: CPT

## 2022-10-19 PROCEDURE — 3023F SPIROM DOC REV: CPT

## 2022-10-19 PROCEDURE — G8427 DOCREV CUR MEDS BY ELIG CLIN: HCPCS

## 2022-10-19 PROCEDURE — 99214 OFFICE O/P EST MOD 30 MIN: CPT

## 2022-10-19 PROCEDURE — 1090F PRES/ABSN URINE INCON ASSESS: CPT

## 2022-10-19 PROCEDURE — G8484 FLU IMMUNIZE NO ADMIN: HCPCS

## 2022-10-19 PROCEDURE — 3017F COLORECTAL CA SCREEN DOC REV: CPT

## 2022-10-19 PROCEDURE — G8399 PT W/DXA RESULTS DOCUMENT: HCPCS

## 2022-10-19 PROCEDURE — G8417 CALC BMI ABV UP PARAM F/U: HCPCS

## 2022-10-19 RX ORDER — ALBUTEROL SULFATE 2.5 MG/3ML
2.5 SOLUTION RESPIRATORY (INHALATION) EVERY 6 HOURS PRN
Qty: 120 EACH | Refills: 11 | Status: SHIPPED | OUTPATIENT
Start: 2022-10-19 | End: 2023-10-19

## 2022-10-19 RX ORDER — NEBULIZER ACCESSORIES
1 EACH MISCELLANEOUS EVERY 6 HOURS PRN
Qty: 1 EACH | Refills: 0 | Status: SHIPPED | OUTPATIENT
Start: 2022-10-19

## 2022-10-19 RX ORDER — GUAIFENESIN 600 MG/1
600 TABLET, EXTENDED RELEASE ORAL 2 TIMES DAILY
Qty: 60 TABLET | Refills: 6 | Status: SHIPPED | OUTPATIENT
Start: 2022-10-19 | End: 2022-11-18

## 2022-10-19 ASSESSMENT — ENCOUNTER SYMPTOMS
SINUS PAIN: 0
SINUS PRESSURE: 0
CHEST TIGHTNESS: 0
SPUTUM PRODUCTION: 1
RHINORRHEA: 0
SHORTNESS OF BREATH: 1
COUGH: 1
WHEEZING: 1

## 2022-10-19 ASSESSMENT — COPD QUESTIONNAIRES: COPD: 1

## 2022-11-01 ENCOUNTER — OFFICE VISIT (OUTPATIENT)
Dept: FAMILY MEDICINE CLINIC | Age: 67
End: 2022-11-01
Payer: MEDICARE

## 2022-11-01 VITALS
RESPIRATION RATE: 16 BRPM | BODY MASS INDEX: 27.99 KG/M2 | TEMPERATURE: 96.9 F | WEIGHT: 124.4 LBS | OXYGEN SATURATION: 97 % | SYSTOLIC BLOOD PRESSURE: 104 MMHG | DIASTOLIC BLOOD PRESSURE: 72 MMHG | HEART RATE: 84 BPM | HEIGHT: 56 IN

## 2022-11-01 DIAGNOSIS — J44.9 CHRONIC OBSTRUCTIVE PULMONARY DISEASE, UNSPECIFIED COPD TYPE (HCC): ICD-10-CM

## 2022-11-01 DIAGNOSIS — I10 ESSENTIAL HYPERTENSION: ICD-10-CM

## 2022-11-01 DIAGNOSIS — M48.061 SPINAL STENOSIS OF LUMBAR REGION WITHOUT NEUROGENIC CLAUDICATION: Primary | ICD-10-CM

## 2022-11-01 PROCEDURE — 3074F SYST BP LT 130 MM HG: CPT | Performed by: FAMILY MEDICINE

## 2022-11-01 PROCEDURE — 1123F ACP DISCUSS/DSCN MKR DOCD: CPT | Performed by: FAMILY MEDICINE

## 2022-11-01 PROCEDURE — 3078F DIAST BP <80 MM HG: CPT | Performed by: FAMILY MEDICINE

## 2022-11-01 PROCEDURE — 99214 OFFICE O/P EST MOD 30 MIN: CPT | Performed by: FAMILY MEDICINE

## 2022-11-01 RX ORDER — NORTRIPTYLINE HYDROCHLORIDE 25 MG/1
25 CAPSULE ORAL NIGHTLY
Qty: 90 CAPSULE | Refills: 3 | Status: SHIPPED | OUTPATIENT
Start: 2022-11-01

## 2022-11-01 NOTE — PROGRESS NOTES
Willy Troy is a 69 y.o. female that presents for     Chief Complaint   Patient presents with    Chronic Pain         /72   Pulse 84   Temp 96.9 °F (36.1 °C) (Infrared)   Resp 16   Ht 4' 8\" (1.422 m)   Wt 124 lb 6.4 oz (56.4 kg)   SpO2 97%   BMI 27.89 kg/m²       HPI      Had her inhalars recently changed by pulm. Breathing is 'a little better'. Has been taking Mucinex with some improvement. HTN    Does patient check BP regularly at home? - Yes - controlled  Current Medication regimen - metoprolol 25mg  Tolerating medications well? - yes    Shortness of breath or chest pain? No  Headache or visual complaints? No  Neurologic changes like confusion? no  Extremity edema? No    BP Readings from Last 3 Encounters:   11/01/22 104/72   10/19/22 122/68   08/02/22 114/71       Chronic Pain    HPI:    Patient has chronic pain of the back and legs. 'I'm dealing with it'. States that she is starting to noticing more burning pain recently. [address] [facility] does help to preven the pain from getting severe. Pain control: inadequate  Improvement in function and ADLs? yes  Current Medications:  Norco q 6 hrs  Escalation of dosage recently?  no    Evidence for abuse or diversion?  no   Seen specialist or pain clinic?: no  Pain contract: yes    Controlled Substances Monitoring:        Health Maintenance   Topic Date Due    COVID-19 Vaccine (1) Never done    Hepatitis C screen  Never done    DTaP/Tdap/Td vaccine (1 - Tdap) Never done    Shingles vaccine (1 of 2) Never done    Pneumococcal 65+ years Vaccine (2 - PPSV23 if available, else PCV20) 10/23/2020    Lipids  11/02/2021    Annual Wellness Visit (AWV)  03/02/2022    Flu vaccine (1) 08/01/2022    Low dose CT lung screening  10/06/2022    Breast cancer screen  10/04/2022    Depression Screen  04/07/2023    Colorectal Cancer Screen  06/17/2029    DEXA (modify frequency per FRAX score)  Completed    Hepatitis A vaccine  Aged Out    Hib vaccine  Aged Out Meningococcal (ACWY) vaccine  Aged Out       Past Medical History:   Diagnosis Date    Breast cancer ([facility] 2016    Bronchitis     CAD (coronary artery disease)     Cerebral artery occlusion with cerebral infarction (HCC)     TIA    COPD (chronic obstructive pulmonary disease) ([facility]     Diverticulosis     Essential hypertension 5/10/2017    Foot drop, right foot     GERD (gastroesophageal reflux disease)     History of therapeutic radiation 2016    IBS (irritable bowel syndrome)     Kidney stone 2014    Malignant neoplasm of upper-inner quadrant of left female breast ([facility] 9/13/2016    breast    MI (myocardial infarction) ([facility] 10/2018    PONV (postoperative nausea and vomiting)        Past Surgical History:   Procedure Laterality Date    [address] [facility] LUMPECTOMY Left 2016    BREAST SURGERY Left 2016    exc. lymph nodes, lumpectomy    CARPAL TUNNEL RELEASE Right 06/2017    OIO    CERVICAL FUSION      COLONOSCOPY  unsure    Dr. Mayo Senior    COLONOSCOPY  10/10/2014    Dr. Tammie Aggarwal    COLONOSCOPY Left 06/17/2019    COLONOSCOPY POLYPECTOMY HOT BIOPSY performed by Alexx Benson MD at [address] [facility]  10/27/2018    [address] [facility] [address]  12/16/2014    HYSTERECTOMY (CERVIX STATUS UNKNOWN)  2014    HYSTERECTOMY, VAGINAL  12/16/2014    KIDNEY STONE SURGERY  2014    LAMINECTOMY  06/12/2018    LUMBAR FUSION N/A 02/19/2020    LUMBAR LAMINECTOMY PSF L3-S1 WITH CAPTIVA performed by Monse Lopez MD at [address] [facility]      Pain injection    [place] OFFICE/OUTPT VISIT,PROCEDURE ONLY N/A 06/12/2018    ACDF C5-6 performed by Adolfo Perez MD at [facility]        Social History     Tobacco Use    Smoking status: Former     Packs/day: 1.50     Years: 20.00     Pack years: 30.00     Types: Cigarettes     Quit date: 10/27/2018     Years since quittin.0    Smokeless tobacco: Never   Vaping Use    Vaping Use: Never used   Substance Use Topics    Alcohol use: No    Drug use: No       Family History   Problem Relation Age of Onset    Cancer Mother         renal cell carcinoma    Kidney Cancer Mother 46    Cirrhosis Father     Diabetes Sister     Kidney Cancer Sister 54    Cancer Sister     Heart Disease Sister         CHF    Diabetes Brother     High Blood Pressure Brother     Breast Cancer Paternal Aunt 54    Ovarian Cancer Neg Hx     Stroke Neg Hx          I have reviewed the patient's past medical history, past surgical history, allergies, medications, social and family history and I have made updates where appropriate. Review of Systems        PHYSICAL EXAM:  /72   Pulse 84   Temp 96.9 °F (36.1 °C) (Infrared)   Resp 16   Ht 4' 8\" (1.422 m)   Wt 124 lb 6.4 oz (56.4 kg)   SpO2 97%   BMI 27.89 kg/m²     Physical Exam  Vitals and nursing note reviewed. Constitutional:       General: She is not in acute distress. Appearance: She is well-developed. HENT:      Head: Normocephalic and atraumatic. Right Ear: Hearing and external ear normal.      Left Ear: Hearing and external ear normal.      Nose: Nose normal.   Eyes:      General:         Right eye: No discharge. Left eye: No discharge. Conjunctiva/sclera: Conjunctivae normal.   Neck:      Thyroid: No thyromegaly. Trachea: No tracheal deviation. Cardiovascular:      Rate and Rhythm: Normal rate and regular rhythm. Heart sounds: Normal heart sounds. No murmur heard. No friction rub. No gallop. Pulmonary:      Effort: Pulmonary effort is normal. No respiratory distress. Breath sounds: No wheezing or rales. Chest:      Chest wall: No tenderness. Musculoskeletal:         General: No deformity. Cervical back: Normal range of motion and neck supple. Lymphadenopathy:      Cervical: No cervical adenopathy. Skin:     General: Skin is warm and dry. Findings: No erythema or rash. Neurological:      Mental Status: She is alert. Motor: No abnormal muscle tone. Coordination: Coordination normal.   Psychiatric:         Behavior: Behavior normal.         Thought Content: Thought content normal.         Judgment: Judgment normal.           ASSESSMENT & PLAN  Kacie Rivera was seen today for chronic pain. Diagnoses and all orders for this visit:    Spinal stenosis of lumbar region without neurogenic claudication  -     nortriptyline (PAMELOR) 25 MG capsule; Take 1 capsule by mouth nightly    Essential hypertension    Chronic obstructive pulmonary disease, unspecified COPD type ([facility]      -Continue norco for pain control, has tolerated this better than other meds  -Other chronic issues are stable, continue current medications  -Advised to call if any issues        Return in about 6 months (around 5/1/2023). The most recent results of the following tests were reviewed with the patient today: none     All copied or forwarded information in the progress note was verified by me to be accurate at the time of visit  Patient's past medical, surgical, social and family history were reviewed and updated     All patient questions answered. Patient voiced understanding.

## 2022-11-07 DIAGNOSIS — M48.061 SPINAL STENOSIS OF LUMBAR REGION WITHOUT NEUROGENIC CLAUDICATION: ICD-10-CM

## 2022-11-07 RX ORDER — HYDROCODONE BITARTRATE AND ACETAMINOPHEN 5; 325 MG/1; MG/1
1 TABLET ORAL EVERY 6 HOURS PRN
Qty: 120 TABLET | Refills: 0 | Status: SHIPPED | OUTPATIENT
Start: 2022-11-07 | End: 2022-12-07

## 2022-11-07 NOTE — TELEPHONE ENCOUNTER
Recent Visits  Date Type Provider Dept   11/01/22 Office Visit DO Jeovany Michaels Fm [facility] Pct   07/26/22 Office Visit Teresa Mckayver, [address] Pct   04/20/22 Office Visit GREGORIO Antony CNPx  [address]   04/07/22 Office Visit GREGORIO Antony CNPx  [address]   10/27/21 Office Visit Teresa MckayverQuan [address]   07/13/21 Office Visit Teresa Nicolasa, [address] recent visits within past 540 days with a meds authorizing provider and meeting all other requirements  Future Appointments  No visits were found meeting these conditions.   Showing future appointments within next 150 days with a meds authorizing provider and meeting all other requirements      Future Appointments   Date Time Provider Melissa Jin   1/9/2023  2:15 PM Marine Carney MD N SRPX Heart [facility]   1/19/2023  3:00 PM GREGORIO Salinas CNP Med [facility]   5/2/2023  2:40 PM DO JACKLYN Michaels PCT [facility]   7/17/2023  2:20 PM STR CT IMAGING RM1  [facility] OUT EXP STR Radiolog   7/20/2023  3:00 PM GREGORIO Salinas CNP Pulm Med [facility]   8/2/2023 11:00 AM Giselle Cummins MD N Oncology [facility]

## 2022-12-13 ENCOUNTER — HOSPITAL ENCOUNTER (OUTPATIENT)
Dept: WOMENS IMAGING | Age: 67
Discharge: HOME | End: 2022-12-13
Payer: MEDICARE

## 2022-12-13 DIAGNOSIS — Z12.31 VISIT FOR SCREENING MAMMOGRAM: ICD-10-CM

## 2022-12-13 PROCEDURE — 77063 BREAST TOMOSYNTHESIS BI: CPT

## 2022-12-15 DIAGNOSIS — M48.061 SPINAL STENOSIS OF LUMBAR REGION WITHOUT NEUROGENIC CLAUDICATION: ICD-10-CM

## 2022-12-15 RX ORDER — HYDROCODONE BITARTRATE AND ACETAMINOPHEN 5; 325 MG/1; MG/1
1 TABLET ORAL EVERY 6 HOURS PRN
Qty: 120 TABLET | Refills: 0 | Status: SHIPPED | OUTPATIENT
Start: 2022-12-15 | End: 2023-01-14

## 2023-01-09 ENCOUNTER — TELEPHONE (OUTPATIENT)
Dept: FAMILY MEDICINE CLINIC | Age: 68
End: 2023-01-09

## 2023-01-09 ENCOUNTER — OFFICE VISIT (OUTPATIENT)
Dept: CARDIOLOGY CLINIC | Age: 68
End: 2023-01-09
Payer: MEDICARE

## 2023-01-09 VITALS
HEART RATE: 81 BPM | DIASTOLIC BLOOD PRESSURE: 83 MMHG | HEIGHT: 56 IN | WEIGHT: 124.8 LBS | SYSTOLIC BLOOD PRESSURE: 125 MMHG | BODY MASS INDEX: 28.07 KG/M2

## 2023-01-09 DIAGNOSIS — I25.10 CORONARY ARTERY DISEASE INVOLVING NATIVE CORONARY ARTERY OF NATIVE HEART WITHOUT ANGINA PECTORIS: Primary | ICD-10-CM

## 2023-01-09 DIAGNOSIS — I10 ESSENTIAL HYPERTENSION: ICD-10-CM

## 2023-01-09 PROCEDURE — G8484 FLU IMMUNIZE NO ADMIN: HCPCS | Performed by: INTERNAL MEDICINE

## 2023-01-09 PROCEDURE — 93000 ELECTROCARDIOGRAM COMPLETE: CPT | Performed by: INTERNAL MEDICINE

## 2023-01-09 PROCEDURE — 3017F COLORECTAL CA SCREEN DOC REV: CPT | Performed by: INTERNAL MEDICINE

## 2023-01-09 PROCEDURE — G8399 PT W/DXA RESULTS DOCUMENT: HCPCS | Performed by: INTERNAL MEDICINE

## 2023-01-09 PROCEDURE — 1090F PRES/ABSN URINE INCON ASSESS: CPT | Performed by: INTERNAL MEDICINE

## 2023-01-09 PROCEDURE — G8417 CALC BMI ABV UP PARAM F/U: HCPCS | Performed by: INTERNAL MEDICINE

## 2023-01-09 PROCEDURE — 1036F TOBACCO NON-USER: CPT | Performed by: INTERNAL MEDICINE

## 2023-01-09 PROCEDURE — G8427 DOCREV CUR MEDS BY ELIG CLIN: HCPCS | Performed by: INTERNAL MEDICINE

## 2023-01-09 PROCEDURE — 3079F DIAST BP 80-89 MM HG: CPT | Performed by: INTERNAL MEDICINE

## 2023-01-09 PROCEDURE — 1123F ACP DISCUSS/DSCN MKR DOCD: CPT | Performed by: INTERNAL MEDICINE

## 2023-01-09 PROCEDURE — 3074F SYST BP LT 130 MM HG: CPT | Performed by: INTERNAL MEDICINE

## 2023-01-09 PROCEDURE — 99212 OFFICE O/P EST SF 10 MIN: CPT | Performed by: INTERNAL MEDICINE

## 2023-01-09 NOTE — PROGRESS NOTES
[address] [facility] [address]  [facility] [address]  Dept: [phone]  Dept Fax: [phone]  Loc: [phone]    Visit Date: 1/9/2023    Ms. Michel Conde is a 69 y.o. female  who presented for:  Chief Complaint   Patient presents with    1 Year Follow Up       HPI:   HPI   70 yo F s/p RCA STEMI 2018 who presents for follow-up. Recurrent back injections. No NTG SL prn. She has some chronic opiates. BP and HR well controlled. No chest pain, angina, HORAN, orthopnea, PND, sob at rest, palpitations, LE edema, or syncope. Current Outpatient Medications:     HYDROcodone-acetaminophen (NORCO) 5-325 MG per tablet, Take 1 tablet by mouth every 6 hours as needed for Pain for up to 30 days. Intended supply: 30 days, Disp: 120 tablet, Rfl: 0    nortriptyline (PAMELOR) 25 MG capsule, Take 1 capsule by mouth nightly, Disp: 90 capsule, Rfl: 3    tiotropium-olodaterol (STIOLTO RESPIMAT) 2.5-2.5 MCG/ACT AERS, Inhale 2 puffs into the lungs daily, Disp: 4 g, Rfl: 11    Respiratory Therapy Supplies (NEBULIZER AIR TUBE/PLUGS) MISC, 1 each by Does not apply route every 6 hours as needed (Wheezing/shortness of breath) Please provide nebulizer kit and supplies. , Disp: 1 each, Rfl: 0    albuterol (PROVENTIL) (2.5 MG/3ML) 0.083% nebulizer solution, Take 3 mLs by nebulization every 6 hours as needed for Wheezing or Shortness of Breath, Disp: 120 each, Rfl: 11    sodium chloride (BRONCHO SALINE) 0.9 % inhaler solution, Use 3 mL by nebulization three times per day as needed for throat/airway secretions. , Disp: 270 mL, Rfl: 11    glycopyrrolate-formoterol (BEVESPI AEROSPHERE) 9-4.8 MCG/ACT AERO, Inhale 2 puffs into the lungs in the morning and 2 puffs before bedtime. , Disp: 10.7 g, Rfl: 11    albuterol sulfate HFA (PROVENTIL;VENTOLIN;PROAIR) 108 (90 Base) MCG/ACT inhaler, INHALE 2 PUFFS BY MOUTH EVERY 6 HOURS AS NEEDED FOR WHEEZING or shortness of breath, Disp: 18 g, Rfl: 11    amLODIPine (NORVASC) 10 MG tablet, Take 1 tablet by mouth once daily, Disp: 90 tablet, Rfl: 3    metoprolol succinate (TOPROL XL) 25 MG extended release tablet, Take 1 tablet by mouth daily Taking it nightly, Disp: 90 tablet, Rfl: 3    atorvastatin (LIPITOR) 80 MG tablet, Take 1 tablet by mouth nightly, Disp: 90 tablet, Rfl: 3    TOVIAZ 4 MG TB24 ER tablet, Take 1 tablet by mouth once daily, Disp: 90 tablet, Rfl: 3    nitroGLYCERIN (NITROSTAT) 0.4 MG SL tablet, Place 1 tablet under the tongue every 5 minutes as needed for Chest pain up to max of 3 total doses. If no relief after 1 dose, call 911., Disp: 25 tablet, Rfl: 3    Handicap Placard MISC, by Does not apply route Expires 3/5/2024, Disp: 1 each, Rfl: 0    fluticasone (FLONASE) 50 MCG/ACT nasal spray, 1 spray by Nasal route daily, Disp: 1 Bottle, Rfl: 3    aspirin 81 MG EC tablet, Take 1 tablet by mouth daily, Disp: 30 tablet, Rfl: 3    Past Medical History  Anna Hester  has a past medical history of Breast cancer ([facility] [address] Bronchitis, CAD (coronary artery disease), Cerebral artery occlusion with cerebral infarction ([facility] [address] COPD (chronic obstructive pulmonary disease) ([facility] [address] Diverticulosis, Essential hypertension, Foot drop, right foot, GERD (gastroesophageal reflux disease), History of therapeutic radiation, IBS (irritable bowel syndrome), Kidney stone, Malignant neoplasm of upper-inner quadrant of left female breast ([facility] [address] MI (myocardial infarction) ([facility] [address] and PONV (postoperative nausea and vomiting). Social History  Anna Hester  reports that she quit smoking about 4 years ago. Her smoking use included cigarettes. She has a 30.00 pack-year smoking history. She has never used smokeless tobacco. She reports that she does not drink alcohol and does not use drugs. Family History  Anna Hester family history includes Breast Cancer (age of onset: 54) in her paternal aunt;  Cancer in her mother and sister; Cirrhosis in her father; Diabetes in her brother and sister; Heart Disease in her sister; High Blood Pressure in her brother; Kidney Cancer (age of onset: 46) in her mother; Kidney Cancer (age of onset: 54) in her sister. There is no family history of bicuspid aortic valve, aneurysms, heart transplant, pacemakers, defibrillators, or sudden cardiac death. Past Surgical History   Past Surgical History:   Procedure Laterality Date    BACK SURGERY  1998    [facility]    BREAST LUMPECTOMY Left 2016    BREAST SURGERY Left 2016    exc. lymph nodes, lumpectomy    CARPAL TUNNEL RELEASE Right 06/2017    [facility]    CERVICAL FUSION      COLONOSCOPY  unsure    Dr. Shore People    COLONOSCOPY  10/10/2014    Dr. Abdiel Bradford Left 06/17/2019    COLONOSCOPY POLYPECTOMY HOT BIOPSY performed by King Morfin MD at [address] [place] [address]  10/27/2018    [address] SURGERY  12/16/2014    HYSTERECTOMY (CERVIX STATUS UNKNOWN)  2014    HYSTERECTOMY, VAGINAL  12/16/2014    KIDNEY STONE SURGERY  2014    LAMINECTOMY  06/12/2018    LUMBAR FUSION N/A 02/19/2020    LUMBAR LAMINECTOMY PSF L3-S1 WITH CAPTIVA performed by Boom Gibbons MD at [address] [address] [facility]      Pain injection    IA OFFICE/OUTPT VISIT,PROCEDURE ONLY N/A 06/12/2018    ACDF C5-6 performed by Nelda Duggan MD at [facility] Left        Review of Systems   Constitutional: Negative for chills and fever  HENT: Negative for congestion, sinus pressure, sneezing and sore throat. Eyes: Negative for pain, discharge, redness and itching. Respiratory: Negative for apnea, cough  Gastrointestinal: Negative for blood in stool, constipation, diarrhea   Endocrine: Negative for cold intolerance, heat intolerance, polydipsia. Genitourinary: Negative for dysuria, enuresis, flank pain and hematuria.    Musculoskeletal: Negative for arthralgias, joint swelling and neck pain. Neurological: Negative for numbness and headaches. Psychiatric/Behavioral: Negative for agitation, confusion, decreased concentration and dysphoric mood. Objective:     /83   Pulse 81   Ht 4' 8\" (1.422 m)   Wt 124 lb 12.8 oz (56.6 kg)   BMI 27.98 kg/m²     Wt Readings from Last 3 Encounters:   01/09/23 124 lb 12.8 oz (56.6 kg)   11/01/22 124 lb 6.4 oz (56.4 kg)   10/19/22 126 lb 6.4 oz (57.3 kg)     BP Readings from Last 3 Encounters:   01/09/23 125/83   11/01/22 104/72   10/19/22 122/68       Nursing note and vitals reviewed. Physical Exam   Constitutional: Oriented to person, place, and time. Appears well-developed and well-nourished. HENT:   Head: Normocephalic and atraumatic. Eyes: EOM are normal. Pupils are equal, round, and reactive to light. Neck: Normal range of motion. Neck supple. No JVD present. Cardiovascular: Normal rate, regular rhythm, normal heart sounds and intact distal pulses. No murmur heard. Pulmonary/Chest: Effort normal and breath sounds normal. No respiratory distress. No wheezes. No rales. Abdominal: Soft. Bowel sounds are normal. No distension. There is no tenderness. Musculoskeletal: Normal range of motion. No edema. Neurological: Alert and oriented to person, place, and time. No cranial nerve deficit. Coordination normal.   Skin: Skin is warm and dry. Psychiatric: Normal mood and affect.        Lab Results   Component Value Date/Time    CKTOTAL 50 04/05/2018 03:44 PM       Lab Results   Component Value Date/Time    WBC 8.9 02/23/2020 08:35 AM    RBC 3.45 02/23/2020 08:35 AM    HGB 11.6 03/16/2020 01:47 PM    HCT 37.6 03/16/2020 01:47 PM    MCV 90.7 02/23/2020 08:35 AM    MCH 29.9 02/23/2020 08:35 AM    MCHC 32.9 02/23/2020 08:35 AM    RDW 12.7 05/18/2018 10:17 AM     02/23/2020 08:35 AM    MPV 9.5 02/23/2020 08:35 AM       Lab Results   Component Value Date/Time     11/02/2020 12:17 PM    K 3.9 11/02/2020 12:17 PM     11/02/2020 12:17 PM    CO2 24 11/02/2020 12:17 PM    BUN 16 11/02/2020 12:17 PM    LABALBU 4.4 11/02/2020 12:17 PM    CREATININE 0.7 07/20/2021 04:19 PM    CALCIUM 9.4 11/02/2020 12:17 PM    LABGLOM >90 11/02/2020 12:17 PM    GLUCOSE 95 11/02/2020 12:17 PM       Lab Results   Component Value Date/Time    ALKPHOS 106 11/02/2020 12:17 PM    ALT 13 11/02/2020 12:17 PM    AST 14 11/02/2020 12:17 PM    PROT 7.0 11/02/2020 12:17 PM    BILITOT 0.4 11/02/2020 12:17 PM    BILIDIR <0.2 02/06/2019 12:20 PM    LABALBU 4.4 11/02/2020 12:17 PM       Lab Results   Component Value Date/Time    MG 1.9 10/28/2018 01:15 AM       Lab Results   Component Value Date    INR 0.92 05/18/2018         No results found for: LABA1C    Lab Results   Component Value Date/Time    TRIG 83 11/02/2020 12:17 PM    HDL 71 11/02/2020 12:17 PM    LDLCALC 44 11/02/2020 12:17 PM       Lab Results   Component Value Date/Time    TSH 1.260 04/02/2019 02:59 PM         Testing Reviewed:      I have individually reviewed the cardiac test below:    ECHO: No results found for this or any previous visit. Assessment/Plan   RCA STEMI PCI, 2018 - tolerating ASA, taking statin, LDL well controlled. Preserved EF  RVSP 29 mmHg  Orthopedic back issues  Stress - negative for ischemia, 9/2019  Doing well, stable, no issues. RF management. Discussed diet/exercise/BP/weight loss/health lifestyle choices/lipids; the patient understands the goals and will try to comply.     Disposition:  1 year           Electronically signed by Joo Hernandez MD   1/9/2023 at 2:42 PM EST

## 2023-01-13 DIAGNOSIS — M48.061 SPINAL STENOSIS OF LUMBAR REGION WITHOUT NEUROGENIC CLAUDICATION: ICD-10-CM

## 2023-01-13 RX ORDER — HYDROCODONE BITARTRATE AND ACETAMINOPHEN 5; 325 MG/1; MG/1
1 TABLET ORAL EVERY 6 HOURS PRN
Qty: 120 TABLET | Refills: 0 | Status: SHIPPED | OUTPATIENT
Start: 2023-01-13 | End: 2023-02-12

## 2023-01-13 NOTE — TELEPHONE ENCOUNTER
Future Appointments   Date Time Provider Melissa Jin   1/19/2023  3:00 PM GREGORIO Ernst CNP [facility] [address]   5/2/2023  2:20 PM GREGORIO Stoll CNP PCT [address]   7/17/2023  2:20 PM STR CT IMAGING RM1  [facility] OUT EXP STR Radiolog   7/20/2023  3:00 PM GREGORIO Ernst CNP N Pulm Med [address]   8/2/2023 11:00 AM Keyla Hernanedz MD N Oncology [address]   1/8/2024  1:00 PM GREGORIO Manjarrez CNP N [facility] Heart [address]

## 2023-01-16 DIAGNOSIS — N32.81 OAB (OVERACTIVE BLADDER): ICD-10-CM

## 2023-01-16 RX ORDER — FESOTERODINE FUMARATE 4 MG/1
TABLET, EXTENDED RELEASE ORAL
Qty: 30 TABLET | Refills: 0 | Status: SHIPPED | OUTPATIENT
Start: 2023-01-16

## 2023-01-16 NOTE — TELEPHONE ENCOUNTER
Future Appointments   Date Time Provider Melissa Jin   1/19/2023  3:00 PM GREGORIO Chua CNP [facility] [address]   5/2/2023  2:20 PM GREGORIO Abreu CNP PCT [facility]   7/17/2023  2:20 PM STR CT IMAGING RM1  [facility] STR[facility] OUT EXP STR Radiolog   7/20/2023  3:00 PM GREGORIO Chua CNP N Pulm Med [facility]   8/2/2023 11:00 AM Jone Evans MD N Oncology [facility]   1/8/2024  1:00 PM GREGORIO Manjarrez CNP N [facility] Heart [facility]

## 2023-01-17 NOTE — PROGRESS NOTES
[facility]    Patient: Oleksandr Torrez, 69 y.o.   : 1955    Patient of Dr. Vargas Tyson   Patient presents with    Follow-up     COPD 3 month f/u med check. HPI  Mikal Primrose is here for follow up for Moderate COPD. At patient's last appointment, she was started on Stiolto. She was also started on Mucinex and saline nebs. She had pulmonary function test with decline in FEV1 and FVC. Overall patient reports respiratory symptoms have been stable since last appointment. Patient reports that she has been taking robitussin and not Mucinex. She has good relief with robitussin. Patient reports that she has had a hoarse voice with her Stiolto inhaler. She admits that she has been inhaling her Stiolto very hard. Patient reports good compliance with inhaled medications (Stiolto). Patient using albuterol 3 times per week on average. Patient reports physical limitation due to respiratory symptoms. Her past medical history is significant for COPD, left breast cancer status post lumpectomy and XRT in 2016, bronchitis, CAD, diverticulosis, hypertension, right foot drop, GERD, IBS, kidney stone, and MI. Following with GI for IBS and reflux - has upcoming appt with Dr. Whitney Strong    Complaints: wheezing  \"some\"  Onset Duration: over a year  Associated symptoms: \"not much\" cough with \"some\" grey phelgm  Pertinent negatives: hemoptysis, chest tightness, and shortness of breath   Risk factors for lung disease: animal exposure and tobacco exposure    Progress History:   Since last visit any new medical issues? No  New ER or hospital visits? No  Any new or changes in medicines? No  Using inhalers? Yes Stiolto and as needed albuterol  Are they helpful? Yes good benefit  Previous inhalers? Breztri - lost voice and Kwaku Mann - never picked up due to cost  Any recent exacerbations?  No  Last PFT: 10/13/22- moderate COPD  Last 6 MWT: None in Epic  Last A1AT: MM     Smoking History:  30 PY history, quit in 2018  Her daughter smokes but goes outside when smoking     Social History:  Patient job history: Retired, previously worked at [employer] as a cook, , and all aspects of the business  She has not had exposure to aerosolized particles or hazardous fumes. (Coal, dust, asbestos, molds ie Hay)  Denies living on a farm that primarily raised crops, livestock or combination  Admits to exposure to pets/animals at home. 1 dog  Denies exposure to tuberculosis. Denies history of glaucoma or urinary retention.      Flu vaccine: did not receive this year  Pneumonia vaccine: PCV13 10/23/2019 and 10/8/2018  COVID-19 vaccine: Not vaccinated and does not wish to receive  Past Medical hx   PMH:  Past Medical History:   Diagnosis Date    Breast cancer ([address] 2016    Bronchitis     CAD (coronary artery disease)     Cerebral artery occlusion with cerebral infarction ([address]     TIA    COPD (chronic obstructive pulmonary disease) ([address]     Diverticulosis     Essential hypertension 5/10/2017    Foot drop, right foot     GERD (gastroesophageal reflux disease)     History of therapeutic radiation 2016    IBS (irritable bowel syndrome)     Kidney stone 2014    Malignant neoplasm of upper-inner quadrant of left female breast ([address] 9/13/2016    breast    MI (myocardial infarction) ([address] 10/2018    PONV (postoperative nausea and vomiting)      SURGICAL HISTORY:  Past Surgical History:   Procedure Laterality Date    [address] [facility] LUMPECTOMY Left 2016    BREAST SURGERY Left 2016    exc. lymph nodes, lumpectomy    CARPAL TUNNEL RELEASE Right 06/2017    [facility]    CERVICAL FUSION      COLONOSCOPY  unsure    Dr. Sage Bautista    COLONOSCOPY  10/10/2014    Dr. Olman Mathis    COLONOSCOPY Left 06/17/2019    COLONOSCOPY POLYPECTOMY HOT BIOPSY performed by Gino Noyola MD at [address] [facility] [address]  10/27/2018    [address] [place] [address] UTERUS  1989    HEMORRHOID SURGERY  2014    HYSTERECTOMY (CERVIX STATUS UNKNOWN)      HYSTERECTOMY, VAGINAL  2014    KIDNEY STONE SURGERY  2014    LAMINECTOMY  2018    LUMBAR FUSION N/A 2020    LUMBAR LAMINECTOMY PSF L3-S1 WITH CAPTIVA performed by Daisy Black MD at [facility]    [facility] OFFICE/OUTPT VISIT,PROCEDURE ONLY N/A 2018    ACDF C5-6 performed by Fredrick Chou MD at [facility]      SOCIAL HISTORY:  Social History     Tobacco Use    Smoking status: Former     Packs/day: 1.50     Years: 20.00     Pack years: 30.00     Types: Cigarettes     Quit date: 10/27/2018     Years since quittin.2    Smokeless tobacco: Never   Vaping Use    Vaping Use: Never used   Substance Use Topics    Alcohol use: No    Drug use: No     ALLERGIES:  Allergies   Allergen Reactions    Naproxen Anaphylaxis    Ultram [Tramadol] Itching and Rash     FAMILY HISTORY:  Family History   Problem Relation Age of Onset    Cancer Mother         renal cell carcinoma    Kidney Cancer Mother 46    Cirrhosis Father     Diabetes Sister     Kidney Cancer Sister 54    Cancer Sister     Heart Disease Sister         CHF    Diabetes Brother     High Blood Pressure Brother     Breast Cancer Paternal Aunt 54    Ovarian Cancer Neg Hx     Stroke Neg Hx      CURRENT MEDICATIONS:  Current Outpatient Medications   Medication Sig Dispense Refill    albuterol sulfate HFA (PROVENTIL;VENTOLIN;PROAIR) 108 (90 Base) MCG/ACT inhaler INHALE 2 PUFFS BY MOUTH EVERY 6 HOURS AS NEEDED FOR WHEEZING or shortness of breath 18 g 11    fesoterodine (TOVIAZ) 4 MG TB24 ER tablet Take 1 tablet by mouth once daily 30 tablet 0    HYDROcodone-acetaminophen (NORCO) 5-325 MG per tablet Take 1 tablet by mouth every 6 hours as needed for Pain for up to 30 days.  Intended supply: 30 days 120 tablet 0    nortriptyline (PAMELOR) 25 MG capsule Take 1 capsule by mouth nightly 90 capsule 3    tiotropium-olodaterol (STIOLTO RESPIMAT) 2.5-2.5 MCG/ACT AERS Inhale 2 puffs into the lungs daily 4 g 11    Respiratory Therapy Supplies (NEBULIZER AIR TUBE/PLUGS) MISC 1 each by Does not apply route every 6 hours as needed (Wheezing/shortness of breath) Please provide nebulizer kit and supplies. 1 each 0    albuterol (PROVENTIL) (2.5 MG/3ML) 0.083% nebulizer solution Take 3 mLs by nebulization every 6 hours as needed for Wheezing or Shortness of Breath 120 each 11    sodium chloride (BRONCHO SALINE) 0.9 % inhaler solution Use 3 mL by nebulization three times per day as needed for throat/airway secretions. 270 mL 11    amLODIPine (NORVASC) 10 MG tablet Take 1 tablet by mouth once daily 90 tablet 3    metoprolol succinate (TOPROL XL) 25 MG extended release tablet Take 1 tablet by mouth daily Taking it nightly 90 tablet 3    atorvastatin (LIPITOR) 80 MG tablet Take 1 tablet by mouth nightly 90 tablet 3    nitroGLYCERIN (NITROSTAT) 0.4 MG SL tablet Place 1 tablet under the tongue every 5 minutes as needed for Chest pain up to max of 3 total doses. If no relief after 1 dose, call 911. 25 tablet 3    Handicap Placard MISC by Does not apply route Expires 3/5/2024 1 each 0    fluticasone (FLONASE) 50 MCG/ACT nasal spray 1 spray by Nasal route daily 1 Bottle 3    aspirin 81 MG EC tablet Take 1 tablet by mouth daily 30 tablet 3     No current facility-administered medications for this visit. Yaritza DAVALOS   Review of Systems   Constitutional:  Negative for appetite change, fever and unexpected weight change. HENT:  Negative for congestion, postnasal drip, rhinorrhea, sinus pressure, sinus pain and sneezing. Respiratory:  Positive for cough and wheezing. Negative for chest tightness and shortness of breath. Denies hemoptysis   Cardiovascular:  Negative for chest pain, palpitations and leg swelling.         Following with Dr. Jf Rose Gastrointestinal:         IBS - indigestion - reflux burning in chest   Genitourinary:  Negative for difficulty urinating. Allergic/Immunologic: Negative for environmental allergies. Physical exam   /60   Pulse 90   Temp 98.3 °F (36.8 °C)   Ht 4' 8\" (1.422 m)   Wt 124 lb 9.6 oz (56.5 kg)   SpO2 96%   BMI 27.93 kg/m²    Wt Readings from Last 3 Encounters:   01/19/23 124 lb 9.6 oz (56.5 kg)   01/09/23 124 lb 12.8 oz (56.6 kg)   11/01/22 124 lb 6.4 oz (56.4 kg)       Physical Exam  Constitutional:       General: She is not in acute distress. Comments: BMI 27.9   HENT:      Head: Normocephalic and atraumatic. Right Ear: External ear normal.      Left Ear: External ear normal.      Mouth/Throat:      Mouth: Mucous membranes are moist.      Pharynx: No oropharyngeal exudate or posterior oropharyngeal erythema. Eyes:      General:         Right eye: No discharge. Left eye: No discharge. Cardiovascular:      Rate and Rhythm: Normal rate and regular rhythm. Pulmonary:      Effort: Pulmonary effort is normal. No respiratory distress. Breath sounds: No wheezing, rhonchi or rales. Comments: Diminished breath sounds  Chest:      Chest wall: No tenderness. Musculoskeletal:      Cervical back: Neck supple. Right lower leg: No edema. Left lower leg: No edema. Skin:     General: Skin is warm and dry. Neurological:      General: No focal deficit present. Mental Status: She is alert. Psychiatric:         Mood and Affect: Mood normal.         Behavior: Behavior normal.         Thought Content:  Thought content normal.        Results   Lung Nodule Screening     [x] Qualifies    [] Does not qualify   [] Declined    [] Completed  30 PY history, quit in 2018   The USPSTF recommends annual screening for lung cancer with low-dose computed tomography (LDCT) in adults aged 48 to 80 years who have a 20 pack-year smoking history and currently smoke or have quit within the past 15 years. Screening should be discontinued once a person has not smoked for 15 years or develops a health problem that substantially limits life expectancy or the ability or willingness to have curative lung surgery. Assessment      Diagnosis Orders   1. Stage 2 moderate COPD by GOLD classification (HCC)  albuterol sulfate HFA (PROVENTIL;VENTOLIN;PROAIR) 108 (90 Base) MCG/ACT inhaler    Spirometry With Bronchodilator      2. Personal history of tobacco use        3. Nodule of lower lobe of right lung              Plan   1. Stage 2 moderate COPD by GOLD classification ([address]  -Symptoms well controlled on Stiolto. Advised patient appropriate inhalation of this inhaler. I also advised her to gargle with warm salt water to help alleviate hoarse voice. Advised patient to call if hoarseness does not resolve. Will continue Stiolto  -Continue albuterol inhaler and nebulizer, one or the other, every 6 hours as needed for shortness of breath or wheezing   -Continue robitussin  -Update Spirometry With Bronchodilator in July  -Vaccinations reviewed    2. Personal history of tobacco use  -Keep CT Lung Screening in July    3. Nodule of lower lobe of right lung  -Keep CT Chest in July to follow    Advised patient to call office with any changes, questions, or concerns regarding respiratory status or issues with prescribed medications    Return in about 6 months (around 7/19/2023) for COPD with Livier sargent.        Electronically signed by GREGORIO Dozier CNP on 1/19/2023 at 3:35 PM     (Please note that portions of this note may have been completed with a voice recognition program. Efforts were made to edit the dictation but occasionally words are mis-transcribed)

## 2023-01-19 ENCOUNTER — OFFICE VISIT (OUTPATIENT)
Dept: PULMONOLOGY | Age: 68
End: 2023-01-19
Payer: MEDICARE

## 2023-01-19 VITALS
OXYGEN SATURATION: 96 % | WEIGHT: 124.6 LBS | DIASTOLIC BLOOD PRESSURE: 60 MMHG | BODY MASS INDEX: 28.03 KG/M2 | HEART RATE: 90 BPM | SYSTOLIC BLOOD PRESSURE: 102 MMHG | TEMPERATURE: 98.3 F | HEIGHT: 56 IN

## 2023-01-19 DIAGNOSIS — R91.1 NODULE OF LOWER LOBE OF RIGHT LUNG: ICD-10-CM

## 2023-01-19 DIAGNOSIS — Z87.891 PERSONAL HISTORY OF TOBACCO USE: ICD-10-CM

## 2023-01-19 DIAGNOSIS — J44.9 STAGE 2 MODERATE COPD BY GOLD CLASSIFICATION (HCC): Primary | ICD-10-CM

## 2023-01-19 PROCEDURE — G8427 DOCREV CUR MEDS BY ELIG CLIN: HCPCS

## 2023-01-19 PROCEDURE — 99214 OFFICE O/P EST MOD 30 MIN: CPT

## 2023-01-19 PROCEDURE — 1090F PRES/ABSN URINE INCON ASSESS: CPT

## 2023-01-19 PROCEDURE — 3023F SPIROM DOC REV: CPT

## 2023-01-19 PROCEDURE — 3017F COLORECTAL CA SCREEN DOC REV: CPT

## 2023-01-19 PROCEDURE — 1036F TOBACCO NON-USER: CPT

## 2023-01-19 PROCEDURE — 3078F DIAST BP <80 MM HG: CPT

## 2023-01-19 PROCEDURE — G8417 CALC BMI ABV UP PARAM F/U: HCPCS

## 2023-01-19 PROCEDURE — G8399 PT W/DXA RESULTS DOCUMENT: HCPCS

## 2023-01-19 PROCEDURE — 1123F ACP DISCUSS/DSCN MKR DOCD: CPT

## 2023-01-19 PROCEDURE — G8484 FLU IMMUNIZE NO ADMIN: HCPCS

## 2023-01-19 PROCEDURE — 3074F SYST BP LT 130 MM HG: CPT

## 2023-01-19 RX ORDER — ALBUTEROL SULFATE 90 UG/1
AEROSOL, METERED RESPIRATORY (INHALATION)
Qty: 18 G | Refills: 11 | Status: SHIPPED | OUTPATIENT
Start: 2023-01-19

## 2023-01-19 ASSESSMENT — ENCOUNTER SYMPTOMS
SHORTNESS OF BREATH: 0
COUGH: 1
RHINORRHEA: 0
CHEST TIGHTNESS: 0
WHEEZING: 1
SINUS PRESSURE: 0
SINUS PAIN: 0

## 2023-02-08 RX ORDER — ATORVASTATIN CALCIUM 80 MG/1
80 TABLET, FILM COATED ORAL NIGHTLY
Qty: 90 TABLET | Refills: 3 | Status: SHIPPED | OUTPATIENT
Start: 2023-02-08

## 2023-02-16 DIAGNOSIS — N32.81 OAB (OVERACTIVE BLADDER): ICD-10-CM

## 2023-02-16 RX ORDER — FESOTERODINE FUMARATE 4 MG/1
TABLET, EXTENDED RELEASE ORAL
Qty: 30 TABLET | Refills: 0 | Status: SHIPPED | OUTPATIENT
Start: 2023-02-16

## 2023-02-16 NOTE — TELEPHONE ENCOUNTER
Recent Visits  Date Type Provider Dept   11/01/22 Office Visit Loree Aranda, DO [facility] [place] [facility]   07/26/22 Office Visit Loree Aranda, [address] [facility]   04/20/22 Office Visit GREGORIO Helton CNP [facility] [place] [facility]   04/07/22 Office Visit GREGORIO Helton CNP [facility] [place] [facility]   10/27/21 Office Visit Loree Aranda, [address] recent visits within past 540 days with a meds authorizing provider and meeting all other requirements  Future Appointments  Date Type Provider Dept   05/02/23 Appointment GREGORIO Bowman CNP [facility]   Showing future appointments within next 150 days with a meds authorizing provider and meeting all other requirements  Future Appointments   Date Time Provider Melissa Vangi   5/2/2023  2:20 PM GREGORIO Bowman - [address] [address] [facility] [place]   7/17/2023  1:30 PM STR PULMONARY FUNCTION ROOM 1 [facility] [place] [facility]   7/17/2023  2:20 PM STR CT IMAGING RM1  [facility] STR[facility] OUT EXP STR Radiolog   7/20/2023  3:00 PM GREGORIO Hayes CNP N Pulm Med [facility] [place]   8/2/2023 11:00 AM Elysia Vuong MD N Oncology [facility] [place]   1/8/2024  1:00 PM GREGORIO Manjarrez CNP N [facility] [place]

## 2023-02-21 DIAGNOSIS — M48.061 SPINAL STENOSIS OF LUMBAR REGION WITHOUT NEUROGENIC CLAUDICATION: ICD-10-CM

## 2023-02-21 RX ORDER — HYDROCODONE BITARTRATE AND ACETAMINOPHEN 5; 325 MG/1; MG/1
1 TABLET ORAL EVERY 6 HOURS PRN
Qty: 120 TABLET | Refills: 0 | Status: SHIPPED | OUTPATIENT
Start: 2023-02-21 | End: 2023-03-23

## 2023-02-27 RX ORDER — METOPROLOL SUCCINATE 25 MG/1
TABLET, EXTENDED RELEASE ORAL
Qty: 90 TABLET | Refills: 3 | Status: SHIPPED | OUTPATIENT
Start: 2023-02-27

## 2023-03-29 DIAGNOSIS — M48.061 SPINAL STENOSIS OF LUMBAR REGION WITHOUT NEUROGENIC CLAUDICATION: ICD-10-CM

## 2023-03-29 DIAGNOSIS — N32.81 OAB (OVERACTIVE BLADDER): ICD-10-CM

## 2023-03-29 RX ORDER — FESOTERODINE FUMARATE 4 MG/1
4 TABLET, EXTENDED RELEASE ORAL DAILY
Qty: 90 TABLET | Refills: 3 | Status: SHIPPED | OUTPATIENT
Start: 2023-03-29

## 2023-03-29 RX ORDER — HYDROCODONE BITARTRATE AND ACETAMINOPHEN 5; 325 MG/1; MG/1
1 TABLET ORAL EVERY 6 HOURS PRN
Qty: 120 TABLET | Refills: 0 | Status: SHIPPED | OUTPATIENT
Start: 2023-03-29 | End: 2023-04-28

## 2023-04-17 DIAGNOSIS — I10 ESSENTIAL HYPERTENSION: ICD-10-CM

## 2023-04-18 RX ORDER — AMLODIPINE BESYLATE 10 MG/1
10 TABLET ORAL DAILY
Qty: 90 TABLET | Refills: 3 | Status: SHIPPED | OUTPATIENT
Start: 2023-04-18

## 2023-05-02 ENCOUNTER — OFFICE VISIT (OUTPATIENT)
Dept: FAMILY MEDICINE CLINIC | Age: 68
End: 2023-05-02
Payer: MEDICARE

## 2023-05-02 VITALS
RESPIRATION RATE: 16 BRPM | OXYGEN SATURATION: 97 % | HEIGHT: 56 IN | SYSTOLIC BLOOD PRESSURE: 104 MMHG | HEART RATE: 81 BPM | DIASTOLIC BLOOD PRESSURE: 68 MMHG | WEIGHT: 121.6 LBS | BODY MASS INDEX: 27.36 KG/M2 | TEMPERATURE: 97 F

## 2023-05-02 DIAGNOSIS — R09.89 CHEST CONGESTION: ICD-10-CM

## 2023-05-02 DIAGNOSIS — R05.1 ACUTE COUGH: Primary | ICD-10-CM

## 2023-05-02 PROCEDURE — 3078F DIAST BP <80 MM HG: CPT | Performed by: NURSE PRACTITIONER

## 2023-05-02 PROCEDURE — 3017F COLORECTAL CA SCREEN DOC REV: CPT | Performed by: NURSE PRACTITIONER

## 2023-05-02 PROCEDURE — G8399 PT W/DXA RESULTS DOCUMENT: HCPCS | Performed by: NURSE PRACTITIONER

## 2023-05-02 PROCEDURE — 1036F TOBACCO NON-USER: CPT | Performed by: NURSE PRACTITIONER

## 2023-05-02 PROCEDURE — 1090F PRES/ABSN URINE INCON ASSESS: CPT | Performed by: NURSE PRACTITIONER

## 2023-05-02 PROCEDURE — G8417 CALC BMI ABV UP PARAM F/U: HCPCS | Performed by: NURSE PRACTITIONER

## 2023-05-02 PROCEDURE — 1123F ACP DISCUSS/DSCN MKR DOCD: CPT | Performed by: NURSE PRACTITIONER

## 2023-05-02 PROCEDURE — 96372 THER/PROPH/DIAG INJ SC/IM: CPT | Performed by: NURSE PRACTITIONER

## 2023-05-02 PROCEDURE — 3074F SYST BP LT 130 MM HG: CPT | Performed by: NURSE PRACTITIONER

## 2023-05-02 PROCEDURE — 99214 OFFICE O/P EST MOD 30 MIN: CPT | Performed by: NURSE PRACTITIONER

## 2023-05-02 PROCEDURE — G8427 DOCREV CUR MEDS BY ELIG CLIN: HCPCS | Performed by: NURSE PRACTITIONER

## 2023-05-02 RX ORDER — METHYLPREDNISOLONE ACETATE 80 MG/ML
60 INJECTION, SUSPENSION INTRA-ARTICULAR; INTRALESIONAL; INTRAMUSCULAR; SOFT TISSUE ONCE
Status: COMPLETED | OUTPATIENT
Start: 2023-05-02 | End: 2023-05-02

## 2023-05-02 RX ORDER — LEVOFLOXACIN 500 MG/1
500 TABLET, FILM COATED ORAL DAILY
Qty: 7 TABLET | Refills: 0 | Status: SHIPPED | OUTPATIENT
Start: 2023-05-02 | End: 2023-05-09

## 2023-05-02 RX ORDER — GUAIFENESIN/DEXTROMETHORPHAN 100-10MG/5
5 SYRUP ORAL EVERY 4 HOURS PRN
Qty: 200 ML | Refills: 0 | Status: SHIPPED | OUTPATIENT
Start: 2023-05-02 | End: 2023-05-12

## 2023-05-02 RX ADMIN — METHYLPREDNISOLONE ACETATE 60 MG: 80 INJECTION, SUSPENSION INTRA-ARTICULAR; INTRALESIONAL; INTRAMUSCULAR; SOFT TISSUE at 15:08

## 2023-05-02 ASSESSMENT — PATIENT HEALTH QUESTIONNAIRE - PHQ9
2. FEELING DOWN, DEPRESSED OR HOPELESS: 0
SUM OF ALL RESPONSES TO PHQ QUESTIONS 1-9: 0
SUM OF ALL RESPONSES TO PHQ9 QUESTIONS 1 & 2: 0
SUM OF ALL RESPONSES TO PHQ QUESTIONS 1-9: 0
1. LITTLE INTEREST OR PLEASURE IN DOING THINGS: 0
SUM OF ALL RESPONSES TO PHQ QUESTIONS 1-9: 0
SUM OF ALL RESPONSES TO PHQ QUESTIONS 1-9: 0

## 2023-05-09 ENCOUNTER — TELEPHONE (OUTPATIENT)
Dept: FAMILY MEDICINE CLINIC | Age: 68
End: 2023-05-09

## 2023-05-09 NOTE — TELEPHONE ENCOUNTER
----- Message from GREGORIO Kaur CNP sent at 5/8/2023  1:32 PM EDT -----  Please check on cough and wheezing

## 2023-05-15 DIAGNOSIS — M48.061 SPINAL STENOSIS OF LUMBAR REGION WITHOUT NEUROGENIC CLAUDICATION: ICD-10-CM

## 2023-05-15 RX ORDER — HYDROCODONE BITARTRATE AND ACETAMINOPHEN 5; 325 MG/1; MG/1
1 TABLET ORAL EVERY 6 HOURS PRN
Qty: 120 TABLET | Refills: 0 | Status: SHIPPED | OUTPATIENT
Start: 2023-05-15 | End: 2023-06-14

## 2023-06-19 DIAGNOSIS — M48.061 SPINAL STENOSIS OF LUMBAR REGION WITHOUT NEUROGENIC CLAUDICATION: ICD-10-CM

## 2023-06-19 RX ORDER — HYDROCODONE BITARTRATE AND ACETAMINOPHEN 5; 325 MG/1; MG/1
1 TABLET ORAL EVERY 6 HOURS PRN
Qty: 120 TABLET | Refills: 0 | Status: SHIPPED | OUTPATIENT
Start: 2023-06-19 | End: 2023-07-19

## 2023-07-17 ENCOUNTER — HOSPITAL ENCOUNTER (OUTPATIENT)
Dept: PULMONOLOGY | Age: 68
Discharge: HOME | End: 2023-07-17
Payer: MEDICARE

## 2023-07-17 ENCOUNTER — HOSPITAL ENCOUNTER (OUTPATIENT)
Dept: CT IMAGING | Age: 68
Discharge: HOME | End: 2023-07-17
Payer: MEDICARE

## 2023-07-17 DIAGNOSIS — J44.9 STAGE 2 MODERATE COPD BY GOLD CLASSIFICATION (HCC): ICD-10-CM

## 2023-07-17 DIAGNOSIS — Z87.891 PERSONAL HISTORY OF TOBACCO USE: ICD-10-CM

## 2023-07-17 DIAGNOSIS — R91.1 NODULE OF LOWER LOBE OF RIGHT LUNG: ICD-10-CM

## 2023-07-17 PROCEDURE — 71271 CT THORAX LUNG CANCER SCR C-: CPT

## 2023-07-17 PROCEDURE — 94010 BREATHING CAPACITY TEST: CPT

## 2023-07-20 ENCOUNTER — OFFICE VISIT (OUTPATIENT)
Dept: PULMONOLOGY | Age: 68
End: 2023-07-20
Payer: MEDICARE

## 2023-07-20 VITALS
HEIGHT: 56 IN | HEART RATE: 85 BPM | DIASTOLIC BLOOD PRESSURE: 70 MMHG | WEIGHT: 121 LBS | SYSTOLIC BLOOD PRESSURE: 110 MMHG | BODY MASS INDEX: 27.22 KG/M2 | OXYGEN SATURATION: 96 %

## 2023-07-20 DIAGNOSIS — Z91.148 NON COMPLIANCE W MEDICATION REGIMEN: ICD-10-CM

## 2023-07-20 DIAGNOSIS — J44.9 STAGE 3 SEVERE COPD BY GOLD CLASSIFICATION (HCC): Primary | ICD-10-CM

## 2023-07-20 DIAGNOSIS — Z87.891 PERSONAL HISTORY OF TOBACCO USE: ICD-10-CM

## 2023-07-20 PROCEDURE — 1090F PRES/ABSN URINE INCON ASSESS: CPT

## 2023-07-20 PROCEDURE — 1036F TOBACCO NON-USER: CPT

## 2023-07-20 PROCEDURE — G8417 CALC BMI ABV UP PARAM F/U: HCPCS

## 2023-07-20 PROCEDURE — 1123F ACP DISCUSS/DSCN MKR DOCD: CPT

## 2023-07-20 PROCEDURE — 3074F SYST BP LT 130 MM HG: CPT

## 2023-07-20 PROCEDURE — G8427 DOCREV CUR MEDS BY ELIG CLIN: HCPCS

## 2023-07-20 PROCEDURE — 3078F DIAST BP <80 MM HG: CPT

## 2023-07-20 PROCEDURE — G8399 PT W/DXA RESULTS DOCUMENT: HCPCS

## 2023-07-20 PROCEDURE — 99214 OFFICE O/P EST MOD 30 MIN: CPT

## 2023-07-20 PROCEDURE — 3023F SPIROM DOC REV: CPT

## 2023-07-20 PROCEDURE — 3017F COLORECTAL CA SCREEN DOC REV: CPT

## 2023-07-20 RX ORDER — GUAIFENESIN AND CODEINE PHOSPHATE 100; 10 MG/5ML; MG/5ML
5 SOLUTION ORAL 4 TIMES DAILY PRN
COMMUNITY

## 2023-07-20 RX ORDER — ALBUTEROL SULFATE 2.5 MG/3ML
2.5 SOLUTION RESPIRATORY (INHALATION) EVERY 6 HOURS PRN
Qty: 120 EACH | Refills: 11 | Status: SHIPPED | OUTPATIENT
Start: 2023-07-20 | End: 2024-07-19

## 2023-07-20 ASSESSMENT — ENCOUNTER SYMPTOMS
SINUS PAIN: 0
CHEST TIGHTNESS: 0
RHINORRHEA: 0
SHORTNESS OF BREATH: 1
WHEEZING: 1
SINUS PRESSURE: 0
COUGH: 1

## 2023-07-20 NOTE — PATIENT INSTRUCTIONS
Start Stiolto 2 puffs daily. Continue your albuterol inhaler and nebulizer. You may use one or the other every 6 hours as needed for shortness of breath or wheezing. Do NOT use both at the same time as they continue the same medication. Start using saline nebulizer 3 times daily as needed for chest congestion. Use robitussin    Use acapella 4 times daily (5 times per session)    I will see you back in 3 months.

## 2023-07-20 NOTE — PROGRESS NOTES
years who have a 20 pack-year smoking history and currently smoke or have quit within the past 15 years. Screening should be discontinued once a person has not smoked for 15 years or develops a health problem that substantially limits life expectancy or the ability or willingness to have curative lung surgery. NONCONTRAST SCREENING CT CHEST:     HISTORY: History of smoking. 30 pack year history of smoking. TECHNIQUE:   1 mm axial imaging of the chest and upper abdomen without IV contrast.      All CT scans at this facility use dose modulation, iterative reconstruction, and/or weight based dosing when appropriate to reduce the radiation dose to as low as reasonably achievable. COMPARISON: CT 7/15/2022     FINDINGS:  LUNGS NODULES:  Some calcified granulomas are present. There are no suspicious pulmonary nodules or masses. LYMPHADENOPATHY:  There are no pathologically enlarged lymph nodes in the mediastinum, hilar or axillary regions. There are several calcified right hilar lymph nodes. OTHER (LUNGS/MEDIASTINUM/MUSCULOSKELETAL/ABDOMEN):  During is again seen along the right major fissure. There is no pericardial or pleural effusion. There is stable aneurysmal dilatation of the ascending aorta which measures 4.1 cm in maximum dimension. There are no suspicious findings in the visualized   portions of the upper abdomen. IMPRESSION:  1. There are no suspicious masses or nodules within the lung fields. 2. Stable aneurysmal dilatation of the ascending aorta which measures 4.1 cm in maximum dimension. 3. There are no pathologically enlarged lymph nodes. 4. LUNGRADS ASSESSMENT VALUE: 1, LUNGRADS S MODIFIER        The LUNG RADS RECOMMENDATIONS for monitoring lung nodules listed below (ACR- Lung-RADS Version 1.0 Assessment Categories)     LUNG RADS RECOMMENDATIONS;  1.  Normal, continue annual screening  2.   Benign appearance or behavior, continue annual screening  3.  6 month CT recommended  4A.  3

## 2023-07-26 DIAGNOSIS — M48.061 SPINAL STENOSIS OF LUMBAR REGION WITHOUT NEUROGENIC CLAUDICATION: ICD-10-CM

## 2023-07-26 RX ORDER — HYDROCODONE BITARTRATE AND ACETAMINOPHEN 5; 325 MG/1; MG/1
1 TABLET ORAL EVERY 6 HOURS PRN
Qty: 120 TABLET | Refills: 0 | Status: SHIPPED | OUTPATIENT
Start: 2023-07-26 | End: 2023-08-25

## 2023-08-01 DIAGNOSIS — C50.212 MALIGNANT NEOPLASM OF UPPER-INNER QUADRANT OF LEFT BREAST IN FEMALE, ESTROGEN RECEPTOR POSITIVE (HCC): Primary | ICD-10-CM

## 2023-08-01 DIAGNOSIS — Z17.0 MALIGNANT NEOPLASM OF UPPER-INNER QUADRANT OF LEFT BREAST IN FEMALE, ESTROGEN RECEPTOR POSITIVE (HCC): Primary | ICD-10-CM

## 2023-08-02 ENCOUNTER — OFFICE VISIT (OUTPATIENT)
Dept: ONCOLOGY | Age: 68
End: 2023-08-02
Payer: MEDICARE

## 2023-08-02 ENCOUNTER — HOSPITAL ENCOUNTER (OUTPATIENT)
Dept: INFUSION THERAPY | Age: 68
Discharge: HOME | End: 2023-08-02
Payer: MEDICARE

## 2023-08-02 VITALS
SYSTOLIC BLOOD PRESSURE: 106 MMHG | HEART RATE: 86 BPM | DIASTOLIC BLOOD PRESSURE: 62 MMHG | OXYGEN SATURATION: 98 % | RESPIRATION RATE: 16 BRPM | TEMPERATURE: 97.8 F

## 2023-08-02 VITALS
HEIGHT: 56 IN | BODY MASS INDEX: 26.95 KG/M2 | HEART RATE: 86 BPM | WEIGHT: 119.8 LBS | SYSTOLIC BLOOD PRESSURE: 106 MMHG | DIASTOLIC BLOOD PRESSURE: 62 MMHG | RESPIRATION RATE: 16 BRPM | TEMPERATURE: 97.8 F | OXYGEN SATURATION: 98 %

## 2023-08-02 DIAGNOSIS — M81.8 OSTEOPOROSIS DUE TO AROMATASE INHIBITOR: ICD-10-CM

## 2023-08-02 DIAGNOSIS — C50.212 MALIGNANT NEOPLASM OF UPPER-INNER QUADRANT OF LEFT BREAST IN FEMALE, ESTROGEN RECEPTOR POSITIVE (HCC): ICD-10-CM

## 2023-08-02 DIAGNOSIS — Z17.0 MALIGNANT NEOPLASM OF UPPER-INNER QUADRANT OF LEFT BREAST IN FEMALE, ESTROGEN RECEPTOR POSITIVE (HCC): ICD-10-CM

## 2023-08-02 DIAGNOSIS — C50.212 MALIGNANT NEOPLASM OF UPPER-INNER QUADRANT OF LEFT BREAST IN FEMALE, ESTROGEN RECEPTOR POSITIVE (HCC): Primary | ICD-10-CM

## 2023-08-02 DIAGNOSIS — T38.6X5A OSTEOPOROSIS DUE TO AROMATASE INHIBITOR: ICD-10-CM

## 2023-08-02 DIAGNOSIS — Z17.0 MALIGNANT NEOPLASM OF UPPER-INNER QUADRANT OF LEFT BREAST IN FEMALE, ESTROGEN RECEPTOR POSITIVE (HCC): Primary | ICD-10-CM

## 2023-08-02 LAB
ABSOLUTE IMMATURE GRANULOCYTE: 0.02 THOU/MM3 (ref 0–0.07)
ALBUMIN SERPL BCG-MCNC: 4.3 G/DL (ref 3.5–5.1)
ALP SERPL-CCNC: 93 U/L (ref 38–126)
ALT SERPL W/O P-5'-P-CCNC: 14 U/L (ref 11–66)
AST SERPL-CCNC: 16 U/L (ref 5–40)
BASOPHILS ABSOLUTE: 0.1 THOU/MM3 (ref 0–0.1)
BASOPHILS NFR BLD AUTO: 1 % (ref 0–3)
BILIRUB CONJ SERPL-MCNC: < 0.2 MG/DL (ref 0–0.3)
BILIRUB SERPL-MCNC: 0.8 MG/DL (ref 0.3–1.2)
BUN BLDP-MCNC: 27 MG/DL (ref 8–26)
CHLORIDE BLD-SCNC: 107 MEQ/L (ref 98–109)
CREAT BLD-MCNC: 0.8 MG/DL (ref 0.5–1.2)
EOSINOPHIL NFR BLD AUTO: 9 % (ref 0–4)
EOSINOPHILS ABSOLUTE: 0.7 THOU/MM3 (ref 0–0.4)
ERYTHROCYTE [DISTWIDTH] IN BLOOD BY AUTOMATED COUNT: 12.3 % (ref 11.5–14.5)
GFR SERPL CREATININE-BSD FRML MDRD: > 60 ML/MIN/1.73M2
GLUCOSE BLD-MCNC: 73 MG/DL (ref 70–108)
HCT VFR BLD AUTO: 39.1 % (ref 37–47)
HGB BLD-MCNC: 12.5 GM/DL (ref 12–16)
IMMATURE GRANULOCYTES: 0 %
IONIZED CALCIUM, WHOLE BLOOD: 1.22 MMOL/L (ref 1.12–1.32)
LYMPHOCYTES ABSOLUTE: 1.7 THOU/MM3 (ref 1–4.8)
LYMPHOCYTES NFR BLD AUTO: 21 % (ref 15–47)
MCH RBC QN AUTO: 29.8 PG (ref 26–33)
MCHC RBC AUTO-ENTMCNC: 32 GM/DL (ref 32.2–35.5)
MCV RBC AUTO: 93 FL (ref 81–99)
MONOCYTES ABSOLUTE: 0.8 THOU/MM3 (ref 0.4–1.3)
MONOCYTES NFR BLD AUTO: 10 % (ref 0–12)
NEUTROPHILS NFR BLD AUTO: 60 % (ref 43–75)
PLATELET # BLD AUTO: 284 THOU/MM3 (ref 130–400)
PMV BLD AUTO: 9.4 FL (ref 9.4–12.4)
POTASSIUM BLD-SCNC: 3.9 MEQ/L (ref 3.5–4.9)
PROT SERPL-MCNC: 7.2 G/DL (ref 6.1–8)
RBC # BLD AUTO: 4.19 MILL/MM3 (ref 4.2–5.4)
SEGMENTED NEUTROPHILS ABSOLUTE COUNT: 4.9 THOU/MM3 (ref 1.8–7.7)
SODIUM BLD-SCNC: 141 MEQ/L (ref 138–146)
TOTAL CO2, WHOLE BLOOD: 26 MEQ/L (ref 23–33)
WBC # BLD AUTO: 8.2 THOU/MM3 (ref 4.8–10.8)

## 2023-08-02 PROCEDURE — G8417 CALC BMI ABV UP PARAM F/U: HCPCS | Performed by: INTERNAL MEDICINE

## 2023-08-02 PROCEDURE — 1036F TOBACCO NON-USER: CPT | Performed by: INTERNAL MEDICINE

## 2023-08-02 PROCEDURE — G8427 DOCREV CUR MEDS BY ELIG CLIN: HCPCS | Performed by: INTERNAL MEDICINE

## 2023-08-02 PROCEDURE — 1123F ACP DISCUSS/DSCN MKR DOCD: CPT | Performed by: INTERNAL MEDICINE

## 2023-08-02 PROCEDURE — G8399 PT W/DXA RESULTS DOCUMENT: HCPCS | Performed by: INTERNAL MEDICINE

## 2023-08-02 PROCEDURE — 3078F DIAST BP <80 MM HG: CPT | Performed by: INTERNAL MEDICINE

## 2023-08-02 PROCEDURE — 99214 OFFICE O/P EST MOD 30 MIN: CPT | Performed by: INTERNAL MEDICINE

## 2023-08-02 PROCEDURE — 3074F SYST BP LT 130 MM HG: CPT | Performed by: INTERNAL MEDICINE

## 2023-08-02 PROCEDURE — 36415 COLL VENOUS BLD VENIPUNCTURE: CPT

## 2023-08-02 PROCEDURE — 99211 OFF/OP EST MAY X REQ PHY/QHP: CPT

## 2023-08-02 PROCEDURE — 3017F COLORECTAL CA SCREEN DOC REV: CPT | Performed by: INTERNAL MEDICINE

## 2023-08-02 PROCEDURE — 80076 HEPATIC FUNCTION PANEL: CPT

## 2023-08-02 PROCEDURE — 85025 COMPLETE CBC W/AUTO DIFF WBC: CPT

## 2023-08-02 PROCEDURE — 80047 BASIC METABLC PNL IONIZED CA: CPT

## 2023-08-02 PROCEDURE — 1090F PRES/ABSN URINE INCON ASSESS: CPT | Performed by: INTERNAL MEDICINE

## 2023-08-02 RX ORDER — ANTACID TABLETS 648 MG/1
2 TABLET, CHEWABLE ORAL 2 TIMES DAILY
Qty: 90 TABLET | Refills: 3 | Status: SHIPPED | OUTPATIENT
Start: 2023-08-02 | End: 2024-08-01

## 2023-08-02 NOTE — PROGRESS NOTES
[place] [address]  Dept: [phone]  Loc: [phone]   Hematology/Oncology Consult (Clinic)        08/02/23       Armida Thompson   1955     No ref. provider found   Preethijasen GREGORIO Dutta CNP       Reason:   Chief Complaint   Patient presents with    Follow-up     Malignant neoplasm of upper-inner quadrant of left breast in female, estrogen receptor positive ([address])          HPI: Armida Thompson is a 69 y.o.  female with a history of stage Ib left breast cancer.    -8/31/2016. Digital screening mammography bilaterally showed an irregular density in the left breast that was indeterminate. Compression and lateral medial views confirmed the presence of the mass. 9/9/2016. Left breast upper inner quadrant core biopsy showed invasive ductal carcinoma grade 2. Estrogen receptor was positive in 95% of cells and progesterone receptor was positive in 80% of cells. Ki-67 was 40% HER2 was not amplified (HER2/CEP17 ratio  < 2.0 AND average HER2 copy number  < 4.0 signals/cell)    -9/29/2016. Left breast lumpectomy and sentinel node biopsy by Dr. Arian Smith with final pathology showing moderately differentiated invasive adenocarcinoma measuring 0.9 cm in greatest dimension, grade 2 (Johnny 7 of 9), with negative surgical margins (closest skin at 0.9 cm) with high nuclear grade DCIS present with central (expansive \"comedo\" necrosis) and measuring 0.8 cm in greatest dimension (negative margins, closest caudal at 0.1 cm) and 0/1 lymph nodes with malignancy. pT1b, pN0(sn)   Estrogen and progesterone receptor were positive   Oncotype DX assay came back with a recurrence score 20. The patient did not receive chemotherapy. 12/4/2016. Completed radiation therapy to the left breast receiving 5256 cGy of radiation treatment. She tolerated it well.   After radiation, she started adjuvant

## 2023-08-31 DIAGNOSIS — M48.061 SPINAL STENOSIS OF LUMBAR REGION WITHOUT NEUROGENIC CLAUDICATION: ICD-10-CM

## 2023-08-31 RX ORDER — HYDROCODONE BITARTRATE AND ACETAMINOPHEN 5; 325 MG/1; MG/1
1 TABLET ORAL EVERY 6 HOURS PRN
Qty: 120 TABLET | Refills: 0 | Status: SHIPPED | OUTPATIENT
Start: 2023-08-31 | End: 2023-09-30

## 2023-09-06 ENCOUNTER — TELEPHONE (OUTPATIENT)
Dept: FAMILY MEDICINE CLINIC | Age: 68
End: 2023-09-06

## 2023-09-06 DIAGNOSIS — R05.1 ACUTE COUGH: ICD-10-CM

## 2023-09-06 DIAGNOSIS — R09.89 CHEST CONGESTION: ICD-10-CM

## 2023-09-06 NOTE — TELEPHONE ENCOUNTER
----- Message from [place] 3D sent at 9/6/2023  9:52 AM EDT -----  Subject: Message to Provider    QUESTIONS  Information for Provider? Pt states that she was previously prescribed an   antibiotic due to her cough at night and in the morning. She says that the   cough is still present and would like to know if you can prescribe this   medication for her again.   ---------------------------------------------------------------------------  --------------  Micky Jay INFO  [phone]; OK to leave message on voicemail  ---------------------------------------------------------------------------  --------------  SCRIPT ANSWERS  Relationship to Patient?  Self

## 2023-09-07 RX ORDER — GUAIFENESIN/DEXTROMETHORPHAN 100-10MG/5
5 SYRUP ORAL EVERY 4 HOURS PRN
Qty: 200 ML | Refills: 0 | Status: SHIPPED | OUTPATIENT
Start: 2023-09-07 | End: 2023-09-17

## 2023-09-07 RX ORDER — LEVOFLOXACIN 500 MG/1
500 TABLET, FILM COATED ORAL DAILY
Qty: 5 TABLET | Refills: 0 | Status: SHIPPED | OUTPATIENT
Start: 2023-09-07 | End: 2023-09-12

## 2023-09-07 NOTE — TELEPHONE ENCOUNTER
Pt states the medications were prescribed a few months ago. Pt states her cough is producing white phlegm and a she has a slight sore throat.   Last medications prescribed were on 5/2/23  Levofloxacin 500 mg  Guaifenesin-dextromethorphan 100-10 mg/5 ml

## 2023-09-07 NOTE — TELEPHONE ENCOUNTER
Pt does not have any other symptoms but the cough producing the white phlegm and the sore throat. Pt has tried OTC medications which have not helped.

## 2023-10-10 DIAGNOSIS — M48.061 SPINAL STENOSIS OF LUMBAR REGION WITHOUT NEUROGENIC CLAUDICATION: ICD-10-CM

## 2023-10-10 RX ORDER — HYDROCODONE BITARTRATE AND ACETAMINOPHEN 5; 325 MG/1; MG/1
1 TABLET ORAL EVERY 6 HOURS PRN
Qty: 120 TABLET | Refills: 0 | Status: SHIPPED | OUTPATIENT
Start: 2023-10-10 | End: 2023-11-09

## 2023-11-13 ENCOUNTER — TELEPHONE (OUTPATIENT)
Dept: FAMILY MEDICINE CLINIC | Age: 68
End: 2023-11-13

## 2023-11-13 DIAGNOSIS — M48.061 SPINAL STENOSIS OF LUMBAR REGION WITHOUT NEUROGENIC CLAUDICATION: ICD-10-CM

## 2023-11-13 RX ORDER — NORTRIPTYLINE HYDROCHLORIDE 25 MG/1
25 CAPSULE ORAL NIGHTLY
Qty: 90 CAPSULE | Refills: 3 | Status: SHIPPED | OUTPATIENT
Start: 2023-11-13

## 2023-11-13 NOTE — TELEPHONE ENCOUNTER
Recent Visits  Date Type Provider Dept   05/02/23 Office Visit Mary Fuentes, GREGORIO - CNP [facility]   11/01/22 Office Visit DO Jeovany Evangelista  [address]   07/26/22 Office Visit DO Jeovany Evangelista  [address] recent visits within past 540 days with a meds authorizing provider and meeting all other requirements  Future Appointments  No visits were found meeting these conditions.   Showing future appointments within next 150 days with a meds authorizing provider and meeting all other requirements

## 2023-11-28 DIAGNOSIS — M48.061 SPINAL STENOSIS OF LUMBAR REGION WITHOUT NEUROGENIC CLAUDICATION: ICD-10-CM

## 2023-11-28 RX ORDER — HYDROCODONE BITARTRATE AND ACETAMINOPHEN 5; 325 MG/1; MG/1
1 TABLET ORAL EVERY 6 HOURS PRN
Qty: 120 TABLET | Refills: 0 | Status: SHIPPED | OUTPATIENT
Start: 2023-11-28 | End: 2023-12-28

## 2023-12-09 ENCOUNTER — HOSPITAL ENCOUNTER (EMERGENCY)
Age: 68
Discharge: HOME | End: 2023-12-09
Attending: EMERGENCY MEDICINE
Payer: MEDICARE

## 2023-12-09 ENCOUNTER — APPOINTMENT (OUTPATIENT)
Dept: GENERAL RADIOLOGY | Age: 68
End: 2023-12-09
Payer: MEDICARE

## 2023-12-09 VITALS
TEMPERATURE: 98.1 F | OXYGEN SATURATION: 94 % | RESPIRATION RATE: 18 BRPM | BODY MASS INDEX: 26.77 KG/M2 | SYSTOLIC BLOOD PRESSURE: 117 MMHG | HEART RATE: 98 BPM | WEIGHT: 119 LBS | HEIGHT: 56 IN | DIASTOLIC BLOOD PRESSURE: 70 MMHG

## 2023-12-09 DIAGNOSIS — J44.1 COPD EXACERBATION (HCC): Primary | ICD-10-CM

## 2023-12-09 LAB
ALBUMIN SERPL BCG-MCNC: 4.7 G/DL (ref 3.5–5.1)
ALP SERPL-CCNC: 90 U/L (ref 38–126)
ALT SERPL W/O P-5'-P-CCNC: 15 U/L (ref 11–66)
ANION GAP SERPL CALC-SCNC: 11 MEQ/L (ref 8–16)
AST SERPL-CCNC: 17 U/L (ref 5–40)
BASOPHILS ABSOLUTE: 0.1 THOU/MM3 (ref 0–0.1)
BASOPHILS NFR BLD AUTO: 0.8 %
BILIRUB SERPL-MCNC: 0.8 MG/DL (ref 0.3–1.2)
BUN SERPL-MCNC: 19 MG/DL (ref 7–22)
CALCIUM SERPL-MCNC: 9.4 MG/DL (ref 8.5–10.5)
CHLORIDE SERPL-SCNC: 104 MEQ/L (ref 98–111)
CO2 SERPL-SCNC: 23 MEQ/L (ref 23–33)
CREAT SERPL-MCNC: 0.6 MG/DL (ref 0.4–1.2)
DEPRECATED RDW RBC AUTO: 42.5 FL (ref 35–45)
EOSINOPHIL NFR BLD AUTO: 4.9 %
EOSINOPHILS ABSOLUTE: 0.4 THOU/MM3 (ref 0–0.4)
ERYTHROCYTE [DISTWIDTH] IN BLOOD BY AUTOMATED COUNT: 12.4 % (ref 11.5–14.5)
FLUAV RNA RESP QL NAA+PROBE: NOT DETECTED
FLUBV RNA RESP QL NAA+PROBE: NOT DETECTED
GFR SERPL CREATININE-BSD FRML MDRD: > 60 ML/MIN/1.73M2
GLUCOSE SERPL-MCNC: 122 MG/DL (ref 70–108)
HCT VFR BLD AUTO: 40.3 % (ref 37–47)
HGB BLD-MCNC: 13 GM/DL (ref 12–16)
IMM GRANULOCYTES # BLD AUTO: 0.01 THOU/MM3 (ref 0–0.07)
IMM GRANULOCYTES NFR BLD AUTO: 0.1 %
LYMPHOCYTES ABSOLUTE: 3.2 THOU/MM3 (ref 1–4.8)
LYMPHOCYTES NFR BLD AUTO: 38.5 %
MCH RBC QN AUTO: 29.8 PG (ref 26–33)
MCHC RBC AUTO-ENTMCNC: 32.3 GM/DL (ref 32.2–35.5)
MCV RBC AUTO: 92.4 FL (ref 81–99)
MONOCYTES ABSOLUTE: 0.8 THOU/MM3 (ref 0.4–1.3)
MONOCYTES NFR BLD AUTO: 9.8 %
NEUTROPHILS NFR BLD AUTO: 45.9 %
NRBC BLD AUTO-RTO: 0 /100 WBC
NT-PROBNP SERPL IA-MCNC: 39.9 PG/ML (ref 0–124)
OSMOLALITY SERPL CALC.SUM OF ELEC: 279.2 MOSMOL/KG (ref 275–300)
PLATELET # BLD AUTO: 300 THOU/MM3 (ref 130–400)
PMV BLD AUTO: 10.2 FL (ref 9.4–12.4)
POTASSIUM SERPL-SCNC: 3.9 MEQ/L (ref 3.5–5.2)
PROT SERPL-MCNC: 7.4 G/DL (ref 6.1–8)
RBC # BLD AUTO: 4.36 MILL/MM3 (ref 4.2–5.4)
SARS-COV-2 RNA RESP QL NAA+PROBE: NOT DETECTED
SEGMENTED NEUTROPHILS ABSOLUTE COUNT: 3.8 THOU/MM3 (ref 1.8–7.7)
SODIUM SERPL-SCNC: 138 MEQ/L (ref 135–145)
TROPONIN, HIGH SENSITIVITY: 8 NG/L (ref 0–12)
WBC # BLD AUTO: 8.2 THOU/MM3 (ref 4.8–10.8)

## 2023-12-09 PROCEDURE — 6370000000 HC RX 637 (ALT 250 FOR IP): Performed by: EMERGENCY MEDICINE

## 2023-12-09 PROCEDURE — 94640 AIRWAY INHALATION TREATMENT: CPT

## 2023-12-09 PROCEDURE — 80053 COMPREHEN METABOLIC PANEL: CPT

## 2023-12-09 PROCEDURE — 87636 SARSCOV2 & INF A&B AMP PRB: CPT

## 2023-12-09 PROCEDURE — 85025 COMPLETE CBC W/AUTO DIFF WBC: CPT

## 2023-12-09 PROCEDURE — 83880 ASSAY OF NATRIURETIC PEPTIDE: CPT

## 2023-12-09 PROCEDURE — 99285 EMERGENCY DEPT VISIT HI MDM: CPT

## 2023-12-09 PROCEDURE — 71046 X-RAY EXAM CHEST 2 VIEWS: CPT

## 2023-12-09 PROCEDURE — 93005 ELECTROCARDIOGRAM TRACING: CPT | Performed by: EMERGENCY MEDICINE

## 2023-12-09 PROCEDURE — 36415 COLL VENOUS BLD VENIPUNCTURE: CPT

## 2023-12-09 PROCEDURE — 84484 ASSAY OF TROPONIN QUANT: CPT

## 2023-12-09 RX ORDER — IPRATROPIUM BROMIDE AND ALBUTEROL SULFATE 2.5; .5 MG/3ML; MG/3ML
2 SOLUTION RESPIRATORY (INHALATION) ONCE
Status: COMPLETED | OUTPATIENT
Start: 2023-12-09 | End: 2023-12-09

## 2023-12-09 RX ADMIN — IPRATROPIUM BROMIDE AND ALBUTEROL SULFATE 2 DOSE: .5; 3 SOLUTION RESPIRATORY (INHALATION) at 18:31

## 2023-12-09 ASSESSMENT — HEART SCORE: ECG: 1

## 2023-12-09 ASSESSMENT — PAIN - FUNCTIONAL ASSESSMENT: PAIN_FUNCTIONAL_ASSESSMENT: NONE - DENIES PAIN

## 2023-12-09 NOTE — ED PROVIDER NOTES
[facility] EMERGENCY DEPT    EMERGENCY MEDICINE      Pt Name: Deepak Palencia  MRN: [id number]  [address] [facility] 1955  Date of evaluation: 12/9/2023  Treating Physician: Rosalind Lange MD    CHIEF COMPLAINT       Chief Complaint   Patient presents with    Shortness of Breath     COPD     History obtained from chart review and the patient. HISTORY OF PRESENT ILLNESS    HPI    Deepak Palencia is a 77 y.o. female with PMH of COPD, STEMI presents to the emergency department for evaluation of shortness of breath and cough. Patient stated that she developed a coughing fit while at her daughter's house and afterward had shortness of breath. She stated that she felt like she had some mucus that she could not cough. Patient took one of her inhalers as well as a nebulizer treatment at home prior to coming to the ED with minimal improvement. Patient is denying any chest pain, nausea, vomiting, fever, dysuria, abdominal pain. The patient has no other acute complaints at this time.     PAST MEDICAL AND SURGICAL HISTORY     Past Medical History:   Diagnosis Date    Breast cancer ([address]) 2016    Bronchitis     CAD (coronary artery disease)     Cerebral artery occlusion with cerebral infarction ([address])     TIA    COPD (chronic obstructive pulmonary disease) ([address])     Diverticulosis     Essential hypertension 5/10/2017    Foot drop, right foot     GERD (gastroesophageal reflux disease)     History of therapeutic radiation 2016    IBS (irritable bowel syndrome)     Kidney stone 2014    Malignant neoplasm of upper-inner quadrant of left female breast ([address]) 9/13/2016    breast    MI (myocardial infarction) ([address]) 10/2018    PONV (postoperative nausea and vomiting)        Past Surgical History:   Procedure Laterality Date    [address] LUMPECTOMY Left 2016    BREAST SURGERY Left 2016    exc. lymph nodes, lumpectomy    CARPAL TUNNEL RELEASE Right 06/2017    OIO    CERVICAL FUSION      COLONOSCOPY follow-up were provided to the patient. ED Medications administered this visit:  (None if blank)  Medications   ipratropium 0.5 mg-albuterol 2.5 mg (DUONEB) nebulizer solution 2 Dose (2 Doses Inhalation Given 12/9/23 1409)       PROCEDURES: (None if blank)  Procedures:     CRITICAL CARE: (None if blank)    DISCHARGE PRESCRIPTIONS: (None if blank)  Discharge Medication List as of 12/9/2023  7:56 PM            FINAL IMPRESSION     Final diagnoses:   COPD exacerbation ([facility])     1. COPD exacerbation [facility])        DISPOSITION / PLAN   DISPOSITION Decision To Discharge 12/09/2023 07:55:39 PM      OUTPATIENT FOLLOW UP THE PATIENT:  Beto Weiss, APRN - CNP  [address] [place] [address] Dr Pina Hernandez  [phone]    Schedule an appointment as soon as possible for a visit in 3 days  As needed        This transcription was electronically signed. Parts of this transcriptions may have been dictated by use of voice recognition software and electronically transcribed, and parts may have been transcribed with the assistance of an ED scribe. The transcription may contain errors not detected in proofreading. Please refer to my supervising physician's documentation if my documentation differs.     Electronically Signed: Mini Reynoso MD, 12/10/23, 11:44 PM         Mini Reynoso MD  Resident  12/10/23 [id number]

## 2023-12-09 NOTE — ED PROVIDER NOTES

## 2023-12-09 NOTE — ED NOTES
Pt taken to bathroom via wheelchair. Pt returned to patient bed in room 10. Respiratory at bedside.      Treva King RN  12/09/23 2245

## 2023-12-10 LAB
EKG ATRIAL RATE: 105 BPM
EKG P AXIS: 85 DEGREES
EKG P-R INTERVAL: 112 MS
EKG Q-T INTERVAL: 338 MS
EKG QRS DURATION: 88 MS
EKG QTC CALCULATION (BAZETT): 446 MS
EKG R AXIS: 106 DEGREES
EKG T AXIS: 79 DEGREES
EKG VENTRICULAR RATE: 105 BPM

## 2023-12-10 PROCEDURE — 93010 ELECTROCARDIOGRAM REPORT: CPT | Performed by: INTERNAL MEDICINE

## 2023-12-10 NOTE — DISCHARGE INSTRUCTIONS
Take your medication as indicated and prescribed. If you were given a prescription for prednisone or any other steroid then, take Pepcid (famotidine - over the counter) every day while you are taking the steroids. If you are a diabetic, you should check your blood sugar more frequently while taking prednisone. Use your inhaler or nebulizer as prescribed, or at minimum every 4 hours while you are having an asthma attack. If you are given an antibiotic, then make sure you get the prescription filled and take the antibiotics until finished. Drink plenty of water while taking the antibiotics. Avoid drinking alcohol or drinks that have caffeine in it while taking antibiotics. Being around people that smoke, sharad houses, weather change can cause an asthma exacerbation. If you smoke, then you should discuss with your physician about ways to quit smoking. PLEASE RETURN TO THE EMERGENCY DEPARTMENT IMMEDIATELY for worsening symptoms of shortness of breath, wheezing, change in the amount of sputum that you cough up or a change in the color of your sputum, using your inhaler more frequently or if your inhaler only lasts up to 2 hours, or if you develop any concerning symptoms such as: high fever not relieved by acetaminophen (Tylenol) and/or ibuprofen (Motrin / Advil), chills, shortness of breath, chest pain, feeling of your heart fluttering or racing, persistent nausea and/or vomiting, vomiting up blood, blood in your stool, loss of consciousness, numbness, weakness or tingling in the arms or legs or change in color of the extremities, changes in mental status, persistent headache, blurry vision, loss of bladder / bowel control, unable to follow up with your physician, or other any other care or concern.

## 2023-12-26 DIAGNOSIS — M48.061 SPINAL STENOSIS OF LUMBAR REGION WITHOUT NEUROGENIC CLAUDICATION: ICD-10-CM

## 2023-12-26 RX ORDER — HYDROCODONE BITARTRATE AND ACETAMINOPHEN 5; 325 MG/1; MG/1
1 TABLET ORAL EVERY 6 HOURS PRN
Qty: 120 TABLET | Refills: 0 | Status: SHIPPED | OUTPATIENT
Start: 2023-12-26 | End: 2024-01-25

## 2023-12-26 NOTE — TELEPHONE ENCOUNTER
PDMP Monitoring:    Last PDMP Deneice Quiet as Reviewed:  Review User Review Instant Review Result   CHIP MERAZ 12/26/2023  4:07 PM Reviewed PDMP [1]     [unfilled]  Urine Drug Screenings (1 yr)    No resulted procedures found.        Medication Contract and Consent for Opioid Use Documents Filed        No documents found

## 2024-01-08 ENCOUNTER — OFFICE VISIT (OUTPATIENT)
Dept: CARDIOLOGY CLINIC | Age: 69
End: 2024-01-08
Payer: MEDICARE

## 2024-01-08 VITALS
HEIGHT: 57 IN | BODY MASS INDEX: 25.24 KG/M2 | SYSTOLIC BLOOD PRESSURE: 130 MMHG | HEART RATE: 87 BPM | WEIGHT: 117 LBS | DIASTOLIC BLOOD PRESSURE: 83 MMHG

## 2024-01-08 DIAGNOSIS — I25.10 CORONARY ARTERY DISEASE INVOLVING NATIVE CORONARY ARTERY OF NATIVE HEART WITHOUT ANGINA PECTORIS: Primary | ICD-10-CM

## 2024-01-08 PROCEDURE — 1090F PRES/ABSN URINE INCON ASSESS: CPT | Performed by: INTERNAL MEDICINE

## 2024-01-08 PROCEDURE — 1036F TOBACCO NON-USER: CPT | Performed by: INTERNAL MEDICINE

## 2024-01-08 PROCEDURE — G8417 CALC BMI ABV UP PARAM F/U: HCPCS | Performed by: INTERNAL MEDICINE

## 2024-01-08 PROCEDURE — 3075F SYST BP GE 130 - 139MM HG: CPT | Performed by: INTERNAL MEDICINE

## 2024-01-08 PROCEDURE — G8484 FLU IMMUNIZE NO ADMIN: HCPCS | Performed by: INTERNAL MEDICINE

## 2024-01-08 PROCEDURE — 3079F DIAST BP 80-89 MM HG: CPT | Performed by: INTERNAL MEDICINE

## 2024-01-08 PROCEDURE — 1123F ACP DISCUSS/DSCN MKR DOCD: CPT | Performed by: INTERNAL MEDICINE

## 2024-01-08 PROCEDURE — 3017F COLORECTAL CA SCREEN DOC REV: CPT | Performed by: INTERNAL MEDICINE

## 2024-01-08 PROCEDURE — 99213 OFFICE O/P EST LOW 20 MIN: CPT | Performed by: INTERNAL MEDICINE

## 2024-01-08 PROCEDURE — G8399 PT W/DXA RESULTS DOCUMENT: HCPCS | Performed by: INTERNAL MEDICINE

## 2024-01-08 PROCEDURE — G8427 DOCREV CUR MEDS BY ELIG CLIN: HCPCS | Performed by: INTERNAL MEDICINE

## 2024-01-08 NOTE — PROGRESS NOTES
PM    MCH 29.8 12/09/2023 05:51 PM    MCHC 32.3 12/09/2023 05:51 PM    RDW 12.3 08/02/2023 10:49 AM     12/09/2023 05:51 PM    MPV 10.2 12/09/2023 05:51 PM       Lab Results   Component Value Date/Time     12/09/2023 05:51 PM    K 3.9 12/09/2023 05:51 PM     12/09/2023 05:51 PM    CO2 23 12/09/2023 05:51 PM    BUN 19 12/09/2023 05:51 PM    LABALBU 4.7 12/09/2023 05:51 PM    CREATININE 0.6 12/09/2023 05:51 PM    CALCIUM 9.4 12/09/2023 05:51 PM    LABGLOM >60 12/09/2023 05:51 PM    GLUCOSE 122 12/09/2023 05:51 PM       Lab Results   Component Value Date/Time    ALKPHOS 90 12/09/2023 05:51 PM    ALT 15 12/09/2023 05:51 PM    AST 17 12/09/2023 05:51 PM    PROT 7.4 12/09/2023 05:51 PM    BILITOT 0.8 12/09/2023 05:51 PM    BILIDIR <0.2 08/02/2023 10:49 AM    LABALBU 4.7 12/09/2023 05:51 PM       Lab Results   Component Value Date/Time    MG 1.9 10/28/2018 01:15 AM       Lab Results   Component Value Date    INR 0.92 05/18/2018         No results found for: \"LABA1C\"    Lab Results   Component Value Date/Time    TRIG 83 11/02/2020 12:17 PM    HDL 71 11/02/2020 12:17 PM    LDLCALC 44 11/02/2020 12:17 PM       Lab Results   Component Value Date/Time    TSH 1.260 04/02/2019 02:59 PM         Testing Reviewed:      I have individually reviewed the cardiac test below:    ECHO: No results found for this or any previous visit.       Assessment/Plan   RCA STEMI PCI, 2018 - tolerating ASA, taking statin, LDL well controlled.  Preserved EF  RVSP 29 mmHg  Orthopedic back issues  Stress - negative for ischemia, 9/2019  COPD   Stable, no cardiac issues.  RF management.  If more breathing issues, f/u Pulmonary, she has nebulizer at home.  Discussed symptoms needing emergency care or those which require further medical attention.   Discussed diet/exercise/BP/weight loss/health lifestyle choices/lipids; the patient understands the goals and will try to comply.    Disposition:  6 months           Electronically signed by

## 2024-01-17 NOTE — PROGRESS NOTES
did not receive chemotherapy.    12/4/2016.  Completed radiation therapy to the left breast receiving 5256 cGy of radiation treatment.  She tolerated it well.  After radiation, she started adjuvant hormonal therapy with anastrozole which was subsequently changed to letrozole.    10/2018.  Hospitalized for STEMI.  She is status post PCI of the RCA.    8/2/2021.  Tolerating letrozole well with minimal and stable hot flashes.  She has chronic back pain related to degenerative joint disease.  She does not have any AI induced arthralgias.  She has no complaints related to her breasts.  She has annual mammography.    -10/5/2021 mammography was without change;  stable postoperative changes in the left breast.  -10/5/2021.  DEXA scan shows osteoporosis. L1-L2 T score of -1.5, left femoral neck T score of -2.3, right femoral neck T score -2.3, total hip left T score -2.1, total hip right T score -2.5      -7/15/2022.  Screening CT scan of the chest shows no new or suspicious pulmonary masses or nodules.    -8/2/2023 She is here in follow-up today s/p done letrozole for over two years.  Chronic back pain s/p massive back surgery a few years ago. On several inhalers for her COPD per Dr Fink office. She has been up-to-date on her mammography having had a mammogram in 12/2022 and looked good-rec annual.  I do not see that she was started on any calcium, vitamin D or any other disease modifying agents.  She reports she got her screening lung CT      Oncology Treatment  -9/29/2016.  Left breast lumpectomy and sentinel node biopsy   -12/4/2016.  Completed radiation therapy to the left breast receiving 52.56 Gy     -12/2016-12/2021 adjuvant hormonal therapy with anastrozole (pt doesn't remember which was subsequently changed to letrozole. Completed 5 years      Monitoring Parameters  -Physical examinations  -laboratory testing as clinically indicated  -mammography and DEXA scans.      INTERVAL NOTE  -12/2023 CBC CMP

## 2024-01-22 ENCOUNTER — HOSPITAL ENCOUNTER (OUTPATIENT)
Dept: WOMENS IMAGING | Age: 69
Discharge: HOME | End: 2024-01-22
Payer: MEDICARE

## 2024-01-22 DIAGNOSIS — Z12.31 VISIT FOR SCREENING MAMMOGRAM: ICD-10-CM

## 2024-01-22 PROCEDURE — 77063 BREAST TOMOSYNTHESIS BI: CPT

## 2024-01-24 ENCOUNTER — OFFICE VISIT (OUTPATIENT)
Dept: ONCOLOGY | Age: 69
End: 2024-01-24
Payer: MEDICARE

## 2024-01-24 ENCOUNTER — HOSPITAL ENCOUNTER (OUTPATIENT)
Dept: INFUSION THERAPY | Age: 69
Discharge: HOME | End: 2024-01-24
Payer: MEDICARE

## 2024-01-24 VITALS
WEIGHT: 117.2 LBS | OXYGEN SATURATION: 95 % | HEIGHT: 56 IN | RESPIRATION RATE: 16 BRPM | HEART RATE: 93 BPM | DIASTOLIC BLOOD PRESSURE: 68 MMHG | SYSTOLIC BLOOD PRESSURE: 116 MMHG | TEMPERATURE: 97.9 F | BODY MASS INDEX: 26.36 KG/M2

## 2024-01-24 VITALS
SYSTOLIC BLOOD PRESSURE: 116 MMHG | DIASTOLIC BLOOD PRESSURE: 68 MMHG | OXYGEN SATURATION: 95 % | HEART RATE: 93 BPM | TEMPERATURE: 97.9 F | RESPIRATION RATE: 16 BRPM

## 2024-01-24 DIAGNOSIS — Z12.31 ENCOUNTER FOR SCREENING MAMMOGRAM FOR MALIGNANT NEOPLASM OF BREAST: ICD-10-CM

## 2024-01-24 DIAGNOSIS — T38.6X5A OSTEOPOROSIS DUE TO AROMATASE INHIBITOR: ICD-10-CM

## 2024-01-24 DIAGNOSIS — Z17.0 MALIGNANT NEOPLASM OF UPPER-INNER QUADRANT OF LEFT BREAST IN FEMALE, ESTROGEN RECEPTOR POSITIVE (HCC): Primary | ICD-10-CM

## 2024-01-24 DIAGNOSIS — C50.212 MALIGNANT NEOPLASM OF UPPER-INNER QUADRANT OF LEFT BREAST IN FEMALE, ESTROGEN RECEPTOR POSITIVE (HCC): Primary | ICD-10-CM

## 2024-01-24 DIAGNOSIS — M81.8 OSTEOPOROSIS DUE TO AROMATASE INHIBITOR: ICD-10-CM

## 2024-01-24 PROCEDURE — G8484 FLU IMMUNIZE NO ADMIN: HCPCS | Performed by: INTERNAL MEDICINE

## 2024-01-24 PROCEDURE — 1123F ACP DISCUSS/DSCN MKR DOCD: CPT | Performed by: INTERNAL MEDICINE

## 2024-01-24 PROCEDURE — G8399 PT W/DXA RESULTS DOCUMENT: HCPCS | Performed by: INTERNAL MEDICINE

## 2024-01-24 PROCEDURE — G8417 CALC BMI ABV UP PARAM F/U: HCPCS | Performed by: INTERNAL MEDICINE

## 2024-01-24 PROCEDURE — 99214 OFFICE O/P EST MOD 30 MIN: CPT | Performed by: INTERNAL MEDICINE

## 2024-01-24 PROCEDURE — 1036F TOBACCO NON-USER: CPT | Performed by: INTERNAL MEDICINE

## 2024-01-24 PROCEDURE — 3074F SYST BP LT 130 MM HG: CPT | Performed by: INTERNAL MEDICINE

## 2024-01-24 PROCEDURE — G8427 DOCREV CUR MEDS BY ELIG CLIN: HCPCS | Performed by: INTERNAL MEDICINE

## 2024-01-24 PROCEDURE — 99211 OFF/OP EST MAY X REQ PHY/QHP: CPT

## 2024-01-24 PROCEDURE — 1090F PRES/ABSN URINE INCON ASSESS: CPT | Performed by: INTERNAL MEDICINE

## 2024-01-24 PROCEDURE — 3078F DIAST BP <80 MM HG: CPT | Performed by: INTERNAL MEDICINE

## 2024-01-24 PROCEDURE — 3017F COLORECTAL CA SCREEN DOC REV: CPT | Performed by: INTERNAL MEDICINE

## 2024-01-24 NOTE — PATIENT INSTRUCTIONS
1) pt needs to make appt with Dr Fink (or as he sees fit as she reports she is due for repeat CT chest screening for lung cancer in July 2024 she says he orders)  2) schedule Mammogram for one year from now  3) RTC after mammo to review

## 2024-02-06 DIAGNOSIS — J44.9 STAGE 2 MODERATE COPD BY GOLD CLASSIFICATION (HCC): ICD-10-CM

## 2024-02-06 RX ORDER — ATORVASTATIN CALCIUM 80 MG/1
80 TABLET, FILM COATED ORAL NIGHTLY
Qty: 90 TABLET | Refills: 3 | Status: SHIPPED | OUTPATIENT
Start: 2024-02-06

## 2024-02-07 RX ORDER — ALBUTEROL SULFATE 90 UG/1
AEROSOL, METERED RESPIRATORY (INHALATION)
Qty: 18 G | Refills: 0 | OUTPATIENT
Start: 2024-02-07

## 2024-02-07 NOTE — TELEPHONE ENCOUNTER
I LVM for patient to call office back to schedule a follow up as she does not have a currently scheduled appointment in our office. Refusing request until she calls to schedule.

## 2024-02-09 DIAGNOSIS — M48.061 SPINAL STENOSIS OF LUMBAR REGION WITHOUT NEUROGENIC CLAUDICATION: ICD-10-CM

## 2024-02-09 RX ORDER — HYDROCODONE BITARTRATE AND ACETAMINOPHEN 5; 325 MG/1; MG/1
1 TABLET ORAL EVERY 6 HOURS PRN
Qty: 120 TABLET | Refills: 0 | Status: SHIPPED | OUTPATIENT
Start: 2024-02-09 | End: 2024-03-10

## 2024-02-09 RX ORDER — ACETAMINOPHEN AND CODEINE PHOSPHATE 300; 30 MG/1; MG/1
1 TABLET ORAL EVERY 6 HOURS PRN
COMMUNITY

## 2024-02-09 RX ORDER — ACETAMINOPHEN AND CODEINE PHOSPHATE 300; 30 MG/1; MG/1
1 TABLET ORAL EVERY 6 HOURS PRN
Status: CANCELLED | OUTPATIENT
Start: 2024-02-09 | End: 2024-03-10

## 2024-02-09 NOTE — TELEPHONE ENCOUNTER
Confused because patient doesn't take Tylenol #3..  Cancelled incorrect pended med and reordered Norco per medication hx on chart

## 2024-03-05 RX ORDER — METOPROLOL SUCCINATE 25 MG/1
TABLET, EXTENDED RELEASE ORAL
Qty: 90 TABLET | Refills: 3 | Status: SHIPPED | OUTPATIENT
Start: 2024-03-05

## 2024-03-14 DIAGNOSIS — J44.9 STAGE 2 MODERATE COPD BY GOLD CLASSIFICATION (HCC): ICD-10-CM

## 2024-03-14 RX ORDER — ALBUTEROL SULFATE 90 UG/1
AEROSOL, METERED RESPIRATORY (INHALATION)
Qty: 18 G | Refills: 0 | OUTPATIENT
Start: 2024-03-14

## 2024-03-14 NOTE — TELEPHONE ENCOUNTER
Phoned patient and left message to call office to schedule a follow up as she does not have a follow up currently scheduled with our office.  Refusing request until she calls to schedule.

## 2024-03-18 DIAGNOSIS — M48.061 SPINAL STENOSIS OF LUMBAR REGION WITHOUT NEUROGENIC CLAUDICATION: ICD-10-CM

## 2024-03-18 RX ORDER — HYDROCODONE BITARTRATE AND ACETAMINOPHEN 5; 325 MG/1; MG/1
1 TABLET ORAL EVERY 6 HOURS PRN
Qty: 120 TABLET | Refills: 0 | Status: SHIPPED | OUTPATIENT
Start: 2024-03-18 | End: 2024-04-17

## 2024-03-25 NOTE — PROGRESS NOTES
screening once a person has not smoked for 15 years or has a health problem that limits life expectancy or the ability to have lung surgery.    The patient  reports that she quit smoking about 5 years ago. Her smoking use included cigarettes. She started smoking about 25 years ago. She has a 30.0 pack-year smoking history. She has never used smokeless tobacco.. Discussed with patient the risks and benefits of screening, including over-diagnosis, false positive rate, and total radiation exposure.  The patient currently exhibits no signs or symptoms suggestive of lung cancer.  Discussed with patient the importance of compliance with yearly annual lung cancer screenings and willingness to undergo diagnosis and treatment if screening scan is positive.  In addition, the patient was counseled regarding the importance of remaining smoke free and/or total smoking cessation.    Also reviewed the following if the patient has Medicare that as of February 10, 2022, Medicare only covers LDCT screening in patients aged 50-77 with at least a 20 pack-year smoking history who currently smoke or have quit in the last 15 years

## 2024-03-26 ENCOUNTER — OFFICE VISIT (OUTPATIENT)
Dept: PULMONOLOGY | Age: 69
End: 2024-03-26
Payer: MEDICARE

## 2024-03-26 VITALS
DIASTOLIC BLOOD PRESSURE: 64 MMHG | BODY MASS INDEX: 24.35 KG/M2 | WEIGHT: 116 LBS | OXYGEN SATURATION: 97 % | TEMPERATURE: 98.8 F | HEART RATE: 80 BPM | SYSTOLIC BLOOD PRESSURE: 108 MMHG | HEIGHT: 58 IN

## 2024-03-26 DIAGNOSIS — J44.9 STAGE 3 SEVERE COPD BY GOLD CLASSIFICATION (HCC): Primary | ICD-10-CM

## 2024-03-26 DIAGNOSIS — Z87.891 PERSONAL HISTORY OF TOBACCO USE: ICD-10-CM

## 2024-03-26 PROCEDURE — 99214 OFFICE O/P EST MOD 30 MIN: CPT

## 2024-03-26 PROCEDURE — 1090F PRES/ABSN URINE INCON ASSESS: CPT

## 2024-03-26 PROCEDURE — 3023F SPIROM DOC REV: CPT

## 2024-03-26 PROCEDURE — G8420 CALC BMI NORM PARAMETERS: HCPCS

## 2024-03-26 PROCEDURE — 3017F COLORECTAL CA SCREEN DOC REV: CPT

## 2024-03-26 PROCEDURE — G8399 PT W/DXA RESULTS DOCUMENT: HCPCS

## 2024-03-26 PROCEDURE — G8484 FLU IMMUNIZE NO ADMIN: HCPCS

## 2024-03-26 PROCEDURE — 3074F SYST BP LT 130 MM HG: CPT

## 2024-03-26 PROCEDURE — G8427 DOCREV CUR MEDS BY ELIG CLIN: HCPCS

## 2024-03-26 PROCEDURE — 3078F DIAST BP <80 MM HG: CPT

## 2024-03-26 PROCEDURE — G0296 VISIT TO DETERM LDCT ELIG: HCPCS

## 2024-03-26 PROCEDURE — 1036F TOBACCO NON-USER: CPT

## 2024-03-26 PROCEDURE — 1123F ACP DISCUSS/DSCN MKR DOCD: CPT

## 2024-03-26 RX ORDER — ALBUTEROL SULFATE 90 UG/1
AEROSOL, METERED RESPIRATORY (INHALATION)
Qty: 18 G | Refills: 11 | Status: SHIPPED | OUTPATIENT
Start: 2024-03-26

## 2024-03-26 ASSESSMENT — ENCOUNTER SYMPTOMS
COUGH: 1
SHORTNESS OF BREATH: 1
SINUS PAIN: 0
SINUS PRESSURE: 0
CHEST TIGHTNESS: 0
RHINORRHEA: 0
WHEEZING: 1

## 2024-04-13 DIAGNOSIS — I10 ESSENTIAL HYPERTENSION: ICD-10-CM

## 2024-04-13 DIAGNOSIS — N32.81 OAB (OVERACTIVE BLADDER): ICD-10-CM

## 2024-04-15 RX ORDER — AMLODIPINE BESYLATE 10 MG/1
10 TABLET ORAL DAILY
Qty: 90 TABLET | Refills: 3 | Status: SHIPPED | OUTPATIENT
Start: 2024-04-15

## 2024-04-15 RX ORDER — FESOTERODINE FUMARATE 4 MG/1
4 TABLET, EXTENDED RELEASE ORAL DAILY
Qty: 90 TABLET | Refills: 3 | Status: SHIPPED | OUTPATIENT
Start: 2024-04-15

## 2024-04-15 NOTE — TELEPHONE ENCOUNTER
Recent Visits  Date Type Provider Dept   05/02/23 Office Visit Jeanine Muller, APRN - CNP [facility]   11/01/22 Office Visit Gilles Mccullough,  [facility]   Showing recent visits within past 540 days with a meds authorizing provider and meeting all other requirements  Future Appointments  No visits were found meeting these conditions.  Showing future appointments within next 150 days with a meds authorizing provider and meeting all other requirements

## 2024-04-15 NOTE — TELEPHONE ENCOUNTER
Recent Visits  Date Type Provider Dept   05/02/23 Office Visit Jeanine Muller, APRN - CNP [facility]   11/01/22 Office Visit Gilles Mccullough,  [facility]   Showing recent visits within past 540 days with a meds authorizing provider and meeting all other requirements  Future Appointments  No visits were found meeting these conditions.  Showing future appointments within next 150 days with a meds authorizing provider and meeting all other requirements

## 2024-04-30 DIAGNOSIS — M48.061 SPINAL STENOSIS OF LUMBAR REGION WITHOUT NEUROGENIC CLAUDICATION: ICD-10-CM

## 2024-04-30 RX ORDER — HYDROCODONE BITARTRATE AND ACETAMINOPHEN 5; 325 MG/1; MG/1
1 TABLET ORAL EVERY 6 HOURS PRN
Qty: 120 TABLET | Refills: 0 | Status: SHIPPED | OUTPATIENT
Start: 2024-04-30 | End: 2024-05-30

## 2024-04-30 RX ORDER — ACETAMINOPHEN AND CODEINE PHOSPHATE 300; 30 MG/1; MG/1
1 TABLET ORAL EVERY 6 HOURS PRN
Status: CANCELLED | OUTPATIENT
Start: 2024-04-30

## 2024-06-11 DIAGNOSIS — M48.061 SPINAL STENOSIS OF LUMBAR REGION WITHOUT NEUROGENIC CLAUDICATION: ICD-10-CM

## 2024-06-11 RX ORDER — HYDROCODONE BITARTRATE AND ACETAMINOPHEN 5; 325 MG/1; MG/1
1 TABLET ORAL EVERY 6 HOURS PRN
Qty: 120 TABLET | Refills: 0 | Status: SHIPPED | OUTPATIENT
Start: 2024-06-11 | End: 2024-07-11

## 2024-06-11 NOTE — TELEPHONE ENCOUNTER
Refill sent   Needs appt for further refills  Has to be seen every 6 mos for controlled substance  Last seen may 2023

## 2024-07-23 ENCOUNTER — TELEPHONE (OUTPATIENT)
Dept: FAMILY MEDICINE CLINIC | Age: 69
End: 2024-07-23

## 2024-07-23 DIAGNOSIS — M48.061 SPINAL STENOSIS OF LUMBAR REGION WITHOUT NEUROGENIC CLAUDICATION: ICD-10-CM

## 2024-07-23 RX ORDER — HYDROCODONE BITARTRATE AND ACETAMINOPHEN 5; 325 MG/1; MG/1
1 TABLET ORAL EVERY 6 HOURS PRN
Qty: 120 TABLET | Refills: 0 | Status: SHIPPED | OUTPATIENT
Start: 2024-07-23 | End: 2024-08-22

## 2024-07-23 NOTE — TELEPHONE ENCOUNTER
Recent Visits  Date Type Provider Dept   05/02/23 Office Visit Jeanine Muller, APRN - CNP [facility]   Showing recent visits within past 540 days with a meds authorizing provider and meeting all other requirements  Future Appointments  No visits were found meeting these conditions.  Showing future appointments within next 150 days with a meds authorizing provider and meeting all other requirements

## 2024-07-23 NOTE — TELEPHONE ENCOUNTER
Please let pt know that is the last refill we will send   Needs follow up appt  Has to be seen every 6 mos

## 2024-08-14 PROBLEM — M46.1 SACROILIITIS, NOT ELSEWHERE CLASSIFIED (HCC): Status: ACTIVE | Noted: 2024-08-14

## 2024-08-22 ENCOUNTER — OFFICE VISIT (OUTPATIENT)
Dept: FAMILY MEDICINE CLINIC | Age: 69
End: 2024-08-22

## 2024-08-22 VITALS
TEMPERATURE: 98.3 F | WEIGHT: 110.2 LBS | OXYGEN SATURATION: 96 % | BODY MASS INDEX: 23.13 KG/M2 | HEIGHT: 58 IN | SYSTOLIC BLOOD PRESSURE: 110 MMHG | DIASTOLIC BLOOD PRESSURE: 68 MMHG | RESPIRATION RATE: 14 BRPM | HEART RATE: 78 BPM

## 2024-08-22 DIAGNOSIS — J44.9 STAGE 3 SEVERE COPD BY GOLD CLASSIFICATION (HCC): ICD-10-CM

## 2024-08-22 DIAGNOSIS — M48.061 SPINAL STENOSIS OF LUMBAR REGION WITHOUT NEUROGENIC CLAUDICATION: ICD-10-CM

## 2024-08-22 DIAGNOSIS — I20.9 ANGINA PECTORIS, UNSPECIFIED (HCC): ICD-10-CM

## 2024-08-22 DIAGNOSIS — R13.10 DYSPHAGIA, UNSPECIFIED TYPE: Primary | ICD-10-CM

## 2024-08-22 DIAGNOSIS — I25.119 ATHEROSCLEROSIS OF NATIVE CORONARY ARTERY WITH ANGINA PECTORIS, UNSPECIFIED WHETHER NATIVE OR TRANSPLANTED HEART (HCC): ICD-10-CM

## 2024-08-22 DIAGNOSIS — M46.1 SACROILIITIS, NOT ELSEWHERE CLASSIFIED (HCC): ICD-10-CM

## 2024-08-22 DIAGNOSIS — K30 INDIGESTION: ICD-10-CM

## 2024-08-22 DIAGNOSIS — I10 ESSENTIAL HYPERTENSION: ICD-10-CM

## 2024-08-22 RX ORDER — OMEPRAZOLE 20 MG/1
20 CAPSULE, DELAYED RELEASE ORAL
Qty: 90 CAPSULE | Refills: 1 | Status: SHIPPED | OUTPATIENT
Start: 2024-08-22

## 2024-08-22 RX ORDER — SUCRALFATE ORAL 1 G/10ML
1 SUSPENSION ORAL 4 TIMES DAILY
Qty: 280 ML | Refills: 0 | Status: SHIPPED | OUTPATIENT
Start: 2024-08-22 | End: 2024-08-29

## 2024-08-22 RX ORDER — HYDROCODONE BITARTRATE AND ACETAMINOPHEN 5; 325 MG/1; MG/1
1 TABLET ORAL EVERY 6 HOURS PRN
Qty: 120 TABLET | Refills: 0 | Status: SHIPPED | OUTPATIENT
Start: 2024-08-22 | End: 2024-09-21

## 2024-08-22 RX ORDER — NORTRIPTYLINE HYDROCHLORIDE 25 MG/1
25 CAPSULE ORAL NIGHTLY
Qty: 90 CAPSULE | Refills: 3 | Status: SHIPPED | OUTPATIENT
Start: 2024-08-22

## 2024-08-22 SDOH — ECONOMIC STABILITY: FOOD INSECURITY: WITHIN THE PAST 12 MONTHS, THE FOOD YOU BOUGHT JUST DIDN'T LAST AND YOU DIDN'T HAVE MONEY TO GET MORE.: NEVER TRUE

## 2024-08-22 SDOH — ECONOMIC STABILITY: INCOME INSECURITY: HOW HARD IS IT FOR YOU TO PAY FOR THE VERY BASICS LIKE FOOD, HOUSING, MEDICAL CARE, AND HEATING?: NOT HARD AT ALL

## 2024-08-22 SDOH — ECONOMIC STABILITY: FOOD INSECURITY: WITHIN THE PAST 12 MONTHS, YOU WORRIED THAT YOUR FOOD WOULD RUN OUT BEFORE YOU GOT MONEY TO BUY MORE.: NEVER TRUE

## 2024-08-22 ASSESSMENT — PATIENT HEALTH QUESTIONNAIRE - PHQ9
2. FEELING DOWN, DEPRESSED OR HOPELESS: NOT AT ALL
SUM OF ALL RESPONSES TO PHQ QUESTIONS 1-9: 0
1. LITTLE INTEREST OR PLEASURE IN DOING THINGS: NOT AT ALL
SUM OF ALL RESPONSES TO PHQ9 QUESTIONS 1 & 2: 0
SUM OF ALL RESPONSES TO PHQ QUESTIONS 1-9: 0

## 2024-08-22 NOTE — PROGRESS NOTES
patient, performing an exam, and developing a plan of care.  All questions answered.  Patient and  verbalized understanding.

## 2024-10-02 DIAGNOSIS — M48.061 SPINAL STENOSIS OF LUMBAR REGION WITHOUT NEUROGENIC CLAUDICATION: ICD-10-CM

## 2024-10-02 RX ORDER — HYDROCODONE BITARTRATE AND ACETAMINOPHEN 5; 325 MG/1; MG/1
1 TABLET ORAL EVERY 6 HOURS PRN
Qty: 120 TABLET | Refills: 0 | Status: SHIPPED | OUTPATIENT
Start: 2024-10-02 | End: 2024-11-01

## 2024-11-13 DIAGNOSIS — M48.061 SPINAL STENOSIS OF LUMBAR REGION WITHOUT NEUROGENIC CLAUDICATION: ICD-10-CM

## 2024-11-13 RX ORDER — HYDROCODONE BITARTRATE AND ACETAMINOPHEN 5; 325 MG/1; MG/1
1 TABLET ORAL EVERY 6 HOURS PRN
Qty: 120 TABLET | Refills: 0 | Status: SHIPPED | OUTPATIENT
Start: 2024-11-13 | End: 2024-12-13

## 2024-11-13 NOTE — TELEPHONE ENCOUNTER
Recent Visits  Date Type Provider Dept   08/22/24 Office Visit Jeanine Muller APRN - CNP [facility]   Showing recent visits within past 540 days with a meds authorizing provider and meeting all other requirements  Future Appointments  Date Type Provider Dept   02/25/25 Appointment Jeanine Muller APRN - CNP [facility]   Showing future appointments within next 150 days with a meds authorizing provider and meeting all other requirements

## 2024-11-13 NOTE — TELEPHONE ENCOUNTER
I reviewed an OARRS report on patient today.  There were no abnormal findings on the report.  Rx sent  Electronically signed by GREGORIO Sage CNP on 11/13/24 at 3:42 PM EST

## 2024-12-26 DIAGNOSIS — M48.061 SPINAL STENOSIS OF LUMBAR REGION WITHOUT NEUROGENIC CLAUDICATION: ICD-10-CM

## 2024-12-26 RX ORDER — HYDROCODONE BITARTRATE AND ACETAMINOPHEN 5; 325 MG/1; MG/1
1 TABLET ORAL EVERY 6 HOURS PRN
Qty: 120 TABLET | Refills: 0 | Status: SHIPPED | OUTPATIENT
Start: 2024-12-26 | End: 2025-01-25

## 2025-01-31 ENCOUNTER — HOSPITAL ENCOUNTER (OUTPATIENT)
Dept: WOMENS IMAGING | Age: 70
Discharge: HOME | End: 2025-01-31
Attending: INTERNAL MEDICINE
Payer: MEDICARE

## 2025-01-31 DIAGNOSIS — C50.212 MALIGNANT NEOPLASM OF UPPER-INNER QUADRANT OF LEFT BREAST IN FEMALE, ESTROGEN RECEPTOR POSITIVE (HCC): ICD-10-CM

## 2025-01-31 DIAGNOSIS — Z12.31 VISIT FOR SCREENING MAMMOGRAM: ICD-10-CM

## 2025-01-31 DIAGNOSIS — Z12.31 ENCOUNTER FOR SCREENING MAMMOGRAM FOR MALIGNANT NEOPLASM OF BREAST: ICD-10-CM

## 2025-01-31 DIAGNOSIS — Z17.0 MALIGNANT NEOPLASM OF UPPER-INNER QUADRANT OF LEFT BREAST IN FEMALE, ESTROGEN RECEPTOR POSITIVE (HCC): ICD-10-CM

## 2025-01-31 PROCEDURE — 77063 BREAST TOMOSYNTHESIS BI: CPT

## 2025-02-10 ENCOUNTER — HOSPITAL ENCOUNTER (OUTPATIENT)
Dept: INFUSION THERAPY | Age: 70
Discharge: HOME | End: 2025-02-10
Payer: MEDICARE

## 2025-02-10 ENCOUNTER — OFFICE VISIT (OUTPATIENT)
Dept: ONCOLOGY | Age: 70
End: 2025-02-10
Payer: MEDICARE

## 2025-02-10 VITALS
OXYGEN SATURATION: 98 % | TEMPERATURE: 97.7 F | WEIGHT: 113.6 LBS | SYSTOLIC BLOOD PRESSURE: 101 MMHG | DIASTOLIC BLOOD PRESSURE: 64 MMHG | BODY MASS INDEX: 23.74 KG/M2 | HEART RATE: 94 BPM

## 2025-02-10 VITALS
TEMPERATURE: 97.7 F | SYSTOLIC BLOOD PRESSURE: 101 MMHG | OXYGEN SATURATION: 98 % | RESPIRATION RATE: 16 BRPM | HEART RATE: 94 BPM | DIASTOLIC BLOOD PRESSURE: 64 MMHG

## 2025-02-10 DIAGNOSIS — Z17.0 MALIGNANT NEOPLASM OF UPPER-INNER QUADRANT OF LEFT BREAST IN FEMALE, ESTROGEN RECEPTOR POSITIVE (HCC): Primary | ICD-10-CM

## 2025-02-10 DIAGNOSIS — C50.212 MALIGNANT NEOPLASM OF UPPER-INNER QUADRANT OF LEFT BREAST IN FEMALE, ESTROGEN RECEPTOR POSITIVE (HCC): Primary | ICD-10-CM

## 2025-02-10 PROCEDURE — 99213 OFFICE O/P EST LOW 20 MIN: CPT | Performed by: NURSE PRACTITIONER

## 2025-02-10 PROCEDURE — 3074F SYST BP LT 130 MM HG: CPT | Performed by: NURSE PRACTITIONER

## 2025-02-10 PROCEDURE — 1123F ACP DISCUSS/DSCN MKR DOCD: CPT | Performed by: NURSE PRACTITIONER

## 2025-02-10 PROCEDURE — 1126F AMNT PAIN NOTED NONE PRSNT: CPT | Performed by: NURSE PRACTITIONER

## 2025-02-10 PROCEDURE — 99211 OFF/OP EST MAY X REQ PHY/QHP: CPT

## 2025-02-10 PROCEDURE — 1159F MED LIST DOCD IN RCRD: CPT | Performed by: NURSE PRACTITIONER

## 2025-02-10 PROCEDURE — 3078F DIAST BP <80 MM HG: CPT | Performed by: NURSE PRACTITIONER

## 2025-02-10 NOTE — PROGRESS NOTES
[facility] [address], [address]  [facility] [address]  Dept: [phone]  Dept Fax: [phone]  Loc: [phone]      Avril Kaufman is a 69 y.o. female who presents today for Follow-up (Malignant neoplasm of upper-inner quadrant of left breast in female, estrogen receptor positive (HCC))      HPI:     This patient is followed byfor history of Left breast ER positive, MI positive Ki-67 40% HER2 not amplified, invasive ductal carcinoma grade 2 .     Cancer History:  Left breast ER positive, MI positive Ki-67 40% HER2 not amplified, invasive ductal carcinoma grade 2 s/p lumpectomy 9/29/2016 and radiation therapy completed 12/4/2016 and adjuvant endocrine therapy for 5 years completed 12/2021.     -pT1b, pN0(sn) M0 = stage IA  -not a candidate for genetic testing  -10/2016. Oncotype DX assay came back with a recurrence score 20. (Pre TAILORX) Indicating a 13% risk of distant recurrence over 10 years with tamoxifen alone.  With the absolute benefit of chemotherapy at 10 years negligible as she was in the intermediate risk category.  -Patient did not undergo chemotherapy.  -8/31/2016.  Digital screening mammography bilaterally showed an irregular density in the left breast that was indeterminate.  Compression and lateral medial views confirmed the presence of the mass.    9/9/2016.  Left breast upper inner quadrant core biopsy showed invasive ductal carcinoma grade 2.  Estrogen receptor was positive in 95% of cells and progesterone receptor was positive in 80% of cells. Ki-67 was 40% HER2 was not amplified (HER2/CEP17 ratio  < 2.0 AND average HER2 copy number  < 4.0 signals/cell)    -9/29/2016.  Left breast lumpectomy and sentinel node biopsy by Dr. Joe with final pathology showing moderately differentiated invasive adenocarcinoma measuring 0.9 cm in greatest dimension, grade 2 (Gattman 7 of 9), with negative surgical margins (closest skin at 0.9 cm) with high nuclear grade

## 2025-02-10 NOTE — PATIENT INSTRUCTIONS
Follow up with your primary care provider about getting a Dexa scan and starting medication to improve your bone health.  Your last Dexa scan was in 2021.      Follow up with your PCP about your tremors when you start to walk.    Your mammogram was negative for any cancer.  The recommendation was to have another mammogram in a year.    Continue to take Vitamin D and calcium.     Follow up with your PCP about getting a CT chest for lung cancer screening.  Last one was in 2023.     Your breast exam today did not identify any lumps or breast changes.     Please feel free to call us with any questions or concerns.

## 2025-02-11 DIAGNOSIS — M48.061 SPINAL STENOSIS OF LUMBAR REGION WITHOUT NEUROGENIC CLAUDICATION: ICD-10-CM

## 2025-02-12 RX ORDER — HYDROCODONE BITARTRATE AND ACETAMINOPHEN 5; 325 MG/1; MG/1
1 TABLET ORAL EVERY 6 HOURS PRN
Qty: 120 TABLET | Refills: 0 | Status: SHIPPED | OUTPATIENT
Start: 2025-02-12 | End: 2025-03-14

## 2025-02-12 NOTE — TELEPHONE ENCOUNTER
I reviewed an OARRS report on patient today.  There were no abnormal findings on the report.  Rx sent  Electronically signed by GREGORIO Sage CNP on 2/12/25 at 12:55 PM EST

## 2025-02-26 RX ORDER — ATORVASTATIN CALCIUM 80 MG/1
80 TABLET, FILM COATED ORAL NIGHTLY
Qty: 90 TABLET | Refills: 0 | Status: SHIPPED | OUTPATIENT
Start: 2025-02-26

## 2025-03-27 DIAGNOSIS — M48.061 SPINAL STENOSIS OF LUMBAR REGION WITHOUT NEUROGENIC CLAUDICATION: ICD-10-CM

## 2025-03-27 RX ORDER — HYDROCODONE BITARTRATE AND ACETAMINOPHEN 5; 325 MG/1; MG/1
1 TABLET ORAL EVERY 6 HOURS PRN
Qty: 120 TABLET | Refills: 0 | Status: SHIPPED | OUTPATIENT
Start: 2025-03-27 | End: 2025-04-26

## 2025-04-15 DIAGNOSIS — J44.9 STAGE 3 SEVERE COPD BY GOLD CLASSIFICATION (HCC): ICD-10-CM

## 2025-04-15 RX ORDER — TIOTROPIUM BROMIDE AND OLODATEROL 3.124; 2.736 UG/1; UG/1
SPRAY, METERED RESPIRATORY (INHALATION)
Qty: 4 G | Refills: 0 | OUTPATIENT
Start: 2025-04-15

## 2025-04-18 ENCOUNTER — OFFICE VISIT (OUTPATIENT)
Dept: PULMONOLOGY | Age: 70
End: 2025-04-18
Payer: MEDICARE

## 2025-04-18 VITALS
BODY MASS INDEX: 23.97 KG/M2 | HEIGHT: 58 IN | HEART RATE: 97 BPM | SYSTOLIC BLOOD PRESSURE: 112 MMHG | DIASTOLIC BLOOD PRESSURE: 62 MMHG | TEMPERATURE: 98.9 F | OXYGEN SATURATION: 98 % | WEIGHT: 114.2 LBS

## 2025-04-18 DIAGNOSIS — Z87.891 PERSONAL HISTORY OF TOBACCO USE: Primary | ICD-10-CM

## 2025-04-18 DIAGNOSIS — J44.9 STAGE 3 SEVERE COPD BY GOLD CLASSIFICATION (HCC): ICD-10-CM

## 2025-04-18 PROCEDURE — 99214 OFFICE O/P EST MOD 30 MIN: CPT

## 2025-04-18 PROCEDURE — G8420 CALC BMI NORM PARAMETERS: HCPCS

## 2025-04-18 PROCEDURE — 1160F RVW MEDS BY RX/DR IN RCRD: CPT

## 2025-04-18 PROCEDURE — G0296 VISIT TO DETERM LDCT ELIG: HCPCS

## 2025-04-18 PROCEDURE — 1036F TOBACCO NON-USER: CPT

## 2025-04-18 PROCEDURE — G8399 PT W/DXA RESULTS DOCUMENT: HCPCS

## 2025-04-18 PROCEDURE — 3078F DIAST BP <80 MM HG: CPT

## 2025-04-18 PROCEDURE — 3017F COLORECTAL CA SCREEN DOC REV: CPT

## 2025-04-18 PROCEDURE — 3023F SPIROM DOC REV: CPT

## 2025-04-18 PROCEDURE — 1090F PRES/ABSN URINE INCON ASSESS: CPT

## 2025-04-18 PROCEDURE — G8427 DOCREV CUR MEDS BY ELIG CLIN: HCPCS

## 2025-04-18 PROCEDURE — 1123F ACP DISCUSS/DSCN MKR DOCD: CPT

## 2025-04-18 PROCEDURE — 1159F MED LIST DOCD IN RCRD: CPT

## 2025-04-18 PROCEDURE — 3074F SYST BP LT 130 MM HG: CPT

## 2025-04-18 RX ORDER — ALBUTEROL SULFATE 0.83 MG/ML
2.5 SOLUTION RESPIRATORY (INHALATION) EVERY 6 HOURS PRN
Qty: 120 EACH | Refills: 11 | Status: SHIPPED | OUTPATIENT
Start: 2025-04-18 | End: 2026-04-18

## 2025-04-18 RX ORDER — ALBUTEROL SULFATE 90 UG/1
INHALANT RESPIRATORY (INHALATION)
Qty: 18 G | Refills: 11 | Status: SHIPPED | OUTPATIENT
Start: 2025-04-18

## 2025-04-18 ASSESSMENT — ENCOUNTER SYMPTOMS
RHINORRHEA: 0
CHEST TIGHTNESS: 0

## 2025-04-18 NOTE — PROGRESS NOTES
NODULES:  Some calcified granulomas are present. There are no suspicious pulmonary nodules or masses.    LYMPHADENOPATHY:  There are no pathologically enlarged lymph nodes in the mediastinum, hilar or axillary regions. There are several calcified right hilar lymph nodes.    OTHER (LUNGS/MEDIASTINUM/MUSCULOSKELETAL/ABDOMEN):  During is again seen along the right major fissure. There is no pericardial or pleural effusion. There is stable aneurysmal dilatation of the ascending aorta which measures 4.1 cm in maximum dimension. There are no suspicious findings in the visualized  portions of the upper abdomen.    Impression  1. There are no suspicious masses or nodules within the lung fields.  2. Stable aneurysmal dilatation of the ascending aorta which measures 4.1 cm in maximum dimension.  3. There are no pathologically enlarged lymph nodes.  4. LUNGRADS ASSESSMENT VALUE: 1, LUNGRADS S MODIFIER      The LUNG RADS RECOMMENDATIONS for monitoring lung nodules listed below (ACR- Lung-RADS Version 1.0 Assessment Categories)    LUNG RADS RECOMMENDATIONS;  1.  Normal, continue annual screening  2.  Benign appearance or behavior, continue annual screening  3.  6 month CT recommended  4A.  3 month CT recommended; may consider PET/CT  4B.  Additional diagnostics and/or tissue sampling recommended  4X.  Additional diagnostics and/or tissue sampling recommended    **This report has been created using voice recognition software.  It may contain minor errors which are inherent in voice recognition technology.**    Final report electronically signed by Dr Olaf Bee on 7/17/2023 4:38 PM    Most recent PFT: study dated 7/17/2023    Assessment/Plan   COPD, GOLD 3 classification: Per most recent PFT dated 7/2023. Current therapy includes Stiolto, albuterol PRN. Pt stable on current regimen. Refills ordered.  Follow up in about 6 months or sooner if needed.   Hx of smoking: Per pt quit almost 7 years ago. Most recent CT obtained

## 2025-04-25 ENCOUNTER — HOSPITAL ENCOUNTER (OUTPATIENT)
Dept: CT IMAGING | Age: 70
Discharge: HOME | End: 2025-04-25
Payer: MEDICARE

## 2025-04-25 DIAGNOSIS — Z87.891 PERSONAL HISTORY OF TOBACCO USE: ICD-10-CM

## 2025-04-25 DIAGNOSIS — I10 ESSENTIAL HYPERTENSION: ICD-10-CM

## 2025-04-25 PROCEDURE — 71271 CT THORAX LUNG CANCER SCR C-: CPT

## 2025-04-25 RX ORDER — AMLODIPINE BESYLATE 10 MG/1
10 TABLET ORAL DAILY
Qty: 90 TABLET | Refills: 3 | Status: SHIPPED | OUTPATIENT
Start: 2025-04-25

## 2025-04-27 ENCOUNTER — RESULTS FOLLOW-UP (OUTPATIENT)
Dept: PULMONOLOGY | Age: 70
End: 2025-04-27

## 2025-04-28 ENCOUNTER — TELEPHONE (OUTPATIENT)
Dept: PULMONOLOGY | Age: 70
End: 2025-04-28

## 2025-04-28 NOTE — TELEPHONE ENCOUNTER
Patient called in stating she would like for you to call her with the results to her CT she recently had done on 4/25/25 she preferred to be reach at her home phone [phone]. Please advise.

## 2025-04-29 NOTE — TELEPHONE ENCOUNTER
Patient called back wanting results and I gave her the result per Gissell comment. Patient voiced her understanding and we will see her at her next visit in Oct.

## 2025-05-14 DIAGNOSIS — M48.061 SPINAL STENOSIS OF LUMBAR REGION WITHOUT NEUROGENIC CLAUDICATION: ICD-10-CM

## 2025-05-14 RX ORDER — HYDROCODONE BITARTRATE AND ACETAMINOPHEN 5; 325 MG/1; MG/1
1 TABLET ORAL EVERY 6 HOURS PRN
Qty: 120 TABLET | Refills: 0 | Status: SHIPPED | OUTPATIENT
Start: 2025-05-14 | End: 2025-06-13

## 2025-05-14 NOTE — TELEPHONE ENCOUNTER
Patient needing her [place] sent to [employer] [facility]    Future Appointments   Date Time Provider Department Center   10/24/2025 11:00 AM Donna Prater PA-C N Pulm Med [facility]   2/9/2026  3:00 PM Tess Mireles PA-C N Oncology [facility]

## 2025-05-14 NOTE — TELEPHONE ENCOUNTER
I reviewed an OARRS report on patient today.  There were no abnormal findings on the report.  Rx sent  Electronically signed by GREGORIO Sage CNP on 5/14/25 at 3:28 PM EDT

## 2025-05-23 RX ORDER — TIOTROPIUM BROMIDE AND OLODATEROL 3.124; 2.736 UG/1; UG/1
SPRAY, METERED RESPIRATORY (INHALATION)
Qty: 4 G | Refills: 0 | OUTPATIENT
Start: 2025-05-23

## 2025-05-23 RX ORDER — ATORVASTATIN CALCIUM 80 MG/1
80 TABLET, FILM COATED ORAL NIGHTLY
Qty: 90 TABLET | Refills: 0 | OUTPATIENT
Start: 2025-05-23

## 2025-05-28 RX ORDER — ATORVASTATIN CALCIUM 80 MG/1
80 TABLET, FILM COATED ORAL NIGHTLY
Qty: 90 TABLET | Refills: 0 | OUTPATIENT
Start: 2025-05-28

## 2025-05-29 RX ORDER — ATORVASTATIN CALCIUM 80 MG/1
80 TABLET, FILM COATED ORAL NIGHTLY
Qty: 90 TABLET | Refills: 0 | OUTPATIENT
Start: 2025-05-29

## 2025-05-29 NOTE — TELEPHONE ENCOUNTER
Avril is requesting a refill of their   Requested Prescriptions     Pending Prescriptions Disp Refills    atorvastatin (LIPITOR) 80 MG tablet 90 tablet 0     Sig: Take 1 tablet by mouth nightly   . Please advise.      Last Appt: 1/8/25  Next Appt:     Preferred pharmacy:     [facility] [address] [place] [address] -  [phone] - F [phone]

## 2025-05-30 RX ORDER — ATORVASTATIN CALCIUM 80 MG/1
80 TABLET, FILM COATED ORAL NIGHTLY
Qty: 90 TABLET | Refills: 0 | Status: SHIPPED | OUTPATIENT
Start: 2025-05-30

## 2025-06-17 DIAGNOSIS — M48.061 SPINAL STENOSIS, LUMBAR REGION WITHOUT NEUROGENIC CLAUDICATION: ICD-10-CM

## 2025-06-17 DIAGNOSIS — M48.02 CERVICAL SPINAL STENOSIS: Primary | ICD-10-CM

## 2025-06-17 RX ORDER — HYDROCODONE BITARTRATE AND ACETAMINOPHEN 5; 325 MG/1; MG/1
1 TABLET ORAL EVERY 6 HOURS PRN
COMMUNITY
End: 2025-06-17 | Stop reason: SDUPTHER

## 2025-06-17 RX ORDER — HYDROCODONE BITARTRATE AND ACETAMINOPHEN 5; 325 MG/1; MG/1
1 TABLET ORAL EVERY 6 HOURS PRN
Qty: 120 TABLET | Refills: 0 | Status: SHIPPED | OUTPATIENT
Start: 2025-06-17 | End: 2025-07-17

## 2025-06-17 NOTE — TELEPHONE ENCOUNTER
Patient needs a refill on [facility] to [facility]

## 2025-07-10 ENCOUNTER — TELEPHONE (OUTPATIENT)
Dept: FAMILY MEDICINE CLINIC | Age: 70
End: 2025-07-10

## 2025-07-10 DIAGNOSIS — R25.1 TREMOR: Primary | ICD-10-CM

## 2025-07-10 NOTE — TELEPHONE ENCOUNTER
----- Message from Luigi JENNINGS sent at 7/10/2025  3:26 PM EDT -----  Regarding: [facility] Referral Request  [facility] Referral Request    Reason for referral request: Specialty Provider    Specialist/Lab/Test patient is requesting (if known):Neurologist    Specialist Phone Number (if applicable):      --------------------------------------------------------------------------------------------------------------------------    Relationship to Patient: Self     Call Back Information: OK to leave message on voicemail  Preferred Call Back Number: Phone [phone]

## 2025-07-10 NOTE — TELEPHONE ENCOUNTER
Pt requesting referral to Dr abreu    She states she went to her husbands appt with Dr Jones and he thought she could have parkinson's disease  She would like a referral placed to him  Sxs right arm twitches  Right and left foot issues

## 2025-07-22 ENCOUNTER — OFFICE VISIT (OUTPATIENT)
Dept: CARDIOLOGY CLINIC | Age: 70
End: 2025-07-22
Payer: MEDICARE

## 2025-07-22 VITALS
BODY MASS INDEX: 23.51 KG/M2 | WEIGHT: 112 LBS | SYSTOLIC BLOOD PRESSURE: 104 MMHG | DIASTOLIC BLOOD PRESSURE: 74 MMHG | HEIGHT: 58 IN | HEART RATE: 90 BPM

## 2025-07-22 DIAGNOSIS — M79.89 SWELLING OF LOWER EXTREMITY: ICD-10-CM

## 2025-07-22 DIAGNOSIS — I25.10 CORONARY ARTERY DISEASE INVOLVING NATIVE CORONARY ARTERY OF NATIVE HEART WITHOUT ANGINA PECTORIS: Primary | ICD-10-CM

## 2025-07-22 PROCEDURE — G8399 PT W/DXA RESULTS DOCUMENT: HCPCS | Performed by: INTERNAL MEDICINE

## 2025-07-22 PROCEDURE — 3078F DIAST BP <80 MM HG: CPT | Performed by: INTERNAL MEDICINE

## 2025-07-22 PROCEDURE — 1036F TOBACCO NON-USER: CPT | Performed by: INTERNAL MEDICINE

## 2025-07-22 PROCEDURE — G8420 CALC BMI NORM PARAMETERS: HCPCS | Performed by: INTERNAL MEDICINE

## 2025-07-22 PROCEDURE — 93000 ELECTROCARDIOGRAM COMPLETE: CPT | Performed by: INTERNAL MEDICINE

## 2025-07-22 PROCEDURE — 3017F COLORECTAL CA SCREEN DOC REV: CPT | Performed by: INTERNAL MEDICINE

## 2025-07-22 PROCEDURE — 1090F PRES/ABSN URINE INCON ASSESS: CPT | Performed by: INTERNAL MEDICINE

## 2025-07-22 PROCEDURE — G8428 CUR MEDS NOT DOCUMENT: HCPCS | Performed by: INTERNAL MEDICINE

## 2025-07-22 PROCEDURE — 1123F ACP DISCUSS/DSCN MKR DOCD: CPT | Performed by: INTERNAL MEDICINE

## 2025-07-22 PROCEDURE — 99214 OFFICE O/P EST MOD 30 MIN: CPT | Performed by: INTERNAL MEDICINE

## 2025-07-22 PROCEDURE — 3074F SYST BP LT 130 MM HG: CPT | Performed by: INTERNAL MEDICINE

## 2025-07-22 RX ORDER — FUROSEMIDE 20 MG/1
20 TABLET ORAL 2 TIMES DAILY
Qty: 60 TABLET | Refills: 3 | Status: SHIPPED | OUTPATIENT
Start: 2025-07-22

## 2025-07-22 RX ORDER — HYDROCODONE BITARTRATE AND ACETAMINOPHEN 5; 325 MG/1; MG/1
1 TABLET ORAL EVERY 6 HOURS PRN
COMMUNITY

## 2025-07-22 NOTE — PROGRESS NOTES
[facility] [place] [facility]  [facility]  [address]  [address]  [place] [address]  Dept: [phone]  Dept Fax: [phone]  Loc: [phone]    Visit Date: 7/22/2025    Ms. Kaufman is a 70 y.o. female  who presented for:  Chief Complaint   Patient presents with    Follow-up     Swelling in bilateral feet       HPI:     History of Present Illness  The patient is a 70-year-old female who presents for evaluation of bilateral feet swelling.    She reports experiencing swelling in both feet for the past 5 to 6 months. She has not been using compression stockings and has not been taking any diuretics. She mentions frequent urination and has been on medication for her bladder. She consumes excessive salt and has been sleeping more than usual.  No chest pain, angina, HORAN, orthopnea, PND, sob at rest, palpitations, or syncope.        Patient's  has psoriasis all over his back, which she believes may be due to chemotherapy. He is currently taking Skyrizi and has received 2 starter injections. he is scheduled to receive the next one on 08/22/2025.  She helps with all of his care.    She was referred to Dr. Berkowitz for a potential Parkinson's disease diagnosis.    SOCIAL HISTORY  Diet: The patient reports consuming excessive salt.      Current Outpatient Medications:     HYDROcodone-acetaminophen (NORCO) 5-325 MG per tablet, Take 1 tablet by mouth every 6 hours as needed for Pain. Max Daily Amount: 4 tablets, Disp: , Rfl:     atorvastatin (LIPITOR) 80 MG tablet, Take 1 tablet by mouth nightly, Disp: 90 tablet, Rfl: 0    amLODIPine (NORVASC) 10 MG tablet, Take 1 tablet by mouth once daily, Disp: 90 tablet, Rfl: 3    tiotropium-olodaterol (STIOLTO RESPIMAT) 2.5-2.5 MCG/ACT AERS, Inhale 2 puffs into the lungs daily, Disp: 4 g, Rfl: 5    albuterol sulfate HFA (PROVENTIL;VENTOLIN;PROAIR) 108 (90 Base) MCG/ACT inhaler, INHALE 2 PUFFS BY MOUTH EVERY 6 HOURS AS NEEDED FOR

## 2025-07-22 NOTE — PATIENT INSTRUCTIONS
Your staff today were Molly   Your provider today was Dr. Sharp  Phone number: [phone]

## 2025-07-31 ENCOUNTER — OFFICE VISIT (OUTPATIENT)
Dept: NEUROLOGY | Age: 70
End: 2025-07-31

## 2025-07-31 VITALS
SYSTOLIC BLOOD PRESSURE: 112 MMHG | HEIGHT: 58 IN | DIASTOLIC BLOOD PRESSURE: 64 MMHG | OXYGEN SATURATION: 97 % | HEART RATE: 90 BPM | BODY MASS INDEX: 23.63 KG/M2 | WEIGHT: 112.6 LBS

## 2025-07-31 DIAGNOSIS — G20.A2 PARKINSON'S DISEASE WITHOUT DYSKINESIA, WITH FLUCTUATING MANIFESTATIONS (HCC): Primary | ICD-10-CM

## 2025-07-31 DIAGNOSIS — R25.8 BRADYKINESIA: ICD-10-CM

## 2025-07-31 DIAGNOSIS — R26.89 SHUFFLING GAIT: ICD-10-CM

## 2025-07-31 DIAGNOSIS — G25.2 RESTING TREMOR: ICD-10-CM

## 2025-07-31 RX ORDER — CARBIDOPA AND LEVODOPA 25; 100 MG/1; MG/1
TABLET ORAL
Qty: 90 TABLET | Refills: 3 | Status: SHIPPED | OUTPATIENT
Start: 2025-07-31

## 2025-07-31 RX ORDER — AMLODIPINE BESYLATE 10 MG/1
10 TABLET ORAL DAILY
COMMUNITY
End: 2025-08-01 | Stop reason: SDUPTHER

## 2025-07-31 NOTE — PROGRESS NOTES
Chief Complaint   Patient presents with    Consultation/tremor.        Avril Kaufman is a 70 y.o. female who presents today for evaluation of tremor. Onset 4 years, location right side, characteristics intermittent, when distracted, or focusing on tasks, modifers, severity mild to moderate. Progressive, associated with slow movement and hesitation with turning, also noted to have hypersalivation. She reports poor sleep, smaller hand writing. Occasional falls to the front. She denies chest pain. +ve shortness of breath with exertion ,neck pain. No dysphagia. No fever. No rash. No weight loss.  History obtained from patient, her  and review of the medical records.     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    IDC Lt breast    MI (myocardial infarction) ([facility]) 10/2018    PONV (postoperative nausea and vomiting)        Patient Active Problem List   Diagnosis    Hemorrhoids    Blood in stool    Change in bowel habits    Uterine prolapse    S/P hemorrhoidectomy    Nicotine dependence    Malignant neoplasm of upper-inner quadrant of left female breast ([facility])    S/P lumpectomy and sln bx, left breast    Essential hypertension    Bilateral carpal tunnel syndrome    Lumbar radiculopathy, chronic    Cervical spinal stenosis    STEMI (ST elevation myocardial infarction) ([facility])    Simple chronic bronchitis ([facility])    Diarrhea    Gastroesophageal reflux disease without esophagitis    Spinal stenosis, lumbar

## 2025-07-31 NOTE — PATIENT INSTRUCTIONS
Start Sinemet 25/100 mg three times a day, at 7 am, 12 noon and 5 pm daily.   B12, Folate, B6.   Physical therapy evaluation for gait safety, parkinson.   Call with any new symptoms or concerns.   Follow up in 2 months.

## 2025-08-01 RX ORDER — AMLODIPINE BESYLATE 10 MG/1
10 TABLET ORAL DAILY
Qty: 90 TABLET | Refills: 1 | Status: SHIPPED | OUTPATIENT
Start: 2025-08-01

## 2025-08-01 NOTE — TELEPHONE ENCOUNTER
Pt was seen by Dr. Sharp 7-22-25.  Amlodipine was discontinued and pt was put on Furosemide.    Pt states since this switch - she was getting way to hot, headaches and nausea and couldn't sleep.    Pt states she stopped taking Furosemide and restarted the amlodipine.  Pt asking if this is okay.    If so - will need a refill sent to [facility].    Pt states it is okay to leave a message on voicemail.

## 2025-08-04 DIAGNOSIS — M48.02 CERVICAL SPINAL STENOSIS: ICD-10-CM

## 2025-08-04 DIAGNOSIS — M48.061 SPINAL STENOSIS, LUMBAR REGION WITHOUT NEUROGENIC CLAUDICATION: ICD-10-CM

## 2025-08-04 RX ORDER — HYDROCODONE BITARTRATE AND ACETAMINOPHEN 5; 325 MG/1; MG/1
1 TABLET ORAL EVERY 6 HOURS PRN
Refills: 0 | Status: CANCELLED | OUTPATIENT
Start: 2025-08-04

## 2025-08-04 RX ORDER — HYDROCODONE BITARTRATE AND ACETAMINOPHEN 5; 325 MG/1; MG/1
1 TABLET ORAL EVERY 6 HOURS PRN
Qty: 120 TABLET | Refills: 0 | Status: SHIPPED | OUTPATIENT
Start: 2025-08-04 | End: 2025-09-03

## 2025-08-21 ENCOUNTER — LAB (OUTPATIENT)
Dept: LAB | Age: 70
End: 2025-08-21

## 2025-08-21 DIAGNOSIS — G20.A2 PARKINSON'S DISEASE WITHOUT DYSKINESIA, WITH FLUCTUATING MANIFESTATIONS (HCC): ICD-10-CM

## 2025-08-21 DIAGNOSIS — R26.89 SHUFFLING GAIT: ICD-10-CM

## 2025-08-21 DIAGNOSIS — R25.8 BRADYKINESIA: ICD-10-CM

## 2025-08-21 LAB
FOLATE SERPL-MCNC: > 20 NG/ML (ref 4.6–34.8)
VIT B12 SERPL-MCNC: 515 PG/ML (ref 232–1245)

## 2025-08-26 DIAGNOSIS — M48.061 SPINAL STENOSIS OF LUMBAR REGION WITHOUT NEUROGENIC CLAUDICATION: ICD-10-CM

## 2025-08-26 RX ORDER — ATORVASTATIN CALCIUM 80 MG/1
80 TABLET, FILM COATED ORAL NIGHTLY
Qty: 90 TABLET | Refills: 3 | Status: SHIPPED | OUTPATIENT
Start: 2025-08-26

## 2025-08-26 RX ORDER — NORTRIPTYLINE HYDROCHLORIDE 25 MG/1
25 CAPSULE ORAL NIGHTLY
Qty: 30 CAPSULE | Refills: 0 | Status: SHIPPED | OUTPATIENT
Start: 2025-08-26

## 2025-08-28 ENCOUNTER — RESULTS FOLLOW-UP (OUTPATIENT)
Dept: NEUROLOGY | Age: 70
End: 2025-08-28

## 2025-08-28 LAB — PYRIDOXAL PHOS SERPL-SCNC: 17.1 NMOL/L (ref 20–125)

## (undated) DEVICE — ENDO KIT: Brand: MEDLINE INDUSTRIES, INC.

## (undated) DEVICE — SOLUTION IV 500ML 0.9% SOD CHL PH 5 INJ USP VIAFLX PLAS

## (undated) DEVICE — TOTAL TRAY, DB, 100% SILI FOLEY, 16FR 10: Brand: MEDLINE

## (undated) DEVICE — STRIP,CLOSURE,WOUND,MEDI-STRIP,1/2X4: Brand: MEDLINE

## (undated) DEVICE — TUBING IV STOPCOCK 48 CM 3 W

## (undated) DEVICE — SOLUTION IV 1000ML 0.9% SOD CHL PH 5 INJ USP VIAFLX PLAS

## (undated) DEVICE — CODMAN® SURGICAL PATTIES 1" X 1" (2.54CM X 2.54CM): Brand: CODMAN®

## (undated) DEVICE — 4-PORT MANIFOLD: Brand: NEPTUNE 2

## (undated) DEVICE — CONNECTOR TBNG AUX H2O JET DISP FOR OLY 160/180 SER

## (undated) DEVICE — SPONGE GZ W4XL4IN COT 12 PLY TYP VII WVN C FLD DSGN

## (undated) DEVICE — FORCEPS BX L L240CM DIA2.4MM RAD JAW 4 HOT FOR POLYP DISP

## (undated) DEVICE — 3M™ BAIR HUGGER® MULTI ACCESS BLANKET, PEDIATRIC, FULL BODY, 10 PER CASE 31000: Brand: BAIR HUGGER™

## (undated) DEVICE — SUTURE ETHLN SZ 3-0 L18IN NONABSORBABLE BLK FS-1 L24MM 3/8 663H

## (undated) DEVICE — SUTURE VCRL SZ 1 L18IN ABSRB VLT CTX L48MM 1/2 CIR J765D

## (undated) DEVICE — 1010 S-DRAPE TOWEL DRAPE 10/BX: Brand: STERI-DRAPE™

## (undated) DEVICE — SPLINT ARMBRD W3XL10.5IN POLYFOAM DLX A LN

## (undated) DEVICE — NEEDLE SPNL L3.5IN PNK HUB S STL REG WALL FIT STYL W/ QNCKE

## (undated) DEVICE — SET CATH 20GA L1.75IN RAD ART POLYUR RADPQ W/ INTEGR

## (undated) DEVICE — TUBING, SUCTION, 1/4" X 20', STRAIGHT: Brand: MEDLINE INDUSTRIES, INC.

## (undated) DEVICE — BIPOLAR SEALER 23-112-1 AQM 6.0: Brand: AQUAMANTYS ®

## (undated) DEVICE — DRESSING,GAUZE,XEROFORM,CURAD,5"X9",ST: Brand: CURAD

## (undated) DEVICE — KAIRISON TUBING SET PNEUMATIC, (3000 MM), STERILE, DISPOSABLE, TO BE USED WITH: FK898R, PACKAGE OF 10 PIECES: Brand: KAIRISON

## (undated) DEVICE — GLOVE ORANGE PI 8   MSG9080

## (undated) DEVICE — 450 ML BOTTLE OF 0.05% CHLORHEXIDINE GLUCONATE IN 99.95% STERILE WATER FOR IRRIGATION, USP AND APPLICATOR.: Brand: IRRISEPT ANTIMICROBIAL WOUND LAVAGE

## (undated) DEVICE — GLOVE SURG SZ 65 THK91MIL LTX FREE SYN POLYISOPRENE

## (undated) DEVICE — JCKSON TBL POSTNER NO HD REST: Brand: MEDLINE INDUSTRIES, INC.

## (undated) DEVICE — THE MILL DISPOSABLE - MEDIUM

## (undated) DEVICE — RESERVOIR,SUCTION,100CC,SILICONE: Brand: MEDLINE

## (undated) DEVICE — SOLUTION IV 1000ML LAC RINGERS PH 6.5 INJ USP VIAFLX PLAS

## (undated) DEVICE — TAPE SURG W4INXL11YD 2IN PERF LINERLESS NONWOVEN MEDFIX EZ

## (undated) DEVICE — SUTURE NRLN SZ 4-0 L18IN NONABSORBABLE BLK L17MM RB-1 1/2 C554D

## (undated) DEVICE — 3M™ RANGER™ BLOOD/FLUID WARMING HIGH FLOW SET, 24355, 10/CASE: Brand: 3M™ RANGER™

## (undated) DEVICE — SUTURE NRLN SZ 4-0 L18IN NONABSORBABLE BLK L13MM TF 1/2 CIR C584D

## (undated) DEVICE — SUTURE VCRL SZ 0 L27IN ABSRB UD L36MM CP-1 1/2 CIR REV CUT J267H

## (undated) DEVICE — SET LNR RED GRN W/ BASE CLEANASCOPE

## (undated) DEVICE — YANKAUER,BULB TIP,W/O VENT,RIGID,STERILE: Brand: MEDLINE

## (undated) DEVICE — BASIC SINGLE BASIN BTC-LF: Brand: MEDLINE INDUSTRIES, INC.

## (undated) DEVICE — Device

## (undated) DEVICE — FORCEPS BX L240CM JAW DIA3.2MM L CAP W/ NDL MIC MESH TOOTH

## (undated) DEVICE — GLOVE ORANGE PI 7   MSG9070

## (undated) DEVICE — BLADE LARYNSCP SZ 3 ENH DIR INTUB GLIDESCOPE MCGRATH MAC

## (undated) DEVICE — SYRINGE MED 10ML LUERLOCK TIP W/O SFTY DISP

## (undated) DEVICE — GLOVE ORANGE PI 8 1/2   MSG9085

## (undated) DEVICE — SUTURE ETHLN SZ 2-0 L30IN NONABSORBABLE BLK L36MM FSLX 3/8 1674H

## (undated) DEVICE — SUTURE ABSRB BRAID COAT UD CP NO 2 27IN VCRL J195H

## (undated) DEVICE — CATHETER ETER IV 22GA L1IN POLYUR STR RADPQ INTROCAN SFTY

## (undated) DEVICE — INTENDED FOR TISSUE SEPARATION, AND OTHER PROCEDURES THAT REQUIRE A SHARP SURGICAL BLADE TO PUNCTURE OR CUT.: Brand: BARD-PARKER ® CARBON RIB-BACK BLADES

## (undated) DEVICE — GLOVE SURG SZ 9 THK91MIL LTX FREE SYN POLYISOPRENE ANTI

## (undated) DEVICE — GLOVE ORANGE PI 7 1/2   MSG9075

## (undated) DEVICE — 4.0MM PRECISION ROUND

## (undated) DEVICE — COVER ARMBRD W13XL28.5IN IMPERV BLU FOR OP RM

## (undated) DEVICE — 500ML,PRESSURE INFUSER W/STOPCOCK: Brand: MEDLINE

## (undated) DEVICE — SOLUTION IV IRRIG WATER 1000ML POUR BRL 2F7114

## (undated) DEVICE — AEGIS 1" DISK 4MM HOLE, PEEL OPEN: Brand: MEDLINE

## (undated) DEVICE — DRAIN SURG 10FR PVC TB W/ TRCR MID PERF NO RESVR HUBLESS

## (undated) DEVICE — KIT EVAC 400CC PVC RADPQ Y CONN

## (undated) DEVICE — PROBE STIM 3 MM FOR PEDCL SCREW DISP

## (undated) DEVICE — SPONGE LAP W18XL18IN WHT COT 4 PLY FLD STRUNG RADPQ DISP ST

## (undated) DEVICE — GOWN,SIRUS,NON REINFRCD,LARGE,SET IN SL: Brand: MEDLINE

## (undated) DEVICE — HEAD POSITIONER, SURGICAL: Brand: DEROYAL

## (undated) DEVICE — BUR RND FLUT AGRSV 5MM

## (undated) DEVICE — GOWN,SIRUS,NONRNF,SETINSLV,XL,20/CS: Brand: MEDLINE

## (undated) DEVICE — IV START KIT: Brand: MEDLINE INDUSTRIES, INC.

## (undated) DEVICE — PATIENT RETURN ELECTRODE, SINGLE-USE, CONTACT QUALITY MONITORING, ADULT, WITH 9FT CORD, FOR PATIENTS WEIGING OVER 33LBS. (15KG): Brand: MEGADYNE

## (undated) DEVICE — SUTURE VCRL SZ 2-0 L27IN ABSRB UD L36MM CP-1 1/2 CIR REV J266H

## (undated) DEVICE — DRAIN SURG FLAT W7MMXL20CM FULL PERF

## (undated) DEVICE — SOLUTION IV 1000ML 0.45% SOD CHL PH 5 INJ USP VIAFLX PLAS

## (undated) DEVICE — E-Z CLEAN, NON-STICK, PTFE COATED, ELECTROSURGICAL BLADE ELECTRODE, 6.5 INCH (16.5 CM): Brand: MEGADYNE

## (undated) DEVICE — SUTURE NONABSORBABLE MONOFILAMENT 5-0 C-1 1X24 IN PROLENE 8725H

## (undated) DEVICE — BLANKET WRM W29.9XL79.1IN UP BODY FORC AIR MISTRAL-AIR

## (undated) DEVICE — SET CATH 20GA L1.5IN RAD ART POLYUR RADPQ W/ INTEGR 0.018IN

## (undated) DEVICE — PRESSURE MONITORING SET: Brand: TRUWAVE

## (undated) DEVICE — SET ADMIN 25ML L117IN PMP MOD CK VLV RLER CLMP 2 SMRTSITE